# Patient Record
Sex: FEMALE | Race: WHITE | NOT HISPANIC OR LATINO | Employment: FULL TIME | URBAN - METROPOLITAN AREA
[De-identification: names, ages, dates, MRNs, and addresses within clinical notes are randomized per-mention and may not be internally consistent; named-entity substitution may affect disease eponyms.]

---

## 2017-01-11 ENCOUNTER — APPOINTMENT (OUTPATIENT)
Dept: LAB | Facility: HOSPITAL | Age: 62
End: 2017-01-11

## 2017-01-11 DIAGNOSIS — E78.5 HYPERLIPIDEMIA, UNSPECIFIED HYPERLIPIDEMIA TYPE: Primary | ICD-10-CM

## 2017-01-11 DIAGNOSIS — E03.9 HYPOTHYROIDISM, UNSPECIFIED TYPE: ICD-10-CM

## 2017-01-11 LAB
ALBUMIN SERPL-MCNC: 4.6 G/DL (ref 3.5–5.2)
ALBUMIN/GLOB SERPL: 1.6 G/DL
ALP SERPL-CCNC: 82 U/L (ref 39–117)
ALT SERPL W P-5'-P-CCNC: 15 U/L (ref 1–33)
ANION GAP SERPL CALCULATED.3IONS-SCNC: 11.9 MMOL/L
AST SERPL-CCNC: 18 U/L (ref 1–32)
BILIRUB SERPL-MCNC: 0.6 MG/DL (ref 0.1–1.2)
BUN BLD-MCNC: 18 MG/DL (ref 8–23)
BUN/CREAT SERPL: 20.5 (ref 7–25)
CALCIUM SPEC-SCNC: 9.3 MG/DL (ref 8.6–10.5)
CHLORIDE SERPL-SCNC: 104 MMOL/L (ref 98–107)
CHOLEST SERPL-MCNC: 219 MG/DL (ref 0–200)
CO2 SERPL-SCNC: 26.1 MMOL/L (ref 22–29)
CREAT BLD-MCNC: 0.88 MG/DL (ref 0.57–1)
GFR SERPL CREATININE-BSD FRML MDRD: 65 ML/MIN/1.73
GLOBULIN UR ELPH-MCNC: 2.9 GM/DL
GLUCOSE BLD-MCNC: 94 MG/DL (ref 65–99)
HDLC SERPL-MCNC: 87 MG/DL (ref 40–60)
LDLC SERPL CALC-MCNC: 114 MG/DL (ref 0–100)
LDLC/HDLC SERPL: 1.31 {RATIO}
POTASSIUM BLD-SCNC: 4 MMOL/L (ref 3.5–5.2)
PROT SERPL-MCNC: 7.5 G/DL (ref 6–8.5)
SODIUM BLD-SCNC: 142 MMOL/L (ref 136–145)
TRIGL SERPL-MCNC: 88 MG/DL (ref 0–150)
TSH SERPL DL<=0.05 MIU/L-ACNC: 1.9 MIU/ML (ref 0.27–4.2)
VLDLC SERPL-MCNC: 17.6 MG/DL (ref 5–40)

## 2017-01-11 PROCEDURE — 80061 LIPID PANEL: CPT | Performed by: INTERNAL MEDICINE

## 2017-01-11 PROCEDURE — 84443 ASSAY THYROID STIM HORMONE: CPT | Performed by: INTERNAL MEDICINE

## 2017-01-11 PROCEDURE — 80053 COMPREHEN METABOLIC PANEL: CPT | Performed by: INTERNAL MEDICINE

## 2017-01-11 RX ORDER — ESTRADIOL 0.5 MG/1
TABLET ORAL
Qty: 90 TABLET | Refills: 0 | Status: SHIPPED | OUTPATIENT
Start: 2017-01-11 | End: 2017-06-30 | Stop reason: SDUPTHER

## 2017-01-11 RX ORDER — SOLIFENACIN SUCCINATE 5 MG/1
TABLET, FILM COATED ORAL
Qty: 90 TABLET | Refills: 1 | Status: SHIPPED | OUTPATIENT
Start: 2017-01-11 | End: 2017-08-09 | Stop reason: SDUPTHER

## 2017-01-16 ENCOUNTER — OFFICE VISIT (OUTPATIENT)
Dept: INTERNAL MEDICINE | Facility: CLINIC | Age: 62
End: 2017-01-16

## 2017-01-16 VITALS
WEIGHT: 157 LBS | HEART RATE: 90 BPM | SYSTOLIC BLOOD PRESSURE: 134 MMHG | DIASTOLIC BLOOD PRESSURE: 84 MMHG | HEIGHT: 58 IN | OXYGEN SATURATION: 98 % | BODY MASS INDEX: 32.95 KG/M2

## 2017-01-16 DIAGNOSIS — E78.00 HYPERCHOLESTEROLEMIA: ICD-10-CM

## 2017-01-16 DIAGNOSIS — F41.9 ANXIETY: Primary | ICD-10-CM

## 2017-01-16 DIAGNOSIS — E03.9 HYPOTHYROIDISM, UNSPECIFIED TYPE: ICD-10-CM

## 2017-01-16 PROCEDURE — 99214 OFFICE O/P EST MOD 30 MIN: CPT | Performed by: INTERNAL MEDICINE

## 2017-01-16 RX ORDER — ALPRAZOLAM 0.25 MG/1
0.25 TABLET ORAL 3 TIMES DAILY PRN
Qty: 30 TABLET | Refills: 0 | Status: SHIPPED | OUTPATIENT
Start: 2017-01-16 | End: 2019-06-21 | Stop reason: SDUPTHER

## 2017-01-16 NOTE — MR AVS SNAPSHOT
Salazar MICHAEL Foy   1/16/2017 9:45 AM   Office Visit    Dept Phone:  [phone]   Encounter #:  [id number]    Provider:  Michelle Wyman MD   Department:  [facility] INTERNAL MEDICINE                Your Full Care Plan              Today's Medication Changes          These changes are accurate as of: 1/16/17 10:35 AM.  If you have any questions, ask your nurse or doctor.               Medication(s)that have changed:     ALPRAZolam 0.25 MG tablet   Commonly known as:  XANAX   Take 1 tablet by mouth 3 (Three) Times a Day As Needed for anxiety.   What changed:    - how much to take  - when to take this  - reasons to take this         Stop taking medication(s)listed here:     calcium carbonate 600 MG tablet   Commonly known as:  OS-HUMBERTO           vitamin D 31814 UNITS capsule capsule   Commonly known as:  ERGOCALCIFEROL           Vitamin D3 2000 UNITS capsule                Where to Get Your Medications      You can get these medications from any pharmacy     Bring a paper prescription for each of these medications     ALPRAZolam 0.25 MG tablet                  Your Updated Medication List          This list is accurate as of: 1/16/17 10:35 AM.  Always use your most recent med list.                ALPRAZolam 0.25 MG tablet   Commonly known as:  XANAX   Take 1 tablet by mouth 3 (Three) Times a Day As Needed for anxiety.       aspirin 81 MG tablet       docusate sodium 100 MG capsule   Commonly known as:  COLACE       estradiol 0.5 MG tablet   Commonly known as:  ESTRACE   TAKE ONE TABLET BY MOUTH DAILY       fluticasone 50 MCG/ACT nasal spray   Commonly known as:  FLONASE       levothyroxine 137 MCG tablet   Commonly known as:  SYNTHROID, LEVOTHROID   TAKE ONE TABLET BY MOUTH DAILY       Magnesium 250 MG tablet       omeprazole 20 MG capsule   Commonly known as:  priLOSEC       simvastatin 40 MG tablet   Commonly known as:  ZOCOR   TAKE ONE TABLET BY MOUTH EVERY NIGHT AT BEDTIME        traZODone 50 MG tablet   Commonly known as:  DESYREL       valACYclovir 1000 MG tablet   Commonly known as:  VALTREX   TAKE ONE TABLET BY MOUTH DAILY       VESICARE 5 MG tablet   Generic drug:  solifenacin   TAKE ONE TABLET BY MOUTH DAILY               You Were Diagnosed With        Codes Comments    Anxiety    -  Primary ICD-10-CM: F41.9  ICD-9-CM: 300.00     Hypothyroidism, unspecified type     ICD-10-CM: E03.9  ICD-9-CM: 244.9     Hypercholesterolemia     ICD-10-CM: E78.00  ICD-9-CM: 272.0       Instructions     None    Patient Instructions History      Upcoming Appointments     Visit Type Date Time Department    OFFICE VISIT 2017  9:45 AM [facility]    OFFICE VISIT 2017 11:00 AM [facility]     [facility] allows you to send messages to your doctor, view your test results, renew your prescriptions, schedule appointments, and more. To sign up, go to [employer] and click on the Sign Up Now link in the New User? box. Enter your [facility] Activation Code exactly as it appears below along with the last four digits of your Social Security Number and your Date of Birth () to complete the sign-up process. If you do not sign up before the expiration date, you must request a new code.    [facility] Activation Code: [email or web address]  Expires: 2017 10:35 AM    If you have questions, you can email [email or web address] or call [phone] to talk to our [facility] staff. Remember, [facility] is NOT to be used for urgent needs. For medical emergencies, dial 911.               Other Info from Your Visit           Your Appointments     2017 11:00 AM EDT   Office Visit with Michelle Wyman MD   [facility] INTER[facility] (--)    [address] [place] [id number] [phone]           Arrive 15 minutes prior to appointment.              Allergies     Codeine      Morphine And Related      Penicillins       "  Reason for Visit     Hyperlipidemia     Hypothyroidism     grief           Vital Signs     Blood Pressure Pulse Height Weight Oxygen Saturation Body Mass Index    134/84 90 58\" (147.3 cm) 157 lb (71.2 kg) 98% 32.81 kg/m2    Smoking Status                   Never Smoker           Problems and Diagnoses Noted     Anxiety problem    High cholesterol    Underactive thyroid        "

## 2017-01-16 NOTE — PROGRESS NOTES
Subjective     Marie Foy is a 61 y.o. female who presents with   Chief Complaint   Patient presents with   • Hyperlipidemia   • Hypothyroidism   • grief       History of Present Illness     HLD.  She is maintained on simvastatin with good control.  Hypothyroidism.  She is well-controlled on levothyroxine.   Grief.  She just lost her  one month ago.  She is feeling very anxious.  She is not sleeping well.  Feels her heart is beating hard with anxiety.     Review of Systems   Respiratory: Negative for shortness of breath.    Cardiovascular: Negative for chest pain.       The following portions of the patient's history were reviewed and updated as appropriate: allergies, current medications and problem list.    Patient Active Problem List    Diagnosis Date Noted   • Fixation hardware in foot 04/21/2016   • Closed nondisplaced fracture of fifth right metatarsal bone 04/21/2016   • Anxiety 02/26/2016   • Gastroesophageal reflux disease 02/26/2016   • Herpes simplex type 2 infection 02/26/2016   • Hypercholesterolemia 02/26/2016   • Hypothyroidism 02/26/2016   • Osteopenia 02/26/2016     Note Last Updated: 2/26/2016     Description: Actonel start date 2006     • Overactive bladder 02/26/2016   • Fear of flying 02/26/2016     Note Last Updated: 2/26/2016     Description: flying and dental work     • Unilateral partial paralysis of vocal cords or larynx 02/26/2016     Note Last Updated: 2/26/2016     Description: chronic     • Colon polyps        Current Outpatient Prescriptions on File Prior to Visit   Medication Sig Dispense Refill   • aspirin 81 MG tablet Take  by mouth.     • docusate sodium (COLACE) 100 MG capsule Take  by mouth daily.     • estradiol (ESTRACE) 0.5 MG tablet TAKE ONE TABLET BY MOUTH DAILY 90 tablet 0   • fluticasone (FLONASE) 50 MCG/ACT nasal spray into each nostril.     • levothyroxine (SYNTHROID, LEVOTHROID) 137 MCG tablet TAKE ONE TABLET BY MOUTH DAILY 90 tablet 0   • Magnesium 250 MG  "tablet Take  by mouth.     • omeprazole (PriLOSEC) 20 MG capsule Take  by mouth.     • simvastatin (ZOCOR) 40 MG tablet TAKE ONE TABLET BY MOUTH EVERY NIGHT AT BEDTIME 90 tablet 1   • traZODone (DESYREL) 50 MG tablet Take  by mouth.     • valACYclovir (VALTREX) 1000 MG tablet TAKE ONE TABLET BY MOUTH DAILY 30 tablet 4   • VESICARE 5 MG tablet TAKE ONE TABLET BY MOUTH DAILY 90 tablet 1   • [DISCONTINUED] ALPRAZolam (XANAX) 0.25 MG tablet Take  by mouth.     • [DISCONTINUED] calcium carbonate (OS-HUMBERTO) 600 MG tablet Take 600 mg by mouth daily.     • [DISCONTINUED] Cholecalciferol (VITAMIN D3) 2000 UNITS capsule Take  by mouth.     • [DISCONTINUED] vitamin D (ERGOCALCIFEROL) 08063 UNITS capsule capsule Take 1 capsule by mouth 2 (two) times a week. 12 capsule 1     No current facility-administered medications on file prior to visit.        Objective     Visit Vitals   • /84   • Pulse 90   • Ht 58\" (147.3 cm)   • Wt 157 lb (71.2 kg)   • SpO2 98%   • BMI 32.81 kg/m2       Physical Exam   Constitutional: She is oriented to person, place, and time. She appears well-developed and well-nourished.   HENT:   Head: Normocephalic and atraumatic.   Cardiovascular: Normal rate, regular rhythm and normal heart sounds.    Pulmonary/Chest: Effort normal and breath sounds normal.   Neurological: She is alert and oriented to person, place, and time.   Skin: Skin is warm and dry.   Psychiatric: She has a normal mood and affect. Her behavior is normal.       Assessment/Plan   Marie was seen today for hyperlipidemia, hypothyroidism and grief.    Diagnoses and all orders for this visit:    Anxiety    Hypothyroidism, unspecified type    Hypercholesterolemia    Other orders  -     ALPRAZolam (XANAX) 0.25 MG tablet; Take 1 tablet by mouth 3 (Three) Times a Day As Needed for anxiety.        Discussion  HLD.  Contiue simvastatin.   Hypothyroidism.  Continue levothyroxine.   Grief and situation anxiety.  Prn Xanax.  Discussed with the " patient onset on action and the potential side effects including addictive nature and sedation.  The patient will let me know of any side effects from the medication.      Harsha and belen updated today.    25 minutes spent with the patient with greater than 50% of time spent counseling the following topics:, diagnostic results, impressions         No future appointments.

## 2017-02-01 ENCOUNTER — DOCUMENTATION (OUTPATIENT)
Dept: PHYSICAL THERAPY | Facility: CLINIC | Age: 62
End: 2017-02-01

## 2017-02-01 NOTE — PROGRESS NOTES
Discharge Summary  Discharge Summary from Physical Therapy Report      Dates  PT visit: 06/15/16 - 07/25/16  Number of Visits: 9     Discharge Status of Patient: See treatment note dated 07/25/16    Goals: Partially Met    Discharge Plan: Continue with current home exercise program as instructed    Comments Patient did not return for final appointment prior to discharge.    Date of Discharge 02/01/17        Lalitha Murphy, PT, DPT  Physical Therapist

## 2017-03-09 RX ORDER — LEVOTHYROXINE SODIUM 137 UG/1
TABLET ORAL
Qty: 90 TABLET | Refills: 0 | Status: SHIPPED | OUTPATIENT
Start: 2017-03-09 | End: 2017-08-09 | Stop reason: SDUPTHER

## 2017-05-01 RX ORDER — SIMVASTATIN 40 MG
TABLET ORAL
Qty: 90 TABLET | Refills: 0 | Status: SHIPPED | OUTPATIENT
Start: 2017-05-01 | End: 2017-05-10

## 2017-05-03 DIAGNOSIS — E78.00 HYPERCHOLESTEROLEMIA: ICD-10-CM

## 2017-05-03 DIAGNOSIS — M85.80 OSTEOPENIA: Primary | ICD-10-CM

## 2017-05-03 DIAGNOSIS — E03.9 HYPOTHYROIDISM, UNSPECIFIED TYPE: ICD-10-CM

## 2017-05-04 LAB
ALBUMIN SERPL-MCNC: 4.7 G/DL (ref 3.5–5.2)
ALBUMIN/GLOB SERPL: 1.7 G/DL
ALP SERPL-CCNC: 86 U/L (ref 39–117)
ALT SERPL-CCNC: 18 U/L (ref 1–33)
APPEARANCE UR: CLEAR
AST SERPL-CCNC: 17 U/L (ref 1–32)
BACTERIA #/AREA URNS HPF: ABNORMAL /HPF
BASOPHILS # BLD AUTO: 0.02 10*3/MM3 (ref 0–0.2)
BASOPHILS NFR BLD AUTO: 0.5 % (ref 0–1.5)
BILIRUB SERPL-MCNC: 0.5 MG/DL (ref 0.1–1.2)
BILIRUB UR QL STRIP: NEGATIVE
BUN SERPL-MCNC: 18 MG/DL (ref 8–23)
BUN/CREAT SERPL: 20.2 (ref 7–25)
CALCIUM SERPL-MCNC: 9.4 MG/DL (ref 8.6–10.5)
CASTS URNS MICRO: ABNORMAL
CHLORIDE SERPL-SCNC: 102 MMOL/L (ref 98–107)
CHOLEST SERPL-MCNC: 234 MG/DL (ref 0–200)
CO2 SERPL-SCNC: 27.7 MMOL/L (ref 22–29)
COLOR UR: YELLOW
CREAT SERPL-MCNC: 0.89 MG/DL (ref 0.57–1)
EOSINOPHIL # BLD AUTO: 0.08 10*3/MM3 (ref 0–0.7)
EOSINOPHIL NFR BLD AUTO: 2 % (ref 0.3–6.2)
EPI CELLS #/AREA URNS HPF: ABNORMAL /HPF
ERYTHROCYTE [DISTWIDTH] IN BLOOD BY AUTOMATED COUNT: 13 % (ref 11.7–13)
GLOBULIN SER CALC-MCNC: 2.7 GM/DL
GLUCOSE SERPL-MCNC: 94 MG/DL (ref 65–99)
GLUCOSE UR QL: NEGATIVE
HCT VFR BLD AUTO: 40.4 % (ref 35.6–45.5)
HDLC SERPL-MCNC: 85 MG/DL (ref 40–60)
HGB BLD-MCNC: 13.1 G/DL (ref 11.9–15.5)
HGB UR QL STRIP: NEGATIVE
IMM GRANULOCYTES # BLD: 0 10*3/MM3 (ref 0–0.03)
IMM GRANULOCYTES NFR BLD: 0 % (ref 0–0.5)
KETONES UR QL STRIP: NEGATIVE
LDLC SERPL CALC-MCNC: 114 MG/DL (ref 0–100)
LEUKOCYTE ESTERASE UR QL STRIP: NEGATIVE
LYMPHOCYTES # BLD AUTO: 1.54 10*3/MM3 (ref 0.9–4.8)
LYMPHOCYTES NFR BLD AUTO: 38.4 % (ref 19.6–45.3)
MCH RBC QN AUTO: 29.4 PG (ref 26.9–32)
MCHC RBC AUTO-ENTMCNC: 32.4 G/DL (ref 32.4–36.3)
MCV RBC AUTO: 90.8 FL (ref 80.5–98.2)
MONOCYTES # BLD AUTO: 0.29 10*3/MM3 (ref 0.2–1.2)
MONOCYTES NFR BLD AUTO: 7.2 % (ref 5–12)
NEUTROPHILS # BLD AUTO: 2.08 10*3/MM3 (ref 1.9–8.1)
NEUTROPHILS NFR BLD AUTO: 51.9 % (ref 42.7–76)
NITRITE UR QL STRIP: NEGATIVE
PH UR STRIP: 7.5 [PH] (ref 5–8)
PLATELET # BLD AUTO: 230 10*3/MM3 (ref 140–500)
POTASSIUM SERPL-SCNC: 4.4 MMOL/L (ref 3.5–5.2)
PROT SERPL-MCNC: 7.4 G/DL (ref 6–8.5)
PROT UR QL STRIP: NEGATIVE
RBC # BLD AUTO: 4.45 10*6/MM3 (ref 3.9–5.2)
RBC #/AREA URNS HPF: ABNORMAL /HPF
SODIUM SERPL-SCNC: 142 MMOL/L (ref 136–145)
SP GR UR: 1.01 (ref 1–1.03)
TRIGL SERPL-MCNC: 176 MG/DL (ref 0–150)
TSH SERPL DL<=0.005 MIU/L-ACNC: 0.43 MIU/ML (ref 0.27–4.2)
UROBILINOGEN UR STRIP-MCNC: (no result) MG/DL
VLDLC SERPL CALC-MCNC: 35.2 MG/DL (ref 5–40)
WBC # BLD AUTO: 4.01 10*3/MM3 (ref 4.5–10.7)
WBC #/AREA URNS HPF: ABNORMAL /HPF

## 2017-05-10 ENCOUNTER — OFFICE VISIT (OUTPATIENT)
Dept: INTERNAL MEDICINE | Facility: CLINIC | Age: 62
End: 2017-05-10

## 2017-05-10 VITALS
BODY MASS INDEX: 33.17 KG/M2 | DIASTOLIC BLOOD PRESSURE: 80 MMHG | WEIGHT: 158 LBS | OXYGEN SATURATION: 96 % | HEART RATE: 67 BPM | HEIGHT: 58 IN | SYSTOLIC BLOOD PRESSURE: 170 MMHG

## 2017-05-10 DIAGNOSIS — M25.511 ACUTE PAIN OF RIGHT SHOULDER: ICD-10-CM

## 2017-05-10 DIAGNOSIS — Z00.00 WELL ADULT EXAM: Primary | ICD-10-CM

## 2017-05-10 DIAGNOSIS — Z11.59 NEED FOR HEPATITIS C SCREENING TEST: ICD-10-CM

## 2017-05-10 DIAGNOSIS — Z12.31 ENCOUNTER FOR SCREENING MAMMOGRAM FOR BREAST CANCER: ICD-10-CM

## 2017-05-10 DIAGNOSIS — E78.5 HYPERLIPIDEMIA, UNSPECIFIED HYPERLIPIDEMIA TYPE: ICD-10-CM

## 2017-05-10 PROCEDURE — 93000 ELECTROCARDIOGRAM COMPLETE: CPT | Performed by: INTERNAL MEDICINE

## 2017-05-10 PROCEDURE — 73030 X-RAY EXAM OF SHOULDER: CPT | Performed by: INTERNAL MEDICINE

## 2017-05-10 PROCEDURE — 99396 PREV VISIT EST AGE 40-64: CPT | Performed by: INTERNAL MEDICINE

## 2017-05-10 RX ORDER — ATORVASTATIN CALCIUM 40 MG/1
40 TABLET, FILM COATED ORAL DAILY
Qty: 90 TABLET | Refills: 3 | Status: SHIPPED | OUTPATIENT
Start: 2017-05-10 | End: 2018-11-30 | Stop reason: SDUPTHER

## 2017-05-10 RX ORDER — ACETAMINOPHEN 500 MG
TABLET ORAL
COMMUNITY
End: 2019-03-11

## 2017-06-16 ENCOUNTER — APPOINTMENT (OUTPATIENT)
Dept: WOMENS IMAGING | Facility: HOSPITAL | Age: 62
End: 2017-06-16

## 2017-06-16 PROCEDURE — 77067 SCR MAMMO BI INCL CAD: CPT | Performed by: RADIOLOGY

## 2017-06-16 PROCEDURE — G0202 SCR MAMMO BI INCL CAD: HCPCS | Performed by: RADIOLOGY

## 2017-07-03 RX ORDER — ESTRADIOL 0.5 MG/1
TABLET ORAL
Qty: 90 TABLET | Refills: 0 | Status: SHIPPED | OUTPATIENT
Start: 2017-07-03 | End: 2017-11-08 | Stop reason: SDUPTHER

## 2017-08-01 ENCOUNTER — RESULTS ENCOUNTER (OUTPATIENT)
Dept: INTERNAL MEDICINE | Facility: CLINIC | Age: 62
End: 2017-08-01

## 2017-08-01 DIAGNOSIS — Z11.59 NEED FOR HEPATITIS C SCREENING TEST: ICD-10-CM

## 2017-08-01 DIAGNOSIS — E78.5 HYPERLIPIDEMIA, UNSPECIFIED HYPERLIPIDEMIA TYPE: ICD-10-CM

## 2017-08-09 RX ORDER — SOLIFENACIN SUCCINATE 5 MG/1
TABLET, FILM COATED ORAL
Qty: 90 TABLET | Refills: 0 | Status: SHIPPED | OUTPATIENT
Start: 2017-08-09 | End: 2018-02-09 | Stop reason: SDUPTHER

## 2017-08-09 RX ORDER — LEVOTHYROXINE SODIUM 137 UG/1
TABLET ORAL
Qty: 90 TABLET | Refills: 0 | Status: SHIPPED | OUTPATIENT
Start: 2017-08-09 | End: 2017-11-08 | Stop reason: SDUPTHER

## 2017-08-14 DIAGNOSIS — E78.5 HYPERLIPIDEMIA, UNSPECIFIED HYPERLIPIDEMIA TYPE: Primary | ICD-10-CM

## 2017-08-14 DIAGNOSIS — E03.9 HYPOTHYROIDISM, UNSPECIFIED TYPE: ICD-10-CM

## 2017-08-14 LAB
ALBUMIN SERPL-MCNC: 4.5 G/DL (ref 3.5–5.2)
ALBUMIN/GLOB SERPL: 1.7 G/DL
ALP SERPL-CCNC: 93 U/L (ref 39–117)
ALT SERPL-CCNC: 18 U/L (ref 1–33)
AST SERPL-CCNC: 18 U/L (ref 1–32)
BILIRUB SERPL-MCNC: 0.7 MG/DL (ref 0.1–1.2)
BUN SERPL-MCNC: 14 MG/DL (ref 8–23)
BUN/CREAT SERPL: 15.1 (ref 7–25)
CALCIUM SERPL-MCNC: 9.7 MG/DL (ref 8.6–10.5)
CHLORIDE SERPL-SCNC: 102 MMOL/L (ref 98–107)
CHOLEST SERPL-MCNC: 223 MG/DL (ref 0–200)
CO2 SERPL-SCNC: 26.2 MMOL/L (ref 22–29)
CREAT SERPL-MCNC: 0.93 MG/DL (ref 0.57–1)
GLOBULIN SER CALC-MCNC: 2.7 GM/DL
GLUCOSE SERPL-MCNC: 98 MG/DL (ref 65–99)
HDLC SERPL-MCNC: 74 MG/DL (ref 40–60)
LDLC SERPL CALC-MCNC: 118 MG/DL (ref 0–100)
POTASSIUM SERPL-SCNC: 4.1 MMOL/L (ref 3.5–5.2)
PROT SERPL-MCNC: 7.2 G/DL (ref 6–8.5)
SODIUM SERPL-SCNC: 143 MMOL/L (ref 136–145)
TRIGL SERPL-MCNC: 156 MG/DL (ref 0–150)
TSH SERPL DL<=0.005 MIU/L-ACNC: 3.27 MIU/ML (ref 0.27–4.2)
VLDLC SERPL CALC-MCNC: 31.2 MG/DL (ref 5–40)

## 2017-08-18 ENCOUNTER — OFFICE VISIT (OUTPATIENT)
Dept: INTERNAL MEDICINE | Facility: CLINIC | Age: 62
End: 2017-08-18

## 2017-08-18 VITALS
OXYGEN SATURATION: 96 % | HEIGHT: 58 IN | HEART RATE: 62 BPM | DIASTOLIC BLOOD PRESSURE: 90 MMHG | SYSTOLIC BLOOD PRESSURE: 152 MMHG | BODY MASS INDEX: 33.17 KG/M2 | WEIGHT: 158 LBS

## 2017-08-18 DIAGNOSIS — R94.31 ABNORMAL EKG: ICD-10-CM

## 2017-08-18 DIAGNOSIS — E03.9 HYPOTHYROIDISM, UNSPECIFIED TYPE: ICD-10-CM

## 2017-08-18 DIAGNOSIS — R07.89 ATYPICAL CHEST PAIN: ICD-10-CM

## 2017-08-18 DIAGNOSIS — E78.00 HYPERCHOLESTEROLEMIA: Primary | ICD-10-CM

## 2017-08-18 PROCEDURE — 93000 ELECTROCARDIOGRAM COMPLETE: CPT | Performed by: INTERNAL MEDICINE

## 2017-08-18 PROCEDURE — 71020 XR CHEST PA AND LATERAL: CPT | Performed by: INTERNAL MEDICINE

## 2017-08-18 PROCEDURE — 99214 OFFICE O/P EST MOD 30 MIN: CPT | Performed by: INTERNAL MEDICINE

## 2017-08-18 RX ORDER — OMEPRAZOLE 40 MG/1
40 CAPSULE, DELAYED RELEASE ORAL DAILY
Qty: 90 CAPSULE | Refills: 3 | Status: SHIPPED | OUTPATIENT
Start: 2017-08-18 | End: 2018-12-31 | Stop reason: SDUPTHER

## 2017-08-18 RX ORDER — RISEDRONATE SODIUM 35 MG/1
35 TABLET, FILM COATED ORAL
COMMUNITY
End: 2017-08-18

## 2017-08-18 NOTE — PATIENT INSTRUCTIONS
Gastroesophageal Reflux Disease, Adult  Normally, food travels down the esophagus and stays in the stomach to be digested. However, when a person has gastroesophageal reflux disease (GERD), food and stomach acid move back up into the esophagus. When this happens, the esophagus becomes sore and inflamed. Over time, GERD can create small holes (ulcers) in the lining of the esophagus.   CAUSES  This condition is caused by a problem with the muscle between the esophagus and the stomach (lower esophageal sphincter, or LES). Normally, the LES muscle closes after food passes through the esophagus to the stomach. When the LES is weakened or abnormal, it does not close properly, and that allows food and stomach acid to go back up into the esophagus. The LES can be weakened by certain dietary substances, medicines, and medical conditions, including:  · Tobacco use.  · Pregnancy.  · Having a hiatal hernia.  · Heavy alcohol use.  · Certain foods and beverages, such as coffee, chocolate, onions, and peppermint.  RISK FACTORS  This condition is more likely to develop in:  · People who have an increased body weight.  · People who have connective tissue disorders.  · People who use NSAID medicines.  SYMPTOMS  Symptoms of this condition include:  · Heartburn.  · Difficult or painful swallowing.  · The feeling of having a lump in the throat.  · A bitter taste in the mouth.  · Bad breath.  · Having a large amount of saliva.  · Having an upset or bloated stomach.  · Belching.  · Chest pain.  · Shortness of breath or wheezing.  · Ongoing (chronic) cough or a night-time cough.  · Wearing away of tooth enamel.  · Weight loss.  Different conditions can cause chest pain. Make sure to see your health care provider if you experience chest pain.  DIAGNOSIS  Your health care provider will take a medical history and perform a physical exam. To determine if you have mild or severe GERD, your health care provider may also monitor how you respond  to treatment. You may also have other tests, including:  · An endoscopy to examine your stomach and esophagus with a small camera.  · A test that measures the acidity level in your esophagus.  · A test that measures how much pressure is on your esophagus.  · A barium swallow or modified barium swallow to show the shape, size, and functioning of your esophagus.  TREATMENT  The goal of treatment is to help relieve your symptoms and to prevent complications. Treatment for this condition may vary depending on how severe your symptoms are. Your health care provider may recommend:  · Changes to your diet.  · Medicine.  · Surgery.  HOME CARE INSTRUCTIONS  Diet  · Follow a diet as recommended by your health care provider. This may involve avoiding foods and drinks such as:    Coffee and tea (with or without caffeine).    Drinks that contain alcohol.    Energy drinks and sports drinks.    Carbonated drinks or sodas.    Chocolate and cocoa.    Peppermint and mint flavorings.    Garlic and onions.    Horseradish.    Spicy and acidic foods, including peppers, chili powder, harding powder, vinegar, hot sauces, and barbecue sauce.    Citrus fruit juices and citrus fruits, such as oranges, nir, and limes.    Tomato-based foods, such as red sauce, chili, salsa, and pizza with red sauce.    Fried and fatty foods, such as donuts, french fries, potato chips, and high-fat dressings.    High-fat meats, such as hot dogs and fatty cuts of red and white meats, such as rib eye steak, sausage, ham, and pacheco.    High-fat dairy items, such as whole milk, butter, and cream cheese.  · Eat small, frequent meals instead of large meals.  · Avoid drinking large amounts of liquid with your meals.  · Avoid eating meals during the 2-3 hours before bedtime.  · Avoid lying down right after you eat.  · Do not exercise right after you eat.   General Instructions   · Pay attention to any changes in your symptoms.  · Take over-the-counter and prescription  medicines only as told by your health care provider. Do not take aspirin, ibuprofen, or other NSAIDs unless your health care provider told you to do so.  · Do not use any tobacco products, including cigarettes, chewing tobacco, and e-cigarettes. If you need help quitting, ask your health care provider.  · Wear loose-fitting clothing. Do not wear anything tight around your waist that causes pressure on your abdomen.  · Raise (elevate) the head of your bed 6 inches (15cm).  · Try to reduce your stress, such as with yoga or meditation. If you need help reducing stress, ask your health care provider.  · If you are overweight, reduce your weight to an amount that is healthy for you. Ask your health care provider for guidance about a safe weight loss goal.  · Keep all follow-up visits as told by your health care provider. This is important.  SEEK MEDICAL CARE IF:  · You have new symptoms.  · You have unexplained weight loss.  · You have difficulty swallowing, or it hurts to swallow.  · You have wheezing or a persistent cough.  · Your symptoms do not improve with treatment.  · You have a hoarse voice.  SEEK IMMEDIATE MEDICAL CARE IF:  · You have pain in your arms, neck, jaw, teeth, or back.  · You feel sweaty, dizzy, or light-headed.  · You have chest pain or shortness of breath.  · You vomit and your vomit looks like blood or coffee grounds.  · You faint.  · Your stool is bloody or black.  · You cannot swallow, drink, or eat.     This information is not intended to replace advice given to you by your health care provider. Make sure you discuss any questions you have with your health care provider.     Document Released: 09/27/2006 Document Revised: 09/07/2016 Document Reviewed: 04/13/2016  [employer] Patient Education ©2017 [employer]

## 2017-08-18 NOTE — PROGRESS NOTES
Subjective     Marie Foy is a 62 y.o. female who presents with   Chief Complaint   Patient presents with   • Hyperlipidemia   • Hypothyroidism       History of Present Illness     HLD.  Little change with atorvastatin 40.  I recommended to take at night.   Hypothyroidism.  Good control with levothyroxine.     New c/o CP.  Off and on issue.  Increase after eating.  Not with exercise.  No SOA.  Stabbing pain.  TUMS is helpful.  Taking an extra prilosec is helpful.     Review of Systems   Respiratory: Negative for cough and shortness of breath.    Cardiovascular: Positive for chest pain.   Gastrointestinal: Negative for abdominal pain.       The following portions of the patient's history were reviewed and updated as appropriate: allergies, current medications and problem list.    Patient Active Problem List    Diagnosis Date Noted   • Anxiety 02/26/2016   • Gastroesophageal reflux disease 02/26/2016   • Herpes simplex type 2 infection 02/26/2016   • Hypercholesterolemia 02/26/2016   • Hypothyroidism 02/26/2016   • Osteopenia 02/26/2016     Note Last Updated: 2/26/2016     Description: Actonel start date 2006     • Overactive bladder 02/26/2016   • Fear of flying 02/26/2016     Note Last Updated: 2/26/2016     Description: flying and dental work     • Unilateral partial paralysis of vocal cords or larynx 02/26/2016     Note Last Updated: 2/26/2016     Description: chronic     • Colon polyps        Current Outpatient Prescriptions on File Prior to Visit   Medication Sig Dispense Refill   • ALPRAZolam (XANAX) 0.25 MG tablet Take 1 tablet by mouth 3 (Three) Times a Day As Needed for anxiety. 30 tablet 0   • aspirin 81 MG tablet Take  by mouth.     • atorvastatin (LIPITOR) 40 MG tablet Take 1 tablet by mouth Daily. 90 tablet 3   • Calcium Carbonate-Vit D-Min (CALCIUM 1200) 5683-5717 MG-UNIT chewable tablet Chew.     • docusate sodium (COLACE) 100 MG capsule Take  by mouth daily.     • estradiol (ESTRACE) 0.5 MG tablet  "TAKE ONE TABLET BY MOUTH DAILY 90 tablet 0   • fluticasone (FLONASE) 50 MCG/ACT nasal spray into each nostril.     • levothyroxine (SYNTHROID, LEVOTHROID) 137 MCG tablet TAKE ONE TABLET BY MOUTH DAILY 90 tablet 0   • Magnesium 250 MG tablet Take  by mouth.     • traZODone (DESYREL) 50 MG tablet Take  by mouth.     • valACYclovir (VALTREX) 1000 MG tablet TAKE ONE TABLET BY MOUTH DAILY 30 tablet 4   • [DISCONTINUED] omeprazole (PriLOSEC) 20 MG capsule Take  by mouth.     • VESICARE 5 MG tablet TAKE ONE TABLET BY MOUTH DAILY 90 tablet 0     No current facility-administered medications on file prior to visit.        Objective     /90  Pulse 62  Ht 58\" (147.3 cm)  Wt 158 lb (71.7 kg)  SpO2 96%  BMI 33.02 kg/m2      Physical Exam   Constitutional: She is oriented to person, place, and time. She appears well-developed and well-nourished.   HENT:   Head: Normocephalic and atraumatic.   Cardiovascular: Normal rate, regular rhythm and normal heart sounds.    Pulmonary/Chest: Effort normal and breath sounds normal.   Neurological: She is alert and oriented to person, place, and time.   Skin: Skin is warm and dry.   Psychiatric: She has a normal mood and affect. Her behavior is normal.     X-rays of the chest  performed today for following indication:   pain.  X-ray reveal nad.  There is no available x-ray for comparison.  X-ray sent to radiology for official interpretation and findings.        ECG 12 Lead  Date/Time: 8/18/2017 2:13 PM  Performed by: VERONIQUE WORLEY  Authorized by: VERONIQUE WORLEY   Comparison: compared with previous ECG from 5/10/2017  Similar to previous ECG  Rhythm: sinus rhythm  Rate: normal  ST Segments: ST segments normal  T depression: V1 and V2  QRS axis: normal  Clinical impression: non-specific ECG            Assessment/Plan   Marie was seen today for hyperlipidemia and hypothyroidism.    Diagnoses and all orders for this visit:    Hypercholesterolemia    Hypothyroidism, unspecified " type    Atypical chest pain  -     XR Chest PA & Lateral  -     Adult Stress Echo With Color & Doppler; Future    Abnormal EKG  -     Adult Stress Echo With Color & Doppler; Future    Other orders  -     omeprazole (PRILOSEC) 40 MG capsule; Take 1 capsule by mouth Daily.  -     Biopsy  -     ECG 12 Lead        Discussion  HLD.  Continue atorvastatin but take qhs.   Hypothyroidism.  Continue levothyroxine.    New atypical CP likely GERD related.  EKG is unchanged.  CXR is NAD.  Increase omeprazole to 40mg.  Schedule stress echo to rule out cardiac.         Future Appointments  Date Time Provider Department Center   11/13/2017 1:10 PM [facility] None   11/20/2017 1:15 PM Michelle Wyman MD [facility] None

## 2017-08-24 ENCOUNTER — HOSPITAL ENCOUNTER (OUTPATIENT)
Dept: CARDIOLOGY | Facility: HOSPITAL | Age: 62
Discharge: HOME | End: 2017-08-24
Admitting: INTERNAL MEDICINE

## 2017-08-24 VITALS
SYSTOLIC BLOOD PRESSURE: 170 MMHG | DIASTOLIC BLOOD PRESSURE: 90 MMHG | HEART RATE: 64 BPM | WEIGHT: 158 LBS | BODY MASS INDEX: 33.17 KG/M2 | HEIGHT: 58 IN

## 2017-08-24 DIAGNOSIS — R93.89 ABNORMAL CXR: Primary | ICD-10-CM

## 2017-08-24 DIAGNOSIS — R07.89 ATYPICAL CHEST PAIN: ICD-10-CM

## 2017-08-24 DIAGNOSIS — R94.31 ABNORMAL EKG: ICD-10-CM

## 2017-08-24 PROCEDURE — 93306 TTE W/DOPPLER COMPLETE: CPT | Performed by: INTERNAL MEDICINE

## 2017-08-24 PROCEDURE — 93306 TTE W/DOPPLER COMPLETE: CPT

## 2017-08-25 ENCOUNTER — TELEPHONE (OUTPATIENT)
Dept: INTERNAL MEDICINE | Facility: CLINIC | Age: 62
End: 2017-08-25

## 2017-08-25 DIAGNOSIS — R07.9 CHEST PAIN, UNSPECIFIED TYPE: Primary | ICD-10-CM

## 2017-08-25 NOTE — TELEPHONE ENCOUNTER
Patient states she was unable to complete stress test. She could not keep up with the treadmill. Is there another type of stress test she could try?  CC    I d/w patient.  I ordered a Lexiscan stress test.  CRIS

## 2017-08-29 LAB
ASCENDING AORTA: 3.2 CM
BH CV ECHO MEAS - ACS: 1.7 CM
BH CV ECHO MEAS - AO MAX PG (FULL): 3.4 MMHG
BH CV ECHO MEAS - AO MAX PG: 7.4 MMHG
BH CV ECHO MEAS - AO MEAN PG (FULL): 1.1 MMHG
BH CV ECHO MEAS - AO MEAN PG: 3.5 MMHG
BH CV ECHO MEAS - AO ROOT AREA (BSA CORRECTED): 1.6
BH CV ECHO MEAS - AO ROOT AREA: 5.2 CM^2
BH CV ECHO MEAS - AO ROOT DIAM: 2.6 CM
BH CV ECHO MEAS - AO V2 MAX: 135.7 CM/SEC
BH CV ECHO MEAS - AO V2 MEAN: 86.8 CM/SEC
BH CV ECHO MEAS - AO V2 VTI: 31.2 CM
BH CV ECHO MEAS - AVA(I,A): 2 CM^2
BH CV ECHO MEAS - AVA(I,D): 2 CM^2
BH CV ECHO MEAS - AVA(V,A): 1.8 CM^2
BH CV ECHO MEAS - AVA(V,D): 1.8 CM^2
BH CV ECHO MEAS - BSA(HAYCOCK): 1.7 M^2
BH CV ECHO MEAS - BSA: 1.6 M^2
BH CV ECHO MEAS - BZI_BMI: 33 KILOGRAMS/M^2
BH CV ECHO MEAS - BZI_METRIC_HEIGHT: 147.3 CM
BH CV ECHO MEAS - BZI_METRIC_WEIGHT: 71.7 KG
BH CV ECHO MEAS - CONTRAST EF 4CH: 64.4 ML/M^2
BH CV ECHO MEAS - EDV(MOD-SP4): 73 ML
BH CV ECHO MEAS - EDV(TEICH): 93.3 ML
BH CV ECHO MEAS - EF(CUBED): 72.1 %
BH CV ECHO MEAS - EF(MOD-SP4): 64.4 %
BH CV ECHO MEAS - EF(TEICH): 64 %
BH CV ECHO MEAS - ESV(MOD-SP4): 26 ML
BH CV ECHO MEAS - ESV(TEICH): 33.6 ML
BH CV ECHO MEAS - FS: 34.7 %
BH CV ECHO MEAS - IVS/LVPW: 0.92
BH CV ECHO MEAS - IVSD: 1.1 CM
BH CV ECHO MEAS - LAT PEAK E' VEL: 7 CM/SEC
BH CV ECHO MEAS - LV DIASTOLIC VOL/BSA (35-75): 44.3 ML/M^2
BH CV ECHO MEAS - LV MASS(C)D: 177.5 GRAMS
BH CV ECHO MEAS - LV MASS(C)DI: 107.7 GRAMS/M^2
BH CV ECHO MEAS - LV MAX PG: 4 MMHG
BH CV ECHO MEAS - LV MEAN PG: 2.4 MMHG
BH CV ECHO MEAS - LV SYSTOLIC VOL/BSA (12-30): 15.8 ML/M^2
BH CV ECHO MEAS - LV V1 MAX: 99.9 CM/SEC
BH CV ECHO MEAS - LV V1 MEAN: 74.6 CM/SEC
BH CV ECHO MEAS - LV V1 VTI: 26.5 CM
BH CV ECHO MEAS - LVIDD: 4.5 CM
BH CV ECHO MEAS - LVIDS: 2.9 CM
BH CV ECHO MEAS - LVLD AP4: 6.8 CM
BH CV ECHO MEAS - LVLS AP4: 5.9 CM
BH CV ECHO MEAS - LVOT AREA (M): 2.3 CM^2
BH CV ECHO MEAS - LVOT AREA: 2.4 CM^2
BH CV ECHO MEAS - LVOT DIAM: 1.7 CM
BH CV ECHO MEAS - LVPWD: 1.2 CM
BH CV ECHO MEAS - MED PEAK E' VEL: 6 CM/SEC
BH CV ECHO MEAS - MR MAX PG: 78.9 MMHG
BH CV ECHO MEAS - MR MAX VEL: 444.2 CM/SEC
BH CV ECHO MEAS - MV A DUR: 0.11 SEC
BH CV ECHO MEAS - MV A MAX VEL: 82.5 CM/SEC
BH CV ECHO MEAS - MV DEC SLOPE: 514.8 CM/SEC^2
BH CV ECHO MEAS - MV DEC TIME: 0.21 SEC
BH CV ECHO MEAS - MV E MAX VEL: 101.9 CM/SEC
BH CV ECHO MEAS - MV E/A: 1.2
BH CV ECHO MEAS - MV MAX PG: 5.9 MMHG
BH CV ECHO MEAS - MV MEAN PG: 2.3 MMHG
BH CV ECHO MEAS - MV P1/2T MAX VEL: 102.3 CM/SEC
BH CV ECHO MEAS - MV P1/2T: 58.2 MSEC
BH CV ECHO MEAS - MV V2 MAX: 121.6 CM/SEC
BH CV ECHO MEAS - MV V2 MEAN: 69.2 CM/SEC
BH CV ECHO MEAS - MV V2 VTI: 38 CM
BH CV ECHO MEAS - MVA P1/2T LCG: 2.1 CM^2
BH CV ECHO MEAS - MVA(P1/2T): 3.8 CM^2
BH CV ECHO MEAS - MVA(VTI): 1.7 CM^2
BH CV ECHO MEAS - PA ACC TIME: 0.15 SEC
BH CV ECHO MEAS - PA MAX PG (FULL): 2.6 MMHG
BH CV ECHO MEAS - PA MAX PG: 4.7 MMHG
BH CV ECHO MEAS - PA PR(ACCEL): 12.5 MMHG
BH CV ECHO MEAS - PA V2 MAX: 108.6 CM/SEC
BH CV ECHO MEAS - PULM A REVS DUR: 0.11 SEC
BH CV ECHO MEAS - PULM A REVS VEL: 26.2 CM/SEC
BH CV ECHO MEAS - PULM DIAS VEL: 38.3 CM/SEC
BH CV ECHO MEAS - PULM S/D: 0.87
BH CV ECHO MEAS - PULM SYS VEL: 33.5 CM/SEC
BH CV ECHO MEAS - PVA(V,A): 2 CM^2
BH CV ECHO MEAS - PVA(V,D): 2 CM^2
BH CV ECHO MEAS - QP/QS: 0.85
BH CV ECHO MEAS - RAP SYSTOLE: 3 MMHG
BH CV ECHO MEAS - RV MAX PG: 2.1 MMHG
BH CV ECHO MEAS - RV MEAN PG: 1.3 MMHG
BH CV ECHO MEAS - RV V1 MAX: 72.3 CM/SEC
BH CV ECHO MEAS - RV V1 MEAN: 54.8 CM/SEC
BH CV ECHO MEAS - RV V1 VTI: 17.9 CM
BH CV ECHO MEAS - RVOT AREA: 3 CM^2
BH CV ECHO MEAS - RVOT DIAM: 2 CM
BH CV ECHO MEAS - RVSP: 13.3 MMHG
BH CV ECHO MEAS - SI(AO): 97.9 ML/M^2
BH CV ECHO MEAS - SI(CUBED): 40.4 ML/M^2
BH CV ECHO MEAS - SI(LVOT): 38.7 ML/M^2
BH CV ECHO MEAS - SI(MOD-SP4): 28.5 ML/M^2
BH CV ECHO MEAS - SI(TEICH): 36.2 ML/M^2
BH CV ECHO MEAS - SUP REN AO DIAM: 1.7 CM
BH CV ECHO MEAS - SV(AO): 161.4 ML
BH CV ECHO MEAS - SV(CUBED): 66.5 ML
BH CV ECHO MEAS - SV(LVOT): 63.7 ML
BH CV ECHO MEAS - SV(MOD-SP4): 47 ML
BH CV ECHO MEAS - SV(RVOT): 54.3 ML
BH CV ECHO MEAS - SV(TEICH): 59.7 ML
BH CV ECHO MEAS - TAPSE (>1.6): 2.2 CM2
BH CV ECHO MEAS - TR MAX VEL: 160.2 CM/SEC
BH CV XLRA - RV BASE: 2.1 CM
BH CV XLRA - TDI S': 13 CM/SEC
E/E' RATIO: 15.5
LEFT ATRIUM VOLUME INDEX: 27 ML/M2
LV EF 2D ECHO EST: 64 %
MAXIMAL PREDICTED HEART RATE: 156 BPM
SINUS: 2.8 CM
STJ: 2.6 CM
STRESS TARGET HR: 133 BPM

## 2017-09-07 ENCOUNTER — HOSPITAL ENCOUNTER (OUTPATIENT)
Dept: CT IMAGING | Facility: HOSPITAL | Age: 62
Discharge: HOME | End: 2017-09-07
Admitting: INTERNAL MEDICINE

## 2017-09-07 DIAGNOSIS — R93.89 ABNORMAL CXR: ICD-10-CM

## 2017-09-07 LAB — CREAT BLDA-MCNC: 0.8 MG/DL (ref 0.6–1.3)

## 2017-09-07 PROCEDURE — 82565 ASSAY OF CREATININE: CPT

## 2017-09-07 PROCEDURE — 0 IOPAMIDOL 61 % SOLUTION: Performed by: INTERNAL MEDICINE

## 2017-09-07 PROCEDURE — 71260 CT THORAX DX C+: CPT

## 2017-09-07 RX ADMIN — IOPAMIDOL 75 ML: 612 INJECTION, SOLUTION INTRAVENOUS at 10:19

## 2017-09-11 PROBLEM — R91.1 INCIDENTAL LUNG NODULE: Status: ACTIVE | Noted: 2017-09-11

## 2017-09-14 ENCOUNTER — HOSPITAL ENCOUNTER (OUTPATIENT)
Dept: CARDIOLOGY | Facility: HOSPITAL | Age: 62
Discharge: HOME | End: 2017-09-14
Admitting: INTERNAL MEDICINE

## 2017-09-14 DIAGNOSIS — R07.9 CHEST PAIN, UNSPECIFIED TYPE: ICD-10-CM

## 2017-09-14 LAB
BH CV NUCLEAR PRIOR STUDY: 2
BH CV STRESS BP STAGE 1: NORMAL
BH CV STRESS COMMENTS STAGE 1: NORMAL
BH CV STRESS DOSE REGADENOSON STAGE 1: 0.4
BH CV STRESS DURATION MIN STAGE 1: 0
BH CV STRESS DURATION SEC STAGE 1: 15
BH CV STRESS HR STAGE 1: 112
BH CV STRESS PROTOCOL 1: NORMAL
BH CV STRESS RECOVERY BP: NORMAL MMHG
BH CV STRESS RECOVERY HR: 75 BPM
BH CV STRESS STAGE 1: 1
LV EF NUC BP: 73 %
MAXIMAL PREDICTED HEART RATE: 156 BPM
PERCENT MAX PREDICTED HR: 71.79 %
STRESS BASELINE BP: NORMAL MMHG
STRESS BASELINE HR: 54 BPM
STRESS PERCENT HR: 84 %
STRESS POST EXERCISE DUR SEC: 15 SEC
STRESS POST PEAK BP: NORMAL MMHG
STRESS POST PEAK HR: 112 BPM
STRESS TARGET HR: 133 BPM

## 2017-09-14 PROCEDURE — 25010000002 REGADENOSON 0.4 MG/5ML SOLUTION: Performed by: INTERNAL MEDICINE

## 2017-09-14 PROCEDURE — 93018 CV STRESS TEST I&R ONLY: CPT | Performed by: INTERNAL MEDICINE

## 2017-09-14 PROCEDURE — 0 TECHNETIUM TETROFOSMIN KIT: Performed by: INTERNAL MEDICINE

## 2017-09-14 PROCEDURE — A9502 TC99M TETROFOSMIN: HCPCS | Performed by: INTERNAL MEDICINE

## 2017-09-14 PROCEDURE — 93016 CV STRESS TEST SUPVJ ONLY: CPT | Performed by: INTERNAL MEDICINE

## 2017-09-14 PROCEDURE — 78452 HT MUSCLE IMAGE SPECT MULT: CPT | Performed by: INTERNAL MEDICINE

## 2017-09-14 PROCEDURE — 78452 HT MUSCLE IMAGE SPECT MULT: CPT

## 2017-09-14 PROCEDURE — 93017 CV STRESS TEST TRACING ONLY: CPT

## 2017-09-14 RX ADMIN — TETROFOSMIN 1 DOSE: 1.38 INJECTION, POWDER, LYOPHILIZED, FOR SOLUTION INTRAVENOUS at 10:35

## 2017-09-14 RX ADMIN — REGADENOSON 0.4 MG: 0.08 INJECTION, SOLUTION INTRAVENOUS at 10:35

## 2017-09-14 RX ADMIN — TETROFOSMIN 1 DOSE: 1.38 INJECTION, POWDER, LYOPHILIZED, FOR SOLUTION INTRAVENOUS at 09:36

## 2017-11-08 RX ORDER — ESTRADIOL 0.5 MG/1
TABLET ORAL
Qty: 90 TABLET | Refills: 0 | Status: SHIPPED | OUTPATIENT
Start: 2017-11-08 | End: 2018-02-09 | Stop reason: SDUPTHER

## 2017-11-08 RX ORDER — LEVOTHYROXINE SODIUM 137 UG/1
TABLET ORAL
Qty: 90 TABLET | Refills: 0 | Status: SHIPPED | OUTPATIENT
Start: 2017-11-08 | End: 2018-02-09 | Stop reason: SDUPTHER

## 2017-11-13 DIAGNOSIS — E78.00 HYPERCHOLESTEROLEMIA: Primary | ICD-10-CM

## 2017-11-13 DIAGNOSIS — E03.9 HYPOTHYROIDISM, UNSPECIFIED TYPE: ICD-10-CM

## 2017-12-12 RX ORDER — VALACYCLOVIR HYDROCHLORIDE 1 G/1
TABLET, FILM COATED ORAL
Qty: 30 TABLET | Refills: 3 | Status: SHIPPED | OUTPATIENT
Start: 2017-12-12 | End: 2019-02-13 | Stop reason: SDUPTHER

## 2017-12-21 DIAGNOSIS — E03.9 HYPOTHYROIDISM, UNSPECIFIED TYPE: ICD-10-CM

## 2017-12-21 DIAGNOSIS — E78.5 HYPERLIPIDEMIA, UNSPECIFIED HYPERLIPIDEMIA TYPE: Primary | ICD-10-CM

## 2017-12-27 LAB
ALBUMIN SERPL-MCNC: 4.9 G/DL (ref 3.5–5.2)
ALBUMIN/GLOB SERPL: 1.7 G/DL
ALP SERPL-CCNC: 110 U/L (ref 39–117)
ALT SERPL-CCNC: 15 U/L (ref 1–33)
AST SERPL-CCNC: 12 U/L (ref 1–32)
BILIRUB SERPL-MCNC: 0.6 MG/DL (ref 0.1–1.2)
BUN SERPL-MCNC: 22 MG/DL (ref 8–23)
BUN/CREAT SERPL: 19 (ref 7–25)
CALCIUM SERPL-MCNC: 9.6 MG/DL (ref 8.6–10.5)
CHLORIDE SERPL-SCNC: 101 MMOL/L (ref 98–107)
CHOLEST SERPL-MCNC: 267 MG/DL (ref 0–200)
CO2 SERPL-SCNC: 26.8 MMOL/L (ref 22–29)
CREAT SERPL-MCNC: 1.16 MG/DL (ref 0.57–1)
GLOBULIN SER CALC-MCNC: 2.9 GM/DL
GLUCOSE SERPL-MCNC: 94 MG/DL (ref 65–99)
HDLC SERPL-MCNC: 71 MG/DL (ref 40–60)
LDLC SERPL CALC-MCNC: 151 MG/DL (ref 0–100)
POTASSIUM SERPL-SCNC: 4.4 MMOL/L (ref 3.5–5.2)
PROT SERPL-MCNC: 7.8 G/DL (ref 6–8.5)
SODIUM SERPL-SCNC: 143 MMOL/L (ref 136–145)
TRIGL SERPL-MCNC: 227 MG/DL (ref 0–150)
TSH SERPL DL<=0.005 MIU/L-ACNC: 1 MIU/ML (ref 0.27–4.2)
VLDLC SERPL CALC-MCNC: 45.4 MG/DL (ref 5–40)

## 2018-01-02 ENCOUNTER — OFFICE VISIT (OUTPATIENT)
Dept: INTERNAL MEDICINE | Facility: CLINIC | Age: 63
End: 2018-01-02

## 2018-01-02 VITALS
HEART RATE: 76 BPM | HEIGHT: 58 IN | SYSTOLIC BLOOD PRESSURE: 154 MMHG | DIASTOLIC BLOOD PRESSURE: 82 MMHG | WEIGHT: 155 LBS | BODY MASS INDEX: 32.54 KG/M2 | OXYGEN SATURATION: 98 %

## 2018-01-02 DIAGNOSIS — E03.9 HYPOTHYROIDISM, UNSPECIFIED TYPE: ICD-10-CM

## 2018-01-02 DIAGNOSIS — J20.8 ACUTE BRONCHITIS DUE TO OTHER SPECIFIED ORGANISMS: ICD-10-CM

## 2018-01-02 DIAGNOSIS — E78.00 HYPERCHOLESTEROLEMIA: Primary | ICD-10-CM

## 2018-01-02 PROCEDURE — 99214 OFFICE O/P EST MOD 30 MIN: CPT | Performed by: INTERNAL MEDICINE

## 2018-01-02 RX ORDER — DOXYCYCLINE 100 MG/1
100 CAPSULE ORAL EVERY 12 HOURS SCHEDULED
Qty: 20 CAPSULE | Refills: 0 | Status: SHIPPED | OUTPATIENT
Start: 2018-01-02 | End: 2018-05-16

## 2018-01-02 NOTE — PROGRESS NOTES
Subjective     Marie Foy is a 62 y.o. female who presents with   Chief Complaint   Patient presents with   • Hyperlipidemia   • Hypothyroidism   • Cough       History of Present Illness     Cough for a week.  Production of sputum.  No fever.  Mucinex DM helps.  Head and sinus pain is associated.  Moderate severity of persistent symptoms.     HLD.  She admittedly is not good with taking medication.    Hypothyroidism.  Controlled with levothyroxine.      Review of Systems   Constitutional: Positive for fatigue.   HENT: Positive for congestion. Negative for sore throat.    Respiratory: Positive for cough. Negative for shortness of breath.    Cardiovascular: Negative for chest pain.       The following portions of the patient's history were reviewed and updated as appropriate: allergies, current medications and problem list.    Patient Active Problem List    Diagnosis Date Noted   • Incidental lung nodule 09/11/2017     Note Last Updated: 9/11/2017     By Ct scan 9/2017.  Recheck six months.      • Anxiety 02/26/2016   • Gastroesophageal reflux disease 02/26/2016   • Herpes simplex type 2 infection 02/26/2016   • Hypercholesterolemia 02/26/2016   • Hypothyroidism 02/26/2016   • Osteopenia 02/26/2016     Note Last Updated: 2/26/2016     Description: Actonel start date 2006     • Overactive bladder 02/26/2016   • Fear of flying 02/26/2016     Note Last Updated: 2/26/2016     Description: flying and dental work     • Unilateral partial paralysis of vocal cords or larynx 02/26/2016     Note Last Updated: 2/26/2016     Description: chronic     • Colon polyps        Current Outpatient Prescriptions on File Prior to Visit   Medication Sig Dispense Refill   • ALPRAZolam (XANAX) 0.25 MG tablet Take 1 tablet by mouth 3 (Three) Times a Day As Needed for anxiety. 30 tablet 0   • aspirin 81 MG tablet Take  by mouth.     • atorvastatin (LIPITOR) 40 MG tablet Take 1 tablet by mouth Daily. 90 tablet 3   • Calcium Carbonate-Vit  "D-Min (CALCIUM 1200) 8639-8893 MG-UNIT chewable tablet Chew.     • docusate sodium (COLACE) 100 MG capsule Take  by mouth daily.     • estradiol (ESTRACE) 0.5 MG tablet TAKE ONE TABLET BY MOUTH DAILY 90 tablet 0   • fluticasone (FLONASE) 50 MCG/ACT nasal spray into each nostril.     • levothyroxine (SYNTHROID, LEVOTHROID) 137 MCG tablet TAKE ONE TABLET BY MOUTH DAILY 90 tablet 0   • Magnesium 250 MG tablet Take  by mouth.     • omeprazole (PRILOSEC) 40 MG capsule Take 1 capsule by mouth Daily. 90 capsule 3   • traZODone (DESYREL) 50 MG tablet Take  by mouth.     • valACYclovir (VALTREX) 1000 MG tablet TAKE ONE TABLET BY MOUTH DAILY 30 tablet 3   • VESICARE 5 MG tablet TAKE ONE TABLET BY MOUTH DAILY 90 tablet 0     No current facility-administered medications on file prior to visit.        Objective     /82  Pulse 76  Ht 147.3 cm (57.99\")  Wt 70.3 kg (155 lb)  SpO2 98%  BMI 32.4 kg/m2    Physical Exam   Constitutional: She is oriented to person, place, and time. She appears well-developed and well-nourished.   HENT:   Head: Normocephalic and atraumatic.   Right Ear: Hearing and tympanic membrane normal.   Left Ear: Hearing and tympanic membrane normal.   Mouth/Throat: No oropharyngeal exudate or posterior oropharyngeal erythema.   Cardiovascular: Normal rate, regular rhythm and normal heart sounds.    Pulmonary/Chest: Effort normal and breath sounds normal.   Neurological: She is alert and oriented to person, place, and time.   Skin: Skin is warm and dry.   Psychiatric: She has a normal mood and affect. Her behavior is normal.       Assessment/Plan   Marie was seen today for hyperlipidemia, hypothyroidism and cough.    Diagnoses and all orders for this visit:    Hypercholesterolemia    Hypothyroidism, unspecified type    Acute bronchitis due to other specified organisms    Other orders  -     doxycycline (MONODOX) 100 MG capsule; Take 1 capsule by mouth Every 12 (Twelve) Hours.        Discussion  HLD.  " Increase compliance with atorvastatin.    Hypothyroidism.  Continue current regimen.  Patient presents with episodes of acute bronchitis.  A prescription for antibiotics is provided today.  The patient is instructed to take along with Mucinex DM.  Let me know they are not feeling better over the next 3 days or if there is any change in symptoms.             No future appointments.

## 2018-01-22 DIAGNOSIS — E78.00 HYPERCHOLESTEROLEMIA: Primary | ICD-10-CM

## 2018-01-22 DIAGNOSIS — E03.9 HYPOTHYROIDISM, UNSPECIFIED TYPE: ICD-10-CM

## 2018-01-22 DIAGNOSIS — R79.9 ABNORMAL BLOOD CHEMISTRY: ICD-10-CM

## 2018-01-24 LAB
BUN SERPL-MCNC: 16 MG/DL (ref 8–23)
BUN/CREAT SERPL: 20 (ref 7–25)
CALCIUM SERPL-MCNC: 9.4 MG/DL (ref 8.6–10.5)
CHLORIDE SERPL-SCNC: 103 MMOL/L (ref 98–107)
CO2 SERPL-SCNC: 30.4 MMOL/L (ref 22–29)
CREAT SERPL-MCNC: 0.8 MG/DL (ref 0.57–1)
GLUCOSE SERPL-MCNC: 71 MG/DL (ref 65–99)
POTASSIUM SERPL-SCNC: 4 MMOL/L (ref 3.5–5.2)
SODIUM SERPL-SCNC: 144 MMOL/L (ref 136–145)

## 2018-02-09 RX ORDER — SOLIFENACIN SUCCINATE 5 MG/1
TABLET, FILM COATED ORAL
Qty: 90 TABLET | Refills: 0 | Status: SHIPPED | OUTPATIENT
Start: 2018-02-09 | End: 2018-04-13 | Stop reason: SDUPTHER

## 2018-02-09 RX ORDER — ESTRADIOL 0.5 MG/1
TABLET ORAL
Qty: 90 TABLET | Refills: 0 | Status: SHIPPED | OUTPATIENT
Start: 2018-02-09 | End: 2018-08-13 | Stop reason: SDUPTHER

## 2018-02-09 RX ORDER — LEVOTHYROXINE SODIUM 137 UG/1
TABLET ORAL
Qty: 90 TABLET | Refills: 0 | Status: SHIPPED | OUTPATIENT
Start: 2018-02-09 | End: 2018-05-16

## 2018-03-12 DIAGNOSIS — R91.1 INCIDENTAL LUNG NODULE: Primary | ICD-10-CM

## 2018-04-16 ENCOUNTER — HOSPITAL ENCOUNTER (OUTPATIENT)
Dept: CT IMAGING | Facility: HOSPITAL | Age: 63
Discharge: HOME | End: 2018-04-16
Admitting: INTERNAL MEDICINE

## 2018-04-16 DIAGNOSIS — R91.1 INCIDENTAL LUNG NODULE: ICD-10-CM

## 2018-04-16 PROCEDURE — 71250 CT THORAX DX C-: CPT

## 2018-04-16 RX ORDER — SOLIFENACIN SUCCINATE 5 MG/1
5 TABLET, FILM COATED ORAL DAILY
Qty: 90 TABLET | Refills: 2 | Status: SHIPPED | OUTPATIENT
Start: 2018-04-16 | End: 2018-04-16 | Stop reason: SDUPTHER

## 2018-04-16 RX ORDER — SOLIFENACIN SUCCINATE 5 MG/1
5 TABLET, FILM COATED ORAL DAILY
Qty: 90 TABLET | Refills: 2 | Status: SHIPPED | OUTPATIENT
Start: 2018-04-16 | End: 2018-07-18

## 2018-05-08 DIAGNOSIS — E03.9 HYPOTHYROIDISM, UNSPECIFIED TYPE: ICD-10-CM

## 2018-05-08 DIAGNOSIS — E78.5 HYPERLIPIDEMIA, UNSPECIFIED HYPERLIPIDEMIA TYPE: Primary | ICD-10-CM

## 2018-05-10 LAB
ALBUMIN SERPL-MCNC: 4.7 G/DL (ref 3.5–5.2)
ALBUMIN/GLOB SERPL: 2.2 G/DL
ALP SERPL-CCNC: 89 U/L (ref 39–117)
ALT SERPL-CCNC: 17 U/L (ref 1–33)
APPEARANCE UR: CLEAR
AST SERPL-CCNC: 17 U/L (ref 1–32)
BACTERIA #/AREA URNS HPF: NORMAL /HPF
BASOPHILS # BLD AUTO: 0.01 10*3/MM3 (ref 0–0.2)
BASOPHILS NFR BLD AUTO: 0.2 % (ref 0–1.5)
BILIRUB SERPL-MCNC: 0.6 MG/DL (ref 0.1–1.2)
BILIRUB UR QL STRIP: NEGATIVE
BUN SERPL-MCNC: 18 MG/DL (ref 8–23)
BUN/CREAT SERPL: 23.1 (ref 7–25)
CALCIUM SERPL-MCNC: 9.7 MG/DL (ref 8.6–10.5)
CHLORIDE SERPL-SCNC: 103 MMOL/L (ref 98–107)
CHOLEST SERPL-MCNC: 201 MG/DL (ref 0–200)
CO2 SERPL-SCNC: 25 MMOL/L (ref 22–29)
COLOR UR: YELLOW
CREAT SERPL-MCNC: 0.78 MG/DL (ref 0.57–1)
EOSINOPHIL # BLD AUTO: 0.11 10*3/MM3 (ref 0–0.7)
EOSINOPHIL NFR BLD AUTO: 1.9 % (ref 0.3–6.2)
EPI CELLS #/AREA URNS HPF: NORMAL /HPF
ERYTHROCYTE [DISTWIDTH] IN BLOOD BY AUTOMATED COUNT: 13.1 % (ref 11.7–13)
GFR SERPLBLD CREATININE-BSD FMLA CKD-EPI: 74 ML/MIN/1.73
GFR SERPLBLD CREATININE-BSD FMLA CKD-EPI: 90 ML/MIN/1.73
GLOBULIN SER CALC-MCNC: 2.1 GM/DL
GLUCOSE SERPL-MCNC: 98 MG/DL (ref 65–99)
GLUCOSE UR QL: NEGATIVE
HCT VFR BLD AUTO: 40.8 % (ref 35.6–45.5)
HDLC SERPL-MCNC: 76 MG/DL (ref 40–60)
HGB BLD-MCNC: 12.8 G/DL (ref 11.9–15.5)
HGB UR QL STRIP: NEGATIVE
IMM GRANULOCYTES # BLD: 0 10*3/MM3 (ref 0–0.03)
IMM GRANULOCYTES NFR BLD: 0 % (ref 0–0.5)
KETONES UR QL STRIP: NEGATIVE
LDLC SERPL CALC-MCNC: 105 MG/DL (ref 0–100)
LEUKOCYTE ESTERASE UR QL STRIP: NEGATIVE
LYMPHOCYTES # BLD AUTO: 1.57 10*3/MM3 (ref 0.9–4.8)
LYMPHOCYTES NFR BLD AUTO: 27.5 % (ref 19.6–45.3)
MCH RBC QN AUTO: 28.9 PG (ref 26.9–32)
MCHC RBC AUTO-ENTMCNC: 31.4 G/DL (ref 32.4–36.3)
MCV RBC AUTO: 92.1 FL (ref 80.5–98.2)
MICRO URNS: NORMAL
MICRO URNS: NORMAL
MONOCYTES # BLD AUTO: 0.42 10*3/MM3 (ref 0.2–1.2)
MONOCYTES NFR BLD AUTO: 7.4 % (ref 5–12)
NEUTROPHILS # BLD AUTO: 3.6 10*3/MM3 (ref 1.9–8.1)
NEUTROPHILS NFR BLD AUTO: 63 % (ref 42.7–76)
NITRITE UR QL STRIP: NEGATIVE
PH UR STRIP: 5.5 [PH] (ref 5–7.5)
PLATELET # BLD AUTO: 214 10*3/MM3 (ref 140–500)
POTASSIUM SERPL-SCNC: 4.2 MMOL/L (ref 3.5–5.2)
PROT SERPL-MCNC: 6.8 G/DL (ref 6–8.5)
PROT UR QL STRIP: NEGATIVE
RBC # BLD AUTO: 4.43 10*6/MM3 (ref 3.9–5.2)
RBC #/AREA URNS HPF: NORMAL /HPF
SODIUM SERPL-SCNC: 142 MMOL/L (ref 136–145)
SP GR UR: 1.01 (ref 1–1.03)
T4 FREE SERPL-MCNC: 2.19 NG/DL (ref 0.93–1.7)
TRIGL SERPL-MCNC: 100 MG/DL (ref 0–150)
TSH SERPL DL<=0.005 MIU/L-ACNC: 0.1 MIU/ML (ref 0.27–4.2)
URINALYSIS REFLEX: NORMAL
UROBILINOGEN UR STRIP-MCNC: 0.2 MG/DL (ref 0.2–1)
VLDLC SERPL CALC-MCNC: 20 MG/DL (ref 5–40)
WBC # BLD AUTO: 5.71 10*3/MM3 (ref 4.5–10.7)
WBC #/AREA URNS HPF: NORMAL /HPF

## 2018-05-16 ENCOUNTER — OFFICE VISIT (OUTPATIENT)
Dept: INTERNAL MEDICINE | Facility: CLINIC | Age: 63
End: 2018-05-16

## 2018-05-16 VITALS
HEART RATE: 57 BPM | OXYGEN SATURATION: 98 % | DIASTOLIC BLOOD PRESSURE: 84 MMHG | SYSTOLIC BLOOD PRESSURE: 158 MMHG | WEIGHT: 158 LBS | BODY MASS INDEX: 33.17 KG/M2 | HEIGHT: 58 IN

## 2018-05-16 DIAGNOSIS — E03.9 HYPOTHYROIDISM, UNSPECIFIED TYPE: ICD-10-CM

## 2018-05-16 DIAGNOSIS — M25.551 RIGHT HIP PAIN: ICD-10-CM

## 2018-05-16 DIAGNOSIS — Z11.59 NEED FOR HEPATITIS C SCREENING TEST: ICD-10-CM

## 2018-05-16 DIAGNOSIS — E78.00 HYPERCHOLESTEROLEMIA: ICD-10-CM

## 2018-05-16 DIAGNOSIS — Z00.00 WELL ADULT EXAM: Primary | ICD-10-CM

## 2018-05-16 PROCEDURE — 90471 IMMUNIZATION ADMIN: CPT | Performed by: INTERNAL MEDICINE

## 2018-05-16 PROCEDURE — 73502 X-RAY EXAM HIP UNI 2-3 VIEWS: CPT | Performed by: INTERNAL MEDICINE

## 2018-05-16 PROCEDURE — 99396 PREV VISIT EST AGE 40-64: CPT | Performed by: INTERNAL MEDICINE

## 2018-05-16 PROCEDURE — 90632 HEPA VACCINE ADULT IM: CPT | Performed by: INTERNAL MEDICINE

## 2018-05-16 RX ORDER — LEVOTHYROXINE SODIUM 0.12 MG/1
125 TABLET ORAL DAILY
Qty: 30 TABLET | Refills: 11 | Status: SHIPPED | OUTPATIENT
Start: 2018-05-16 | End: 2019-06-01 | Stop reason: SDUPTHER

## 2018-05-16 NOTE — PROGRESS NOTES
Subjective     Marie Foy is a 62 y.o. female who presents for a complete physical exam.      History of Present Illness     Increased BP.  She states typically good at home.  We discussed doing checks and calling in from home.  HLD.  Fairly good control with atorvastatin 40.   Hypothyroidism.  She is overcontrolled.   We discussed decreasing dose.    C/o right hip pain with walking steps and getting up from walking.  Normal walking does not hurt.  No injury.     Review of Systems   Constitutional: Negative.    HENT: Negative.    Eyes: Negative.    Respiratory: Negative.    Cardiovascular: Negative.    Gastrointestinal: Negative.    Endocrine: Negative.    Genitourinary: Negative.    Skin: Negative.    Allergic/Immunologic: Negative.    Neurological: Negative.    Hematological: Negative.    Psychiatric/Behavioral: Negative.        The following portions of the patient's history were reviewed and updated as appropriate: allergies, current medications, past family history, past medical history, past social history, past surgical history and problem list.  Health maintenance tab was reviewed and updated with the patient.       Patient Active Problem List    Diagnosis Date Noted   • Incidental lung nodule 09/11/2017     Note Last Updated: 9/11/2017     By Ct scan 9/2017.  Recheck six months.      • Anxiety 02/26/2016   • Gastroesophageal reflux disease 02/26/2016   • Herpes simplex type 2 infection 02/26/2016   • Hypercholesterolemia 02/26/2016   • Hypothyroidism 02/26/2016   • Osteopenia 02/26/2016     Note Last Updated: 2/26/2016     Description: Actonel start date 2006     • Overactive bladder 02/26/2016   • Fear of flying 02/26/2016     Note Last Updated: 2/26/2016     Description: flying and dental work     • Unilateral partial paralysis of vocal cords or larynx 02/26/2016     Note Last Updated: 2/26/2016     Description: chronic     • Colon polyps        Past Medical History:   Diagnosis Date   • Colon polyps     • Constipation    • Difficulty sleeping    • Generalized stiffness    • Graves disease    • Hearing loss in right ear    • Heart murmur    • History of bipolar disorder    • History of substance abuse    • Joint pain     swelling   • Muscle pain        Past Surgical History:   Procedure Laterality Date   • ANKLE SURGERY     • BLADDER SURGERY     • COLONOSCOPY N/A 8/23/2016    Procedure: COLONOSCOPY TO CECUM & T.I.  WITH SALINE INJECTION, HOT SNARE POLYPECTOMY, COLD POLYPECTOMY AND CLIP PLACEMENT X 1;  Surgeon: Darryl Garcia MD;  Location: [facility] ENDOSCOPY;  Service:    • FOOT SURGERY     • HYSTERECTOMY     • JOINT REPLACEMENT Right     HAND   • REPLACEMENT TOTAL KNEE     • THROAT SURGERY      childhood       Family History   Problem Relation Age of Onset   • Hypertension Mother    • Heart failure Mother    • Diabetes Mother    • Colon polyps Mother    • Pancreatic cancer Father        Social History     Social History   • Marital status:      Spouse name: N/A   • Number of children: 5   • Years of education: N/A     Occupational History   • Not on file.     Social History Main Topics   • Smoking status: Never Smoker   • Smokeless tobacco: Never Used   • Alcohol use Yes      Comment: OCASSIONALLY   • Drug use: Unknown   • Sexual activity: Defer     Other Topics Concern   • Not on file     Social History Narrative   • No narrative on file       Current Outpatient Prescriptions on File Prior to Visit   Medication Sig Dispense Refill   • ALPRAZolam (XANAX) 0.25 MG tablet Take 1 tablet by mouth 3 (Three) Times a Day As Needed for anxiety. 30 tablet 0   • aspirin 81 MG tablet Take  by mouth.     • atorvastatin (LIPITOR) 40 MG tablet Take 1 tablet by mouth Daily. 90 tablet 3   • Calcium Carbonate-Vit D-Min (CALCIUM 1200) 2454-0551 MG-UNIT chewable tablet Chew.     • docusate sodium (COLACE) 100 MG capsule Take  by mouth daily.     • estradiol (ESTRACE) 0.5 MG tablet TAKE ONE TABLET BY MOUTH DAILY 90 tablet 0   •  "fluticasone (FLONASE) 50 MCG/ACT nasal spray into each nostril.     • Magnesium 250 MG tablet Take  by mouth.     • omeprazole (PRILOSEC) 40 MG capsule Take 1 capsule by mouth Daily. 90 capsule 3   • solifenacin (VESICARE) 5 MG tablet Take 1 tablet by mouth Daily. 90 tablet 2   • valACYclovir (VALTREX) 1000 MG tablet TAKE ONE TABLET BY MOUTH DAILY 30 tablet 3   • [DISCONTINUED] levothyroxine (SYNTHROID, LEVOTHROID) 137 MCG tablet TAKE ONE TABLET BY MOUTH DAILY 90 tablet 0   • [DISCONTINUED] doxycycline (MONODOX) 100 MG capsule Take 1 capsule by mouth Every 12 (Twelve) Hours. 20 capsule 0     No current facility-administered medications on file prior to visit.        Allergies   Allergen Reactions   • Codeine    • Morphine And Related    • Penicillins        Immunization History   Administered Date(s) Administered   • Influenza, Quadrivalent 10/20/2014   • Tdap 10/20/2014   • Zoster 10/14/2013       Objective     /84   Pulse 57   Ht 147.3 cm (57.99\")   Wt 71.7 kg (158 lb)   SpO2 98%   BMI 33.03 kg/m²     Physical Exam   Constitutional: She is oriented to person, place, and time. She appears well-developed and well-nourished.   HENT:   Head: Normocephalic and atraumatic.   Right Ear: Hearing, tympanic membrane and external ear normal.   Left Ear: Hearing, tympanic membrane and external ear normal.   Nose: Nose normal.   Mouth/Throat: Oropharynx is clear and moist.   Neck: Neck supple. No thyromegaly present.   Cardiovascular: Normal rate, regular rhythm and normal heart sounds.    No murmur heard.  Pulmonary/Chest: Effort normal and breath sounds normal. Right breast exhibits no mass. Left breast exhibits no mass.   Abdominal: Soft. She exhibits no distension. There is no hepatosplenomegaly. There is no tenderness.   Genitourinary: No breast tenderness.   Musculoskeletal:        Right shoulder: She exhibits decreased range of motion and tenderness. She exhibits no swelling, no effusion and no crepitus. "   Lymphadenopathy:     She has no cervical adenopathy.   Neurological: She is alert and oriented to person, place, and time.   Skin: Skin is warm and dry.   Psychiatric: She has a normal mood and affect. Her speech is normal and behavior is normal. Judgment and thought content normal. Cognition and memory are normal.     X-rays of the right hip  performed today for following indication:   pain.  X-ray reveal OA changes.  There is no available x-ray for comparison.  X-ray sent to radiology for official interpretation and findings.        Assessment/Plan   Marie was seen today for annual exam.    Diagnoses and all orders for this visit:    Well adult exam    Right hip pain  -     XR Hip With or Without Pelvis 2 - 3 View Right    Need for hepatitis C screening test  -     Hepatitis C Antibody    Hypercholesterolemia    Hypothyroidism, unspecified type    Other orders  -     levothyroxine (SYNTHROID) 125 MCG tablet; Take 1 tablet by mouth Daily.  -     Hepatitis A Vaccine Adult IM        Discussion    Patient presents today for a CPE.      Patient follows a healthy diet.   Patient follows an adequate exercise regimen. Patient will schedule a mammogram. Colon cancer screening is up to date.   Pap smear no longer performed secondary to hysterectomy.    I have recommended that the patient get the new HZV shot called Shingrix.  DEXA is up to date.    HLD.  Continue current.  Hypothyroidism.  Decrease to 125mcg and recheck 8 weeks.   Right hip pain.  Abnormal xray.  Sent to radiologist for overread to start.     Health Maintenance   Topic Date Due   • HEPATITIS C SCREENING  02/26/2016   • ANNUAL PHYSICAL  05/11/2018   • INFLUENZA VACCINE  08/01/2018   • DXA SCAN  08/15/2018   • LIPID PANEL  05/09/2019   • MAMMOGRAM  06/16/2019   • COLONOSCOPY  08/23/2021   • TDAP/TD VACCINES (2 - Td) 10/20/2024   • ZOSTER VACCINE  Completed   • PAP SMEAR  Excluded            No future appointments.

## 2018-05-17 LAB — HCV AB S/CO SERPL IA: <0.1 S/CO RATIO (ref 0–0.9)

## 2018-05-18 ENCOUNTER — TELEPHONE (OUTPATIENT)
Dept: INTERNAL MEDICINE | Facility: CLINIC | Age: 63
End: 2018-05-18

## 2018-05-18 DIAGNOSIS — M25.551 RIGHT HIP PAIN: Primary | ICD-10-CM

## 2018-05-18 NOTE — TELEPHONE ENCOUNTER
Pt informed.    ----- Message from Michelle Wyman MD sent at 5/17/2018  3:30 PM EDT -----  Hep C is negative.

## 2018-06-01 ENCOUNTER — HOSPITAL ENCOUNTER (OUTPATIENT)
Dept: PHYSICAL THERAPY | Facility: HOSPITAL | Age: 63
Setting detail: THERAPIES SERIES
Discharge: HOME | End: 2018-06-01

## 2018-06-01 DIAGNOSIS — M25.551 RIGHT HIP PAIN: Primary | ICD-10-CM

## 2018-06-01 PROCEDURE — 97162 PT EVAL MOD COMPLEX 30 MIN: CPT | Performed by: PHYSICAL THERAPIST

## 2018-06-01 PROCEDURE — 97140 MANUAL THERAPY 1/> REGIONS: CPT | Performed by: PHYSICAL THERAPIST

## 2018-06-02 NOTE — THERAPY EVALUATION
Outpatient Physical Therapy Ortho Initial Evaluation  [facility]     Patient Name: Marie Foy  : 1955  MRN: [id number]  Today's Date: 2018      Visit Date: 2018    Patient Active Problem List   Diagnosis   • Anxiety   • Gastroesophageal reflux disease   • Herpes simplex type 2 infection   • Hypercholesterolemia   • Hypothyroidism   • Osteopenia   • Overactive bladder   • Fear of flying   • Unilateral partial paralysis of vocal cords or larynx   • Colon polyps   • Incidental lung nodule        Past Medical History:   Diagnosis Date   • Colon polyps    • Constipation    • Difficulty sleeping    • Generalized stiffness    • Graves disease    • Hearing loss in right ear    • Heart murmur    • History of bipolar disorder    • History of substance abuse    • Joint pain     swelling   • Muscle pain         Past Surgical History:   Procedure Laterality Date   • ANKLE SURGERY     • BLADDER SURGERY     • COLONOSCOPY N/A 2016    Procedure: COLONOSCOPY TO CECUM & T.I.  WITH SALINE INJECTION, HOT SNARE POLYPECTOMY, COLD POLYPECTOMY AND CLIP PLACEMENT X 1;  Surgeon: Darryl Garcia MD;  Location: [facility] ENDOSCOPY;  Service:    • FOOT SURGERY     • HYSTERECTOMY     • JOINT REPLACEMENT Right     HAND   • REPLACEMENT TOTAL KNEE     • THROAT SURGERY      childhood       Visit Dx:     ICD-10-CM ICD-9-CM   1. Right hip pain M25.551 719.45             Patient History     Row Name 18 1100             History    Chief Complaint Pain  -KJ      Type of Pain Hip pain  -KJ      Date Current Problem(s) Began 17  -KJ      Brief Description of Current Complaint Pain on side of R hip for about three months, worsening. Awful going up stairs, sleeping on R side or L.   -KJ      Patient/Caregiver Goals Relieve pain  -KJ         Pain     Pain Location Hip  -KJ      Pain at Present 2  -KJ      Pain at Best 2  -KJ      Pain at Worst 10   stabbing but not consant  -KJ      What Performance Factors Make the  Current Problem(s) WORSE? stairs, sleeping  on it, standing after prolonged sitting. Mild discomfrot with prolonged walking, more like a muscle cramp.   -KJ      What Performance Factors Make the Current Problem(s) BETTER? walks one step at a time, using railing. Heating pad. Takes ibuprofen- not sure if that helps.   -KJ        User Key  (r) = Recorded By, (t) = Taken By, (c) = Cosigned By    Initials Name Provider Type    KATHERINE Kaufman, PT Physical Therapist                PT Ortho     Row Name 06/01/18 1100       Lumbosacral Accessory Motions    Lumbosacral Accessory Motions Tested? --   painful and hypomobil L4-5 and R and L SI- very  -KJ       Lumbar/SI Special Tests    SLR (Neural Tension) Right:;Negative  -KJ    Thigh Thrust/Posterior Shear (SI Dysfunction) Right:;Positive  -KJ       Hip/Thigh Palpation    Greater Trochanter Right:;Tender   just posterior with very deep pressure  -KJ    Gluteus Medius Right:;Tender;Guarded/taut;Trigger point   mildly tender  -KJ    Gluteus Minimus Right:;Tender;Guarded/taut   mildly tender  -KJ    Piriformis Right:;Tender;Guarded/taut   mildly tender  -KJ       Hip Special Tests    STEPHANIA (hip vs SI pathology) Right:;Negative  -KJ    Hip scour test (labral vs hip pathology) Right:;Negative   smooth motion noted  -KJ    Piriformis test (piriformis syndrome) Right:;Negative  -KJ       General ROM    RT Lower Ext Rt Hip ABduction;Rt Hip Extension;Rt Hip Flexion;Rt Hip External Rotation;Rt Hip Internal Rotation;Rt Knee Extension/Flexion  -KJ       Right Lower Ext    Rt Hip ABduction AROM 0-49 deg with pain  -KJ    Rt Hip Extension AROM 0-9deg with pain  -KJ    Rt Hip Flexion AROM 0-90 deg   -KJ    Rt Hip External Rotation AROM 0-30 deg  -KJ    Rt Hip Internal Rotation AROM 0-45 deg  -KJ       MMT (Manual Muscle Testing)    Additional Documentation --   R hip flexion, Er/IR; R knee ext/flexion 4+/5  -KJ       Transfers    Comment (Transfers) Slow and antalgic transfer from  sit to stand  -KJ       Gait/Stairs Assessment/Training    Comment (Gait/Stairs) Slow, antlagic pace noted, trendelenburg on R.   -KJ      User Key  (r) = Recorded By, (t) = Taken By, (c) = Cosigned By    Initials Name Provider Type    KATHERINE Kaufman, PT Physical Therapist                      Therapy Education  Education Details: Evaluation findings, likely cause of pain, use of ice, plan of care.   Given: HEP, Symptoms/condition management, Pain management  Program: New  How Provided: Verbal, Demonstration, Written  Provided to: Patient  Level of Understanding: Verbalized, Demonstrated, Teach back education performed           PT OP Goals     Row Name 06/01/18 2100          PT Short Term Goals    STG Date to Achieve 06/15/18  -KJ     STG 1 Pt. will be independent and compliant with initial home exercise program.  -KJ     STG 1 Progress New  -KJ     STG 2 Pt. will report R hip pain </=7-8/10 on VAS indicating decreased perceived functional disability.  -KJ     STG 2 Progress New  -KJ     STG 3 Pt. will report sleeping >3-4 hours without interruption from hip pain.  -KJ     STG 3 Progress New  -KJ        Long Term Goals    LTG Date to Achieve 07/01/18  -KJ     LTG 1 Pt. will be independent and compliant with advanced home exercise program.  -KJ     LTG 1 Progress New  -KJ     LTG 2 Pt. will report R hip pain </=2-3/10 to increase ease of going up/down stairs.   -KJ     LTG 2 Progress New  -KJ     LTG 3 Pt. will score >/=60/80 on LEFS, indicating decreased perceived functional disability.  -KJ     LTG 3 Progress New  -KJ        Time Calculation    PT Goal Re-Cert Due Date 07/01/18  -KJ       User Key  (r) = Recorded By, (t) = Taken By, (c) = Cosigned By    Initials Name Provider Type    KATHERINE Kaufman, PT Physical Therapist                PT Assessment/Plan     Row Name 06/01/18 1650          PT Assessment    Functional Limitations Decreased safety during functional activities;Impaired gait;Impaired  locomotion;Limitations in functional capacity and performance;Performance in sport activities;Limitations in community activities;Limitation in home management;Performance in leisure activities  -KJ     Assessment Comments Pt. is a 62 year old female with three month history of R lateral hip pain with walking, stair climbing, and sleep with insidious onset. Pt. presents with painful R hip AROM, tenderness R greater trochanter, painful lumbar PIVM, trigger points R gluteals, and LEFS score of 43/80 where 80/80 represents no perceived functional disability. Pt's signs and symptoms are consistent with R trochanteric bursitis. Pt. will benefit from skilled physical therapy to address these issues.   -KJ     Please refer to paper survey for additional self-reported information Yes  -KJ     Rehab Potential Good  -KJ     Patient/caregiver participated in establishment of treatment plan and goals Yes  -KJ     Patient would benefit from skilled therapy intervention Yes  -KJ        PT Plan    PT Frequency 2x/week  -KJ     Predicted Duration of Therapy Intervention (OT Eval) 6 weeks  -KJ     PT Plan Comments Assess response to needling, continue weekly. Trial of phono to R hip, Oscar Ruffin Instruct in core and hip stab program.   -KJ       User Key  (r) = Recorded By, (t) = Taken By, (c) = Cosigned By    Initials Name Provider Type    KATHERINE Kaufman, PT Physical Therapist                Modalities     Row Name 06/01/18 2100             Ice    Location Ice pack to R hip in L side lying followign needling   -KJ      Rx Minutes 10 mins  -KJ        User Key  (r) = Recorded By, (t) = Taken By, (c) = Cosigned By    Initials Name Provider Type    KATHERINE Kaufman, PT Physical Therapist             Manual Rx (last 36 hours)      Manual Treatments     Row Name 06/01/18 1500             Manual Rx 1    Manual Rx 1 Type Intramuscular manual therapy with DDN.    After reviewing all risks (including pneumothorax, bruising, infection,  nerve injury, and soreness) written informed consent for dry needling was obtained.     Patient position during treatment: L sidelying     Muscles treated: R gluteus medius    Response: several LTRs noted, minimal pain response.     Clean needle technique observed at all times, precautions for lung fields, neurovascular structures observed.     Manual palpation and assessment performed before, during, and after session.    Written after-care instructions issued.   -KATHERINE        User Key  (r) = Recorded By, (t) = Taken By, (c) = Cosigned By    Initials Name Provider Type    KATHERINE Kaufman, PT Physical Therapist                      Outcome Measure Options: Lower Extremity Functional Scale (LEFS)  Lower Extremity Functional Index  Any of your usual work, housework or school activities: A little bit of difficulty  Your usual hobbies, recreational or sporting activities: Extreme difficulty or unable to perform activity  Getting into or out of the bath: A little bit of difficulty  Walking between rooms: No difficulty  Putting on your shoes or socks: A little bit of difficulty  Squatting: Quite a bit of difficulty  Lifting an object, like a bag of groceries from the floor: No difficulty  Performing light activities around your home: A little bit of difficulty  Performing heavy activities around your home: Moderate difficulty  Getting into or out of a car: A little bit of difficulty  Walking 2 blocks: No difficulty  Walking a mile: No difficulty  Going up or down 10 stairs (about 1 flight of stairs): Quite a bit of difficulty  Standing for 1 hour: Moderate difficulty  Sitting for 1 hour: Moderate difficulty  Running on even ground: Extreme difficulty or unable to perform activity  Running on uneven ground: Extreme difficulty or unable to perform activity  Making sharp turns while running fast: Extreme difficulty or unable to perform activity  Hopping: Extreme difficulty or unable to perform activity  Rolling over in  bed: No difficulty  Total: 43      Time Calculation:   Start Time: 1115  Stop Time: 1210  Time Calculation (min): 55 min  Total Timed Code Minutes- PT: 25 minute(s)     Therapy Charges for Today     Code Description Service Date Service Provider Modifiers Qty    23671270362 HC PT MANUAL THERAPY EA 15 MIN 6/1/2018 Marissa Kaufman, PT GP 1    90831917167 HC PT HOT OR COLD PACK TREAT MCARE 6/1/2018 Marissa Kaufman, PT GP 1    01794311192 HC PT EVAL MOD COMPLEXITY 2 6/1/2018 Marissa Kaufman, PT GP 1          PT G-Codes  Outcome Measure Options: Lower Extremity Functional Scale (LEFS)         Marissa Kaufman, PT  6/1/2018

## 2018-06-05 ENCOUNTER — APPOINTMENT (OUTPATIENT)
Dept: PHYSICAL THERAPY | Facility: HOSPITAL | Age: 63
End: 2018-06-05

## 2018-06-07 ENCOUNTER — APPOINTMENT (OUTPATIENT)
Dept: PHYSICAL THERAPY | Facility: HOSPITAL | Age: 63
End: 2018-06-07

## 2018-06-12 ENCOUNTER — HOSPITAL ENCOUNTER (OUTPATIENT)
Dept: PHYSICAL THERAPY | Facility: HOSPITAL | Age: 63
Setting detail: THERAPIES SERIES
Discharge: HOME | End: 2018-06-12

## 2018-06-12 ENCOUNTER — APPOINTMENT (OUTPATIENT)
Dept: PHYSICAL THERAPY | Facility: HOSPITAL | Age: 63
End: 2018-06-12

## 2018-06-12 DIAGNOSIS — M25.551 RIGHT HIP PAIN: Primary | ICD-10-CM

## 2018-06-12 PROCEDURE — 97140 MANUAL THERAPY 1/> REGIONS: CPT

## 2018-06-12 PROCEDURE — 97110 THERAPEUTIC EXERCISES: CPT

## 2018-06-12 NOTE — THERAPY TREATMENT NOTE
Outpatient Physical Therapy Ortho Treatment Note  [facility]     Patient Name: Marie Foy  : 1955  MRN: [id number]  Today's Date: 2018      Visit Date: 2018    Visit Dx:    ICD-10-CM ICD-9-CM   1. Right hip pain M25.551 719.45       Patient Active Problem List   Diagnosis   • Anxiety   • Gastroesophageal reflux disease   • Herpes simplex type 2 infection   • Hypercholesterolemia   • Hypothyroidism   • Osteopenia   • Overactive bladder   • Fear of flying   • Unilateral partial paralysis of vocal cords or larynx   • Colon polyps   • Incidental lung nodule        Past Medical History:   Diagnosis Date   • Colon polyps    • Constipation    • Difficulty sleeping    • Generalized stiffness    • Graves disease    • Hearing loss in right ear    • Heart murmur    • History of bipolar disorder    • History of substance abuse    • Joint pain     swelling   • Muscle pain         Past Surgical History:   Procedure Laterality Date   • ANKLE SURGERY     • BLADDER SURGERY     • COLONOSCOPY N/A 2016    Procedure: COLONOSCOPY TO CECUM & T.I.  WITH SALINE INJECTION, HOT SNARE POLYPECTOMY, COLD POLYPECTOMY AND CLIP PLACEMENT X 1;  Surgeon: Darryl Garcia MD;  Location: [facility] ENDOSCOPY;  Service:    • FOOT SURGERY     • HYSTERECTOMY     • JOINT REPLACEMENT Right     HAND   • REPLACEMENT TOTAL KNEE     • THROAT SURGERY      childhood                             PT Assessment/Plan     Row Name 18 1300          PT Assessment    Assessment Comments Pt returns for fisrt f/u visit.  Reports good response to DDN at initial visit.  Noted multiple TP's throughout gluteals and along entire ITB.  Added Gua sha and STM for glutes, piriformis and ITB and will assess response next visit. Instructed and issued HEP for core and hip strengthening.  Pt unable to isolate t.a. without incrrased LBP so did not add to HEP yet but advised to practice in car at stoplights.  Discussed heat and ice and opted for heat  today to relax faciltiated muscles throughout glute/hip and ITB  -JA        PT Plan    PT Plan Comments assess response to ther ex, manual and heat.  -DOMINIQUE       User Key  (r) = Recorded By, (t) = Taken By, (c) = Cosigned By    Initials Name Provider Type    DOMINIQUE Stoddard, PT Physical Therapist                Modalities     Row Name 06/12/18 1300             Moist Heat    MH Applied Yes  -DOMINIQUE      Location R hip/glutes/ITB in L SL w/pillow between knees  -JA      Rx Minutes 12 mins  -JA      MH S/P Rx Yes  -DOMINIQUE        User Key  (r) = Recorded By, (t) = Taken By, (c) = Cosigned By    Initials Name Provider Type    DOMINIQUE Stoddard, PT Physical Therapist                Exercises     Row Name 06/12/18 1700 06/12/18 1300          Subjective Comments    Subjective Comments  -- I was a little sore like she said I could be but Friday night i had the best sleep I've had in weeks.  -DOMINIQUE        Subjective Pain    Able to rate subjective pain?  -- yes  -JA     Pre-Treatment Pain Level  -- 4  -JA        Total Minutes    56724 - PT Therapeutic Exercise Minutes  -- 23  -JA     09091 - PT Manual Therapy Minutes 20  -JA  --        Exercise 1    Exercise Name 1  -- Nustep  -JA     Cueing 1  -- Verbal  -JA     Time 1  -- 7 min  -JA     Additional Comments  -- L4  -JA        Exercise 2    Exercise Name 2  -- SKC  -JA     Cueing 2  -- Verbal;Tactile  -JA     Reps 2  -- 3  -JA     Time 2  -- 20s  -JA     Additional Comments  -- diallo  -JA        Exercise 3    Exercise Name 3  -- LTR in small range  -JA     Cueing 3  -- Verbal;Tactile  -JA     Reps 3  -- 5  -JA     Time 3  -- 5s  -JA     Additional Comments  -- sub-pain, slow and controlled  -JA        Exercise 4    Exercise Name 4  -- Piriformis stretch H/L crossed leg  -JA     Cueing 4  -- Verbal;Tactile  -JA     Reps 4  -- 3  -JA     Time 4  -- 15-20s  -JA     Additional Comments  -- diallo   R painful but improved each rep  -JA        Exercise 5    Exercise Name 5  -- glute sets   "-JA     Cueing 5  -- Verbal  -JA     Reps 5  -- 10  -JA     Time 5  -- 3s  -JA        Exercise 6    Exercise Name 6  -- SL clams  -JA     Cueing 6  -- Verbal;Tactile  -JA     Reps 6  -- 10  -JA     Time 6  -- 3s  -JA        Exercise 7    Exercise Name 7  -- \"froggy\" stretch/hip add stretch  -JA     Reps 7  -- 3  -JA     Time 7  -- 10-15 sec   -JA     Additional Comments  -- do on couch to let leg rest against the back for gentle controlled stretch  -DOMINIQUE       User Key  (r) = Recorded By, (t) = Taken By, (c) = Cosigned By    Initials Name Provider Type    DOMINIQUE Stoddard, PT Physical Therapist                06/12/18 1700   Total Minutes   40802 - PT Manual Therapy Minutes 20   Manual Rx 1   Manual Rx 1 Location R glute/hip/ITB in L SL w/pillow between knees   Manual Rx 1 Type gua sha and STM, TP release in glutes  (exquisite point tenderness in R glutes, gr troch, & ITB)   Manual Rx 1 Duration 20 min                    Therapy Education  Education Details: Discussed goal to increase R hip flexibility and strengthen/stabilize hip girdle and core to improve functional mobility.  Discussed heat and ice for pain modulation.   Given: HEP, Symptoms/condition management, Pain management  Program: New  How Provided: Verbal, Demonstration, Written  Provided to: Patient  Level of Understanding: Teach back education performed              Time Calculation:      Therapy Suggested Charges     Code   Minutes Charges    01406 (CPT®) Hc Pt Neuromusc Re Education Ea 15 Min      86178 (CPT®) Hc Pt Ther Proc Ea 15 Min      60894 (CPT®) Hc Gait Training Ea 15 Min      48776 (CPT®) Hc Pt Therapeutic Act Ea 15 Min      01186 (CPT®) Hc Pt Manual Therapy Ea 15 Min 20 1    22086 (CPT®) Hc Pt Ther Massage- Per 15 Min      17376 (CPT®) Hc Pt Iontophoresis Ea 15 Min      95517 (CPT®) Hc Pt Elec Stim Ea-Per 15 Min      45893 (CPT®) Hc Pt Ultrasound Ea 15 Min      72244 (CPT®) Hc Pt Self Care/Mgmt/Train Ea 15 Min      Total  20 1    "     Therapy Charges for Today     Code Description Service Date Service Provider Modifiers Qty    14548708964 HC PT MANUAL THERAPY EA 15 MIN 6/12/2018 Monica Stoddard, PT GP 1    97837774398 HC PT THER PROC EA 15 MIN 6/12/2018 Monica Stoddard, PT GP 2    84800619160 HC PT HOT OR COLD PACK TREAT MCARE 6/12/2018 Monica Stoddard, PT GP 1                    Monica Stoddard, PT  6/12/2018

## 2018-06-14 ENCOUNTER — HOSPITAL ENCOUNTER (OUTPATIENT)
Dept: PHYSICAL THERAPY | Facility: HOSPITAL | Age: 63
Setting detail: THERAPIES SERIES
Discharge: HOME | End: 2018-06-14

## 2018-06-14 DIAGNOSIS — M25.551 RIGHT HIP PAIN: Primary | ICD-10-CM

## 2018-06-14 PROCEDURE — 97110 THERAPEUTIC EXERCISES: CPT | Performed by: PHYSICAL THERAPIST

## 2018-06-14 PROCEDURE — 97140 MANUAL THERAPY 1/> REGIONS: CPT | Performed by: PHYSICAL THERAPIST

## 2018-06-14 NOTE — THERAPY TREATMENT NOTE
Outpatient Physical Therapy Ortho Treatment Note  [facility]     Patient Name: Marie Foy  : 1955  MRN: [id number]  Today's Date: 2018      Visit Date: 2018    Visit Dx:    ICD-10-CM ICD-9-CM   1. Right hip pain M25.551 719.45       Patient Active Problem List   Diagnosis   • Anxiety   • Gastroesophageal reflux disease   • Herpes simplex type 2 infection   • Hypercholesterolemia   • Hypothyroidism   • Osteopenia   • Overactive bladder   • Fear of flying   • Unilateral partial paralysis of vocal cords or larynx   • Colon polyps   • Incidental lung nodule        Past Medical History:   Diagnosis Date   • Colon polyps    • Constipation    • Difficulty sleeping    • Generalized stiffness    • Graves disease    • Hearing loss in right ear    • Heart murmur    • History of bipolar disorder    • History of substance abuse    • Joint pain     swelling   • Muscle pain         Past Surgical History:   Procedure Laterality Date   • ANKLE SURGERY     • BLADDER SURGERY     • COLONOSCOPY N/A 2016    Procedure: COLONOSCOPY TO CECUM & T.I.  WITH SALINE INJECTION, HOT SNARE POLYPECTOMY, COLD POLYPECTOMY AND CLIP PLACEMENT X 1;  Surgeon: Darryl Garcia MD;  Location: [facility] ENDOSCOPY;  Service:    • FOOT SURGERY     • HYSTERECTOMY     • JOINT REPLACEMENT Right     HAND   • REPLACEMENT TOTAL KNEE     • THROAT SURGERY      childhood                             PT Assessment/Plan     Row Name 18 1300          PT Assessment    Assessment Comments Ms. Foy presents, ambulating with mild limp.  We progressed hip girdle/core strengthening today and repeated DDN of R glut medius, R piriformis with several LTR's.  Suspect L spine involvement/stenosis on top of greater trochanteric tendonopathy.   -GJ        PT Plan    PT Plan Comments assess response to DDN of R glut tissues. continue to strengthen hip girdle/core tissues, DDN/manual PRN.  Activity modification education   -       User Key  (r) =  Recorded By, (t) = Taken By, (c) = Cosigned By    Initials Name Provider Type    MARVIN Matos, PT Physical Therapist                Modalities     Row Name 06/14/18 1100             Ice    Ice Applied Yes  -GJ      Location Ice pack to R hip in L side lying followign needling   -GJ      Rx Minutes 10 mins  -GJ      Ice S/P Rx Yes  -GJ        User Key  (r) = Recorded By, (t) = Taken By, (c) = Cosigned By    Initials Name Provider Type    GJ Adriel Matos, PT Physical Therapist                Exercises     Row Name 06/14/18 1128 06/14/18 1100          Subjective Comments    Subjective Comments  -- I'm sore from the deep massage last time. No N/T in my legs.  The worst thing is trying to go up stairs I get a stab in my hip or when getting up from sitting   -GJ        Subjective Pain    Pre-Treatment Pain Level  -- 0  -GJ        Total Minutes    38418 - PT Therapeutic Exercise Minutes 30  -GJ  --     72857 - PT Manual Therapy Minutes 15  -GJ  --        Exercise 1    Exercise Name 1  -- Nustep  -GJ     Time 1  -- 5 min  -GJ        Exercise 3    Exercise Name 3  -- LTR in small range  -GJ     Cueing 3  -- Verbal  -GJ     Reps 3  -- 10  -GJ     Time 3  -- 5 s  -GJ        Exercise 4    Exercise Name 4  -- Piriformis stretch H/L crossed leg  -GJ     Cueing 4  -- Verbal;Tactile  -GJ     Reps 4  -- 3  -GJ     Time 4  -- 15-20s  -GJ     Additional Comments  -- bilateral  -GJ        Exercise 6    Exercise Name 6  -- SL clams, bilatereally   -GJ     Cueing 6  -- Verbal;Tactile  -GJ     Sets 6  -- 2  -GJ     Reps 6  -- 10  -GJ     Time 6  -- 3s  -GJ        Exercise 8    Exercise Name 8  -- stanidng HS curl, bilateral  -GJ     Cueing 8  -- Verbal;Demo  -GJ     Sets 8  -- 2  -GJ     Reps 8  -- 10  -GJ     Additional Comments  -- 2#  -GJ        Exercise 9    Exercise Name 9  -- bridge  -GJ     Cueing 9  -- Demo;Verbal  -GJ     Reps 9  -- 15  -GJ     Time 9  -- 3 s  -GJ        Exercise 10    Exercise Name 10  -- mini squat  -GJ      Cueing 10  -- Verbal;Demo  -GJ     Reps 10  -- 15  -       User Key  (r) = Recorded By, (t) = Taken By, (c) = Cosigned By    Initials Name Provider Type    MARVIN Matos PT Physical Therapist                        Manual Rx (last 36 hours)      Manual Treatments     Row Name 06/14/18 1100             Manual Rx 2    Manual Rx 2 Location intramuscular manual therpay  -GJ Assessed pt. for Dry Needling and intramuscular manual therapy. Discussed risks/benefits of Dry Needling with pt, including but not limited to muscle soreness, bruising, vasovagal response, pneumothorax, nerve injury. Patient signed written consent to proceed with treatment.    Patient position during treatment: L SL with pillow between knees.   Muscles treated: R glut medius, R piriformis.   Response to treatment: several moderate LTR's, no immediate adverse response. Moderate pain response.     palpation used before, during, and after to facilitate assessment Clean needle technique observed at all times, precautions for lung fields, neurovascular structures observed.    DDN performed by Adriel Matos II, PT, DPT     Manual Rx 2 Duration 15 min  -        User Key  (r) = Recorded By, (t) = Taken By, (c) = Cosigned By    Initials Name Provider Type    MARVIN Matos PT Physical Therapist                PT OP Goals     Row Name 06/14/18 1100          PT Short Term Goals    STG Date to Achieve 06/15/18  -     STG 1 Pt. will be independent and compliant with initial home exercise program.  -GJ     STG 1 Progress Ongoing  -GJ     STG 1 Progress Comments intermittent cues  -GJ     STG 2 Pt. will report R hip pain </=7-8/10 on VAS indicating decreased perceived functional disability.  -GJ     STG 2 Progress Progressing  -GJ     STG 2 Progress Comments pt reportig 0/10 pain today in the clinic, will continue to assess  -GJ     STG 3 Pt. will report sleeping >3-4 hours without interruption from hip pain.  -GJ     STG 3 Progress Ongoing   -GJ     STG 3 Progress Comments pt reports waking intermittently to adjust position, able to return to sleep  -GJ        Long Term Goals    LTG Date to Achieve 07/01/18  -GJ     LTG 1 Pt. will be independent and compliant with advanced home exercise program.  -GJ     LTG 1 Progress Ongoing  -GJ     LTG 2 Pt. will report R hip pain </=2-3/10 to increase ease of going up/down stairs.   -GJ     LTG 2 Progress Ongoing  -GJ     LTG 2 Progress Comments pt reportig 0/10 pain today in the clinic, will continue to assess  -GJ     LTG 3 Pt. will score >/=60/80 on LEFS, indicating decreased perceived functional disability.  -GJ     LTG 3 Progress Ongoing  -GJ       User Key  (r) = Recorded By, (t) = Taken By, (c) = Cosigned By    Initials Name Provider Type    MARVIN Matos, PT Physical Therapist          Therapy Education  Education Details: discussed ergonmics with work activities, discussed importance of strengthening hip girdle tissues.    Given: HEP, Symptoms/condition management, Pain management, Posture/body mechanics, Mobility training  Program: New  How Provided: Verbal, Demonstration, Written  Provided to: Patient  Level of Understanding: Teach back education performed, Verbalized, Demonstrated              Time Calculation:   Start Time: 1130  Stop Time: 1228  Time Calculation (min): 58 min  Therapy Suggested Charges     Code   Minutes Charges    33285 (CPT®) Hc Pt Neuromusc Re Education Ea 15 Min      00769 (CPT®) Hc Pt Ther Proc Ea 15 Min 30 2    81373 (CPT®) Hc Gait Training Ea 15 Min      68309 (CPT®) Hc Pt Therapeutic Act Ea 15 Min      23936 (CPT®) Hc Pt Manual Therapy Ea 15 Min 15 1    63659 (CPT®) Hc Pt Ther Massage- Per 15 Min      46358 (CPT®) Hc Pt Iontophoresis Ea 15 Min      91559 (CPT®) Hc Pt Elec Stim Ea-Per 15 Min      15860 (CPT®) Hc Pt Ultrasound Ea 15 Min      43303 (CPT®) Hc Pt Self Care/Mgmt/Train Ea 15 Min      Total  45 3        Therapy Charges for Today     Code Description Service Date  Service Provider Modifiers Qty    [id number]  PT THER PROC EA 15 MIN 6/14/2018 Adriel Matos, PT GP 2    76793243362 HC PT MANUAL THERAPY EA 15 MIN 6/14/2018 Adriel Matos, PT GP 1    [id number]  PT HOT OR COLD PACK TREAT MCARE 6/14/2018 Adriel Matso, PT GP 1                    Adriel Matos, PT  6/14/2018

## 2018-06-18 ENCOUNTER — APPOINTMENT (OUTPATIENT)
Dept: WOMENS IMAGING | Facility: HOSPITAL | Age: 63
End: 2018-06-18

## 2018-06-19 ENCOUNTER — HOSPITAL ENCOUNTER (OUTPATIENT)
Dept: PHYSICAL THERAPY | Facility: HOSPITAL | Age: 63
Setting detail: THERAPIES SERIES
Discharge: HOME | End: 2018-06-19

## 2018-06-19 DIAGNOSIS — M25.551 RIGHT HIP PAIN: Primary | ICD-10-CM

## 2018-06-19 PROCEDURE — 97110 THERAPEUTIC EXERCISES: CPT | Performed by: PHYSICAL THERAPIST

## 2018-06-19 PROCEDURE — 97140 MANUAL THERAPY 1/> REGIONS: CPT | Performed by: PHYSICAL THERAPIST

## 2018-06-19 NOTE — THERAPY TREATMENT NOTE
Outpatient Physical Therapy Ortho Treatment Note  [facility]     Patient Name: Marie Foy  : 1955  MRN: [id number]  Today's Date: 2018      Visit Date: 2018    Visit Dx:    ICD-10-CM ICD-9-CM   1. Right hip pain M25.551 719.45       Patient Active Problem List   Diagnosis   • Anxiety   • Gastroesophageal reflux disease   • Herpes simplex type 2 infection   • Hypercholesterolemia   • Hypothyroidism   • Osteopenia   • Overactive bladder   • Fear of flying   • Unilateral partial paralysis of vocal cords or larynx   • Colon polyps   • Incidental lung nodule        Past Medical History:   Diagnosis Date   • Colon polyps    • Constipation    • Difficulty sleeping    • Generalized stiffness    • Graves disease    • Hearing loss in right ear    • Heart murmur    • History of bipolar disorder    • History of substance abuse    • Joint pain     swelling   • Muscle pain         Past Surgical History:   Procedure Laterality Date   • ANKLE SURGERY     • BLADDER SURGERY     • COLONOSCOPY N/A 2016    Procedure: COLONOSCOPY TO CECUM & T.I.  WITH SALINE INJECTION, HOT SNARE POLYPECTOMY, COLD POLYPECTOMY AND CLIP PLACEMENT X 1;  Surgeon: Darryl Garcia MD;  Location: [facility] ENDOSCOPY;  Service:    • FOOT SURGERY     • HYSTERECTOMY     • JOINT REPLACEMENT Right     HAND   • REPLACEMENT TOTAL KNEE     • THROAT SURGERY      childhood                             PT Assessment/Plan     Row Name 18 1245          PT Assessment    Assessment Comments Patient demonstrates more notable antalgia entering clinic today, reports increased pain in R hip.  She has palpable tenderness with taut tissues and knots throughout R lateral/post hip girdle (glutes, piriformis, ITB, tissues and greater trochanteric region) - intermittent referral down leg and a couple of times up into shoulder blade reported during manual this visit.  Patient indicates she is primarily on her feet at work which is likely not helping  her condition.    -RA        PT Plan    PT Plan Comments Continue skilled therapy for postural/core/hip girdle strength/stabilization as patient tolerates.  Manual/modalities as appropriate/indicated.   -RA       User Key  (r) = Recorded By, (t) = Taken By, (c) = Cosigned By    Initials Name Provider Type    SARAI Rawlsina MICHAEL Meza, PT Physical Therapist                Modalities     Row Name 06/19/18 1200             Moist Heat    MH Applied Yes  -RA      Location R hip/glutes/ITB in L SL w/pillow between knees  -RA      Rx Minutes 12 mins  -RA      MH S/P Rx Yes  -RA        User Key  (r) = Recorded By, (t) = Taken By, (c) = Cosigned By    Initials Name Provider Type    SARAI Meza, PT Physical Therapist                Exercises     Row Name 06/19/18 1200 06/19/18 1100          Subjective Comments    Subjective Comments Don't know if I overdid it with ex or if I slept wrong last night - couldn't get comfortable all night.    -RA  --        Subjective Pain    Pre-Treatment Pain Level 7  -RA  --        Total Minutes    64743 - PT Therapeutic Exercise Minutes 28  -RA  --     43734 - PT Manual Therapy Minutes  -- 18  -RA        Exercise 1    Exercise Name 1 Nustep  -RA  --     Time 1 5 min  -RA  --     Additional Comments L4  -RA  --        Exercise 2    Exercise Name 2 SKC  -RA  --     Cueing 2 Verbal  -RA  --     Reps 2 3  -RA  --     Time 2 20s  -RA  --     Additional Comments B  -RA  --        Exercise 3    Exercise Name 3 LTR in small range  -RA  --     Cueing 3 Verbal  -RA  --     Reps 3 10  -RA  --     Time 3 5 s  -RA  --        Exercise 4    Exercise Name 4 Piriformis stretch H/L crossed leg  -RA  --     Cueing 4 Verbal;Tactile  -RA  --     Reps 4 3  -RA  --     Time 4 15-20s  -RA  --     Additional Comments B   -RA  --        Exercise 6    Exercise Name 6 SL clams, bilatereally   -RA  --     Cueing 6 Verbal;Tactile  -RA  --     Sets 6 --  -RA  --     Reps 6 10  -RA  --     Time 6 3s  -RA  --        Exercise  8    Exercise Name 8 blairidcris HS curl, bilateral  -RA  --     Cueing 8 Verbal;Demo  -RA  --     Sets 8 2  -RA  --     Reps 8 10  -RA  --     Additional Comments 2#   -RA  --        Exercise 9    Exercise Name 9 bridge  -RA  --     Cueing 9 Demo;Verbal  -RA  --     Reps 9 15  -RA  --     Time 9 3 s  -RA  --        Exercise 10    Exercise Name 10 mini squat  -RA  --     Cueing 10 Verbal;Demo  -RA  --     Reps 10 15  -RA  --       User Key  (r) = Recorded By, (t) = Taken By, (c) = Cosigned By    Initials Name Provider Type    RA Mary Ann Meza, PT Physical Therapist                        Manual Rx (last 36 hours)      Manual Treatments     Row Name 06/19/18 1100             Total Minutes    26856 - PT Manual Therapy Minutes 18  -RA         Manual Rx 1    Manual Rx 1 Location R glute/hip/ITB in L SL w/pillow between knees  -RA      Manual Rx 1 Type STM, TP release in glutes   tenderness in R glutes, gr troch, & ITB  -RA        User Key  (r) = Recorded By, (t) = Taken By, (c) = Cosigned By    Initials Name Provider Type    SARAI Meza, PT Physical Therapist                             Time Calculation:   Start Time: 1218  Stop Time: 1318  Time Calculation (min): 60 min  Therapy Suggested Charges     Code   Minutes Charges    79282 (CPT®) Hc Pt Neuromusc Re Education Ea 15 Min      18564 (CPT®) Hc Pt Ther Proc Ea 15 Min 28 2    03710 (CPT®) Hc Gait Training Ea 15 Min      35377 (CPT®) Hc Pt Therapeutic Act Ea 15 Min      61750 (CPT®) Hc Pt Manual Therapy Ea 15 Min 18 1    02785 (CPT®) Hc Pt Ther Massage- Per 15 Min      41177 (CPT®) Hc Pt Iontophoresis Ea 15 Min      30908 (CPT®) Hc Pt Elec Stim Ea-Per 15 Min      84007 (CPT®) Hc Pt Ultrasound Ea 15 Min      70679 (CPT®) Hc Pt Self Care/Mgmt/Train Ea 15 Min      Total  46 3        Therapy Charges for Today     Code Description Service Date Service Provider Modifiers Qty    73742995159 HC PT THER PROC EA 15 MIN 6/19/2018 Mary Ann Meza, PT GP 2    [id number]   PT MANUAL THERAPY EA 15 MIN 6/19/2018 Mary Ann Meza, PT GP 1    [id number]  PT HOT OR COLD PACK TREAT MCARE 6/19/2018 Mary Ann Meza, PT GP 1                    Mary Ann Meza, PT  6/19/2018

## 2018-06-21 ENCOUNTER — HOSPITAL ENCOUNTER (OUTPATIENT)
Dept: PHYSICAL THERAPY | Facility: HOSPITAL | Age: 63
Setting detail: THERAPIES SERIES
Discharge: HOME | End: 2018-06-21

## 2018-06-21 DIAGNOSIS — M25.551 RIGHT HIP PAIN: Primary | ICD-10-CM

## 2018-06-21 PROCEDURE — 97110 THERAPEUTIC EXERCISES: CPT | Performed by: PHYSICAL THERAPIST

## 2018-06-21 PROCEDURE — 97140 MANUAL THERAPY 1/> REGIONS: CPT | Performed by: PHYSICAL THERAPIST

## 2018-06-21 NOTE — THERAPY TREATMENT NOTE
Outpatient Physical Therapy Ortho Treatment Note  [facility]     Patient Name: Marie Foy  : 1955  MRN: [id number]  Today's Date: 2018      Visit Date: 2018    Visit Dx:    ICD-10-CM ICD-9-CM   1. Right hip pain M25.551 719.45       Patient Active Problem List   Diagnosis   • Anxiety   • Gastroesophageal reflux disease   • Herpes simplex type 2 infection   • Hypercholesterolemia   • Hypothyroidism   • Osteopenia   • Overactive bladder   • Fear of flying   • Unilateral partial paralysis of vocal cords or larynx   • Colon polyps   • Incidental lung nodule        Past Medical History:   Diagnosis Date   • Colon polyps    • Constipation    • Difficulty sleeping    • Generalized stiffness    • Graves disease    • Hearing loss in right ear    • Heart murmur    • History of bipolar disorder    • History of substance abuse    • Joint pain     swelling   • Muscle pain         Past Surgical History:   Procedure Laterality Date   • ANKLE SURGERY     • BLADDER SURGERY     • COLONOSCOPY N/A 2016    Procedure: COLONOSCOPY TO CECUM & T.I.  WITH SALINE INJECTION, HOT SNARE POLYPECTOMY, COLD POLYPECTOMY AND CLIP PLACEMENT X 1;  Surgeon: Darryl Garcia MD;  Location: [facility] ENDOSCOPY;  Service:    • FOOT SURGERY     • HYSTERECTOMY     • JOINT REPLACEMENT Right     HAND   • REPLACEMENT TOTAL KNEE     • THROAT SURGERY      childhood                             PT Assessment/Plan     Row Name 18 1416          PT Assessment    Assessment Comments Patient walking better today, continues to exhibit increased lateral trunk sway 2/2 hip decreased hip strength/stabilizaiton.  -RA        PT Plan    PT Plan Comments Continue working on postural/core/hip girdle strength/stabilization.  May add more standing hip work, continue manual, possible DDN as indicated.   -RA       User Key  (r) = Recorded By, (t) = Taken By, (c) = Cosigned By    Initials Name Provider Type    RA Mary Ann Meza, PT Physical  Therapist                Modalities     Row Name 06/21/18 1300             Moist Heat    MH Applied Yes  -RA      Location R hip/glutes/ITB in L SL w/pillow between knees  -RA      Rx Minutes 12 mins  -RA      MH S/P Rx Yes  -RA        User Key  (r) = Recorded By, (t) = Taken By, (c) = Cosigned By    Initials Name Provider Type    RA Mary Ann MICHAEL Meza, PT Physical Therapist                Exercises     Row Name 06/21/18 1300             Subjective Comments    Subjective Comments Better, not so much pain, just sore/stiff - haven't been awake for very long  -RA         Subjective Pain    Pre-Treatment Pain Level 3  -RA         Total Minutes    43956 - PT Therapeutic Exercise Minutes 29  -RA      68442 - PT Manual Therapy Minutes 16  -RA         Exercise 1    Exercise Name 1 Nustep  -RA      Time 1 5 min  -RA      Additional Comments L4  -RA         Exercise 2    Exercise Name 2 SKC  -RA      Cueing 2 Verbal  -RA      Reps 2 3  -RA      Time 2 20s  -RA      Additional Comments B  -RA         Exercise 3    Exercise Name 3 LTR in small range  -RA      Cueing 3 Verbal  -RA      Reps 3 10  -RA      Time 3 5 s  -RA         Exercise 4    Exercise Name 4 Piriformis stretch H/L crossed leg  -RA      Cueing 4 Verbal;Tactile  -RA      Reps 4 3  -RA      Time 4 15-20s  -RA      Additional Comments B  -RA         Exercise 6    Exercise Name 6 SL clams, bilatereally   -RA      Cueing 6 Verbal;Tactile  -RA      Sets 6 2  -RA      Reps 6 10  -RA      Time 6 3s  -RA         Exercise 8    Exercise Name 8 stanidng HS curl, bilateral  -RA      Cueing 8 Verbal;Demo  -RA      Sets 8 2  -RA      Reps 8 10  -RA      Additional Comments 2#  -RA         Exercise 9    Exercise Name 9 bridge  -RA      Cueing 9 Demo;Verbal  -RA      Reps 9 15  -RA      Time 9 3 s  -RA         Exercise 10    Exercise Name 10 mini squat  -RA      Cueing 10 Verbal;Demo  -RA      Reps 10 15  -RA        User Key  (r) = Recorded By, (t) = Taken By, (c) = Cosigned By     Initials Name Provider Type    RA Mary Ann Meza PT Physical Therapist                        Manual Rx (last 36 hours)      Manual Treatments     Row Name 06/21/18 1300             Total Minutes    85626 - PT Manual Therapy Minutes 16  -RA         Manual Rx 1    Manual Rx 1 Location R glute/hip/ITB in L SL w/pillow between knees  -RA      Manual Rx 1 Type STM, TP release in glutes   tenderness in R glutes, gr troch, & ITB  -RA        User Key  (r) = Recorded By, (t) = Taken By, (c) = Cosigned By    Initials Name Provider Type    RA Mary Ann Meza PT Physical Therapist              Therapy Education  Given: HEP, Symptoms/condition management, Pain management, Posture/body mechanics, Mobility training  Program: Reinforced  How Provided: Verbal, Demonstration  Provided to: Patient  Level of Understanding: Teach back education performed, Verbalized, Demonstrated              Time Calculation:   Start Time: 1319  Stop Time: 1416  Time Calculation (min): 57 min  Therapy Suggested Charges     Code   Minutes Charges    17125 (CPT®) Hc Pt Neuromusc Re Education Ea 15 Min      95733 (CPT®) Hc Pt Ther Proc Ea 15 Min 29 2    99518 (CPT®) Hc Gait Training Ea 15 Min      59732 (CPT®) Hc Pt Therapeutic Act Ea 15 Min      00839 (CPT®) Hc Pt Manual Therapy Ea 15 Min 16 1    29540 (CPT®) Hc Pt Ther Massage- Per 15 Min      66047 (CPT®) Hc Pt Iontophoresis Ea 15 Min      26765 (CPT®) Hc Pt Elec Stim Ea-Per 15 Min      58751 (CPT®) Hc Pt Ultrasound Ea 15 Min      77796 (CPT®) Hc Pt Self Care/Mgmt/Train Ea 15 Min      Total  45 3        Therapy Charges for Today     Code Description Service Date Service Provider Modifiers Qty    16291944021 HC PT THER PROC EA 15 MIN 6/21/2018 Mary Ann Meza, PT GP 2    14441275225 HC PT MANUAL THERAPY EA 15 MIN 6/21/2018 Mary Ann Meza, PT GP 1    16789684301 HC PT HOT OR COLD PACK TREAT MCARE 6/21/2018 Mary Ann Meza, PT GP 1                    Mary Ann Meza, PT  6/21/2018

## 2018-06-26 ENCOUNTER — APPOINTMENT (OUTPATIENT)
Dept: PHYSICAL THERAPY | Facility: HOSPITAL | Age: 63
End: 2018-06-26

## 2018-06-28 ENCOUNTER — APPOINTMENT (OUTPATIENT)
Dept: WOMENS IMAGING | Facility: HOSPITAL | Age: 63
End: 2018-06-28

## 2018-06-28 ENCOUNTER — HOSPITAL ENCOUNTER (OUTPATIENT)
Dept: PHYSICAL THERAPY | Facility: HOSPITAL | Age: 63
Setting detail: THERAPIES SERIES
Discharge: HOME | End: 2018-06-28

## 2018-06-28 DIAGNOSIS — M25.551 RIGHT HIP PAIN: Primary | ICD-10-CM

## 2018-06-28 PROCEDURE — 77067 SCR MAMMO BI INCL CAD: CPT | Performed by: RADIOLOGY

## 2018-06-28 PROCEDURE — 97110 THERAPEUTIC EXERCISES: CPT

## 2018-06-28 PROCEDURE — 97140 MANUAL THERAPY 1/> REGIONS: CPT

## 2018-06-28 NOTE — THERAPY TREATMENT NOTE
Outpatient Physical Therapy Ortho Treatment Note  [facility]     Patient Name: Marie Foy  : 1955  MRN: [id number]  Today's Date: 2018      Visit Date: 2018    Visit Dx:    ICD-10-CM ICD-9-CM   1. Right hip pain M25.551 719.45       Patient Active Problem List   Diagnosis   • Anxiety   • Gastroesophageal reflux disease   • Herpes simplex type 2 infection   • Hypercholesterolemia   • Hypothyroidism   • Osteopenia   • Overactive bladder   • Fear of flying   • Unilateral partial paralysis of vocal cords or larynx   • Colon polyps   • Incidental lung nodule        Past Medical History:   Diagnosis Date   • Colon polyps    • Constipation    • Difficulty sleeping    • Generalized stiffness    • Graves disease    • Hearing loss in right ear    • Heart murmur    • History of bipolar disorder    • History of substance abuse    • Joint pain     swelling   • Muscle pain         Past Surgical History:   Procedure Laterality Date   • ANKLE SURGERY     • BLADDER SURGERY     • COLONOSCOPY N/A 2016    Procedure: COLONOSCOPY TO CECUM & T.I.  WITH SALINE INJECTION, HOT SNARE POLYPECTOMY, COLD POLYPECTOMY AND CLIP PLACEMENT X 1;  Surgeon: Darryl Garcia MD;  Location: [facility] ENDOSCOPY;  Service:    • FOOT SURGERY     • HYSTERECTOMY     • JOINT REPLACEMENT Right     HAND   • REPLACEMENT TOTAL KNEE     • THROAT SURGERY      childhood                             PT Assessment/Plan     Row Name 18 1100          PT Assessment    Assessment Comments Pt reports no pain in hip today but notes stiffness in knees.  She tolerated increased strengthening with tband around knees for sidestepping and other variations with no immediate adverse reaction.  Also increased weight with standing ham curls.  She had significantly less point tenderness pain in glutes and ITB.  Demonstrates progress toward goals.  -JA        PT Plan    PT Plan Comments Continue working on postureal/core/hip girdle  strength/stabilization.  Cont gradually adding more standing hip work, continue manual, possible DDN as indicated.   -JA       User Key  (r) = Recorded By, (t) = Taken By, (c) = Cosigned By    Initials Name Provider Type    DOMINIQUE Stoddard, PT Physical Therapist                Modalities     Row Name 06/28/18 1100             Moist Heat    MH Applied Yes  -JA      Location R hip/glutes/ITB in L SL w/pillow between knees  -JA      Rx Minutes 15 mins  -JA      MH S/P Rx Yes  -JA        User Key  (r) = Recorded By, (t) = Taken By, (c) = Cosigned By    Initials Name Provider Type    DOMINIQUE Stoddard, PT Physical Therapist                Exercises     Row Name 06/28/18 1100             Subjective Comments    Subjective Comments Not having any pain today.  Just a stiffness in my knees.  -JA         Subjective Pain    Able to rate subjective pain? yes  -JA      Pre-Treatment Pain Level 0  -JA         Total Minutes    01054 - PT Therapeutic Exercise Minutes 32  -JA      08751 - PT Manual Therapy Minutes 14  -JA         Exercise 1    Exercise Name 1 Nustep  -JA      Time 1 5 min  -JA      Additional Comments increased to L5  -JA         Exercise 2    Exercise Name 2 SKC  -JA      Cueing 2 Verbal  -JA      Reps 2 3  -JA      Time 2 20s  -JA      Additional Comments diallo  -JA         Exercise 3    Exercise Name 3 LTR in small range  -JA      Cueing 3 Verbal  -JA      Reps 3 10  -JA      Time 3 5 s  -JA         Exercise 4    Exercise Name 4 Piriformis stretch H/L crossed leg  -JA      Cueing 4 Verbal;Tactile  -JA      Reps 4 3  -JA      Time 4 15-20s  -JA         Exercise 6    Exercise Name 6 SL clams, bilatereally   -JA      Cueing 6 Verbal;Tactile  -JA      Sets 6 2  -JA      Reps 6 10  -JA      Time 6 3s  -JA         Exercise 7    Exercise Name 7 added gait work w/tband around knees  -JA      Cueing 7 Verbal;Demo  -JA      Additional Comments sidestep, zigzag, monster, box-step  -JA         Exercise 8    Exercise  Name 8 standing HS curl, bilateral  -DOMINIQUE      Cueing 8 Verbal;Demo  -JA      Sets 8 2  -JA      Reps 8 10  -JA      Additional Comments 3#  -JA         Exercise 9    Exercise Name 9 bridge  -JA      Cueing 9 Demo;Verbal  -JA      Reps 9 15  -JA      Time 9 3 s  -JA         Exercise 10    Exercise Name 10 mini squat  -JA      Cueing 10 Verbal;Demo  -JA      Reps 10 10  -JA        User Key  (r) = Recorded By, (t) = Taken By, (c) = Cosigned By    Initials Name Provider Type    DOMINIQUE Stoddard, PT Physical Therapist                        Manual Rx (last 36 hours)      Manual Treatments     Row Name 06/28/18 1100             Total Minutes    87504 - PT Manual Therapy Minutes 14  -JA         Manual Rx 1    Manual Rx 1 Location R glute/hip/ITB in L SL w/pillow between knees  -DOMINIQUE      Manual Rx 1 Type gua sha and STM, TP release in glutes   much less point tenderness in R glutes, gr troch, & ITB  -DOMINIQUE      Manual Rx 1 Duration --  -DOMINIQUE        User Key  (r) = Recorded By, (t) = Taken By, (c) = Cosigned By    Initials Name Provider Type    DOMINIQUE Stoddard, PT Physical Therapist                PT OP Goals     Row Name 06/28/18 1100          PT Short Term Goals    STG Date to Achieve 06/15/18  -DOMINIQUE     STG 1 Pt. will be independent and compliant with initial home exercise program.  -DOMINIQUE     STG 1 Progress Met  -DOMINIQUE     STG 2 Pt. will report R hip pain </=7-8/10 on VAS indicating decreased perceived functional disability.  -DOMINIQUE     STG 2 Progress Met  -DOMINIQUE     STG 2 Progress Comments 0/10 hip pain, only c/o stiffness in knees  -DOMINIQUE     STG 3 Pt. will report sleeping >3-4 hours without interruption from hip pain.  -DOMINIQUE     STG 3 Progress Met  -DOMINIQUE        Long Term Goals    LTG Date to Achieve 07/01/18  -DOMINIQUE     LTG 1 Pt. will be independent and compliant with advanced home exercise program.  -DOMINIQUE     LTG 1 Progress Progressing  -DOMINIQUE     LTG 2 Pt. will report R hip pain </=2-3/10 to increase ease of going up/down stairs.   -DOMINIQUE      LTG 2 Progress Ongoing  -DOMINIQUE     LTG 2 Progress Comments still has pain with steps and avoids them at work  -DOMINIQUE     LTG 3 Pt. will score >/=60/80 on LEFS, indicating decreased perceived functional disability.  -DOMINIQUE     LTG 3 Progress Ongoing  -DOMINIQUE     LTG 3 Progress Comments not yet reassessed  -DOMINIQUE       User Key  (r) = Recorded By, (t) = Taken By, (c) = Cosigned By    Initials Name Provider Type    DOMINIQUE Stoddard, PT Physical Therapist          Therapy Education  Given: Symptoms/condition management, Pain management, Posture/body mechanics, HEP              Time Calculation:   Start Time: 1130  Stop Time: 1225  Time Calculation (min): 55 min  Therapy Suggested Charges     Code   Minutes Charges    37989 (CPT®) Hc Pt Neuromusc Re Education Ea 15 Min      17552 (CPT®) Hc Pt Ther Proc Ea 15 Min 32 2    88988 (CPT®) Hc Gait Training Ea 15 Min      52640 (CPT®) Hc Pt Therapeutic Act Ea 15 Min      99013 (CPT®) Hc Pt Manual Therapy Ea 15 Min 14 1    81012 (CPT®) Hc Pt Ther Massage- Per 15 Min      73895 (CPT®) Hc Pt Iontophoresis Ea 15 Min      31998 (CPT®) Hc Pt Elec Stim Ea-Per 15 Min      09034 (CPT®) Hc Pt Ultrasound Ea 15 Min      94444 (CPT®) Hc Pt Self Care/Mgmt/Train Ea 15 Min      Total  46 3        Therapy Charges for Today     Code Description Service Date Service Provider Modifiers Qty    54628663911 HC PT THER PROC EA 15 MIN 6/28/2018 Monica Stoddard, PT GP 2    43542302813 HC PT MANUAL THERAPY EA 15 MIN 6/28/2018 Monica Stoddard, PT GP 1    76949475460 HC PT HOT OR COLD PACK TREAT MCARE 6/28/2018 Monica Stoddard, PT GP 1                    Monica Stoddard PT  6/28/2018

## 2018-07-12 DIAGNOSIS — E03.9 HYPOTHYROIDISM, UNSPECIFIED TYPE: ICD-10-CM

## 2018-07-12 DIAGNOSIS — E89.0 POSTABLATIVE HYPOTHYROIDISM: Primary | ICD-10-CM

## 2018-07-13 LAB — TSH SERPL DL<=0.005 MIU/L-ACNC: 0.54 MIU/ML (ref 0.27–4.2)

## 2018-07-18 ENCOUNTER — TELEPHONE (OUTPATIENT)
Dept: INTERNAL MEDICINE | Facility: CLINIC | Age: 63
End: 2018-07-18

## 2018-07-18 RX ORDER — TOLTERODINE 4 MG/1
4 CAPSULE, EXTENDED RELEASE ORAL DAILY
Qty: 30 CAPSULE | Refills: 5 | Status: SHIPPED | OUTPATIENT
Start: 2018-07-18 | End: 2018-12-04

## 2018-07-18 NOTE — TELEPHONE ENCOUNTER
Vesicare is to expensive. Is there a cheaper medication?  CC    I called in detrol LA to try. ZEENATW

## 2018-08-06 ENCOUNTER — OFFICE VISIT (OUTPATIENT)
Dept: INTERNAL MEDICINE | Facility: CLINIC | Age: 63
End: 2018-08-06

## 2018-08-06 VITALS
WEIGHT: 157 LBS | TEMPERATURE: 98 F | OXYGEN SATURATION: 95 % | HEART RATE: 66 BPM | BODY MASS INDEX: 32.82 KG/M2 | DIASTOLIC BLOOD PRESSURE: 84 MMHG | SYSTOLIC BLOOD PRESSURE: 138 MMHG

## 2018-08-06 DIAGNOSIS — N30.00 ACUTE CYSTITIS WITHOUT HEMATURIA: Primary | ICD-10-CM

## 2018-08-06 LAB
BILIRUB BLD-MCNC: NEGATIVE MG/DL
CLARITY, POC: ABNORMAL
COLOR UR: ABNORMAL
GLUCOSE UR STRIP-MCNC: NEGATIVE MG/DL
KETONES UR QL: NEGATIVE
LEUKOCYTE EST, POC: ABNORMAL
NITRITE UR-MCNC: NEGATIVE MG/ML
PH UR: 6.5 [PH] (ref 5–8)
PROT UR STRIP-MCNC: ABNORMAL MG/DL
RBC # UR STRIP: ABNORMAL /UL
SP GR UR: 1.01 (ref 1–1.03)
UROBILINOGEN UR QL: NORMAL

## 2018-08-06 PROCEDURE — 99213 OFFICE O/P EST LOW 20 MIN: CPT | Performed by: INTERNAL MEDICINE

## 2018-08-06 PROCEDURE — 81003 URINALYSIS AUTO W/O SCOPE: CPT | Performed by: INTERNAL MEDICINE

## 2018-08-06 RX ORDER — NITROFURANTOIN 25; 75 MG/1; MG/1
100 CAPSULE ORAL 2 TIMES DAILY
Qty: 14 CAPSULE | Refills: 0 | Status: SHIPPED | OUTPATIENT
Start: 2018-08-06 | End: 2018-08-13

## 2018-08-06 NOTE — PROGRESS NOTES
Subjective     Marie Foy is a 63 y.o. female who presents with   Chief Complaint   Patient presents with   • Difficulty Urinating       History of Present Illness     She started on Friday with symptoms.  Burning and urgency.  It gets worse as day goes on.  No fever.    Review of Systems   Gastrointestinal: Negative for abdominal pain.   Musculoskeletal: Positive for back pain.       The following portions of the patient's history were reviewed and updated as appropriate: allergies, current medications and problem list.    Patient Active Problem List    Diagnosis Date Noted   • Incidental lung nodule 09/11/2017     Note Last Updated: 9/11/2017     By Ct scan 9/2017.  Recheck six months.      • Anxiety 02/26/2016   • Gastroesophageal reflux disease 02/26/2016   • Herpes simplex type 2 infection 02/26/2016   • Hypercholesterolemia 02/26/2016   • Hypothyroidism 02/26/2016   • Osteopenia 02/26/2016     Note Last Updated: 2/26/2016     Description: Actonel start date 2006     • Overactive bladder 02/26/2016   • Fear of flying 02/26/2016     Note Last Updated: 2/26/2016     Description: flying and dental work     • Unilateral partial paralysis of vocal cords or larynx 02/26/2016     Note Last Updated: 2/26/2016     Description: chronic     • Colon polyps        Current Outpatient Prescriptions on File Prior to Visit   Medication Sig Dispense Refill   • ALPRAZolam (XANAX) 0.25 MG tablet Take 1 tablet by mouth 3 (Three) Times a Day As Needed for anxiety. 30 tablet 0   • aspirin 81 MG tablet Take  by mouth.     • atorvastatin (LIPITOR) 40 MG tablet Take 1 tablet by mouth Daily. 90 tablet 3   • Calcium Carbonate-Vit D-Min (CALCIUM 1200) 3311-2515 MG-UNIT chewable tablet Chew.     • docusate sodium (COLACE) 100 MG capsule Take  by mouth daily.     • estradiol (ESTRACE) 0.5 MG tablet TAKE ONE TABLET BY MOUTH DAILY 90 tablet 0   • fluticasone (FLONASE) 50 MCG/ACT nasal spray into each nostril.     • levothyroxine  (SYNTHROID) 125 MCG tablet Take 1 tablet by mouth Daily. 30 tablet 11   • Magnesium 250 MG tablet Take  by mouth.     • omeprazole (PRILOSEC) 40 MG capsule Take 1 capsule by mouth Daily. 90 capsule 3   • tolterodine LA (DETROL LA) 4 MG 24 hr capsule Take 1 capsule by mouth Daily. 30 capsule 5   • valACYclovir (VALTREX) 1000 MG tablet TAKE ONE TABLET BY MOUTH DAILY 30 tablet 3     No current facility-administered medications on file prior to visit.        Objective     /84   Pulse 66   Temp 98 °F (36.7 °C)   Wt 71.2 kg (157 lb)   SpO2 95%   BMI 32.82 kg/m²     Physical Exam   Constitutional: She appears well-developed and well-nourished.   HENT:   Head: Normocephalic and atraumatic.   Pulmonary/Chest: Effort normal.   Abdominal: Soft. She exhibits no distension and no mass. There is no tenderness. There is no rebound and no guarding.   Neurological: She is alert.       Assessment/Plan   Marie was seen today for difficulty urinating.    Diagnoses and all orders for this visit:    Acute cystitis without hematuria  -     POC Urinalysis Dipstick, Automated  -     Urine Culture - Urine, Urine, Clean Catch    Other orders  -     nitrofurantoin, macrocrystal-monohydrate, (MACROBID) 100 MG capsule; Take 1 capsule by mouth 2 (Two) Times a Day for 7 days.        Discussion  Patient presents with acute cystitis.  Urine dip is positive for markers of infection.  A prescription for antibiotics is given.  Urine is send for culture and we will follow up on that.  Let us know if she is not feeling better of the next 48 hours.          Future Appointments  Date Time Provider Department Center   11/26/2018 1:30 PM [facility] None   11/30/2018 1:00 PM Michelle Wyman MD [facility] None

## 2018-08-10 LAB
BACTERIA UR CULT: ABNORMAL
BACTERIA UR CULT: ABNORMAL
OTHER ANTIBIOTIC SUSC ISLT: ABNORMAL

## 2018-08-13 RX ORDER — ESTRADIOL 0.5 MG/1
TABLET ORAL
Qty: 90 TABLET | Refills: 0 | Status: SHIPPED | OUTPATIENT
Start: 2018-08-13 | End: 2018-08-14 | Stop reason: SDUPTHER

## 2018-08-14 RX ORDER — ESTRADIOL 0.5 MG/1
0.5 TABLET ORAL DAILY
Qty: 90 TABLET | Refills: 0 | Status: SHIPPED | OUTPATIENT
Start: 2018-08-14 | End: 2019-02-13 | Stop reason: SDUPTHER

## 2018-08-18 ENCOUNTER — DOCUMENTATION (OUTPATIENT)
Dept: PHYSICAL THERAPY | Facility: HOSPITAL | Age: 63
End: 2018-08-18

## 2018-08-18 DIAGNOSIS — M25.551 RIGHT HIP PAIN: Primary | ICD-10-CM

## 2018-08-18 NOTE — THERAPY DISCHARGE NOTE
Outpatient Physical Therapy Discharge Summary         Patient Name: Marie Foy  : 1955  MRN: [id number]    Today's Date: 2018    Visit Dx:    ICD-10-CM ICD-9-CM   1. Right hip pain M25.551 719.45             PT OP Goals     Row Name 18 1200          PT Short Term Goals    STG Date to Achieve 06/15/18  -DOMINIQUE     STG 1 Pt. will be independent and compliant with initial home exercise program.  -DOMINIQUE     STG 1 Progress Met  -DOMINIQUE     STG 2 Pt. will report R hip pain </=7-8/10 on VAS indicating decreased perceived functional disability.  -DOMINIQUE     STG 2 Progress Met  -DOMINIQUE     STG 3 Pt. will report sleeping >3-4 hours without interruption from hip pain.  -DOMINIQUE     STG 3 Progress Met  -DOMINIQUE        Long Term Goals    LTG Date to Achieve 18  -DOMINIQUE     LTG 1 Pt. will be independent and compliant with advanced home exercise program.  -DOMINIQUE     LTG 1 Progress Partially Met  -DOMINIQUE     LTG 2 Pt. will report R hip pain </=2-3/10 to increase ease of going up/down stairs.   -DOMINIQUE     LTG 2 Progress Not Met  -DOMINIQUE     LTG 3 Pt. will score >/=60/80 on LEFS, indicating decreased perceived functional disability.  -DOMINIQUE     LTG 3 Progress Not Met  -DOMINIQUE     LTG 3 Progress Comments not reassessed  -DOMINIQUE       User Key  (r) = Recorded By, (t) = Taken By, (c) = Cosigned By    Initials Name Provider Type    Monica Reyes, PT Physical Therapist          OP PT Discharge Summary  Discharge Instructions/Additional Comments:   Pt was seen for 6 visits for R hip pain from  to 18 for R hip pain.  Therapy consisted of ther ex, HEP, manual techniques, education for posture and body mechanics to reduce strain on hip, and heat.  She progressed well and at the last visit attended she reported no pain in hip, just stiffness in knees.  She tolerated increased strengthening with tband around knees for sidestepping and other variations with no immediate adverse reaction, as well as tolerated increased weight with standing ham curls.   She had significantly less point tenderness pain in glutes and ITB and demonstrated progress toward goals.      Time Calculation:        Therapy Suggested Charges     Code   Minutes Charges    None                       Monica Stoddard, PT  8/18/2018

## 2018-11-26 DIAGNOSIS — E78.5 HYPERLIPIDEMIA, UNSPECIFIED HYPERLIPIDEMIA TYPE: ICD-10-CM

## 2018-11-26 DIAGNOSIS — E03.9 HYPOTHYROIDISM, UNSPECIFIED TYPE: Primary | ICD-10-CM

## 2018-11-27 LAB
ALBUMIN SERPL-MCNC: 4.8 G/DL (ref 3.5–5.2)
ALBUMIN/GLOB SERPL: 1.5 G/DL
ALP SERPL-CCNC: 96 U/L (ref 39–117)
ALT SERPL-CCNC: 18 U/L (ref 1–33)
AST SERPL-CCNC: 18 U/L (ref 1–32)
BILIRUB SERPL-MCNC: 0.7 MG/DL (ref 0.1–1.2)
BUN SERPL-MCNC: 20 MG/DL (ref 8–23)
BUN/CREAT SERPL: 21.5 (ref 7–25)
CALCIUM SERPL-MCNC: 10.6 MG/DL (ref 8.6–10.5)
CHLORIDE SERPL-SCNC: 101 MMOL/L (ref 98–107)
CHOLEST SERPL-MCNC: 311 MG/DL (ref 0–200)
CO2 SERPL-SCNC: 27.4 MMOL/L (ref 22–29)
CREAT SERPL-MCNC: 0.93 MG/DL (ref 0.57–1)
GLOBULIN SER CALC-MCNC: 3.1 GM/DL
GLUCOSE SERPL-MCNC: 106 MG/DL (ref 65–99)
HDLC SERPL-MCNC: 87 MG/DL (ref 40–60)
LDLC SERPL CALC-MCNC: 198 MG/DL (ref 0–100)
POTASSIUM SERPL-SCNC: 4.3 MMOL/L (ref 3.5–5.2)
PROT SERPL-MCNC: 7.9 G/DL (ref 6–8.5)
SODIUM SERPL-SCNC: 140 MMOL/L (ref 136–145)
T4 FREE SERPL-MCNC: 1.27 NG/DL (ref 0.93–1.7)
TRIGL SERPL-MCNC: 129 MG/DL (ref 0–150)
TSH SERPL DL<=0.005 MIU/L-ACNC: 8 MIU/ML (ref 0.27–4.2)
VLDLC SERPL CALC-MCNC: 25.8 MG/DL (ref 5–40)

## 2018-11-30 ENCOUNTER — OFFICE VISIT (OUTPATIENT)
Dept: INTERNAL MEDICINE | Facility: CLINIC | Age: 63
End: 2018-11-30

## 2018-11-30 VITALS
DIASTOLIC BLOOD PRESSURE: 78 MMHG | SYSTOLIC BLOOD PRESSURE: 130 MMHG | HEART RATE: 61 BPM | OXYGEN SATURATION: 97 % | BODY MASS INDEX: 32.82 KG/M2 | WEIGHT: 157 LBS

## 2018-11-30 DIAGNOSIS — E03.9 HYPOTHYROIDISM, UNSPECIFIED TYPE: ICD-10-CM

## 2018-11-30 DIAGNOSIS — E78.00 HYPERCHOLESTEROLEMIA: Primary | ICD-10-CM

## 2018-11-30 PROBLEM — R91.1 INCIDENTAL LUNG NODULE: Status: RESOLVED | Noted: 2017-09-11 | Resolved: 2018-11-30

## 2018-11-30 PROCEDURE — 99213 OFFICE O/P EST LOW 20 MIN: CPT | Performed by: INTERNAL MEDICINE

## 2018-11-30 RX ORDER — ATORVASTATIN CALCIUM 40 MG/1
40 TABLET, FILM COATED ORAL DAILY
Qty: 90 TABLET | Refills: 3 | Status: SHIPPED | OUTPATIENT
Start: 2018-11-30 | End: 2020-03-18

## 2018-11-30 NOTE — PROGRESS NOTES
Subjective     Marie Foy is a 63 y.o. female who presents with   Chief Complaint   Patient presents with   • Hyperlipidemia   • Hypothyroidism       History of Present Illness     HLD.  Cholesterol is very elevated.  She has skipped some doses of cholesterol medication.    Hypothyroidism.  She is slightly under controlled.  She at times skips doses.      Review of Systems   Respiratory: Negative.    Cardiovascular: Negative.        The following portions of the patient's history were reviewed and updated as appropriate: allergies, current medications and problem list.    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 30 tablet 0   • aspirin 81 MG tablet Take  by mouth.     • Calcium Carbonate-Vit D-Min (CALCIUM 1200) 5497-6473 MG-UNIT chewable tablet Chew.     • docusate sodium (COLACE) 100 MG capsule Take  by mouth daily.     • estradiol (ESTRACE) 0.5 MG tablet Take 1 tablet by mouth Daily. 90 tablet 0   • fluticasone (FLONASE) 50 MCG/ACT nasal spray into each nostril.     • levothyroxine (SYNTHROID) 125 MCG tablet Take 1 tablet by mouth Daily. 30 tablet 11   • Magnesium 250 MG tablet Take  by mouth.     • omeprazole (PRILOSEC) 40 MG capsule Take 1  capsule by mouth Daily. 90 capsule 3   • tolterodine LA (DETROL LA) 4 MG 24 hr capsule Take 1 capsule by mouth Daily. 30 capsule 5   • valACYclovir (VALTREX) 1000 MG tablet TAKE ONE TABLET BY MOUTH DAILY 30 tablet 3   • [DISCONTINUED] atorvastatin (LIPITOR) 40 MG tablet Take 1 tablet by mouth Daily. 90 tablet 3     No current facility-administered medications on file prior to visit.        Objective     /78   Pulse 61   Wt 71.2 kg (157 lb)   SpO2 97%   BMI 32.82 kg/m²     Physical Exam   Constitutional: She is oriented to person, place, and time. She appears well-developed and well-nourished.   HENT:   Head: Normocephalic and atraumatic.   Cardiovascular: Normal rate, regular rhythm and normal heart sounds.   Pulmonary/Chest: Effort normal and breath sounds normal.   Neurological: She is alert and oriented to person, place, and time.   Skin: Skin is warm and dry.   Psychiatric: She has a normal mood and affect. Her behavior is normal.       Assessment/Plan   Marie was seen today for hyperlipidemia and hypothyroidism.    Diagnoses and all orders for this visit:    Hypercholesterolemia    Hypothyroidism, unspecified type    Other orders  -     atorvastatin (LIPITOR) 40 MG tablet; Take 1 tablet by mouth Daily.        Discussion  HLD.  I encouraged compliance with atorvastatin.   Hypothyroidism.  Continue levothyroxine.      F/u OV three months.        No future appointments.

## 2018-12-04 RX ORDER — SOLIFENACIN SUCCINATE 5 MG/1
5 TABLET, FILM COATED ORAL DAILY
Qty: 90 TABLET | Refills: 3 | Status: SHIPPED | OUTPATIENT
Start: 2018-12-04 | End: 2020-05-11 | Stop reason: SDUPTHER

## 2018-12-31 RX ORDER — OMEPRAZOLE 40 MG/1
CAPSULE, DELAYED RELEASE ORAL
Qty: 90 CAPSULE | Refills: 0 | Status: SHIPPED | OUTPATIENT
Start: 2018-12-31 | End: 2019-04-11 | Stop reason: SDUPTHER

## 2019-01-08 ENCOUNTER — OFFICE VISIT (OUTPATIENT)
Dept: INTERNAL MEDICINE | Facility: CLINIC | Age: 64
End: 2019-01-08

## 2019-01-08 VITALS
HEART RATE: 83 BPM | WEIGHT: 155 LBS | TEMPERATURE: 98.2 F | BODY MASS INDEX: 32.4 KG/M2 | SYSTOLIC BLOOD PRESSURE: 180 MMHG | OXYGEN SATURATION: 98 % | DIASTOLIC BLOOD PRESSURE: 82 MMHG

## 2019-01-08 DIAGNOSIS — J10.1 INFLUENZA A: Primary | ICD-10-CM

## 2019-01-08 LAB
EXPIRATION DATE: ABNORMAL
FLUAV AG NPH QL: POSITIVE
FLUBV AG NPH QL: NEGATIVE
INTERNAL CONTROL: ABNORMAL
Lab: ABNORMAL

## 2019-01-08 PROCEDURE — 99213 OFFICE O/P EST LOW 20 MIN: CPT | Performed by: INTERNAL MEDICINE

## 2019-01-08 PROCEDURE — 87804 INFLUENZA ASSAY W/OPTIC: CPT | Performed by: INTERNAL MEDICINE

## 2019-01-08 RX ORDER — OSELTAMIVIR PHOSPHATE 75 MG/1
75 CAPSULE ORAL 2 TIMES DAILY
Qty: 10 CAPSULE | Refills: 0 | Status: SHIPPED | OUTPATIENT
Start: 2019-01-08 | End: 2019-03-11

## 2019-01-08 NOTE — PROGRESS NOTES
Subjective     Marie Foy is a 63 y.o. female who presents with   Chief Complaint   Patient presents with   • Sore Throat   • Earache   • Cough       History of Present Illness     Four days of symptoms.  Started with a sore throat.  Achiness associated.  Mild ear pain.  Cough which is severe and dry.  No SOA or wheezing.  Motrin helpful.      Review of Systems   Constitutional: Positive for fatigue.       The following portions of the patient's history were reviewed and updated as appropriate: allergies, current medications and problem list.    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 30 tablet 0   • aspirin 81 MG tablet Take  by mouth.     • atorvastatin (LIPITOR) 40 MG tablet Take 1 tablet by mouth Daily. 90 tablet 3   • Calcium Carbonate-Vit D-Min (CALCIUM 1200) 6198-8632 MG-UNIT chewable tablet Chew.     • docusate sodium (COLACE) 100 MG capsule Take  by mouth daily.     • estradiol (ESTRACE) 0.5 MG tablet Take 1 tablet by mouth Daily. 90 tablet 0   • fluticasone (FLONASE) 50 MCG/ACT nasal spray into each nostril.     • levothyroxine (SYNTHROID) 125 MCG tablet Take 1 tablet by mouth Daily. 30 tablet 11   • Magnesium 250 MG tablet Take  by  mouth.     • omeprazole (priLOSEC) 40 MG capsule TAKE ONE CAPSULE BY MOUTH DAILY 90 capsule 0   • solifenacin (VESICARE) 5 MG tablet Take 1 tablet by mouth Daily. 90 tablet 3   • valACYclovir (VALTREX) 1000 MG tablet TAKE ONE TABLET BY MOUTH DAILY 30 tablet 3     No current facility-administered medications on file prior to visit.        Objective     /82   Pulse 83   Temp 98.2 °F (36.8 °C)   Wt 70.3 kg (155 lb)   SpO2 98%   BMI 32.40 kg/m²     Physical Exam   Constitutional: She is oriented to person, place, and time. She appears well-developed and well-nourished.   HENT:   Head: Normocephalic and atraumatic.   Right Ear: Hearing and tympanic membrane normal.   Left Ear: Hearing and tympanic membrane normal.   Mouth/Throat: No oropharyngeal exudate or posterior oropharyngeal erythema.   Cardiovascular: Normal rate, regular rhythm and normal heart sounds.   Pulmonary/Chest: Effort normal and breath sounds normal.   Neurological: She is alert and oriented to person, place, and time.   Skin: Skin is warm and dry.   Psychiatric: She has a normal mood and affect. Her behavior is normal.       Assessment/Plan   Marie was seen today for sore throat, earache and cough.    Diagnoses and all orders for this visit:    Influenza A  -     POC Influenza A / B    Other orders  -     oseltamivir (TAMIFLU) 75 MG capsule; Take 1 capsule by mouth 2 (Two) Times a Day.        Discussion  Patient presents with an acute respiratory illness c/w the flu.  Marie Foy tests positive for influenza A.  A prescription for Tamiflu is provided.  Increase rest and fluids.  Warned of pneumonia complication.  The patient will let me know if not feeling better over the next several days.           Future Appointments   Date Time Provider Department Center   2/28/2019  1:30 PM [facility] None   3/5/2019  1:15 PM Michelle Wyman MD [facility] None

## 2019-02-13 RX ORDER — VALACYCLOVIR HYDROCHLORIDE 1 G/1
TABLET, FILM COATED ORAL
Qty: 30 TABLET | Refills: 0 | Status: SHIPPED | OUTPATIENT
Start: 2019-02-13 | End: 2019-03-10 | Stop reason: SDUPTHER

## 2019-02-13 RX ORDER — ESTRADIOL 0.5 MG/1
TABLET ORAL
Qty: 90 TABLET | Refills: 0 | Status: SHIPPED | OUTPATIENT
Start: 2019-02-13 | End: 2019-08-07 | Stop reason: SDUPTHER

## 2019-02-28 DIAGNOSIS — E03.9 HYPOTHYROIDISM, UNSPECIFIED TYPE: ICD-10-CM

## 2019-02-28 DIAGNOSIS — E78.5 HYPERLIPIDEMIA, UNSPECIFIED HYPERLIPIDEMIA TYPE: Primary | ICD-10-CM

## 2019-03-01 DIAGNOSIS — E03.9 HYPOTHYROIDISM, UNSPECIFIED TYPE: ICD-10-CM

## 2019-03-01 DIAGNOSIS — E78.5 HYPERLIPIDEMIA, UNSPECIFIED HYPERLIPIDEMIA TYPE: Primary | ICD-10-CM

## 2019-03-05 LAB
ALBUMIN SERPL-MCNC: 4.8 G/DL (ref 3.5–5.2)
ALBUMIN/GLOB SERPL: 1.8 G/DL
ALP SERPL-CCNC: 93 U/L (ref 39–117)
ALT SERPL-CCNC: 13 U/L (ref 1–33)
AST SERPL-CCNC: 13 U/L (ref 1–32)
BILIRUB SERPL-MCNC: 0.5 MG/DL (ref 0.1–1.2)
BUN SERPL-MCNC: 19 MG/DL (ref 8–23)
BUN/CREAT SERPL: 22.1 (ref 7–25)
CALCIUM SERPL-MCNC: 9.7 MG/DL (ref 8.6–10.5)
CHLORIDE SERPL-SCNC: 104 MMOL/L (ref 98–107)
CHOLEST SERPL-MCNC: 201 MG/DL (ref 0–200)
CO2 SERPL-SCNC: 26.4 MMOL/L (ref 22–29)
CREAT SERPL-MCNC: 0.86 MG/DL (ref 0.57–1)
GLOBULIN SER CALC-MCNC: 2.6 GM/DL
GLUCOSE SERPL-MCNC: 99 MG/DL (ref 65–99)
HDLC SERPL-MCNC: 81 MG/DL (ref 40–60)
LDLC SERPL CALC-MCNC: 99 MG/DL (ref 0–100)
POTASSIUM SERPL-SCNC: 4.2 MMOL/L (ref 3.5–5.2)
PROT SERPL-MCNC: 7.4 G/DL (ref 6–8.5)
SODIUM SERPL-SCNC: 143 MMOL/L (ref 136–145)
TRIGL SERPL-MCNC: 105 MG/DL (ref 0–150)
TSH SERPL DL<=0.005 MIU/L-ACNC: 2.11 MIU/ML (ref 0.27–4.2)
VLDLC SERPL CALC-MCNC: 21 MG/DL (ref 5–40)

## 2019-03-11 ENCOUNTER — OFFICE VISIT (OUTPATIENT)
Dept: INTERNAL MEDICINE | Facility: CLINIC | Age: 64
End: 2019-03-11

## 2019-03-11 VITALS
BODY MASS INDEX: 33.45 KG/M2 | DIASTOLIC BLOOD PRESSURE: 90 MMHG | OXYGEN SATURATION: 98 % | WEIGHT: 160 LBS | SYSTOLIC BLOOD PRESSURE: 140 MMHG | HEART RATE: 60 BPM

## 2019-03-11 DIAGNOSIS — E78.00 HYPERCHOLESTEROLEMIA: Primary | ICD-10-CM

## 2019-03-11 DIAGNOSIS — R03.0 ELEVATED BLOOD PRESSURE READING IN OFFICE WITHOUT DIAGNOSIS OF HYPERTENSION: ICD-10-CM

## 2019-03-11 DIAGNOSIS — E03.9 HYPOTHYROIDISM, UNSPECIFIED TYPE: ICD-10-CM

## 2019-03-11 PROCEDURE — 99213 OFFICE O/P EST LOW 20 MIN: CPT | Performed by: INTERNAL MEDICINE

## 2019-03-11 RX ORDER — VALACYCLOVIR HYDROCHLORIDE 1 G/1
TABLET, FILM COATED ORAL
Qty: 30 TABLET | Refills: 0 | Status: SHIPPED | OUTPATIENT
Start: 2019-03-11 | End: 2019-08-07 | Stop reason: SDUPTHER

## 2019-03-11 NOTE — PROGRESS NOTES
Subjective     Marie Foy is a 63 y.o. female who presents with   Chief Complaint   Patient presents with   • Hyperlipidemia   • Hypothyroidism       History of Present Illness     HLD.  Control is good on Atorvastatin.    Hypothyroidism.  Control is good on levothyroxine.    BP is increased today.  Typically she is good at home.      Review of Systems   Respiratory: Negative.    Cardiovascular: Negative.        The following portions of the patient's history were reviewed and updated as appropriate: allergies, current medications and problem list.    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 30 tablet 0   • aspirin 81 MG tablet Take  by mouth.     • atorvastatin (LIPITOR) 40 MG tablet Take 1 tablet by mouth Daily. 90 tablet 3   • docusate sodium (COLACE) 100 MG capsule Take  by mouth daily.     • estradiol (ESTRACE) 0.5 MG tablet TAKE ONE TABLET BY MOUTH DAILY 90 tablet 0   • fluticasone (FLONASE) 50 MCG/ACT nasal spray into each nostril.     • levothyroxine (SYNTHROID) 125 MCG tablet Take 1 tablet by mouth Daily. 30 tablet 11   • Magnesium 250 MG tablet Take  by mouth.     • omeprazole (priLOSEC) 40 MG capsule TAKE ONE CAPSULE BY MOUTH DAILY 90  capsule 0   • solifenacin (VESICARE) 5 MG tablet Take 1 tablet by mouth Daily. 90 tablet 3   • valACYclovir (VALTREX) 1000 MG tablet TAKE ONE TABLET BY MOUTH DAILY 30 tablet 0   • [DISCONTINUED] Calcium Carbonate-Vit D-Min (CALCIUM 1200) 4940-9320 MG-UNIT chewable tablet Chew.     • [DISCONTINUED] oseltamivir (TAMIFLU) 75 MG capsule Take 1 capsule by mouth 2 (Two) Times a Day. 10 capsule 0     No current facility-administered medications on file prior to visit.        Objective     /90   Pulse 60   Wt 72.6 kg (160 lb)   SpO2 98%   BMI 33.45 kg/m²     Physical Exam   Constitutional: She is oriented to person, place, and time. She appears well-developed and well-nourished.   HENT:   Head: Normocephalic and atraumatic.   Cardiovascular: Normal rate, regular rhythm and normal heart sounds.   Pulmonary/Chest: Effort normal and breath sounds normal.   Neurological: She is alert and oriented to person, place, and time.   Skin: Skin is warm and dry.   Psychiatric: She has a normal mood and affect. Her behavior is normal.       Assessment/Plan   Marie was seen today for hyperlipidemia and hypothyroidism.    Diagnoses and all orders for this visit:    Hypercholesterolemia    Hypothyroidism, unspecified type    Elevated blood pressure reading in office without diagnosis of hypertension        Discussion    HLD.  Continue Crestor.    Hypothyroidism. Continue levothyroxine.    Increased BP.  We discussed medications that increased blood pressure such as NSAIDs.  Watch salt intake.  Call in checks from home.         Future Appointments   Date Time Provider Department Center   6/17/2019  1:00 PM [facility] None   6/21/2019 11:15 AM Michelle Wyman MD [facility] None

## 2019-04-11 RX ORDER — OMEPRAZOLE 40 MG/1
CAPSULE, DELAYED RELEASE ORAL
Qty: 90 CAPSULE | Refills: 0 | Status: SHIPPED | OUTPATIENT
Start: 2019-04-11 | End: 2019-08-03 | Stop reason: SDUPTHER

## 2019-06-03 RX ORDER — LEVOTHYROXINE SODIUM 0.12 MG/1
TABLET ORAL
Qty: 30 TABLET | Refills: 7 | Status: SHIPPED | OUTPATIENT
Start: 2019-06-03 | End: 2020-03-18

## 2019-06-17 DIAGNOSIS — E03.9 HYPOTHYROIDISM, UNSPECIFIED TYPE: ICD-10-CM

## 2019-06-17 DIAGNOSIS — E78.5 HYPERLIPIDEMIA, UNSPECIFIED HYPERLIPIDEMIA TYPE: Primary | ICD-10-CM

## 2019-06-18 LAB
ALBUMIN SERPL-MCNC: 4.4 G/DL (ref 3.5–5.2)
ALBUMIN/GLOB SERPL: 1.6 G/DL
ALP SERPL-CCNC: 98 U/L (ref 39–117)
ALT SERPL-CCNC: 12 U/L (ref 1–33)
AST SERPL-CCNC: 13 U/L (ref 1–32)
BILIRUB SERPL-MCNC: 0.7 MG/DL (ref 0.2–1.2)
BUN SERPL-MCNC: 15 MG/DL (ref 8–23)
BUN/CREAT SERPL: 19 (ref 7–25)
CALCIUM SERPL-MCNC: 9.7 MG/DL (ref 8.6–10.5)
CHLORIDE SERPL-SCNC: 102 MMOL/L (ref 98–107)
CHOLEST SERPL-MCNC: 209 MG/DL (ref 0–200)
CO2 SERPL-SCNC: 27.7 MMOL/L (ref 22–29)
CREAT SERPL-MCNC: 0.79 MG/DL (ref 0.57–1)
GLOBULIN SER CALC-MCNC: 2.8 GM/DL
GLUCOSE SERPL-MCNC: 86 MG/DL (ref 65–99)
HDLC SERPL-MCNC: 76 MG/DL (ref 40–60)
LDLC SERPL CALC-MCNC: 110 MG/DL (ref 0–100)
POTASSIUM SERPL-SCNC: 4.4 MMOL/L (ref 3.5–5.2)
PROT SERPL-MCNC: 7.2 G/DL (ref 6–8.5)
SODIUM SERPL-SCNC: 141 MMOL/L (ref 136–145)
TRIGL SERPL-MCNC: 114 MG/DL (ref 0–150)
TSH SERPL DL<=0.005 MIU/L-ACNC: 0.46 MIU/ML (ref 0.27–4.2)
VLDLC SERPL CALC-MCNC: 22.8 MG/DL

## 2019-06-21 ENCOUNTER — OFFICE VISIT (OUTPATIENT)
Dept: INTERNAL MEDICINE | Facility: CLINIC | Age: 64
End: 2019-06-21

## 2019-06-21 VITALS
SYSTOLIC BLOOD PRESSURE: 140 MMHG | HEART RATE: 64 BPM | WEIGHT: 155 LBS | DIASTOLIC BLOOD PRESSURE: 88 MMHG | OXYGEN SATURATION: 98 % | BODY MASS INDEX: 32.54 KG/M2 | HEIGHT: 58 IN

## 2019-06-21 DIAGNOSIS — E78.00 HYPERCHOLESTEROLEMIA: ICD-10-CM

## 2019-06-21 DIAGNOSIS — E03.9 HYPOTHYROIDISM, UNSPECIFIED TYPE: ICD-10-CM

## 2019-06-21 DIAGNOSIS — Z00.00 WELL ADULT EXAM: Primary | ICD-10-CM

## 2019-06-21 DIAGNOSIS — F41.9 ANXIETY: ICD-10-CM

## 2019-06-21 PROBLEM — Z79.890 HORMONE REPLACEMENT THERAPY (HRT): Status: ACTIVE | Noted: 2019-06-21

## 2019-06-21 PROCEDURE — 99397 PER PM REEVAL EST PAT 65+ YR: CPT | Performed by: INTERNAL MEDICINE

## 2019-06-21 RX ORDER — ALPRAZOLAM 0.25 MG/1
0.25 TABLET ORAL 3 TIMES DAILY PRN
Qty: 20 TABLET | Refills: 0 | Status: SHIPPED | OUTPATIENT
Start: 2019-06-21 | End: 2022-01-04 | Stop reason: SDUPTHER

## 2019-06-21 RX ORDER — ALPRAZOLAM 0.25 MG/1
0.25 TABLET ORAL 3 TIMES DAILY PRN
Qty: 20 TABLET | Refills: 0 | Status: SHIPPED | OUTPATIENT
Start: 2019-06-21 | End: 2019-06-21 | Stop reason: SDUPTHER

## 2019-06-21 NOTE — PROGRESS NOTES
Subjective     Marie Foy is a 63 y.o. female who presents for a complete physical exam.      History of Present Illness     Increased BP.  She states typically good at home.  We discussed doing checks and calling in from home.  HLD.  Fairly good control with atorvastatin 40.   Hypothyroidism.  She is overcontrolled.   We discussed decreasing dose.  Anxiety.  Rare use of xanax.      Patient is renting a home from her ex-.  She pays rent.  Son is living there rent free.  Ex- says she is free to ask him to leave.  She states he uses drug and is unstable.  She fears of retaliation if she asked him to leave.  She does not want him there any longer.  She is fearful of him.  He has never physically harmed her.      Review of Systems   Constitutional: Negative.    HENT: Negative.    Eyes: Negative.    Respiratory: Negative.    Cardiovascular: Negative.    Gastrointestinal: Negative.    Endocrine: Negative.    Genitourinary: Negative.    Musculoskeletal: Negative.    Skin: Negative.    Allergic/Immunologic: Negative.    Neurological: Negative.    Hematological: Negative.        The following portions of the patient's history were reviewed and updated as appropriate: allergies, current medications, past family history, past medical history, past social history, past surgical history and problem list.  Health maintenance tab was reviewed and updated with the patient.       Patient Active Problem List    Diagnosis Date Noted   • Hormone replacement therapy (HRT) 06/21/2019   • Anxiety 02/26/2016   • Gastroesophageal reflux disease 02/26/2016   • Herpes simplex type 2 infection 02/26/2016   • Hypercholesterolemia 02/26/2016   • Hypothyroidism 02/26/2016   • Osteopenia 02/26/2016     Note Last Updated: 2/26/2016     Description: Actonel start date 2006     • Overactive bladder 02/26/2016   • Fear of flying 02/26/2016     Note Last Updated: 2/26/2016     Description: flying and dental work     • Unilateral  partial paralysis of vocal cords or larynx 02/26/2016     Note Last Updated: 2/26/2016     Description: chronic     • Colon polyps        Past Medical History:   Diagnosis Date   • Colon polyps    • Constipation    • Difficulty sleeping    • Generalized stiffness    • Graves disease    • Hearing loss in right ear    • Heart murmur    • History of bipolar disorder    • History of substance abuse    • Joint pain     swelling   • Muscle pain        Past Surgical History:   Procedure Laterality Date   • ANKLE SURGERY     • BLADDER SURGERY     • COLONOSCOPY N/A 8/23/2016    Procedure: COLONOSCOPY TO CECUM & T.I.  WITH SALINE INJECTION, HOT SNARE POLYPECTOMY, COLD POLYPECTOMY AND CLIP PLACEMENT X 1;  Surgeon: Darryl Garcia MD;  Location: [facility] ENDOSCOPY;  Service:    • FOOT SURGERY     • HYSTERECTOMY     • JOINT REPLACEMENT Right     HAND   • REPLACEMENT TOTAL KNEE     • THROAT SURGERY      childhood       Family History   Problem Relation Age of Onset   • Hypertension Mother    • Heart failure Mother    • Diabetes Mother    • Colon polyps Mother    • Pancreatic cancer Father        Social History     Socioeconomic History   • Marital status:      Spouse name: Not on file   • Number of children: 5   • Years of education: Not on file   • Highest education level: Not on file   Tobacco Use   • Smoking status: Never Smoker   • Smokeless tobacco: Never Used   Substance and Sexual Activity   • Alcohol use: Yes     Comment: OCASSIONALLY   • Drug use: Defer   • Sexual activity: Defer       Current Outpatient Medications on File Prior to Visit   Medication Sig Dispense Refill   • aspirin 81 MG tablet Take  by mouth.     • atorvastatin (LIPITOR) 40 MG tablet Take 1 tablet by mouth Daily. 90 tablet 3   • docusate sodium (COLACE) 100 MG capsule Take  by mouth daily.     • estradiol (ESTRACE) 0.5 MG tablet TAKE ONE TABLET BY MOUTH DAILY 90 tablet 0   • fluticasone (FLONASE) 50 MCG/ACT nasal spray into each nostril.     •  "levothyroxine (SYNTHROID, LEVOTHROID) 125 MCG tablet TAKE ONE TABLET BY MOUTH DAILY 30 tablet 7   • Magnesium 250 MG tablet Take  by mouth.     • omeprazole (priLOSEC) 40 MG capsule TAKE ONE CAPSULE BY MOUTH DAILY 90 capsule 0   • solifenacin (VESICARE) 5 MG tablet Take 1 tablet by mouth Daily. 90 tablet 3   • valACYclovir (VALTREX) 1000 MG tablet TAKE ONE TABLET BY MOUTH DAILY 30 tablet 0   • [DISCONTINUED] ALPRAZolam (XANAX) 0.25 MG tablet Take 1 tablet by mouth 3 (Three) Times a Day As Needed for anxiety. 30 tablet 0     No current facility-administered medications on file prior to visit.        Allergies   Allergen Reactions   • Codeine    • Morphine And Related    • Penicillins        Immunization History   Administered Date(s) Administered   • Flu Mist 10/20/2014   • Hepatitis A 05/16/2018   • Pneumococcal Conjugate 13-Valent (PCV13) 06/21/2019   • Tdap 10/20/2014   • Zostavax 10/14/2013       Objective     /88   Pulse 64   Ht 147.3 cm (57.99\")   Wt 70.3 kg (155 lb)   SpO2 98%   BMI 32.40 kg/m²     Physical Exam   Constitutional: She is oriented to person, place, and time. She appears well-developed and well-nourished.   HENT:   Head: Normocephalic and atraumatic.   Right Ear: Hearing, tympanic membrane and external ear normal.   Left Ear: Hearing, tympanic membrane and external ear normal.   Nose: Nose normal.   Mouth/Throat: Oropharynx is clear and moist.   Neck: Neck supple. No thyromegaly present.   Cardiovascular: Normal rate, regular rhythm and normal heart sounds.   No murmur heard.  Pulmonary/Chest: Effort normal and breath sounds normal. Right breast exhibits no mass. Left breast exhibits no mass.   Abdominal: Soft. She exhibits no distension. There is no hepatosplenomegaly. There is no tenderness.   Genitourinary: No breast tenderness.   Lymphadenopathy:     She has no cervical adenopathy.   Neurological: She is alert and oriented to person, place, and time.   Skin: Skin is warm and dry. "   Psychiatric: She has a normal mood and affect. Her speech is normal and behavior is normal. Judgment and thought content normal. Cognition and memory are normal.       Assessment/Plan   Marie was seen today for annual exam.    Diagnoses and all orders for this visit:    Well adult exam  -     pneumococcal conj. 13-valent (PREVNAR-13) vaccine 0.5 mL    Hypercholesterolemia    Hypothyroidism, unspecified type    Anxiety    Other orders  -     Discontinue: ALPRAZolam (XANAX) 0.25 MG tablet; Take 1 tablet by mouth 3 (Three) Times a Day As Needed for Anxiety.  -     ALPRAZolam (XANAX) 0.25 MG tablet; Take 1 tablet by mouth 3 (Three) Times a Day As Needed for Anxiety.  -     Cancel: Hepatitis A Vaccine Adult IM        Discussion    Patient presents today for a CPE.      Patient follows a healthy diet.   Patient follows an adequate exercise regimen. Mammogram is up to date.   Colon cancer screening is up to date.   Pap smear no longer performed secondary to hysterectomy.   Immunizations are as per orders.   Patient will schedule a DEXA.    Increased BP. Monitor at home and call in checks.  HLD.  Continue current.  Hypothyroidism.  Continue current.  Anxiety.  Update contract.    She would like her adult drug using son to leave where she is renting but is unsure of how to go about it because she fears him.  I gave the patient number for [facility] to get advice.  He has actually never physically harmed her.    I have recommended that the patient get the following immunizations:  Shingrix.      Health Maintenance   Topic Date Due   • ZOSTER VACCINE (2 of 3) 12/09/2013   • PNEUMOCOCCAL VACCINES (65+ LOW/MEDIUM RISK) (1 of 2 - PCV13) 06/28/2018   • DXA SCAN  08/15/2018   • ANNUAL PHYSICAL  05/17/2019   • INFLUENZA VACCINE  08/01/2019   • LIPID PANEL  06/17/2020   • MAMMOGRAM  06/28/2020   • COLONOSCOPY  08/23/2021   • TDAP/TD VACCINES (2 - Td) 10/20/2024   • HEPATITIS C SCREENING  Completed   • PAP SMEAR   Discontinued            Future Appointments   Date Time Provider Department Center   12/26/2019  1:00 PM [facility] None   6/18/2020  1:00 PM [facility] None   6/18/2020  1:00 PM [facility] None   6/23/2020  1:30 PM Michelle Wyman MD [facility] None

## 2019-08-05 RX ORDER — OMEPRAZOLE 40 MG/1
CAPSULE, DELAYED RELEASE ORAL
Qty: 90 CAPSULE | Refills: 0 | Status: SHIPPED | OUTPATIENT
Start: 2019-08-05 | End: 2019-11-16 | Stop reason: SDUPTHER

## 2019-08-07 RX ORDER — ESTRADIOL 0.5 MG/1
TABLET ORAL
Qty: 90 TABLET | Refills: 0 | Status: SHIPPED | OUTPATIENT
Start: 2019-08-07 | End: 2020-05-06

## 2019-08-07 RX ORDER — VALACYCLOVIR HYDROCHLORIDE 1 G/1
TABLET, FILM COATED ORAL
Qty: 30 TABLET | Refills: 0 | Status: SHIPPED | OUTPATIENT
Start: 2019-08-07 | End: 2019-11-08 | Stop reason: SDUPTHER

## 2019-11-08 RX ORDER — VALACYCLOVIR HYDROCHLORIDE 1 G/1
TABLET, FILM COATED ORAL
Qty: 30 TABLET | Refills: 0 | Status: SHIPPED | OUTPATIENT
Start: 2019-11-08 | End: 2020-02-17

## 2019-11-18 RX ORDER — OMEPRAZOLE 40 MG/1
CAPSULE, DELAYED RELEASE ORAL
Qty: 90 CAPSULE | Refills: 0 | Status: SHIPPED | OUTPATIENT
Start: 2019-11-18 | End: 2020-03-11

## 2019-12-24 DIAGNOSIS — E03.9 HYPOTHYROIDISM, UNSPECIFIED TYPE: ICD-10-CM

## 2019-12-24 DIAGNOSIS — E78.5 HYPERLIPIDEMIA, UNSPECIFIED HYPERLIPIDEMIA TYPE: Primary | ICD-10-CM

## 2020-02-17 DIAGNOSIS — E78.5 HYPERLIPIDEMIA, UNSPECIFIED HYPERLIPIDEMIA TYPE: Primary | ICD-10-CM

## 2020-02-17 DIAGNOSIS — E03.9 HYPOTHYROIDISM, UNSPECIFIED TYPE: ICD-10-CM

## 2020-02-17 RX ORDER — VALACYCLOVIR HYDROCHLORIDE 1 G/1
TABLET, FILM COATED ORAL
Qty: 30 TABLET | Refills: 0 | Status: SHIPPED | OUTPATIENT
Start: 2020-02-17 | End: 2020-05-04

## 2020-02-19 LAB
ALBUMIN SERPL-MCNC: 4.5 G/DL (ref 3.5–5.2)
ALBUMIN/GLOB SERPL: 1.9 G/DL
ALP SERPL-CCNC: 94 U/L (ref 39–117)
ALT SERPL-CCNC: 23 U/L (ref 1–33)
AST SERPL-CCNC: 16 U/L (ref 1–32)
BILIRUB SERPL-MCNC: 0.6 MG/DL (ref 0.2–1.2)
BUN SERPL-MCNC: 19 MG/DL (ref 8–23)
BUN/CREAT SERPL: 22.1 (ref 7–25)
CALCIUM SERPL-MCNC: 9.6 MG/DL (ref 8.6–10.5)
CHLORIDE SERPL-SCNC: 103 MMOL/L (ref 98–107)
CHOLEST SERPL-MCNC: 196 MG/DL (ref 0–200)
CO2 SERPL-SCNC: 26.4 MMOL/L (ref 22–29)
CREAT SERPL-MCNC: 0.86 MG/DL (ref 0.57–1)
GLOBULIN SER CALC-MCNC: 2.4 GM/DL
GLUCOSE SERPL-MCNC: 80 MG/DL (ref 65–99)
HDLC SERPL-MCNC: 70 MG/DL (ref 40–60)
LDLC SERPL CALC-MCNC: 102 MG/DL (ref 0–100)
POTASSIUM SERPL-SCNC: 4.6 MMOL/L (ref 3.5–5.2)
PROT SERPL-MCNC: 6.9 G/DL (ref 6–8.5)
SODIUM SERPL-SCNC: 141 MMOL/L (ref 136–145)
T4 FREE SERPL-MCNC: 2.41 NG/DL (ref 0.93–1.7)
TRIGL SERPL-MCNC: 122 MG/DL (ref 0–150)
TSH SERPL DL<=0.005 MIU/L-ACNC: 0.09 UIU/ML (ref 0.27–4.2)
VLDLC SERPL CALC-MCNC: 24.4 MG/DL

## 2020-03-11 RX ORDER — OMEPRAZOLE 40 MG/1
CAPSULE, DELAYED RELEASE ORAL
Qty: 90 CAPSULE | Refills: 3 | Status: SHIPPED | OUTPATIENT
Start: 2020-03-11 | End: 2021-03-29

## 2020-03-18 RX ORDER — ATORVASTATIN CALCIUM 40 MG/1
TABLET, FILM COATED ORAL
Qty: 90 TABLET | Refills: 2 | Status: SHIPPED | OUTPATIENT
Start: 2020-03-18 | End: 2021-06-28

## 2020-03-18 RX ORDER — LEVOTHYROXINE SODIUM 0.12 MG/1
TABLET ORAL
Qty: 30 TABLET | Refills: 6 | Status: SHIPPED | OUTPATIENT
Start: 2020-03-18 | End: 2020-06-23 | Stop reason: SDUPTHER

## 2020-05-04 ENCOUNTER — TELEPHONE (OUTPATIENT)
Dept: ORTHOPEDIC SURGERY | Facility: CLINIC | Age: 65
End: 2020-05-04

## 2020-05-04 RX ORDER — VALACYCLOVIR HYDROCHLORIDE 1 G/1
TABLET, FILM COATED ORAL
Qty: 90 TABLET | Refills: 3 | Status: SHIPPED | OUTPATIENT
Start: 2020-05-04 | End: 2021-10-19

## 2020-05-05 NOTE — PROGRESS NOTES
New Patient Complaint      Patient: Marie Fyo  YOB: 1955 64 y.o. female  Medical Record Number: [id number]    Chief Complaints: I hurt my knee    History of Present Illness: Patient fell from a slow step ladder and struck her left knee on a toilet on 4/4/2020.  She had onset of pain in her left knee which has improved to some degree but now moderate intermittent aching burning pain with swelling and bruising over the medial aspect of her left knee worse with standing and walking improved with ice and rest.    She was seen at [facility] and provided with a knee immobilizer.  She thought she had a walker but has not been able to find it and has been bearing weight on this.    Prior to this she had some intermittent aching pain in her left knee after sitting for prolonged periods of time with known history of arthritis in her left knee for which she has seen Dr. To in the past.  She had previous right knee replacement by Dr. Kaplan      Patient underwent revision fixation of right fifth metatarsal recurrent fracture in February 2016 and was last seen in July 2016 for that.    HPI    Allergies:   Allergies   Allergen Reactions   • Codeine    • Morphine And Related    • Penicillins        Medications:   Current Outpatient Medications on File Prior to Visit   Medication Sig   • ALPRAZolam (XANAX) 0.25 MG tablet Take 1 tablet by mouth 3 (Three) Times a Day As Needed for Anxiety.   • atorvastatin (LIPITOR) 40 MG tablet TAKE ONE TABLET BY MOUTH DAILY   • docusate sodium (COLACE) 100 MG capsule Take  by mouth daily.   • fluticasone (FLONASE) 50 MCG/ACT nasal spray into each nostril.   • levothyroxine (SYNTHROID, LEVOTHROID) 125 MCG tablet TAKE ONE TABLET BY MOUTH DAILY   • Magnesium 250 MG tablet Take  by mouth.   • Magnesium 500 MG capsule Take  by mouth.   • omeprazole (priLOSEC) 40 MG capsule TAKE ONE CAPSULE BY MOUTH DAILY   • solifenacin (VESICARE) 5 MG tablet Take 1 tablet  "by mouth Daily.   • valACYclovir (VALTREX) 1000 MG tablet TAKE ONE TABLET BY MOUTH DAILY   • [DISCONTINUED] aspirin 81 MG tablet Take  by mouth.   • [DISCONTINUED] estradiol (ESTRACE) 0.5 MG tablet TAKE ONE TABLET BY MOUTH DAILY     No current facility-administered medications on file prior to visit.        Past Medical History:   Diagnosis Date   • Colon polyps    • Constipation    • Difficulty sleeping    • Generalized stiffness    • Graves disease    • Hearing loss in right ear    • Heart murmur    • History of bipolar disorder    • History of substance abuse (CMS/HCC)    • Joint pain     swelling   • Muscle pain      Past Surgical History:   Procedure Laterality Date   • ANKLE SURGERY     • BLADDER SURGERY     • COLONOSCOPY N/A 8/23/2016    Procedure: COLONOSCOPY TO CECUM & T.I.  WITH SALINE INJECTION, HOT SNARE POLYPECTOMY, COLD POLYPECTOMY AND CLIP PLACEMENT X 1;  Surgeon: Darryl Garcia MD;  Location: [facility] ENDOSCOPY;  Service:    • FOOT SURGERY     • HYSTERECTOMY     • JOINT REPLACEMENT Right     HAND   • REPLACEMENT TOTAL KNEE     • THROAT SURGERY      childhood     Social History     Occupational History   • Not on file   Tobacco Use   • Smoking status: Never Smoker   • Smokeless tobacco: Never Used   Substance and Sexual Activity   • Alcohol use: Yes     Comment: OCASSIONALLY   • Drug use: Defer   • Sexual activity: Defer      Social History     Social History Narrative   • Not on file     Family History   Problem Relation Age of Onset   • Hypertension Mother    • Heart failure Mother    • Diabetes Mother    • Colon polyps Mother    • Pancreatic cancer Father        Review of Systems: 14 point review of systems performed, positive pertinent findings identified in HPI. All remaining systems negative     Review of Systems      Physical Exam:   Vitals:    05/06/20 1119   Temp: 98.1 °F (36.7 °C)   Weight: 70.3 kg (155 lb)   Height: 147.3 cm (58\")   PainSc:   6     Physical Exam   Constitutional: pleasant, " well developed   Eyes: sclera non icteric  Hearing : adequate for exam  Cardiovascular: palpable pulses in left foot, left calf/ thigh NT without sign of DVT  Respiratoy: breathing unlabored   Neurological: grossly sensate to LT throughout left LE  Psychiatric: oriented with normal mood and affect.   Lymphatic: No palpable popliteal lymphadenopathy left LE  Skin: intact throughout left leg/foot  Musculoskeletal: Left knee shows moderate ecchymosis and tenderness over the medial aspect of the knee along the proximal tibia but there is no break in the skin or impending skin compromise.  She was nontender over the lateral tibial plateau nor along the medial or lateral femoral condyles to any appreciable degree.  She was nontender over the extensor mechanism and was able to actively flex the knee to about 105 degrees and actively extend this as well as perform straight leg raise.  Left leg compartments were soft.  Physical Exam  Ortho Exam    Radiology: 4 views of the left knee including the distal femur and proximal tib-fib were brought in the patient had done on 5/4/2020 and compared with x-rays  of the left knee from 6/9/2016, there is persistent significant arthritic change of the left knee that has worsened to some degree with significant cystic change over the medial aspect of the tibial plateau but without a clear fracture.  There remains tricompartmental arthritic changes but no gross malalignment of the patella.    Assessment/Plan: Exacerbation of chronic left knee arthritis    Reviewed with her there could be an occult fracture here but did not see any obvious disruption of her extensor mechanism.    We discussed getting an MRI to evaluate for fracture but I think it would change our treatment protocol at this time as she has known significant arthritis and does not have any obvious major fracture.    Her pain has been improving but will have her discontinue the knee immobilizer and she will continue with use  of a cloth elastic type knee compression garment to help with swelling continue with use of ice with skin precautions and was provided with a walker today for partial weightbearing only.    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    We will see her back in 2 weeks with x-rays of her left knee.  If anything worsens in the interim she will let me know.

## 2020-05-06 ENCOUNTER — OFFICE VISIT (OUTPATIENT)
Dept: ORTHOPEDIC SURGERY | Facility: CLINIC | Age: 65
End: 2020-05-06

## 2020-05-06 VITALS — TEMPERATURE: 98.1 F | WEIGHT: 155 LBS | BODY MASS INDEX: 32.54 KG/M2 | HEIGHT: 58 IN

## 2020-05-06 DIAGNOSIS — M17.12 PRIMARY LOCALIZED OSTEOARTHROSIS OF LEFT LOWER LEG: Primary | ICD-10-CM

## 2020-05-06 PROCEDURE — 99214 OFFICE O/P EST MOD 30 MIN: CPT | Performed by: ORTHOPAEDIC SURGERY

## 2020-05-06 RX ORDER — FOLIC ACID 0.8 MG
500 TABLET ORAL DAILY
COMMUNITY
End: 2021-03-15 | Stop reason: SDDI

## 2020-05-11 ENCOUNTER — TELEMEDICINE (OUTPATIENT)
Dept: INTERNAL MEDICINE | Facility: CLINIC | Age: 65
End: 2020-05-11

## 2020-05-11 ENCOUNTER — TELEPHONE (OUTPATIENT)
Dept: INTERNAL MEDICINE | Facility: CLINIC | Age: 65
End: 2020-05-11

## 2020-05-11 DIAGNOSIS — I10 ESSENTIAL (PRIMARY) HYPERTENSION: Primary | ICD-10-CM

## 2020-05-11 PROCEDURE — 99214 OFFICE O/P EST MOD 30 MIN: CPT | Performed by: INTERNAL MEDICINE

## 2020-05-11 RX ORDER — SOLIFENACIN SUCCINATE 5 MG/1
5 TABLET, FILM COATED ORAL DAILY
Qty: 90 TABLET | Refills: 3 | Status: SHIPPED | OUTPATIENT
Start: 2020-05-11 | End: 2020-05-26 | Stop reason: SDUPTHER

## 2020-05-11 RX ORDER — AMLODIPINE BESYLATE 5 MG/1
5 TABLET ORAL DAILY
Qty: 90 TABLET | Refills: 3 | Status: SHIPPED | OUTPATIENT
Start: 2020-05-11 | End: 2021-07-27

## 2020-05-11 NOTE — TELEPHONE ENCOUNTER
PLEASE SEND A SCRIPT FOR solifenacin (VESICARE) 5 MG tablet    [facility] [phone] FAX NUMBER      ALSO, HER PRESSURE IS GETTING HIGHER THAT SHE IS COMFORTABLE WITH.  PLEASE CALL AND ADVISE [phone]

## 2020-05-11 NOTE — PROGRESS NOTES
Subjective     Marie Foy is a 64 y.o. female who presents with   Chief Complaint   Patient presents with   • Hypertension       History of Present Illness     You have chosen to receive care through a telehealth visit.  Do you consent to use a video/audio connection for your medical care today? Yes.  Length of visit is 26 minutes.      C/o increasing BP.  No symptoms associated.  She just started to check on it.  She has been taking more ibuprofen recently.  Numbers at home 181/81, 180/101, 173/71.  Moderately severe BP elevation has been constant over the last several days.      Review of Systems   Constitutional: Negative for fever.   Respiratory: Negative for shortness of breath.    Cardiovascular: Negative for chest pain, palpitations and leg swelling.   Neurological: Negative for headaches.       The following portions of the patient's history were reviewed and updated as appropriate: allergies, current medications and problem list.    Patient Active Problem List    Diagnosis Date Noted   • Essential (primary) hypertension 05/11/2020   • Hormone replacement therapy (HRT) 06/21/2019   • Anxiety 02/26/2016   • Gastroesophageal reflux disease 02/26/2016   • Herpes simplex type 2 infection 02/26/2016   • Hypercholesterolemia 02/26/2016   • Hypothyroidism 02/26/2016   • Osteopenia 02/26/2016     Note Last Updated: 2/26/2016     Description: Actonel start date 2006     • Overactive bladder 02/26/2016   • Fear of flying 02/26/2016     Note Last Updated: 2/26/2016     Description: flying and dental work     • Unilateral partial paralysis of vocal cords or larynx 02/26/2016     Note Last Updated: 2/26/2016     Description: chronic     • Colon polyps        Current Outpatient Medications on File Prior to Visit   Medication Sig Dispense Refill   • ALPRAZolam (XANAX) 0.25 MG tablet Take 1 tablet by mouth 3 (Three) Times a Day As Needed for Anxiety. 20 tablet 0   • atorvastatin (LIPITOR) 40 MG tablet TAKE ONE TABLET BY  MOUTH DAILY 90 tablet 2   • docusate sodium (COLACE) 100 MG capsule Take  by mouth daily.     • fluticasone (FLONASE) 50 MCG/ACT nasal spray into each nostril.     • levothyroxine (SYNTHROID, LEVOTHROID) 125 MCG tablet TAKE ONE TABLET BY MOUTH DAILY 30 tablet 6   • Magnesium 250 MG tablet Take  by mouth.     • Magnesium 500 MG capsule Take  by mouth.     • omeprazole (priLOSEC) 40 MG capsule TAKE ONE CAPSULE BY MOUTH DAILY 90 capsule 3   • solifenacin (VESIcare) 5 MG tablet Take 1 tablet by mouth Daily. 90 tablet 3   • valACYclovir (VALTREX) 1000 MG tablet TAKE ONE TABLET BY MOUTH DAILY 90 tablet 3   • [DISCONTINUED] solifenacin (VESICARE) 5 MG tablet Take 1 tablet by mouth Daily. 90 tablet 3     No current facility-administered medications on file prior to visit.        Objective     There were no vitals taken for this visit.    Physical Exam   Constitutional: She is oriented to person, place, and time. She appears well-developed and well-nourished.   HENT:   Head: Normocephalic and atraumatic.   Pulmonary/Chest: Effort normal.   Neurological: She is alert and oriented to person, place, and time.   Psychiatric: She has a normal mood and affect. Her behavior is normal.       Assessment/Plan   Marie was seen today for hypertension.    Diagnoses and all orders for this visit:    Essential (primary) hypertension        Discussion    Patient presents with new onset essential hypertension.  She is instructed to avoid NSAIDs.  She is instructed to watch salt in her diet.  Discussed with the patient onset on action and the potential side effects including edema.  The patient will let me know of any side effects from the medication.    F/u as planned in June.           Future Appointments   Date Time Provider Department Center   5/20/2020 11:40 AM Abelino Little MD [facility] None   6/18/2020  1:00 PM [facility] None   6/18/2020  1:00 PM [facility] None   6/23/2020   1:30 PM Michelle Wyman MD [facility] None

## 2020-05-11 NOTE — TELEPHONE ENCOUNTER
Make video visit for today for BP f/u.    I printed out prescription.  I could no find the pharmacy.  CRIS

## 2020-05-20 ENCOUNTER — OFFICE VISIT (OUTPATIENT)
Dept: ORTHOPEDIC SURGERY | Facility: CLINIC | Age: 65
End: 2020-05-20

## 2020-05-20 VITALS — TEMPERATURE: 97.3 F | WEIGHT: 155 LBS | BODY MASS INDEX: 32.54 KG/M2 | HEIGHT: 58 IN

## 2020-05-20 DIAGNOSIS — M17.12 PRIMARY LOCALIZED OSTEOARTHROSIS OF LEFT LOWER LEG: Primary | ICD-10-CM

## 2020-05-20 PROCEDURE — 73562 X-RAY EXAM OF KNEE 3: CPT | Performed by: ORTHOPAEDIC SURGERY

## 2020-05-20 PROCEDURE — 99213 OFFICE O/P EST LOW 20 MIN: CPT | Performed by: ORTHOPAEDIC SURGERY

## 2020-05-20 NOTE — PROGRESS NOTES
"Knee Exam      Patient: Marie Foy    YOB: 1955 64 y.o. female    Chief Complaints: knee hurts    History of Present Illness: Patient was seen on 5/6/2020 after injuring her left knee on 4/4/2020 when she fell from a low step ladder and struck her knee on a toilet.    She has since been using a walker and knee pain has improved some but has not resolved with moderate aching stabbing pain over the anteromedial aspect of the left knee.    Prior to this she had some intermittent aching pain in her left knee after sitting for prolonged periods of time with known history of arthritis in her left knee for which she has seen Dr. To in the past.  She had previous right knee replacement by Dr. Kaplan        Patient underwent revision fixation of right fifth metatarsal recurrent fracture in February 2016 and was last seen in July 2016 for that.  HPI    ROS: knee pain  Past Medical History:   Diagnosis Date   • Colon polyps    • Constipation    • Difficulty sleeping    • Generalized stiffness    • Graves disease    • Hearing loss in right ear    • Heart murmur    • History of bipolar disorder    • History of substance abuse (CMS/[facility])    • Joint pain     swelling   • Muscle pain        Physical Exam:   Vitals:    05/20/20 1203   Temp: 97.3 °F (36.3 °C)   TempSrc: Temporal   Weight: 70.3 kg (155 lb)   Height: 147.3 cm (58\")   PainSc: 0-No pain   PainLoc: Knee     Well developed with normal mood.  Left knee shows mild swelling with resolving ecchymosis over the anteromedial joint line with discomfort over the anteromedial aspect of the knee at the joint line.  Grossly stable ligamentous exam      Radiology: 3 views of the left knee ordered evaluate pain reviewed and compared with prior x-rays.  There is significant arthritic change with loss of joint space medially and tricompartmental arthritic change.  I do not see any clear evidence of fracture.  There is some questionable abnormality on the lateral " but I think it is over lapping osteophytes.  May be some questionable area over the medial tibial plateau with a may be some mild impaction or could be cystic change.      Assessment/Plan: Left knee pain with probable exacerbation of chronic arthritis with possible fracture.    We discussed treatment options and I think we need to go ahead and get an MRI of her knee to make sure there is not any type of occult fracture or other pathology that would preclude advancing with weightbearing and physical therapy and possible injection.    She will continue with her walker for now and use her elastic knee brace and will get the MRI in order to evaluate for any other pathology and then determine treatment course from there.    She understands to call to schedule follow-up appointment after MRIs been scheduled in order review results in the office

## 2020-05-22 ENCOUNTER — HOSPITAL ENCOUNTER (OUTPATIENT)
Dept: MRI IMAGING | Facility: HOSPITAL | Age: 65
Discharge: HOME | End: 2020-05-22
Admitting: ORTHOPAEDIC SURGERY

## 2020-05-22 DIAGNOSIS — M17.12 PRIMARY LOCALIZED OSTEOARTHROSIS OF LEFT LOWER LEG: ICD-10-CM

## 2020-05-22 PROCEDURE — 73721 MRI JNT OF LWR EXTRE W/O DYE: CPT

## 2020-05-26 ENCOUNTER — APPOINTMENT (OUTPATIENT)
Dept: MRI IMAGING | Facility: HOSPITAL | Age: 65
End: 2020-05-26

## 2020-05-26 ENCOUNTER — TELEPHONE (OUTPATIENT)
Dept: INTERNAL MEDICINE | Facility: CLINIC | Age: 65
End: 2020-05-26

## 2020-05-26 RX ORDER — SOLIFENACIN SUCCINATE 5 MG/1
5 TABLET, FILM COATED ORAL DAILY
Qty: 90 TABLET | Refills: 3 | Status: SHIPPED | OUTPATIENT
Start: 2020-05-26 | End: 2020-11-16 | Stop reason: SDUPTHER

## 2020-05-26 NOTE — TELEPHONE ENCOUNTER
Pt is requesting the medication solifenacin (VESIcare) 5 MG tablet to be faxed over     Fax number [phone]    Please advise

## 2020-05-27 ENCOUNTER — TELEPHONE (OUTPATIENT)
Dept: ORTHOPEDIC SURGERY | Facility: CLINIC | Age: 65
End: 2020-05-27

## 2020-05-27 NOTE — TELEPHONE ENCOUNTER
"I called and spoke with patient about her MRI results.  She said her knee is feeling much better \"not having any pain, the swelling is gone, and the discoloration is almost gone\".  She has been using her brace and has not been using a walker or cane around the house but using a walker out of the house.    Reviewed with her that her MRI shows degenerative tears of the meniscus as well as a hematoma and MCL sprain and arthritis but no fracture.    As her symptoms are essentially resolved I do not think we need to do an injection nor does she desire it.  She also declined any formal therapy at this point.    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    If symptoms recur she will let me know and we will get her back in to evaluate for injection and therapy.      "

## 2020-06-01 ENCOUNTER — TELEPHONE (OUTPATIENT)
Dept: ORTHOPEDIC SURGERY | Facility: CLINIC | Age: 65
End: 2020-06-01

## 2020-06-01 NOTE — TELEPHONE ENCOUNTER
Patient says [employer] told her she could return to work using a cane. Says her work won't let her return with any restrictions.

## 2020-06-01 NOTE — TELEPHONE ENCOUNTER
I spoke with patient she has had her knee feels fine now not having any pain or swelling and we had initially discussed her returning to work using her cane as needed but she is not allowed to return to work with use of her cane and stated to me that she felt that she could do as such as safely.    We will lowered her then return to work without restrictions but if she has any recurrence of pain she will back off and let me know.

## 2020-06-17 DIAGNOSIS — I10 ESSENTIAL (PRIMARY) HYPERTENSION: Primary | ICD-10-CM

## 2020-06-17 DIAGNOSIS — E03.9 HYPOTHYROIDISM, UNSPECIFIED TYPE: ICD-10-CM

## 2020-06-17 DIAGNOSIS — Z00.00 MEDICARE ANNUAL WELLNESS VISIT, SUBSEQUENT: ICD-10-CM

## 2020-06-17 DIAGNOSIS — E78.5 HYPERLIPIDEMIA, UNSPECIFIED HYPERLIPIDEMIA TYPE: ICD-10-CM

## 2020-06-19 LAB
ALBUMIN SERPL-MCNC: 4.5 G/DL (ref 3.8–4.8)
ALBUMIN/GLOB SERPL: 2 {RATIO} (ref 1.2–2.2)
ALP SERPL-CCNC: 88 IU/L (ref 39–117)
ALT SERPL-CCNC: 12 IU/L (ref 0–32)
APPEARANCE UR: CLEAR
AST SERPL-CCNC: 15 IU/L (ref 0–40)
BACTERIA #/AREA URNS HPF: NORMAL /[HPF]
BASOPHILS # BLD AUTO: 0 X10E3/UL (ref 0–0.2)
BASOPHILS NFR BLD AUTO: 0 %
BILIRUB SERPL-MCNC: 0.5 MG/DL (ref 0–1.2)
BILIRUB UR QL STRIP: NEGATIVE
BUN SERPL-MCNC: 20 MG/DL (ref 8–27)
BUN/CREAT SERPL: 24 (ref 12–28)
CALCIUM SERPL-MCNC: 9.5 MG/DL (ref 8.7–10.3)
CHLORIDE SERPL-SCNC: 105 MMOL/L (ref 96–106)
CHOLEST SERPL-MCNC: 173 MG/DL (ref 100–199)
CO2 SERPL-SCNC: 24 MMOL/L (ref 20–29)
COLOR UR: YELLOW
CREAT SERPL-MCNC: 0.83 MG/DL (ref 0.57–1)
EOSINOPHIL # BLD AUTO: 0.1 X10E3/UL (ref 0–0.4)
EOSINOPHIL NFR BLD AUTO: 3 %
EPI CELLS #/AREA URNS HPF: NORMAL /HPF (ref 0–10)
ERYTHROCYTE [DISTWIDTH] IN BLOOD BY AUTOMATED COUNT: 12.8 % (ref 11.7–15.4)
GLOBULIN SER CALC-MCNC: 2.2 G/DL (ref 1.5–4.5)
GLUCOSE SERPL-MCNC: 99 MG/DL (ref 65–99)
GLUCOSE UR QL: NEGATIVE
HCT VFR BLD AUTO: 37.4 % (ref 34–46.6)
HDLC SERPL-MCNC: 68 MG/DL
HGB BLD-MCNC: 12.2 G/DL (ref 11.1–15.9)
HGB UR QL STRIP: NEGATIVE
IMM GRANULOCYTES # BLD AUTO: 0 X10E3/UL (ref 0–0.1)
IMM GRANULOCYTES NFR BLD AUTO: 0 %
KETONES UR QL STRIP: NEGATIVE
LDLC SERPL CALC-MCNC: 86 MG/DL (ref 0–99)
LDLC/HDLC SERPL: 1.3 RATIO (ref 0–3.2)
LEUKOCYTE ESTERASE UR QL STRIP: ABNORMAL
LYMPHOCYTES # BLD AUTO: 1.3 X10E3/UL (ref 0.7–3.1)
LYMPHOCYTES NFR BLD AUTO: 27 %
MCH RBC QN AUTO: 28.7 PG (ref 26.6–33)
MCHC RBC AUTO-ENTMCNC: 32.6 G/DL (ref 31.5–35.7)
MCV RBC AUTO: 88 FL (ref 79–97)
MICRO URNS: ABNORMAL
MONOCYTES # BLD AUTO: 0.4 X10E3/UL (ref 0.1–0.9)
MONOCYTES NFR BLD AUTO: 8 %
MUCOUS THREADS URNS QL MICRO: PRESENT
NEUTROPHILS # BLD AUTO: 3 X10E3/UL (ref 1.4–7)
NEUTROPHILS NFR BLD AUTO: 62 %
NITRITE UR QL STRIP: POSITIVE
PH UR STRIP: 5.5 [PH] (ref 5–7.5)
PLATELET # BLD AUTO: 275 X10E3/UL (ref 150–450)
POTASSIUM SERPL-SCNC: 4.2 MMOL/L (ref 3.5–5.2)
PROT SERPL-MCNC: 6.7 G/DL (ref 6–8.5)
PROT UR QL STRIP: NEGATIVE
RBC # BLD AUTO: 4.25 X10E6/UL (ref 3.77–5.28)
RBC #/AREA URNS HPF: NORMAL /HPF (ref 0–2)
SODIUM SERPL-SCNC: 143 MMOL/L (ref 134–144)
SP GR UR: 1.02 (ref 1–1.03)
T4 FREE SERPL-MCNC: 2.64 NG/DL (ref 0.82–1.77)
TRIGL SERPL-MCNC: 93 MG/DL (ref 0–149)
TSH SERPL DL<=0.005 MIU/L-ACNC: 0.03 UIU/ML (ref 0.45–4.5)
UROBILINOGEN UR STRIP-MCNC: 0.2 MG/DL (ref 0.2–1)
VLDLC SERPL CALC-MCNC: 19 MG/DL (ref 5–40)
WBC # BLD AUTO: 4.8 X10E3/UL (ref 3.4–10.8)
WBC #/AREA URNS HPF: NORMAL /HPF (ref 0–5)

## 2020-06-23 ENCOUNTER — OFFICE VISIT (OUTPATIENT)
Dept: INTERNAL MEDICINE | Facility: CLINIC | Age: 65
End: 2020-06-23

## 2020-06-23 VITALS
OXYGEN SATURATION: 97 % | HEART RATE: 91 BPM | DIASTOLIC BLOOD PRESSURE: 55 MMHG | WEIGHT: 154 LBS | HEIGHT: 58 IN | RESPIRATION RATE: 16 BRPM | SYSTOLIC BLOOD PRESSURE: 151 MMHG | TEMPERATURE: 98 F | BODY MASS INDEX: 32.32 KG/M2

## 2020-06-23 DIAGNOSIS — Z00.00 WELCOME TO MEDICARE PREVENTIVE VISIT: Primary | ICD-10-CM

## 2020-06-23 PROBLEM — Z79.890 HORMONE REPLACEMENT THERAPY (HRT): Status: RESOLVED | Noted: 2019-06-21 | Resolved: 2020-06-23

## 2020-06-23 PROCEDURE — G0402 INITIAL PREVENTIVE EXAM: HCPCS | Performed by: INTERNAL MEDICINE

## 2020-06-23 PROCEDURE — 90732 PPSV23 VACC 2 YRS+ SUBQ/IM: CPT | Performed by: INTERNAL MEDICINE

## 2020-06-23 PROCEDURE — G0009 ADMIN PNEUMOCOCCAL VACCINE: HCPCS | Performed by: INTERNAL MEDICINE

## 2020-06-23 RX ORDER — LEVOTHYROXINE SODIUM 112 UG/1
112 TABLET ORAL DAILY
Qty: 90 TABLET | Refills: 3 | Status: SHIPPED | OUTPATIENT
Start: 2020-06-23 | End: 2020-09-15 | Stop reason: SDUPTHER

## 2020-06-23 NOTE — PROGRESS NOTES
Subjective     Marie Foy is a 64 y.o. female who presents for a Welcome to Medicare CPE.      History of Present Illness     HTN.  BP is increased today.  We discussed her checking at work.    HLD.  Fairly good control with atorvastatin 40.   Hypothyroidism.  She is overcontrolled.   We discussed decreasing dose.  Anxiety.  Rare use of xanax for anxiety.      Review of Systems   Respiratory: Negative.    Cardiovascular: Negative.        The following portions of the patient's history were reviewed and updated as appropriate: allergies, current medications, past family history, past medical history, past social history, past surgical history and problem list.     Patient Active Problem List   Diagnosis   • Anxiety   • Gastroesophageal reflux disease   • Herpes simplex type 2 infection   • Hypercholesterolemia   • Hypothyroidism   • Osteopenia   • Overactive bladder   • Fear of flying   • Unilateral partial paralysis of vocal cords or larynx   • Colon polyps   • Hormone replacement therapy (HRT)   • Essential (primary) hypertension   • Primary localized osteoarthrosis of left lower leg       Past Medical History:   Diagnosis Date   • Colon polyps    • Constipation    • Difficulty sleeping    • Generalized stiffness    • Graves disease    • Hearing loss in right ear    • Heart murmur    • History of bipolar disorder    • History of substance abuse (CMS/[facility])    • Joint pain     swelling   • Muscle pain        Past Surgical History:   Procedure Laterality Date   • ANKLE SURGERY     • BLADDER SURGERY     • COLONOSCOPY N/A 8/23/2016    Procedure: COLONOSCOPY TO CECUM & T.I.  WITH SALINE INJECTION, HOT SNARE POLYPECTOMY, COLD POLYPECTOMY AND CLIP PLACEMENT X 1;  Surgeon: Darryl Garcia MD;  Location: [facility] ENDOSCOPY;  Service:    • FOOT SURGERY     • HYSTERECTOMY     • JOINT REPLACEMENT Right     HAND   • REPLACEMENT TOTAL KNEE     • THROAT SURGERY      childhood       Family History   Problem Relation Age of Onset    • Hypertension Mother    • Heart failure Mother    • Diabetes Mother    • Colon polyps Mother    • Pancreatic cancer Father        Social History     Socioeconomic History   • Marital status:      Spouse name: Not on file   • Number of children: 5   • Years of education: Not on file   • Highest education level: Not on file   Tobacco Use   • Smoking status: Never Smoker   • Smokeless tobacco: Never Used   Substance and Sexual Activity   • Alcohol use: Yes     Comment: OCASSIONALLY   • Drug use: Defer   • Sexual activity: Defer       Current Outpatient Medications on File Prior to Visit   Medication Sig Dispense Refill   • ALPRAZolam (XANAX) 0.25 MG tablet Take 1 tablet by mouth 3 (Three) Times a Day As Needed for Anxiety. 20 tablet 0   • amLODIPine (Norvasc) 5 MG tablet Take 1 tablet by mouth Daily. 90 tablet 3   • atorvastatin (LIPITOR) 40 MG tablet TAKE ONE TABLET BY MOUTH DAILY 90 tablet 2   • docusate sodium (COLACE) 100 MG capsule Take  by mouth daily.     • levothyroxine (SYNTHROID, LEVOTHROID) 125 MCG tablet TAKE ONE TABLET BY MOUTH DAILY 30 tablet 6   • Magnesium 500 MG capsule Take  by mouth.     • omeprazole (priLOSEC) 40 MG capsule TAKE ONE CAPSULE BY MOUTH DAILY 90 capsule 3   • solifenacin (VESIcare) 5 MG tablet Take 1 tablet by mouth Daily. 90 tablet 3   • valACYclovir (VALTREX) 1000 MG tablet TAKE ONE TABLET BY MOUTH DAILY 90 tablet 3   • fluticasone (FLONASE) 50 MCG/ACT nasal spray into each nostril.     • Magnesium 250 MG tablet Take  by mouth.       No current facility-administered medications on file prior to visit.        Allergies   Allergen Reactions   • Codeine    • Morphine And Related    • Penicillins        Immunization History   Administered Date(s) Administered   • Flu Mist 10/20/2014   • Hepatitis A 05/16/2018   • Pneumococcal Conjugate 13-Valent (PCV13) 06/21/2019   • Tdap 10/20/2014   • Zostavax 10/14/2013       Objective     /55 (BP Location: Right arm, Patient Position:  "Sitting, Cuff Size: Adult)   Pulse 91   Temp 98 °F (36.7 °C)   Resp 16   Ht 147.3 cm (58\")   Wt 69.9 kg (154 lb)   SpO2 97%   BMI 32.19 kg/m²     Physical Exam   Constitutional: She is oriented to person, place, and time. She appears well-developed and well-nourished.   HENT:   Head: Normocephalic and atraumatic.   Right Ear: Hearing, tympanic membrane and external ear normal.   Left Ear: Hearing, tympanic membrane and external ear normal.   Nose: Nose normal.   Mouth/Throat: Oropharynx is clear and moist.   Neck: Neck supple. No thyromegaly present.   Cardiovascular: Normal rate, regular rhythm and normal heart sounds.   No murmur heard.  Pulmonary/Chest: Effort normal and breath sounds normal. Right breast exhibits no mass. Left breast exhibits no mass. No breast tenderness.   Abdominal: Soft. She exhibits no distension. There is no hepatosplenomegaly. There is no tenderness.   Genitourinary: No breast tenderness.   Lymphadenopathy:     She has no cervical adenopathy.   Neurological: She is alert and oriented to person, place, and time.   Skin: Skin is warm and dry.   Psychiatric: She has a normal mood and affect. Her speech is normal and behavior is normal. Judgment and thought content normal. Cognition and memory are normal.         ECG 12 Lead  Date/Time: 6/23/2020 1:53 PM  Performed by: Michelle Wyman MD  Authorized by: Michelle Wyman MD   Comparison: compared with previous ECG from 8/18/2017  Similar to previous ECG  Rhythm: sinus rhythm  Rate: normal  Conduction: conduction normal  ST Segments: ST segments normal  T Waves: T waves normal  QRS axis: normal  Other findings: poor R wave progression    Clinical impression: non-specific ECG              Eye exam results:  See rooming activity    Assessment/Plan   Marie was seen today for medicare wellness-initial visit.    Diagnoses and all orders for this visit:    Welcome to Medicare preventive visit    Other orders  -     levothyroxine (SYNTHROID, " LEVOTHROID) 112 MCG tablet; Take 1 tablet by mouth Daily.  -     Pneumococcal Polysaccharide Vaccine 23-Valent Greater Than or Equal To 1yo Subcutaneous / IM        Discussion    Welcome to Medicare Physical.  See below for waylon history, PHQ-9, functional ability questionnaire and cognitive impairment screening. Direct observation of cognitive abilities:  The patient does not exhibit any impairment in cognitive abilities upon direct observation at today's visit.    EKG and eye exam were performed today as well.  These were all reviewed with the patient and the patient was provided with a personal prevention plan of service in patient instructions.   ACP discussion was held with the patient during this visit. Patient does not have an advance directive, information provided..  Patient was given health advice or handouts on the following:  nutrition, exercise.  I have recommended that the patient get the following immunizations:  Shingrix.  HTN. The patient will continue current regimen.    The patient is instructed to monitor at home and call in checks.    HLD.  The patient will continue current regimen.      Hypothyroidism.  Decrease dose.  Recheck in 6-8 weeks.        Depression Screen:    PHQ-2/PHQ-9 Depression Screening 6/23/2020   Little interest or pleasure in doing things 1   Feeling down, depressed, or hopeless 1   Trouble falling or staying asleep, or sleeping too much 1   Feeling tired or having little energy 3   Poor appetite or overeating 0   Feeling bad about yourself - or that you are a failure or have let yourself or your family down 0   Trouble concentrating on things, such as reading the newspaper or watching television 0   Moving or speaking so slowly that other people could have noticed. Or the opposite - being so fidgety or restless that you have been moving around a lot more than usual 0   Thoughts that you would be better off dead, or of hurting yourself in some way 0   Total Score 6   If you  checked off any problems, how difficult have these problems made it for you to do your work, take care of things at home, or get along with other people? Not difficult at all       Fall Risk Screen:  STEW Fall Risk Assessment has not been completed.    Health Habits and Functional/Cognitive screen:  Functional & Cognitive Status 6/23/2020   Do you have difficulty preparing food and eating? No   Do you have difficulty bathing yourself, getting dressed or grooming yourself? No   Do you have difficulty using the toilet? No   Do you have difficulty moving around from place to place? No   Do you have trouble with steps or getting out of a bed or a chair? No   Current Diet Low Fat Diet   Dental Exam Not up to date   Eye Exam Not up to date   Exercise (times per week) 5 times per week   Current Exercise Activities Include Walking   Do you need help using the phone?  No   Are you deaf or do you have serious difficulty hearing?  Yes   Do you need help with transportation? No   Do you need help shopping? No   Do you need help preparing meals?  No   Do you need help with housework?  No   Do you need help with laundry? No   Do you need help taking your medications? No   Do you need help managing money? No   Do you ever drive or ride in a car without wearing a seat belt? No       Health Maintenance   Topic Date Due   • ZOSTER VACCINE (2 of 3) 12/09/2013   • MEDICARE ANNUAL WELLNESS  02/26/2016   • Pneumococcal Vaccine Once at 65 Years Old  06/28/2018   • MAMMOGRAM  06/28/2020   • INFLUENZA VACCINE  08/01/2020   • LIPID PANEL  06/18/2021   • COLONOSCOPY  08/23/2021   • DXA SCAN  06/19/2022   • TDAP/TD VACCINES (2 - Td) 10/20/2024   • HEPATITIS C SCREENING  Completed            No future appointments.

## 2020-06-30 DIAGNOSIS — M81.0 OSTEOPOROSIS, UNSPECIFIED OSTEOPOROSIS TYPE, UNSPECIFIED PATHOLOGICAL FRACTURE PRESENCE: Primary | ICD-10-CM

## 2020-07-17 ENCOUNTER — INFUSION (OUTPATIENT)
Dept: ONCOLOGY | Facility: HOSPITAL | Age: 65
End: 2020-07-17

## 2020-07-17 VITALS
OXYGEN SATURATION: 98 % | TEMPERATURE: 98.4 F | DIASTOLIC BLOOD PRESSURE: 79 MMHG | BODY MASS INDEX: 31.89 KG/M2 | HEART RATE: 74 BPM | SYSTOLIC BLOOD PRESSURE: 147 MMHG | WEIGHT: 152.6 LBS

## 2020-07-17 DIAGNOSIS — M81.0 OSTEOPOROSIS, UNSPECIFIED OSTEOPOROSIS TYPE, UNSPECIFIED PATHOLOGICAL FRACTURE PRESENCE: Primary | ICD-10-CM

## 2020-07-17 PROCEDURE — 25010000002 DENOSUMAB 60 MG/ML SOLUTION PREFILLED SYRINGE: Performed by: INTERNAL MEDICINE

## 2020-07-17 PROCEDURE — 96372 THER/PROPH/DIAG INJ SC/IM: CPT

## 2020-07-17 RX ADMIN — DENOSUMAB 60 MG: 60 INJECTION SUBCUTANEOUS at 13:48

## 2020-09-14 DIAGNOSIS — E03.9 HYPOTHYROIDISM, UNSPECIFIED TYPE: Primary | ICD-10-CM

## 2020-09-14 LAB
T4 FREE SERPL-MCNC: 1.99 NG/DL (ref 0.93–1.7)
TSH SERPL DL<=0.005 MIU/L-ACNC: 0.22 UIU/ML (ref 0.27–4.2)

## 2020-09-15 RX ORDER — LEVOTHYROXINE SODIUM 0.1 MG/1
100 TABLET ORAL DAILY
Qty: 30 TABLET | Refills: 5 | Status: SHIPPED | OUTPATIENT
Start: 2020-09-15 | End: 2021-04-30

## 2020-09-16 ENCOUNTER — APPOINTMENT (OUTPATIENT)
Dept: WOMENS IMAGING | Facility: HOSPITAL | Age: 65
End: 2020-09-16

## 2020-09-16 PROCEDURE — 77063 BREAST TOMOSYNTHESIS BI: CPT | Performed by: RADIOLOGY

## 2020-09-16 PROCEDURE — 77067 SCR MAMMO BI INCL CAD: CPT | Performed by: RADIOLOGY

## 2020-09-25 DIAGNOSIS — R92.8 ABNORMAL MAMMOGRAM OF RIGHT BREAST: Primary | ICD-10-CM

## 2020-10-29 ENCOUNTER — APPOINTMENT (OUTPATIENT)
Dept: WOMENS IMAGING | Facility: HOSPITAL | Age: 65
End: 2020-10-29

## 2020-10-29 PROCEDURE — 77065 DX MAMMO INCL CAD UNI: CPT | Performed by: RADIOLOGY

## 2020-11-02 DIAGNOSIS — R92.8 ABNORMAL MAMMOGRAM OF RIGHT BREAST: Primary | ICD-10-CM

## 2020-11-02 DIAGNOSIS — R92.8 ABNORMAL MAMMOGRAM OF RIGHT BREAST: ICD-10-CM

## 2020-11-16 RX ORDER — SOLIFENACIN SUCCINATE 5 MG/1
5 TABLET, FILM COATED ORAL DAILY
Qty: 90 TABLET | Refills: 3 | Status: SHIPPED | OUTPATIENT
Start: 2020-11-16 | End: 2021-10-14 | Stop reason: SDUPTHER

## 2020-11-16 RX ORDER — SOLIFENACIN SUCCINATE 5 MG/1
5 TABLET, FILM COATED ORAL DAILY
Qty: 90 TABLET | Refills: 3 | Status: CANCELLED | OUTPATIENT
Start: 2020-11-16

## 2020-11-16 NOTE — TELEPHONE ENCOUNTER
Caller: Marie Foy    Relationship: Self    Best call back number: [phone]  Medication needed:   Requested Prescriptions     Pending Prescriptions Disp Refills   • solifenacin (VESIcare) 5 MG tablet 90 tablet 3     Sig: Take 1 tablet by mouth Daily.           Does the patient have less than a 3 day supply:  [] Yes  [x] No    What is the patient's preferred pharmacy:    Patient would like this to faxed over to [facility], THE FAX NUMBER IS [phone] HER CUSTOMER ID NUMBER IS [id number]. THIS IS DUE TO THE COST OF MEDICATION.

## 2020-11-17 ENCOUNTER — TELEPHONE (OUTPATIENT)
Dept: INTERNAL MEDICINE | Facility: CLINIC | Age: 65
End: 2020-11-17

## 2020-11-17 NOTE — TELEPHONE ENCOUNTER
Caller: Marie Foy    Relationship: Self    Best call back number: [phone]    Medication needed:    solifenacin (VESIcare) 5 MG tablet  5 mg, Daily 3 ordered       What details did the patient provide when requesting the medication: PATIENT HAS REQUESTED THIS MEDICATION BE SENT TO [facility] DUE TO HIGH CO-PAY AT OTHER PHARMACY.    THE PATIENT STATED SHE HAD A NOTICE THROUGH [employer] THE MEDICATION REFILLED HAD BEEN DENIED.     Does the patient have less than a 3 day supply:  [] Yes  [x] No    What is the patient's preferred pharmacy:      [facility]  [address] [place] [address]    PHONE: [phone]   FAX: [phone]     PATIENT WOULD LIKE A CALLBACK WITH STATUS UPDATE ON WHETHER THIS HAS BEEN FILLED.

## 2020-11-25 ENCOUNTER — TELEPHONE (OUTPATIENT)
Dept: INTERNAL MEDICINE | Facility: CLINIC | Age: 65
End: 2020-11-25

## 2020-11-25 NOTE — TELEPHONE ENCOUNTER
----- Message from Susanne Coyle MA sent at 11/25/2020 12:54 PM EST -----  Regarding: FW: Prescription Question  Contact: [phone]    ----- Message -----  From: Marie Foy  Sent: 11/25/2020  11:59 AM EST  To: Ame Starks [facility]  Subject: Prescription Question                            I asked that a script be faxed to a Mail-order RX, one that I have used before and was denied by your office. Why?  My vesicare/solifenacin 5mg thru my present RX insurance is OVER $900.00, for a 90 day supply, and once the deductible is meet, it's still OVER $700, for 90 day supply. Through the Mail Order RX, the vesicare/solifencin 5mg is only $75. for a 90 day supply, and I have used this mail-order RX before, without problems.  Please re-consider faxing the script  for the Vesicare/Solifenacin 5mg to the mail-order RX.  Fax #[phone] Customer ID # [id number]  Thank you for your time concerning this matter. If this has already been taken care of, Thank you again.  Marie Foy  1955

## 2020-11-25 NOTE — TELEPHONE ENCOUNTER
----- Message from Marie Foy sent at 11/25/2020 11:59 AM EST -----  Regarding: Prescription Question  Contact: [phone]  I asked that a script be faxed to a Mail-order RX, one that I have used before and was denied by your office. Why?  My vesicare/solifenacin 5mg thru my present RX insurance is OVER $900.00, for a 90 day supply, and once the deductible is meet, it's still OVER $700, for 90 day supply. Through the Mail Order RX, the vesicare/solifencin 5mg is only $75. for a 90 day supply, and I have used this mail-order RX before, without problems.  Please re-consider faxing the script  for the Vesicare/Solifenacin 5mg to the mail-order RX.  Fax #[phone] Customer ID # [id number]  Thank you for your time concerning this matter. If this has already been taken care of, Thank you again.  Marie Foy  1955

## 2020-12-04 ENCOUNTER — APPOINTMENT (OUTPATIENT)
Dept: WOMENS IMAGING | Facility: HOSPITAL | Age: 65
End: 2020-12-04

## 2020-12-04 PROCEDURE — 19081 BX BREAST 1ST LESION STRTCTC: CPT | Performed by: RADIOLOGY

## 2020-12-10 DIAGNOSIS — E03.9 HYPOTHYROIDISM, UNSPECIFIED TYPE: Primary | ICD-10-CM

## 2020-12-11 LAB — TSH SERPL DL<=0.005 MIU/L-ACNC: 0.74 UIU/ML (ref 0.45–4.5)

## 2021-01-18 ENCOUNTER — LAB (OUTPATIENT)
Dept: OTHER | Facility: HOSPITAL | Age: 66
End: 2021-01-18

## 2021-01-18 ENCOUNTER — INFUSION (OUTPATIENT)
Dept: ONCOLOGY | Facility: HOSPITAL | Age: 66
End: 2021-01-18

## 2021-01-18 VITALS — HEIGHT: 58 IN | RESPIRATION RATE: 18 BRPM | TEMPERATURE: 97.5 F | BODY MASS INDEX: 31.89 KG/M2

## 2021-01-18 DIAGNOSIS — M85.80 OSTEOPENIA, UNSPECIFIED LOCATION: ICD-10-CM

## 2021-01-18 DIAGNOSIS — M85.80 OSTEOPENIA, UNSPECIFIED LOCATION: Primary | ICD-10-CM

## 2021-01-18 DIAGNOSIS — M81.0 OSTEOPOROSIS, UNSPECIFIED OSTEOPOROSIS TYPE, UNSPECIFIED PATHOLOGICAL FRACTURE PRESENCE: ICD-10-CM

## 2021-01-18 LAB
ALBUMIN SERPL-MCNC: 4.6 G/DL (ref 3.5–5.2)
ALBUMIN/GLOB SERPL: 1.7 G/DL
ALP SERPL-CCNC: 77 U/L (ref 39–117)
ALT SERPL W P-5'-P-CCNC: 12 U/L (ref 1–33)
ANION GAP SERPL CALCULATED.3IONS-SCNC: 6.6 MMOL/L (ref 5–15)
AST SERPL-CCNC: 14 U/L (ref 1–32)
BILIRUB SERPL-MCNC: 0.5 MG/DL (ref 0–1.2)
BUN SERPL-MCNC: 14 MG/DL (ref 8–23)
BUN/CREAT SERPL: 17.3 (ref 7–25)
CALCIUM SPEC-SCNC: 9.4 MG/DL (ref 8.6–10.5)
CHLORIDE SERPL-SCNC: 107 MMOL/L (ref 98–107)
CO2 SERPL-SCNC: 29.4 MMOL/L (ref 22–29)
CREAT SERPL-MCNC: 0.81 MG/DL (ref 0.57–1)
GFR SERPL CREATININE-BSD FRML MDRD: 71 ML/MIN/1.73
GLOBULIN UR ELPH-MCNC: 2.7 GM/DL
GLUCOSE SERPL-MCNC: 107 MG/DL (ref 65–99)
POTASSIUM SERPL-SCNC: 4.1 MMOL/L (ref 3.5–5.2)
PROT SERPL-MCNC: 7.3 G/DL (ref 6–8.5)
SODIUM SERPL-SCNC: 143 MMOL/L (ref 136–145)

## 2021-01-18 PROCEDURE — 96372 THER/PROPH/DIAG INJ SC/IM: CPT

## 2021-01-18 PROCEDURE — 25010000002 DENOSUMAB 60 MG/ML SOLUTION PREFILLED SYRINGE: Performed by: INTERNAL MEDICINE

## 2021-01-18 PROCEDURE — 80053 COMPREHEN METABOLIC PANEL: CPT | Performed by: INTERNAL MEDICINE

## 2021-01-18 RX ADMIN — DENOSUMAB 60 MG: 60 INJECTION SUBCUTANEOUS at 13:34

## 2021-01-18 NOTE — NURSING NOTE
Arrived  for prolia injection. Indication and side effects reviewed. Denies recent dental work. Labs and medications verified. Prolia administered in R arm without incidence. Instructed to call prescribing MD for any concerns or questions and instructed on how to schedule future appts.  Pt vu and discharged ambulatory.

## 2021-01-21 ENCOUNTER — TRANSCRIBE ORDERS (OUTPATIENT)
Dept: SLEEP MEDICINE | Facility: HOSPITAL | Age: 66
End: 2021-01-21

## 2021-01-21 DIAGNOSIS — Z01.818 OTHER SPECIFIED PRE-OPERATIVE EXAMINATION: Primary | ICD-10-CM

## 2021-01-23 ENCOUNTER — LAB (OUTPATIENT)
Dept: LAB | Facility: HOSPITAL | Age: 66
End: 2021-01-23

## 2021-01-23 DIAGNOSIS — Z01.818 OTHER SPECIFIED PRE-OPERATIVE EXAMINATION: ICD-10-CM

## 2021-01-23 PROCEDURE — U0004 COV-19 TEST NON-CDC HGH THRU: HCPCS

## 2021-01-23 PROCEDURE — C9803 HOPD COVID-19 SPEC COLLECT: HCPCS

## 2021-01-25 LAB — SARS-COV-2 RNA RESP QL NAA+PROBE: NOT DETECTED

## 2021-01-26 ENCOUNTER — ON CAMPUS - OUTPATIENT (OUTPATIENT)
Dept: URBAN - METROPOLITAN AREA HOSPITAL 114 | Facility: HOSPITAL | Age: 66
End: 2021-01-26
Payer: COMMERCIAL

## 2021-01-26 ENCOUNTER — ANESTHESIA (OUTPATIENT)
Dept: GASTROENTEROLOGY | Facility: HOSPITAL | Age: 66
End: 2021-01-26

## 2021-01-26 ENCOUNTER — HOSPITAL ENCOUNTER (OUTPATIENT)
Facility: HOSPITAL | Age: 66
Setting detail: HOSPITAL OUTPATIENT SURGERY
Discharge: HOME | End: 2021-01-26
Attending: INTERNAL MEDICINE | Admitting: INTERNAL MEDICINE

## 2021-01-26 ENCOUNTER — ANESTHESIA EVENT (OUTPATIENT)
Dept: GASTROENTEROLOGY | Facility: HOSPITAL | Age: 66
End: 2021-01-26

## 2021-01-26 VITALS
OXYGEN SATURATION: 100 % | HEIGHT: 58 IN | RESPIRATION RATE: 16 BRPM | DIASTOLIC BLOOD PRESSURE: 78 MMHG | HEART RATE: 79 BPM | SYSTOLIC BLOOD PRESSURE: 146 MMHG | WEIGHT: 144 LBS | BODY MASS INDEX: 30.23 KG/M2

## 2021-01-26 DIAGNOSIS — Z86.010 HX OF COLONIC POLYPS: ICD-10-CM

## 2021-01-26 DIAGNOSIS — D12.0 BENIGN NEOPLASM OF CECUM: ICD-10-CM

## 2021-01-26 DIAGNOSIS — K63.3 ULCER OF INTESTINE: ICD-10-CM

## 2021-01-26 DIAGNOSIS — Z86.010 PERSONAL HISTORY OF COLONIC POLYPS: ICD-10-CM

## 2021-01-26 DIAGNOSIS — D09.8 CARCINOMA IN SITU OF OTHER SPECIFIED SITES: ICD-10-CM

## 2021-01-26 PROCEDURE — 88305 TISSUE EXAM BY PATHOLOGIST: CPT | Performed by: INTERNAL MEDICINE

## 2021-01-26 PROCEDURE — 45381 COLONOSCOPY SUBMUCOUS NJX: CPT | Mod: 59 | Performed by: INTERNAL MEDICINE

## 2021-01-26 PROCEDURE — 45380 COLONOSCOPY AND BIOPSY: CPT | Performed by: INTERNAL MEDICINE

## 2021-01-26 PROCEDURE — 25010000002 PROPOFOL 10 MG/ML EMULSION: Performed by: ANESTHESIOLOGY

## 2021-01-26 RX ORDER — PROPOFOL 10 MG/ML
VIAL (ML) INTRAVENOUS AS NEEDED
Status: DISCONTINUED | OUTPATIENT
Start: 2021-01-26 | End: 2021-01-26 | Stop reason: SURG

## 2021-01-26 RX ORDER — SODIUM CHLORIDE, SODIUM LACTATE, POTASSIUM CHLORIDE, CALCIUM CHLORIDE 600; 310; 30; 20 MG/100ML; MG/100ML; MG/100ML; MG/100ML
30 INJECTION, SOLUTION INTRAVENOUS CONTINUOUS PRN
Status: DISCONTINUED | OUTPATIENT
Start: 2021-01-26 | End: 2021-01-26 | Stop reason: HOSPADM

## 2021-01-26 RX ORDER — PROPOFOL 10 MG/ML
VIAL (ML) INTRAVENOUS CONTINUOUS PRN
Status: DISCONTINUED | OUTPATIENT
Start: 2021-01-26 | End: 2021-01-26 | Stop reason: SURG

## 2021-01-26 RX ORDER — LIDOCAINE HYDROCHLORIDE 20 MG/ML
INJECTION, SOLUTION INFILTRATION; PERINEURAL AS NEEDED
Status: DISCONTINUED | OUTPATIENT
Start: 2021-01-26 | End: 2021-01-26 | Stop reason: SURG

## 2021-01-26 RX ADMIN — PROPOFOL 100 MG: 10 INJECTION, EMULSION INTRAVENOUS at 10:30

## 2021-01-26 RX ADMIN — PROPOFOL 100 MCG/KG/MIN: 10 INJECTION, EMULSION INTRAVENOUS at 10:32

## 2021-01-26 RX ADMIN — SODIUM CHLORIDE, POTASSIUM CHLORIDE, SODIUM LACTATE AND CALCIUM CHLORIDE 30 ML/HR: 600; 310; 30; 20 INJECTION, SOLUTION INTRAVENOUS at 10:25

## 2021-01-26 RX ADMIN — LIDOCAINE HYDROCHLORIDE 60 MG: 20 INJECTION, SOLUTION INFILTRATION; PERINEURAL at 10:31

## 2021-01-26 NOTE — DISCHARGE INSTRUCTIONS
For the next 24 hours patient needs to be with a responsible adult.    For 24 hours DO NOT drive, operate machinery, appliances, drink alcohol, make important decisions or sign legal documents.    Start with a light or bland diet and advance to regular diet as tolerated.    Follow recommendations on procedure report provided by your doctor.    Call Dr Garcia for problems [phone]    Problems may include but not limited to: large amounts of bleeding, trouble breathing, repeated vomiting, severe unrelieved pain, fever or chills.

## 2021-01-26 NOTE — ANESTHESIA POSTPROCEDURE EVALUATION
"Patient: Marie Foy    Procedure Summary     Date: 01/26/21 Room / Location: [facility] ENDOSCOPY 9 /  [facility]    Anesthesia Start: 1028 Anesthesia Stop: 1103    Procedure: COLONOSCOPY to cecum with cold biopsyies and cold biopsy polypectomy and tattoo needle injection of colonic ulcer (N/A ) Diagnosis:     Surgeon: Darryl Garcia MD Provider: Rocco Fan MD    Anesthesia Type: MAC ASA Status: 3          Anesthesia Type: MAC    Vitals  Vitals Value Taken Time   /78 01/26/21 1119   Temp     Pulse 79 01/26/21 1119   Resp 16 01/26/21 1119   SpO2 100 % 01/26/21 1109           Post Anesthesia Care and Evaluation    Patient location during evaluation: PACU  Patient participation: complete - patient participated  Level of consciousness: awake  Pain score: 0  Pain management: adequate  Airway patency: patent  Anesthetic complications: No anesthetic complications  PONV Status: none  Cardiovascular status: acceptable  Respiratory status: acceptable  Hydration status: acceptable    Comments: /78 (BP Location: Left arm, Patient Position: Lying)   Pulse 79   Resp 16   Ht 147.3 cm (58\")   Wt 65.3 kg (144 lb)   SpO2 100%   BMI 30.10 kg/m²       "

## 2021-01-26 NOTE — ANESTHESIA PREPROCEDURE EVALUATION
Anesthesia Evaluation     Patient summary reviewed and Nursing notes reviewed   NPO Solid Status: > 8 hours  NPO Liquid Status: > 2 hours           Airway   Mallampati: I  TM distance: >3 FB  Neck ROM: full  No difficulty expected  Dental - normal exam     Pulmonary - negative pulmonary ROS and normal exam   Cardiovascular - normal exam    (+) hypertension, valvular problems/murmurs, hyperlipidemia,       Neuro/Psych  (+) psychiatric history Anxiety,     GI/Hepatic/Renal/Endo    (+)  GERD,      Musculoskeletal     Abdominal  - normal exam    Bowel sounds: normal.   Substance History - negative use     OB/GYN negative ob/gyn ROS         Other   arthritis,                    Anesthesia Plan    ASA 3     MAC       Anesthetic plan, all risks, benefits, and alternatives have been provided, discussed and informed consent has been obtained with: patient.

## 2021-01-26 NOTE — H&P
Patient Care Team:  Michelle Wyman MD as PCP - General  Michelle Wyman MD as PCP - Family Medicine  Michelle Wyman MD as Referring Physician (Internal Medicine)    Chief complaint history of colon polyps    Subjective     History of Present Illness    Review of Systems   All other systems reviewed and are negative.       Past History:  Medical History: has a past medical history of Colon polyps, Constipation, Difficulty sleeping, Generalized stiffness, Graves disease, Hearing loss in right ear, Heart murmur, History of bipolar disorder, History of substance abuse (CMS/HCC), Hyperlipidemia, Hypertension, Joint pain, and Muscle pain.   Surgical History: has a past surgical history that includes Ankle surgery; Bladder surgery; Foot surgery; Hysterectomy; Replacement total knee (Right); Throat surgery; Joint replacement (Right); and Colonoscopy (N/A, 8/23/2016).   Family History: family history includes Colon polyps in her mother; Diabetes in her mother; Heart failure in her mother; Hypertension in her mother; Pancreatic cancer in her father.   Social History: reports that she has never smoked. She has never used smokeless tobacco. She reports current alcohol use. She reports previous drug use.    Medications Prior to Admission   Medication Sig Dispense Refill Last Dose   • ALPRAZolam (XANAX) 0.25 MG tablet Take 1 tablet by mouth 3 (Three) Times a Day As Needed for Anxiety. 20 tablet 0 Past Month at Unknown time   • amLODIPine (Norvasc) 5 MG tablet Take 1 tablet by mouth Daily. 90 tablet 3 1/26/2021 at Unknown time   • atorvastatin (LIPITOR) 40 MG tablet TAKE ONE TABLET BY MOUTH DAILY 90 tablet 2 1/25/2021 at Unknown time   • calcium citrate-vitamin d (CITRACAL) 200-250 MG-UNIT tablet tablet Take 1 tablet by mouth 2 (Two) Times a Day.   1/25/2021 at Unknown time   • docusate sodium (COLACE) 100 MG capsule Take  by mouth daily.   1/25/2021 at Unknown time   • levothyroxine (SYNTHROID, LEVOTHROID) 100 MCG  tablet Take 1 tablet by mouth Daily. 30 tablet 5 1/26/2021 at Unknown time   • Magnesium 500 MG capsule Take  by mouth.   1/25/2021 at Unknown time   • omeprazole (priLOSEC) 40 MG capsule TAKE ONE CAPSULE BY MOUTH DAILY 90 capsule 3 1/26/2021 at Unknown time   • solifenacin (VESIcare) 5 MG tablet Take 1 tablet by mouth Daily. 90 tablet 3 1/25/2021 at Unknown time   • valACYclovir (VALTREX) 1000 MG tablet TAKE ONE TABLET BY MOUTH DAILY 90 tablet 3 1/25/2021 at Unknown time      Allergies: Codeine, Morphine and related, and Penicillins    Objective      Vital Signs  Heart Rate:  [61] 61  Resp:  [16] 16  BP: (144)/(64) 144/64    Physical Exam  HENT:      Right Ear: External ear normal.      Left Ear: External ear normal.      Mouth/Throat:      Pharynx: Oropharynx is clear.   Eyes:      Conjunctiva/sclera: Conjunctivae normal.   Cardiovascular:      Rate and Rhythm: Normal rate.   Pulmonary:      Effort: Pulmonary effort is normal.   Abdominal:      General: Abdomen is flat.   Neurological:      General: No focal deficit present.      Mental Status: She is alert.   Psychiatric:         Mood and Affect: Mood normal.         Results Review:   I reviewed the patient's new clinical results.      Assessment/Plan       * No active hospital problems. *      Assessment:  (Personal history of colon polyps ).     Plan:   (Colonoscopy risks, alternatives and benefits discussed with patient and the patient is agreeable to having procedure done.).       I discussed the patients findings and my recommendations with patient and nursing staff    Darryl Garcia MD  01/26/21  10:33 EST

## 2021-01-27 LAB
CYTO UR: NORMAL
LAB AP CASE REPORT: NORMAL
LAB AP DIAGNOSIS COMMENT: NORMAL
PATH REPORT.FINAL DX SPEC: NORMAL
PATH REPORT.GROSS SPEC: NORMAL

## 2021-01-29 ENCOUNTER — TELEPHONE (OUTPATIENT)
Dept: INTERNAL MEDICINE | Facility: CLINIC | Age: 66
End: 2021-01-29

## 2021-01-29 ENCOUNTER — OFFICE (OUTPATIENT)
Dept: URBAN - METROPOLITAN AREA CLINIC 65 | Facility: CLINIC | Age: 66
End: 2021-01-29
Payer: MEDICARE

## 2021-01-29 VITALS
HEART RATE: 63 BPM | HEIGHT: 58 IN | SYSTOLIC BLOOD PRESSURE: 140 MMHG | WEIGHT: 144 LBS | DIASTOLIC BLOOD PRESSURE: 88 MMHG | TEMPERATURE: 98 F

## 2021-01-29 DIAGNOSIS — C18.9 COLON ADENOCARCINOMA (HCC): Primary | ICD-10-CM

## 2021-01-29 DIAGNOSIS — Z86.010 PERSONAL HISTORY OF COLONIC POLYPS: ICD-10-CM

## 2021-01-29 DIAGNOSIS — C18.9 MALIGNANT NEOPLASM OF COLON, UNSPECIFIED: ICD-10-CM

## 2021-01-29 PROCEDURE — 99213 OFFICE O/P EST LOW 20 MIN: CPT | Performed by: INTERNAL MEDICINE

## 2021-01-29 NOTE — TELEPHONE ENCOUNTER
PATIENT REQUESTING A LIST OF  SURGEONS THAT YOU WOULD RECOMMEND FOR HER RESECTION OF HER COLON DUE TO CANCER.  PLEASE ADVISE    PATIENT CALL BACK #: [phone]

## 2021-02-04 ENCOUNTER — OFFICE VISIT (OUTPATIENT)
Dept: SURGERY | Facility: CLINIC | Age: 66
End: 2021-02-04

## 2021-02-04 VITALS — WEIGHT: 150.8 LBS | BODY MASS INDEX: 31.65 KG/M2 | HEIGHT: 58 IN

## 2021-02-04 DIAGNOSIS — C18.6 MALIGNANT NEOPLASM OF DESCENDING COLON (HCC): Primary | ICD-10-CM

## 2021-02-04 PROCEDURE — 99204 OFFICE O/P NEW MOD 45 MIN: CPT | Performed by: SURGERY

## 2021-02-04 RX ORDER — SODIUM CHLORIDE 0.9 % (FLUSH) 0.9 %
10 SYRINGE (ML) INJECTION AS NEEDED
Status: CANCELLED | OUTPATIENT
Start: 2021-03-02

## 2021-02-04 RX ORDER — ALVIMOPAN 12 MG/1
12 CAPSULE ORAL ONCE
Status: CANCELLED | OUTPATIENT
Start: 2021-03-02 | End: 2021-02-11

## 2021-02-04 RX ORDER — SODIUM CHLORIDE 0.9 % (FLUSH) 0.9 %
3 SYRINGE (ML) INJECTION EVERY 12 HOURS SCHEDULED
Status: CANCELLED | OUTPATIENT
Start: 2021-03-02

## 2021-02-04 NOTE — PROGRESS NOTES
Cc: Colon cancer    History of presenting illness:   This is a nice, 65-year-old female with a history of colon polyps who last had a colonoscopy around 5 years ago who more recently underwent repeat surveillance colonoscopy done by Dr. Garcia with finding of invasive adenocarcinoma felt to be in the descending colon.  The patient has been asymptomatic.  She denies any weight loss, change in appetite or change in bowel habits.  No rectal bleeding.  No abdominal pain.  Patient relates that her mother had colon cancer diagnosed in her 80s, although it seems that that was not the cause of her death.    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  The precise nature of this is unclear, but she says that she has been told as a result there is some narrowing of the trachea.  This has not resulted in any breathing troubles.  She has undergone a hysterectomy as well as what sounds like bladder suspension.  She has had right knee surgery and a recent right breast biopsy (benign).  She is also had orthopedic procedures on her foot, ankle and hand.  She denies any other abdominal surgery.    Medications: Xanax, Norvasc, Lipitor, Citracal, docusate, levothyroxine, Prilosec, Vesicare, Valtrex    Allergies: Codeine causes a headache, morphine caused a rash long ago (she has taken oxycodone without trouble) penicillin caused a rash (patient has taken Keflex and other cephalosporins without trouble)    Social History: Patient is a non-smoker, continues to work, recently     Family History: Colon cancer in her mother (diagnosed in her 80s), heart failure in her mother, pancreatic cancer in her father    Review of Systems:  Constitutional: Negative for fever, chills, change in weight or appetite  HENT: no hearing change, no runny  nose  Eyes: no visual changes or icterus  Neck: no swollen glands or dysphagia or odynophagia  Respiratory: negative for SOB, cough, hemoptysis or wheezing  Cardiovascular: negative for chest pain, palpitations or peripheral edema  Gastrointestinal: Negative for nausea, vomiting, diarrhea, positive for occasional constipation  Musculoskeletal: negative for myalgias or joint pain  Skin: patient denies icterus or rash  Hematologic: negative for easy bleeding or bruising      Physical Exam:   Heart rate 64 weight 150 pounds height 58 inches body mass index 31.5  General: alert and oriented, appropriate, no acute distress  HENT:  Head is normocephalic, atraumatic, mucous membranes are moist  Eyes: Extraocular movements are intact, no scleral icterus  Neck: Supple without lymphadenopathy or thyromegaly, trachea is in the midline  Respiratory: Lungs are clear bilaterally without wheezing, no use of accessory muscles is noted  Cardiovascular: Regular rate and rhythm without murmur, no peripheral edema  Gastrointestinal: Soft and benign, no hernia felt, no significant scars, no tenderness, no mass, liver not appreciated  Musculoskeletal: Muscle bulk and strength is normal and equal bilaterally.  No gross deformity.  Skin: No rash or icterus noted    Laboratory data: Pathology from recent colonoscopy reviewed.  There was a benign tubular adenoma in the cecum, completely removed.  At around 50 cm and what was estimated to be the descending colon there was an invasive moderately differentiated adenocarcinoma identified.    Colonoscopy images are reviewed by me.  The lesion in question is sessile and measured at about 22 mm.  The report states that it was inked with 3 cc of Puja ink tattoo.    Imaging data: None relevant      Assessment and plan:   -Invasive adenocarcinoma of the descending colon  -History of airway injury as a child with possible resultant tracheal stenosis, asymptomatic, but possibly affecting administration  of general anesthesia, will discuss with anesthesia  -Options discussed with patient.  I discussed the nature of staging and have suggested that we obtain CT of the abdomen and pelvis and have also ordered a CEA level.  Assuming that there is no evidence for widely metastatic disease, I suggested that we proceed with a minimally invasive, da Farhat robot assisted left colectomy.  Complications associated with the procedure were discussed with the patient and noted to include, but not be limited to, bleeding, infection, injury to intra-abdominal structures, anastomotic leak, injury to the bladder, ureter, associated bowel structures, hernia, possible need for diverting ostomy or further revisional surgery.  Patient is agreeable to proceed as outlined.  I did explain to the patient that if there was evidence of metastatic disease, in that scenario typically surgery would be delayed indefinitely.      Martin Calvo MD, FACS  General, Minimally Invasive and Endoscopic Surgery  [facility]    [address], [address]  [place], [place], [address]  P: [phone]  F: [phone]

## 2021-02-17 ENCOUNTER — TRANSCRIBE ORDERS (OUTPATIENT)
Dept: PREADMISSION TESTING | Facility: HOSPITAL | Age: 66
End: 2021-02-17

## 2021-02-17 DIAGNOSIS — Z01.818 OTHER SPECIFIED PRE-OPERATIVE EXAMINATION: Primary | ICD-10-CM

## 2021-02-22 ENCOUNTER — PRE-ADMISSION TESTING (OUTPATIENT)
Dept: PREADMISSION TESTING | Facility: HOSPITAL | Age: 66
End: 2021-02-22

## 2021-02-22 ENCOUNTER — HOSPITAL ENCOUNTER (OUTPATIENT)
Dept: CT IMAGING | Facility: HOSPITAL | Age: 66
Discharge: HOME | End: 2021-02-22

## 2021-02-22 VITALS
BODY MASS INDEX: 31.07 KG/M2 | OXYGEN SATURATION: 99 % | HEIGHT: 58 IN | WEIGHT: 148 LBS | SYSTOLIC BLOOD PRESSURE: 161 MMHG | TEMPERATURE: 98.4 F | RESPIRATION RATE: 16 BRPM | DIASTOLIC BLOOD PRESSURE: 75 MMHG | HEART RATE: 56 BPM

## 2021-02-22 DIAGNOSIS — C18.6 MALIGNANT NEOPLASM OF DESCENDING COLON (HCC): ICD-10-CM

## 2021-02-22 LAB
ANION GAP SERPL CALCULATED.3IONS-SCNC: 9.1 MMOL/L (ref 5–15)
BASOPHILS # BLD AUTO: 0.02 10*3/MM3 (ref 0–0.2)
BASOPHILS NFR BLD AUTO: 0.5 % (ref 0–1.5)
BUN SERPL-MCNC: 19 MG/DL (ref 8–23)
BUN/CREAT SERPL: 25 (ref 7–25)
CALCIUM SPEC-SCNC: 9.1 MG/DL (ref 8.6–10.5)
CEA SERPL-MCNC: 5.86 NG/ML
CHLORIDE SERPL-SCNC: 104 MMOL/L (ref 98–107)
CO2 SERPL-SCNC: 25.9 MMOL/L (ref 22–29)
CREAT SERPL-MCNC: 0.76 MG/DL (ref 0.57–1)
DEPRECATED RDW RBC AUTO: 39.9 FL (ref 37–54)
EOSINOPHIL # BLD AUTO: 0.08 10*3/MM3 (ref 0–0.4)
EOSINOPHIL NFR BLD AUTO: 1.8 % (ref 0.3–6.2)
ERYTHROCYTE [DISTWIDTH] IN BLOOD BY AUTOMATED COUNT: 12.8 % (ref 12.3–15.4)
GFR SERPL CREATININE-BSD FRML MDRD: 76 ML/MIN/1.73
GLUCOSE SERPL-MCNC: 90 MG/DL (ref 65–99)
HCT VFR BLD AUTO: 37.5 % (ref 34–46.6)
HGB BLD-MCNC: 12.5 G/DL (ref 12–15.9)
IMM GRANULOCYTES # BLD AUTO: 0 10*3/MM3 (ref 0–0.05)
IMM GRANULOCYTES NFR BLD AUTO: 0 % (ref 0–0.5)
LYMPHOCYTES # BLD AUTO: 1.33 10*3/MM3 (ref 0.7–3.1)
LYMPHOCYTES NFR BLD AUTO: 30.6 % (ref 19.6–45.3)
MCH RBC QN AUTO: 28.8 PG (ref 26.6–33)
MCHC RBC AUTO-ENTMCNC: 33.3 G/DL (ref 31.5–35.7)
MCV RBC AUTO: 86.4 FL (ref 79–97)
MONOCYTES # BLD AUTO: 0.35 10*3/MM3 (ref 0.1–0.9)
MONOCYTES NFR BLD AUTO: 8.1 % (ref 5–12)
NEUTROPHILS NFR BLD AUTO: 2.56 10*3/MM3 (ref 1.7–7)
NEUTROPHILS NFR BLD AUTO: 59 % (ref 42.7–76)
NRBC BLD AUTO-RTO: 0 /100 WBC (ref 0–0.2)
PLATELET # BLD AUTO: 237 10*3/MM3 (ref 140–450)
PMV BLD AUTO: 8.8 FL (ref 6–12)
POTASSIUM SERPL-SCNC: 4.2 MMOL/L (ref 3.5–5.2)
QT INTERVAL: 436 MS
RBC # BLD AUTO: 4.34 10*6/MM3 (ref 3.77–5.28)
SODIUM SERPL-SCNC: 139 MMOL/L (ref 136–145)
WBC # BLD AUTO: 4.34 10*3/MM3 (ref 3.4–10.8)

## 2021-02-22 PROCEDURE — 36415 COLL VENOUS BLD VENIPUNCTURE: CPT

## 2021-02-22 PROCEDURE — 85025 COMPLETE CBC W/AUTO DIFF WBC: CPT

## 2021-02-22 PROCEDURE — 82378 CARCINOEMBRYONIC ANTIGEN: CPT

## 2021-02-22 PROCEDURE — 74177 CT ABD & PELVIS W/CONTRAST: CPT

## 2021-02-22 PROCEDURE — 25010000002 IOPAMIDOL 61 % SOLUTION: Performed by: SURGERY

## 2021-02-22 PROCEDURE — 82565 ASSAY OF CREATININE: CPT

## 2021-02-22 PROCEDURE — 93010 ELECTROCARDIOGRAM REPORT: CPT | Performed by: INTERNAL MEDICINE

## 2021-02-22 PROCEDURE — 80048 BASIC METABOLIC PNL TOTAL CA: CPT

## 2021-02-22 RX ADMIN — IOPAMIDOL 85 ML: 612 INJECTION, SOLUTION INTRAVENOUS at 11:52

## 2021-02-22 NOTE — DISCHARGE INSTRUCTIONS
Take the following medications the morning of surgery: LEVOTHYROXINE, AMLODIPINE, OMEPRAZOLE, XANAX AS NEEDED.      If you are on prescription narcotic pain medication to control your pain you may also take that medication the morning of surgery.    General Instructions:  • Do not eat solid food after midnight the night before surgery.  • You may drink clear liquids day of surgery but must stop at least one hour before your hospital arrival time.  • It is beneficial for you to have a clear drink that contains carbohydrates the day of surgery.  We suggest a 12 to 20 ounce bottle of Gatorade or Powerade for non-diabetic patients or a 12 to 20 ounce bottle of G2 or Powerade Zero for diabetic patients.     Clear liquids are liquids you can see through.  Nothing red in color.     Plain water                               Sports drinks  Sodas                                   Gelatin (Jell-O)  Fruit juices without pulp such as white grape juice and apple juice  Popsicles that contain no fruit or yogurt  Tea or coffee (no cream or milk added)  Gatorade / Powerade  G2 / Powerade Zero    • Bring any papers given to you in the doctor’s office.  • Wear clean comfortable clothes.  • Do not wear contact lenses, false eyelashes or make-up.  Bring a case for your glasses.   • Remove all piercings.  Leave jewelry and any other valuables at home.  • The Pre-Admission Testing nurse will instruct you to bring medications if unable to obtain an accurate list in Pre-Admission Testing.            Preventing a Surgical Site Infection:  • For 2 to 3 days before surgery, avoid shaving with a razor because the razor can irritate skin and make it easier to develop an infection.    • Any areas of open skin can increase the risk of a post-operative wound infection by allowing bacteria to enter and travel throughout the body.  Notify your surgeon if you have any skin wounds / rashes even if it is not near the expected surgical site.  The area  will need assessed to determine if surgery should be delayed until it is healed.  • The night prior to surgery shower using a fresh bar of anti-bacterial soap (such as Dial) and clean washcloth.  Sleep in a clean bed with clean clothing.  Do not allow pets to sleep with you.  • Shower on the morning of surgery using a fresh bar of anti-bacterial soap (such as Dial) and clean washcloth.  Dry with a clean towel and dress in clean clothing.  • Ask your surgeon if you will be receiving antibiotics prior to surgery.  • Make sure you, your family, and all healthcare providers clean their hands with soap and water or an alcohol based hand  before caring for you or your wound.    Day of surgery: 3/2/2021. MAIN OR. ARRIVAL TIME 6AM  Your arrival time is approximately two hours before your scheduled surgery time.  Upon arrival, a Pre-op nurse and Anesthesiologist will review your health history, obtain vital signs, and answer questions you may have.  The only belongings needed at this time will be a list of your home medications and if applicable your C-PAP/BI-PAP machine.  A Pre-op nurse will start an IV and you may receive medication in preparation for surgery, including something to help you relax.     Please be aware that surgery does come with discomfort.  We want to make every effort to control your discomfort so please discuss any uncontrolled symptoms with your nurse.   Your doctor will most likely have prescribed pain medications.          If you are staying overnight following surgery, you will be transported to your hospital room following the recovery period.  [facility] has all private rooms.    If you have any questions please call Pre-Admission Testing at [phone].  Deductibles and co-payments are collected on the day of service. Please be prepared to pay the required co-pay, deductible or deposit on the day of service as defined by your plan.    CHLORHEXIDINE CLOTH  INSTRUCTIONS  The morning of surgery follow these instructions using the Chlorhexidine cloths you've been given.  These steps reduce bacteria on the body.  Do not use the cloths near your eyes, ears mouth, genitalia or on open wounds.  Throw the cloths away after use but do not try to flush them down a toilet.      • Open and remove one cloth at a time from the package.    • Leave the cloth unfolded and begin the bathing.  • Massage the skin with the cloths using gentle pressure to remove bacteria.  Do not scrub harshly.   • Follow the steps below with one 2% CHG cloth per area (6 total cloths).  • One cloth for neck, shoulders and chest.  • One cloth for both arms, hands, fingers and underarms (do underarms last).  • One cloth for the abdomen followed by groin.  • One cloth for right leg and foot including between the toes.  • One cloth for left leg and foot including between the toes.  • The last cloth is to be used for the back of the neck, back and buttocks.    Allow the CHG to air dry 3 minutes on the skin which will give it time to work and decrease the chance of irritation.  The skin may feel sticky until it is dry.  Do not rinse with water or any other liquid or you will lose the beneficial effects of the CHG.  If mild skin irritation occurs, do rinse the skin to remove the CHG.  Report this to the nurse at time of admission.  Do not apply lotions, creams, ointments, deodorants or perfumes after using the clothes. Dress in clean clothes before coming to the hospital.    Patient Education for Self-Quarantine Process    Following your COVID testing, we strongly recommend that you do not leave your home after you have been tested for COVID except to get medical care. This includes not going to work, school or to public areas.  If this is not possible for you to do please limit your activities to only required outings.  Be sure to wear a mask when you are with other people, practice social distancing and wash  your hands frequently.      The following items provide additional details to keep you safe.  • Wash your hands with soap and water frequently for at least 20 seconds.   • Avoid touching your eyes, nose and mouth with unwashed hands.  • Do not share anything - utensils, towels, food from the same bowl.   • Have your own utensils, drinking glass, dishes, towels and bedding.   • Do not have visitors.   • Do use FaceTime to stay in touch with family and friends.  • You should stay in a specific room away from others if possible.   • Stay at least 6 feet away from others in the home if you cannot have a dedicated room to yourself.   • Do not snuggle with your pet. While the CDC says there is no evidence that pets can spread COVID-19 or be infected from humans, it is probably best to avoid “petting, snuggling, being kissed or licked and sharing food (during self-quarantine)”, according to the CDC.   • Sanitize household surfaces daily. Include all high touch areas (door handles, light switches, phones, countertops, etc.)  • Do not share a bathroom with others, if possible.   • Wear a mask around others in your home if you are unable to stay in a separate room or 6 feet apart. If  you are unable to wear a mask, have your family member wear a mask if they must be within 6 feet of you.   Call your surgeon immediately if you experience any of the following symptoms:  • Sore Throat  • Shortness of Breath or difficulty breathing  • Cough  • Chills  • Body soreness or muscle pain  • Headache  • Fever  • New loss of taste or smell  • Do not arrive for your surgery ill.  Your procedure will need to be rescheduled to another time.  You will need to call your physician before the day of surgery to avoid any unnecessary exposure to hospital staff as well as other patients.

## 2021-02-23 LAB — CREAT BLDA-MCNC: 0.8 MG/DL (ref 0.6–1.3)

## 2021-02-27 ENCOUNTER — LAB (OUTPATIENT)
Dept: LAB | Facility: HOSPITAL | Age: 66
End: 2021-02-27

## 2021-02-27 DIAGNOSIS — Z01.818 OTHER SPECIFIED PRE-OPERATIVE EXAMINATION: ICD-10-CM

## 2021-02-27 PROCEDURE — U0005 INFEC AGEN DETEC AMPLI PROBE: HCPCS

## 2021-02-27 PROCEDURE — U0004 COV-19 TEST NON-CDC HGH THRU: HCPCS

## 2021-02-27 PROCEDURE — C9803 HOPD COVID-19 SPEC COLLECT: HCPCS

## 2021-03-01 LAB — SARS-COV-2 RNA RESP QL NAA+PROBE: NOT DETECTED

## 2021-03-02 ENCOUNTER — HOSPITAL ENCOUNTER (INPATIENT)
Facility: HOSPITAL | Age: 66
LOS: 2 days | Discharge: HOME | End: 2021-03-04
Attending: SURGERY | Admitting: SURGERY

## 2021-03-02 ENCOUNTER — ANESTHESIA EVENT (OUTPATIENT)
Dept: PERIOP | Facility: HOSPITAL | Age: 66
End: 2021-03-02

## 2021-03-02 ENCOUNTER — ANESTHESIA (OUTPATIENT)
Dept: PERIOP | Facility: HOSPITAL | Age: 66
End: 2021-03-02

## 2021-03-02 DIAGNOSIS — C18.6 MALIGNANT NEOPLASM OF DESCENDING COLON (HCC): ICD-10-CM

## 2021-03-02 PROCEDURE — 25010000002 NEOSTIGMINE PER 0.5 MG: Performed by: STUDENT IN AN ORGANIZED HEALTH CARE EDUCATION/TRAINING PROGRAM

## 2021-03-02 PROCEDURE — 25010000002 FENTANYL CITRATE (PF) 100 MCG/2ML SOLUTION: Performed by: NURSE ANESTHETIST, CERTIFIED REGISTERED

## 2021-03-02 PROCEDURE — 25010000002 KETOROLAC TROMETHAMINE PER 15 MG: Performed by: NURSE ANESTHETIST, CERTIFIED REGISTERED

## 2021-03-02 PROCEDURE — 25010000002 PROPOFOL 10 MG/ML EMULSION: Performed by: NURSE ANESTHETIST, CERTIFIED REGISTERED

## 2021-03-02 PROCEDURE — 25010000002 ONDANSETRON PER 1 MG: Performed by: NURSE ANESTHETIST, CERTIFIED REGISTERED

## 2021-03-02 PROCEDURE — 25010000002 LIDOCAINE PER 10 MG: Performed by: NURSE ANESTHETIST, CERTIFIED REGISTERED

## 2021-03-02 PROCEDURE — 25010000002 CEFOXITIN PER 1 G: Performed by: SURGERY

## 2021-03-02 PROCEDURE — 25010000002 SUCCINYLCHOLINE PER 20 MG: Performed by: NURSE ANESTHETIST, CERTIFIED REGISTERED

## 2021-03-02 PROCEDURE — 88309 TISSUE EXAM BY PATHOLOGIST: CPT | Performed by: SURGERY

## 2021-03-02 PROCEDURE — 25010000002 DEXAMETHASONE PER 1 MG: Performed by: NURSE ANESTHETIST, CERTIFIED REGISTERED

## 2021-03-02 PROCEDURE — 25010000002 MIDAZOLAM PER 1 MG: Performed by: ANESTHESIOLOGY

## 2021-03-02 PROCEDURE — 0DBL4ZZ EXCISION OF TRANSVERSE COLON, PERCUTANEOUS ENDOSCOPIC APPROACH: ICD-10-PCS | Performed by: SURGERY

## 2021-03-02 PROCEDURE — 44204 LAPARO PARTIAL COLECTOMY: CPT | Performed by: SPECIALIST/TECHNOLOGIST, OTHER

## 2021-03-02 PROCEDURE — 8E0W4CZ ROBOTIC ASSISTED PROCEDURE OF TRUNK REGION, PERCUTANEOUS ENDOSCOPIC APPROACH: ICD-10-PCS | Performed by: SURGERY

## 2021-03-02 PROCEDURE — 25010000002 HYDROMORPHONE PER 4 MG: Performed by: NURSE ANESTHETIST, CERTIFIED REGISTERED

## 2021-03-02 PROCEDURE — C1889 IMPLANT/INSERT DEVICE, NOC: HCPCS | Performed by: SURGERY

## 2021-03-02 PROCEDURE — 25010000002 PHENYLEPHRINE PER 1 ML: Performed by: NURSE ANESTHETIST, CERTIFIED REGISTERED

## 2021-03-02 PROCEDURE — 25010000002 MAGNESIUM SULFATE PER 500 MG OF MAGNESIUM: Performed by: NURSE ANESTHETIST, CERTIFIED REGISTERED

## 2021-03-02 PROCEDURE — 25010000002 DIPHENHYDRAMINE PER 50 MG: Performed by: NURSE ANESTHETIST, CERTIFIED REGISTERED

## 2021-03-02 PROCEDURE — 44204 LAPARO PARTIAL COLECTOMY: CPT | Performed by: SURGERY

## 2021-03-02 DEVICE — KNOTLESS TISSUE CONTROL DEVICE, VIOLET UNIDIRECTIONAL (ANTIBACTERIAL) SYNTHETIC ABSORBABLE DEVICE
Type: IMPLANTABLE DEVICE | Site: ABDOMEN | Status: FUNCTIONAL
Brand: STRATAFIX

## 2021-03-02 DEVICE — STAPLER 60 RELOAD BLUE
Type: IMPLANTABLE DEVICE | Site: ABDOMEN | Status: FUNCTIONAL
Brand: SUREFORM

## 2021-03-02 RX ORDER — LIDOCAINE HYDROCHLORIDE 10 MG/ML
0.5 INJECTION, SOLUTION EPIDURAL; INFILTRATION; INTRACAUDAL; PERINEURAL ONCE AS NEEDED
Status: DISCONTINUED | OUTPATIENT
Start: 2021-03-02 | End: 2021-03-02 | Stop reason: HOSPADM

## 2021-03-02 RX ORDER — ALVIMOPAN 12 MG/1
12 CAPSULE ORAL 2 TIMES DAILY
Status: DISCONTINUED | OUTPATIENT
Start: 2021-03-03 | End: 2021-03-03

## 2021-03-02 RX ORDER — LIDOCAINE HYDROCHLORIDE 20 MG/ML
INJECTION, SOLUTION INFILTRATION; PERINEURAL AS NEEDED
Status: DISCONTINUED | OUTPATIENT
Start: 2021-03-02 | End: 2021-03-02 | Stop reason: SURG

## 2021-03-02 RX ORDER — SODIUM CHLORIDE 0.9 % (FLUSH) 0.9 %
3 SYRINGE (ML) INJECTION EVERY 12 HOURS SCHEDULED
Status: DISCONTINUED | OUTPATIENT
Start: 2021-03-02 | End: 2021-03-02 | Stop reason: HOSPADM

## 2021-03-02 RX ORDER — OXYBUTYNIN CHLORIDE 5 MG/1
5 TABLET, EXTENDED RELEASE ORAL DAILY
Status: DISCONTINUED | OUTPATIENT
Start: 2021-03-02 | End: 2021-03-04 | Stop reason: HOSPADM

## 2021-03-02 RX ORDER — ONDANSETRON 2 MG/ML
4 INJECTION INTRAMUSCULAR; INTRAVENOUS ONCE AS NEEDED
Status: DISCONTINUED | OUTPATIENT
Start: 2021-03-02 | End: 2021-03-02 | Stop reason: HOSPADM

## 2021-03-02 RX ORDER — GABAPENTIN 100 MG/1
200 CAPSULE ORAL EVERY 8 HOURS SCHEDULED
Status: DISCONTINUED | OUTPATIENT
Start: 2021-03-02 | End: 2021-03-04 | Stop reason: HOSPADM

## 2021-03-02 RX ORDER — NALOXONE HCL 0.4 MG/ML
0.2 VIAL (ML) INJECTION AS NEEDED
Status: DISCONTINUED | OUTPATIENT
Start: 2021-03-02 | End: 2021-03-02 | Stop reason: HOSPADM

## 2021-03-02 RX ORDER — AMLODIPINE BESYLATE 5 MG/1
5 TABLET ORAL DAILY
Status: DISCONTINUED | OUTPATIENT
Start: 2021-03-03 | End: 2021-03-04 | Stop reason: HOSPADM

## 2021-03-02 RX ORDER — ONDANSETRON 2 MG/ML
INJECTION INTRAMUSCULAR; INTRAVENOUS AS NEEDED
Status: DISCONTINUED | OUTPATIENT
Start: 2021-03-02 | End: 2021-03-02 | Stop reason: SURG

## 2021-03-02 RX ORDER — PROPOFOL 10 MG/ML
VIAL (ML) INTRAVENOUS AS NEEDED
Status: DISCONTINUED | OUTPATIENT
Start: 2021-03-02 | End: 2021-03-02 | Stop reason: SURG

## 2021-03-02 RX ORDER — ALPRAZOLAM 0.25 MG/1
0.25 TABLET ORAL 3 TIMES DAILY PRN
Status: DISCONTINUED | OUTPATIENT
Start: 2021-03-02 | End: 2021-03-04 | Stop reason: HOSPADM

## 2021-03-02 RX ORDER — LIDOCAINE HYDROCHLORIDE ANHYDROUS AND DEXTROSE MONOHYDRATE 5; 400 G/100ML; MG/100ML
INJECTION, SOLUTION INTRAVENOUS CONTINUOUS PRN
Status: DISCONTINUED | OUTPATIENT
Start: 2021-03-02 | End: 2021-03-02 | Stop reason: SURG

## 2021-03-02 RX ORDER — ALVIMOPAN 12 MG/1
12 CAPSULE ORAL ONCE
Status: COMPLETED | OUTPATIENT
Start: 2021-03-02 | End: 2021-03-02

## 2021-03-02 RX ORDER — LEVOTHYROXINE SODIUM 0.1 MG/1
100 TABLET ORAL DAILY
Status: DISCONTINUED | OUTPATIENT
Start: 2021-03-03 | End: 2021-03-04 | Stop reason: HOSPADM

## 2021-03-02 RX ORDER — GLYCOPYRROLATE 0.2 MG/ML
INJECTION INTRAMUSCULAR; INTRAVENOUS AS NEEDED
Status: DISCONTINUED | OUTPATIENT
Start: 2021-03-02 | End: 2021-03-02 | Stop reason: SURG

## 2021-03-02 RX ORDER — DIPHENHYDRAMINE HYDROCHLORIDE 50 MG/ML
12.5 INJECTION INTRAMUSCULAR; INTRAVENOUS
Status: DISCONTINUED | OUTPATIENT
Start: 2021-03-02 | End: 2021-03-02 | Stop reason: HOSPADM

## 2021-03-02 RX ORDER — CELECOXIB 200 MG/1
200 CAPSULE ORAL EVERY 12 HOURS SCHEDULED
Status: DISCONTINUED | OUTPATIENT
Start: 2021-03-02 | End: 2021-03-04 | Stop reason: HOSPADM

## 2021-03-02 RX ORDER — LABETALOL HYDROCHLORIDE 5 MG/ML
5 INJECTION, SOLUTION INTRAVENOUS
Status: DISCONTINUED | OUTPATIENT
Start: 2021-03-02 | End: 2021-03-02 | Stop reason: HOSPADM

## 2021-03-02 RX ORDER — PROMETHAZINE HYDROCHLORIDE 25 MG/1
25 TABLET ORAL ONCE AS NEEDED
Status: DISCONTINUED | OUTPATIENT
Start: 2021-03-02 | End: 2021-03-02 | Stop reason: HOSPADM

## 2021-03-02 RX ORDER — FENTANYL CITRATE 50 UG/ML
25 INJECTION, SOLUTION INTRAMUSCULAR; INTRAVENOUS
Status: DISCONTINUED | OUTPATIENT
Start: 2021-03-02 | End: 2021-03-02 | Stop reason: HOSPADM

## 2021-03-02 RX ORDER — DEXAMETHASONE SODIUM PHOSPHATE 10 MG/ML
INJECTION INTRAMUSCULAR; INTRAVENOUS AS NEEDED
Status: DISCONTINUED | OUTPATIENT
Start: 2021-03-02 | End: 2021-03-02 | Stop reason: SURG

## 2021-03-02 RX ORDER — ROCURONIUM BROMIDE 10 MG/ML
INJECTION, SOLUTION INTRAVENOUS AS NEEDED
Status: DISCONTINUED | OUTPATIENT
Start: 2021-03-02 | End: 2021-03-02 | Stop reason: SURG

## 2021-03-02 RX ORDER — DIPHENHYDRAMINE HCL 25 MG
25 CAPSULE ORAL
Status: DISCONTINUED | OUTPATIENT
Start: 2021-03-02 | End: 2021-03-02 | Stop reason: HOSPADM

## 2021-03-02 RX ORDER — FLUMAZENIL 0.1 MG/ML
0.2 INJECTION INTRAVENOUS AS NEEDED
Status: DISCONTINUED | OUTPATIENT
Start: 2021-03-02 | End: 2021-03-02 | Stop reason: HOSPADM

## 2021-03-02 RX ORDER — HYDRALAZINE HYDROCHLORIDE 20 MG/ML
5 INJECTION INTRAMUSCULAR; INTRAVENOUS
Status: DISCONTINUED | OUTPATIENT
Start: 2021-03-02 | End: 2021-03-02 | Stop reason: HOSPADM

## 2021-03-02 RX ORDER — ONDANSETRON 2 MG/ML
4 INJECTION INTRAMUSCULAR; INTRAVENOUS EVERY 6 HOURS PRN
Status: DISCONTINUED | OUTPATIENT
Start: 2021-03-02 | End: 2021-03-04 | Stop reason: HOSPADM

## 2021-03-02 RX ORDER — MIDAZOLAM HYDROCHLORIDE 1 MG/ML
0.5 INJECTION INTRAMUSCULAR; INTRAVENOUS
Status: DISCONTINUED | OUTPATIENT
Start: 2021-03-02 | End: 2021-03-02 | Stop reason: HOSPADM

## 2021-03-02 RX ORDER — FENTANYL CITRATE 50 UG/ML
INJECTION, SOLUTION INTRAMUSCULAR; INTRAVENOUS AS NEEDED
Status: DISCONTINUED | OUTPATIENT
Start: 2021-03-02 | End: 2021-03-02 | Stop reason: SURG

## 2021-03-02 RX ORDER — SODIUM CHLORIDE 9 MG/ML
INJECTION, SOLUTION INTRAVENOUS AS NEEDED
Status: DISCONTINUED | OUTPATIENT
Start: 2021-03-02 | End: 2021-03-02 | Stop reason: HOSPADM

## 2021-03-02 RX ORDER — DIPHENHYDRAMINE HYDROCHLORIDE 50 MG/ML
INJECTION INTRAMUSCULAR; INTRAVENOUS AS NEEDED
Status: DISCONTINUED | OUTPATIENT
Start: 2021-03-02 | End: 2021-03-02 | Stop reason: SURG

## 2021-03-02 RX ORDER — SODIUM CHLORIDE, SODIUM LACTATE, POTASSIUM CHLORIDE, CALCIUM CHLORIDE 600; 310; 30; 20 MG/100ML; MG/100ML; MG/100ML; MG/100ML
9 INJECTION, SOLUTION INTRAVENOUS CONTINUOUS
Status: DISCONTINUED | OUTPATIENT
Start: 2021-03-02 | End: 2021-03-02

## 2021-03-02 RX ORDER — HYDROMORPHONE HYDROCHLORIDE 1 MG/ML
0.25 INJECTION, SOLUTION INTRAMUSCULAR; INTRAVENOUS; SUBCUTANEOUS
Status: DISCONTINUED | OUTPATIENT
Start: 2021-03-02 | End: 2021-03-02 | Stop reason: HOSPADM

## 2021-03-02 RX ORDER — PANTOPRAZOLE SODIUM 40 MG/1
40 TABLET, DELAYED RELEASE ORAL EVERY MORNING
Status: DISCONTINUED | OUTPATIENT
Start: 2021-03-03 | End: 2021-03-04 | Stop reason: HOSPADM

## 2021-03-02 RX ORDER — ONDANSETRON 4 MG/1
4 TABLET, FILM COATED ORAL EVERY 6 HOURS PRN
Status: DISCONTINUED | OUTPATIENT
Start: 2021-03-02 | End: 2021-03-04 | Stop reason: HOSPADM

## 2021-03-02 RX ORDER — KETOROLAC TROMETHAMINE 30 MG/ML
INJECTION, SOLUTION INTRAMUSCULAR; INTRAVENOUS AS NEEDED
Status: DISCONTINUED | OUTPATIENT
Start: 2021-03-02 | End: 2021-03-02 | Stop reason: SURG

## 2021-03-02 RX ORDER — SUCCINYLCHOLINE CHLORIDE 20 MG/ML
INJECTION INTRAMUSCULAR; INTRAVENOUS AS NEEDED
Status: DISCONTINUED | OUTPATIENT
Start: 2021-03-02 | End: 2021-03-02 | Stop reason: SURG

## 2021-03-02 RX ORDER — OXYCODONE HYDROCHLORIDE AND ACETAMINOPHEN 5; 325 MG/1; MG/1
1 TABLET ORAL EVERY 4 HOURS PRN
Status: DISCONTINUED | OUTPATIENT
Start: 2021-03-02 | End: 2021-03-04 | Stop reason: HOSPADM

## 2021-03-02 RX ORDER — SODIUM CHLORIDE 0.9 % (FLUSH) 0.9 %
3-10 SYRINGE (ML) INJECTION AS NEEDED
Status: DISCONTINUED | OUTPATIENT
Start: 2021-03-02 | End: 2021-03-02 | Stop reason: HOSPADM

## 2021-03-02 RX ORDER — MAGNESIUM SULFATE HEPTAHYDRATE 500 MG/ML
INJECTION, SOLUTION INTRAMUSCULAR; INTRAVENOUS AS NEEDED
Status: DISCONTINUED | OUTPATIENT
Start: 2021-03-02 | End: 2021-03-02 | Stop reason: SURG

## 2021-03-02 RX ORDER — FAMOTIDINE 10 MG/ML
20 INJECTION, SOLUTION INTRAVENOUS ONCE
Status: COMPLETED | OUTPATIENT
Start: 2021-03-02 | End: 2021-03-02

## 2021-03-02 RX ORDER — ALBUTEROL SULFATE 2.5 MG/3ML
2.5 SOLUTION RESPIRATORY (INHALATION) ONCE AS NEEDED
Status: DISCONTINUED | OUTPATIENT
Start: 2021-03-02 | End: 2021-03-02 | Stop reason: HOSPADM

## 2021-03-02 RX ORDER — KETAMINE HYDROCHLORIDE 10 MG/ML
INJECTION INTRAMUSCULAR; INTRAVENOUS AS NEEDED
Status: DISCONTINUED | OUTPATIENT
Start: 2021-03-02 | End: 2021-03-02 | Stop reason: SURG

## 2021-03-02 RX ORDER — MIDAZOLAM HYDROCHLORIDE 1 MG/ML
1 INJECTION INTRAMUSCULAR; INTRAVENOUS
Status: DISCONTINUED | OUTPATIENT
Start: 2021-03-02 | End: 2021-03-02 | Stop reason: HOSPADM

## 2021-03-02 RX ORDER — SODIUM CHLORIDE 0.9 % (FLUSH) 0.9 %
10 SYRINGE (ML) INJECTION AS NEEDED
Status: DISCONTINUED | OUTPATIENT
Start: 2021-03-02 | End: 2021-03-02 | Stop reason: HOSPADM

## 2021-03-02 RX ORDER — BUPIVACAINE HYDROCHLORIDE AND EPINEPHRINE 2.5; 5 MG/ML; UG/ML
INJECTION, SOLUTION INFILTRATION; PERINEURAL AS NEEDED
Status: DISCONTINUED | OUTPATIENT
Start: 2021-03-02 | End: 2021-03-02 | Stop reason: HOSPADM

## 2021-03-02 RX ORDER — PROMETHAZINE HYDROCHLORIDE 25 MG/1
25 SUPPOSITORY RECTAL ONCE AS NEEDED
Status: DISCONTINUED | OUTPATIENT
Start: 2021-03-02 | End: 2021-03-02 | Stop reason: HOSPADM

## 2021-03-02 RX ORDER — ACETAMINOPHEN 500 MG
500 TABLET ORAL EVERY 6 HOURS
Status: DISCONTINUED | OUTPATIENT
Start: 2021-03-02 | End: 2021-03-04 | Stop reason: HOSPADM

## 2021-03-02 RX ORDER — INDOCYANINE GREEN AND WATER 25 MG
KIT INJECTION AS NEEDED
Status: DISCONTINUED | OUTPATIENT
Start: 2021-03-02 | End: 2021-03-02 | Stop reason: SURG

## 2021-03-02 RX ORDER — MAGNESIUM HYDROXIDE 1200 MG/15ML
LIQUID ORAL AS NEEDED
Status: DISCONTINUED | OUTPATIENT
Start: 2021-03-02 | End: 2021-03-02 | Stop reason: HOSPADM

## 2021-03-02 RX ORDER — EPHEDRINE SULFATE 50 MG/ML
5 INJECTION, SOLUTION INTRAVENOUS ONCE AS NEEDED
Status: DISCONTINUED | OUTPATIENT
Start: 2021-03-02 | End: 2021-03-02 | Stop reason: HOSPADM

## 2021-03-02 RX ADMIN — PHENYLEPHRINE HYDROCHLORIDE 100 MCG: 10 INJECTION INTRAVENOUS at 09:16

## 2021-03-02 RX ADMIN — GLYCOPYRROLATE 0.2 MG: 0.2 INJECTION INTRAMUSCULAR; INTRAVENOUS at 08:13

## 2021-03-02 RX ADMIN — KETAMINE HYDROCHLORIDE 10 MG: 10 INJECTION INTRAMUSCULAR; INTRAVENOUS at 09:30

## 2021-03-02 RX ADMIN — PROPOFOL 110 MG: 10 INJECTION, EMULSION INTRAVENOUS at 08:16

## 2021-03-02 RX ADMIN — INDOCYANINE GREEN AND WATER 7.5 MG: KIT at 10:23

## 2021-03-02 RX ADMIN — LIDOCAINE HYDROCHLORIDE 2 MG/MIN: 4 INJECTION, SOLUTION INTRAVENOUS at 08:46

## 2021-03-02 RX ADMIN — FENTANYL CITRATE 50 MCG: 50 INJECTION INTRAMUSCULAR; INTRAVENOUS at 10:03

## 2021-03-02 RX ADMIN — ALVIMOPAN 12 MG: 12 CAPSULE ORAL at 07:50

## 2021-03-02 RX ADMIN — SUCCINYLCHOLINE CHLORIDE 100 MG: 20 INJECTION, SOLUTION INTRAMUSCULAR; INTRAVENOUS; PARENTERAL at 08:16

## 2021-03-02 RX ADMIN — DIPHENHYDRAMINE HYDROCHLORIDE 25 MG: 50 INJECTION, SOLUTION INTRAMUSCULAR; INTRAVENOUS at 08:30

## 2021-03-02 RX ADMIN — NEOSTIGMINE METHYLSULFATE 2 MG: 1 INJECTION INTRAMUSCULAR; INTRAVENOUS; SUBCUTANEOUS at 11:33

## 2021-03-02 RX ADMIN — PHENYLEPHRINE HYDROCHLORIDE 100 MCG: 10 INJECTION INTRAVENOUS at 11:25

## 2021-03-02 RX ADMIN — GABAPENTIN 200 MG: 100 CAPSULE ORAL at 17:16

## 2021-03-02 RX ADMIN — ROCURONIUM BROMIDE 15 MG: 50 INJECTION INTRAVENOUS at 10:16

## 2021-03-02 RX ADMIN — CEFOXITIN SODIUM 2 G: 2 POWDER, FOR SOLUTION INTRAVENOUS at 17:18

## 2021-03-02 RX ADMIN — GLYCOPYRROLATE 0.4 MG: 0.2 INJECTION INTRAMUSCULAR; INTRAVENOUS at 11:33

## 2021-03-02 RX ADMIN — KETAMINE HYDROCHLORIDE 10 MG: 10 INJECTION INTRAMUSCULAR; INTRAVENOUS at 11:18

## 2021-03-02 RX ADMIN — LIDOCAINE HYDROCHLORIDE 80 MG: 20 INJECTION, SOLUTION INFILTRATION; PERINEURAL at 08:16

## 2021-03-02 RX ADMIN — FENTANYL CITRATE 25 MCG: 50 INJECTION, SOLUTION INTRAMUSCULAR; INTRAVENOUS at 12:43

## 2021-03-02 RX ADMIN — ROCURONIUM BROMIDE 5 MG: 50 INJECTION INTRAVENOUS at 08:16

## 2021-03-02 RX ADMIN — KETAMINE HYDROCHLORIDE 10 MG: 10 INJECTION INTRAMUSCULAR; INTRAVENOUS at 08:34

## 2021-03-02 RX ADMIN — FENTANYL CITRATE 50 MCG: 50 INJECTION INTRAMUSCULAR; INTRAVENOUS at 08:13

## 2021-03-02 RX ADMIN — ACETAMINOPHEN 500 MG: 500 TABLET, FILM COATED ORAL at 20:31

## 2021-03-02 RX ADMIN — CELECOXIB 200 MG: 200 CAPSULE ORAL at 20:31

## 2021-03-02 RX ADMIN — CEFOXITIN SODIUM 2 G: 2 POWDER, FOR SOLUTION INTRAVENOUS at 21:56

## 2021-03-02 RX ADMIN — KETAMINE HYDROCHLORIDE 10 MG: 10 INJECTION INTRAMUSCULAR; INTRAVENOUS at 10:36

## 2021-03-02 RX ADMIN — PHENYLEPHRINE HYDROCHLORIDE 100 MCG: 10 INJECTION INTRAVENOUS at 09:20

## 2021-03-02 RX ADMIN — CEFOXITIN SODIUM 2 G: 1 POWDER, FOR SOLUTION INTRAVENOUS at 07:58

## 2021-03-02 RX ADMIN — KETAMINE HYDROCHLORIDE 10 MG: 10 INJECTION INTRAMUSCULAR; INTRAVENOUS at 08:25

## 2021-03-02 RX ADMIN — MAGNESIUM SULFATE HEPTAHYDRATE 2 G: 500 INJECTION, SOLUTION INTRAMUSCULAR; INTRAVENOUS at 08:43

## 2021-03-02 RX ADMIN — SUGAMMADEX 200 MG: 100 INJECTION, SOLUTION INTRAVENOUS at 12:10

## 2021-03-02 RX ADMIN — FAMOTIDINE 20 MG: 10 INJECTION, SOLUTION INTRAVENOUS at 07:51

## 2021-03-02 RX ADMIN — HYDROMORPHONE HYDROCHLORIDE 0.25 MG: 1 INJECTION, SOLUTION INTRAMUSCULAR; INTRAVENOUS; SUBCUTANEOUS at 12:53

## 2021-03-02 RX ADMIN — GABAPENTIN 200 MG: 100 CAPSULE ORAL at 21:56

## 2021-03-02 RX ADMIN — DEXAMETHASONE SODIUM PHOSPHATE 6 MG: 10 INJECTION INTRAMUSCULAR; INTRAVENOUS at 08:40

## 2021-03-02 RX ADMIN — ONDANSETRON 4 MG: 2 INJECTION INTRAMUSCULAR; INTRAVENOUS at 11:32

## 2021-03-02 RX ADMIN — MIDAZOLAM 0.5 MG: 1 INJECTION INTRAMUSCULAR; INTRAVENOUS at 07:52

## 2021-03-02 RX ADMIN — SODIUM CHLORIDE, POTASSIUM CHLORIDE, SODIUM LACTATE AND CALCIUM CHLORIDE 9 ML/HR: 600; 310; 30; 20 INJECTION, SOLUTION INTRAVENOUS at 07:51

## 2021-03-02 RX ADMIN — KETOROLAC TROMETHAMINE 30 MG: 30 INJECTION, SOLUTION INTRAMUSCULAR at 11:29

## 2021-03-02 RX ADMIN — PHENYLEPHRINE HYDROCHLORIDE 100 MCG: 10 INJECTION INTRAVENOUS at 08:44

## 2021-03-02 RX ADMIN — ROCURONIUM BROMIDE 45 MG: 50 INJECTION INTRAVENOUS at 08:20

## 2021-03-02 RX ADMIN — OXYBUTYNIN CHLORIDE 5 MG: 5 TABLET, EXTENDED RELEASE ORAL at 17:16

## 2021-03-02 RX ADMIN — ACETAMINOPHEN 500 MG: 500 TABLET, FILM COATED ORAL at 17:16

## 2021-03-02 RX ADMIN — CEFOXITIN SODIUM 2 G: 1 POWDER, FOR SOLUTION INTRAVENOUS at 11:14

## 2021-03-02 NOTE — ANESTHESIA POSTPROCEDURE EVALUATION
Patient: Marie Foy    Procedure Summary     Date: 03/02/21 Room / Location: [facility] OR  / [facility] MAIN OR    Anesthesia Start: 0805 Anesthesia Stop: 1159    Procedure: LAPAROSCOPIC TRANSVERSE COLECTOMY WITH DAVINCI ROBOT (N/A Abdomen) Diagnosis:       Malignant neoplasm of descending colon (CMS/HCC)      (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])    Surgeon: Martin Calvo MD Provider: Daron Jiang MD    Anesthesia Type: general ASA Status: 3          Anesthesia Type: general    Vitals  Vitals Value Taken Time   /54 03/02/21 1415   Temp 36.4 °C (97.6 °F) 03/02/21 1415   Pulse 45 03/02/21 1427   Resp 16 03/02/21 1415   SpO2 98 % 03/02/21 1428   Vitals shown include unvalidated device data.        Post Anesthesia Care and Evaluation    Patient location during evaluation: bedside  Patient participation: complete - patient participated  Level of consciousness: awake and alert  Pain management: adequate  Airway patency: patent  Anesthetic complications: No anesthetic complications  PONV Status: controlled  Cardiovascular status: blood pressure returned to baseline and acceptable  Respiratory status: acceptable  Hydration status: acceptable

## 2021-03-02 NOTE — ANESTHESIA PROCEDURE NOTES
Airway  Urgency: elective    Date/Time: 3/2/2021 8:21 AM  Airway not difficult    General Information and Staff    Patient location during procedure: OR  Anesthesiologist: Daron Jiang MD  CRNA: David Fine CRNA    Indications and Patient Condition  Indications for airway management: airway protection    Preoxygenated: yes  MILS maintained throughout  Mask difficulty assessment: 1 - vent by mask    Final Airway Details  Final airway type: endotracheal airway      Successful airway: ETT  Cuffed: yes   Successful intubation technique: direct laryngoscopy  Facilitating devices/methods: anterior pressure/BURP and intubating stylet  Endotracheal tube insertion site: oral  Blade: John  Blade size: 3  ETT size (mm): 6.0  Cormack-Lehane Classification: grade IIa - partial view of glottis  Placement verified by: chest auscultation and capnometry   Inital cuff pressure (cm H2O): 22  Cuff volume (mL): 6  Measured from: lips  ETT/EBT  to lips (cm): 20  Number of attempts at approach: 2  Assessment: atraumatic intubation    Additional Comments  Appears L VC paralyzed

## 2021-03-02 NOTE — OP NOTE
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    Anesthesia:  General (general endotracheal tube)    EBL:   50 mL    Specimens:   Distal transverse colon    Indications:  · Nice 65-year-old female with a recently diagnosed adenocarcinoma at about 50 cm, estimated by gastroenterology to be in the descending colon.  CT prior to colectomy was negative.    Findings:   · Tattoo was in the distal portion of the transverse colon  · No evidence of any distant disease    Recommendations:   · Routine post colectomy care    Description of procedure:    After obtaining informed consent, patient was brought to the operating room and placed under a general anesthetic.  Rincon catheter was placed.  She was oriented in split leg position and her abdomen was then sterilely prepped and draped.  Access to the abdominal cavity was gained with a 5 mm trocar and direct access technique in the right upper quadrant.  The abdomen was insufflated and initial inspection demonstrated no evidence of injury.  Additional 8 mm robotic trocar was then placed to the right of midline below the umbilicus.  I then used this to manipulate the omentum and lift this up over the transverse colon.  We were able to follow the sigmoid colon and descending colon and I could not identify the tattoo.  As we flipped the omentum over the transverse colon you could see that in the distal portion of the transverse colon the tattoo was evident.  It was  clear that this would be more of a transverse colectomy than a left colectomy, and as such I placed the trochars which just a slight angle from the right upper quadrant to the left lower quadrant.  8 mm trochars were placed in the right upper quadrant and right midabdomen.  The initial 8 mm trocar in the right lower quadrant was exchanged out for a 12 mm and then a suprapubic 8 mm trocar was placed.  The robot was then docked.  I moved to the console.  I began taking the omentum off of the descending colon and this was followed proximally to the splenic flexure.  The omentum was taken off the splenic flexure and rotated over the top.  I then continue taking the omentum off work my way proximally to about the level of the falciform ligament.  This was well proximal to the tattoo.  I chose my proximal division site.  The lesser sac was entered and a space was made underneath the transverse colon approximately 6 cm proximal to the tattoo.  The 60 mm blue loaded sureform stapler was used to divide the transverse colon.  I then identified my distal transection site and shows a spot again about 6 cm distal to the tattoo.  Again the mesentery was taken with the vessel sealing device and the colon was divided proximal to the splenic flexure again using the 60 mm blue loaded sureform stapler.  The vessel sealing device was then used to divide the mesentery.  I traced it down as low as I could to the root and then came across it from proximal to distal.  The specimen was completely freed up and passed down into the pelvis.  Arms 3 was undocked and the 12 mm port site was extended slightly at the skin level.  The anterior fascia overlying the rectus muscle was extended laterally and the rectus fibers were split.  The posterior sheath was then split.  The small-sized Abundio wound retractor was introduced and the specimen was extracted and then opened on the back table.  We could see that we had obtained the lesion in question  and had about 5 cm of margin on one side and approximately 7 on the other.  The 12 mm trocar was then reintroduced inside the Abundio wound retractor and the Abundio was then tied around this port.  Arm 3 was then docked again.  I then scrubbed back in and the 2 ends of the colon seem to sit next each other nicely.  We did inject ICG and there was excellent perfusion at both ends of the transected colon.  I used a 2-0 silk to gather the ends of the colon at the planned distal end of the anastomosis.  Another 2-0 silk was used to hold up the planned proximal end of the anastomosis.  The monopolar scissors were then used to make a hole in both the proximal and distal transverse colon and again the 60 mm blue loaded stapler was used to create our common channel.  The common defect was then closed with a inner layer of full-thickness 3-0 PDS suture and then run back on itself as a second Lemberted layer.  The omentum was then pulled back over the transverse colon.  The robot was undocked.  The extraction site in the right lower quadrant was closed at the fascial layer with 0 PDS.  Skin incisions were closed with 4-0 Monocryl.    Martin Calvo MD  General and Endoscopic Surgery  [facility] [place], operated by [facility]    [address], [address]  [place], [place], [address]  P: [phone]  F: [phone]

## 2021-03-02 NOTE — ANESTHESIA PREPROCEDURE EVALUATION
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) 22-Feb-2021     Neuro/Psych  (+) psychiatric history Anxiety,     GI/Hepatic/Renal/Endo    (+)   thyroid problem hypothyroidism    ROS Comment: Colon CA     Musculoskeletal     Abdominal    Substance History      OB/GYN          Other                        Anesthesia Plan    ASA 3     general   (Will intubate with smaller ETT)    Anesthetic plan, all risks, benefits, and alternatives have been provided, discussed and informed consent has been obtained with: patient.

## 2021-03-02 NOTE — PLAN OF CARE
Goal Outcome Evaluation:     Progress: improving  Outcome Summary: Pt arrived to 3 park from PACU, lethargic but AOx4, tolerating clear liquids, not complaining of much pain sched pain meds given PRN if needed. IV ab given IV now SL. Pt on room air. Rincon cath clean intact to bedside drainage. Surgical site clean dry and intact. VSS no acute needs at this time

## 2021-03-03 LAB
ANION GAP SERPL CALCULATED.3IONS-SCNC: 8.9 MMOL/L (ref 5–15)
BASOPHILS # BLD AUTO: 0.02 10*3/MM3 (ref 0–0.2)
BASOPHILS NFR BLD AUTO: 0.2 % (ref 0–1.5)
BUN SERPL-MCNC: 10 MG/DL (ref 8–23)
BUN/CREAT SERPL: 11.4 (ref 7–25)
CALCIUM SPEC-SCNC: 7.5 MG/DL (ref 8.6–10.5)
CHLORIDE SERPL-SCNC: 107 MMOL/L (ref 98–107)
CO2 SERPL-SCNC: 23.1 MMOL/L (ref 22–29)
CREAT SERPL-MCNC: 0.88 MG/DL (ref 0.57–1)
DEPRECATED RDW RBC AUTO: 38.8 FL (ref 37–54)
EOSINOPHIL # BLD AUTO: 0.01 10*3/MM3 (ref 0–0.4)
EOSINOPHIL NFR BLD AUTO: 0.1 % (ref 0.3–6.2)
ERYTHROCYTE [DISTWIDTH] IN BLOOD BY AUTOMATED COUNT: 12.4 % (ref 12.3–15.4)
GFR SERPL CREATININE-BSD FRML MDRD: 64 ML/MIN/1.73
GLUCOSE SERPL-MCNC: 97 MG/DL (ref 65–99)
HCT VFR BLD AUTO: 32.7 % (ref 34–46.6)
HGB BLD-MCNC: 11.1 G/DL (ref 12–15.9)
IMM GRANULOCYTES # BLD AUTO: 0.04 10*3/MM3 (ref 0–0.05)
IMM GRANULOCYTES NFR BLD AUTO: 0.5 % (ref 0–0.5)
LYMPHOCYTES # BLD AUTO: 1.52 10*3/MM3 (ref 0.7–3.1)
LYMPHOCYTES NFR BLD AUTO: 18.3 % (ref 19.6–45.3)
MCH RBC QN AUTO: 29.3 PG (ref 26.6–33)
MCHC RBC AUTO-ENTMCNC: 33.9 G/DL (ref 31.5–35.7)
MCV RBC AUTO: 86.3 FL (ref 79–97)
MONOCYTES # BLD AUTO: 0.6 10*3/MM3 (ref 0.1–0.9)
MONOCYTES NFR BLD AUTO: 7.2 % (ref 5–12)
NEUTROPHILS NFR BLD AUTO: 6.13 10*3/MM3 (ref 1.7–7)
NEUTROPHILS NFR BLD AUTO: 73.7 % (ref 42.7–76)
NRBC BLD AUTO-RTO: 0 /100 WBC (ref 0–0.2)
PLATELET # BLD AUTO: 210 10*3/MM3 (ref 140–450)
PMV BLD AUTO: 9.6 FL (ref 6–12)
POTASSIUM SERPL-SCNC: 3.7 MMOL/L (ref 3.5–5.2)
RBC # BLD AUTO: 3.79 10*6/MM3 (ref 3.77–5.28)
SODIUM SERPL-SCNC: 139 MMOL/L (ref 136–145)
WBC # BLD AUTO: 8.32 10*3/MM3 (ref 3.4–10.8)

## 2021-03-03 PROCEDURE — 80048 BASIC METABOLIC PNL TOTAL CA: CPT | Performed by: SURGERY

## 2021-03-03 PROCEDURE — 25010000002 ENOXAPARIN PER 10 MG: Performed by: SURGERY

## 2021-03-03 PROCEDURE — 99024 POSTOP FOLLOW-UP VISIT: CPT | Performed by: SURGERY

## 2021-03-03 PROCEDURE — 25010000002 CEFOXITIN PER 1 G: Performed by: SURGERY

## 2021-03-03 PROCEDURE — 85025 COMPLETE CBC W/AUTO DIFF WBC: CPT | Performed by: SURGERY

## 2021-03-03 RX ADMIN — AMLODIPINE BESYLATE 5 MG: 5 TABLET ORAL at 09:37

## 2021-03-03 RX ADMIN — ACETAMINOPHEN 500 MG: 500 TABLET, FILM COATED ORAL at 20:04

## 2021-03-03 RX ADMIN — PANTOPRAZOLE SODIUM 40 MG: 40 TABLET, DELAYED RELEASE ORAL at 09:39

## 2021-03-03 RX ADMIN — ACETAMINOPHEN 500 MG: 500 TABLET, FILM COATED ORAL at 03:55

## 2021-03-03 RX ADMIN — OXYBUTYNIN CHLORIDE 5 MG: 5 TABLET, EXTENDED RELEASE ORAL at 09:38

## 2021-03-03 RX ADMIN — GABAPENTIN 200 MG: 100 CAPSULE ORAL at 06:05

## 2021-03-03 RX ADMIN — CELECOXIB 200 MG: 200 CAPSULE ORAL at 20:04

## 2021-03-03 RX ADMIN — LEVOTHYROXINE SODIUM 100 MCG: 0.1 TABLET ORAL at 09:38

## 2021-03-03 RX ADMIN — ALVIMOPAN 12 MG: 12 CAPSULE ORAL at 09:37

## 2021-03-03 RX ADMIN — GABAPENTIN 200 MG: 100 CAPSULE ORAL at 14:14

## 2021-03-03 RX ADMIN — OXYCODONE HYDROCHLORIDE AND ACETAMINOPHEN 1 TABLET: 5; 325 TABLET ORAL at 16:03

## 2021-03-03 RX ADMIN — ENOXAPARIN SODIUM 40 MG: 40 INJECTION SUBCUTANEOUS at 09:38

## 2021-03-03 RX ADMIN — CEFOXITIN SODIUM 2 G: 2 POWDER, FOR SOLUTION INTRAVENOUS at 03:55

## 2021-03-03 RX ADMIN — OXYCODONE HYDROCHLORIDE AND ACETAMINOPHEN 1 TABLET: 5; 325 TABLET ORAL at 10:58

## 2021-03-03 RX ADMIN — OXYCODONE HYDROCHLORIDE AND ACETAMINOPHEN 1 TABLET: 5; 325 TABLET ORAL at 20:04

## 2021-03-03 RX ADMIN — ACETAMINOPHEN 500 MG: 500 TABLET, FILM COATED ORAL at 09:36

## 2021-03-03 RX ADMIN — ACETAMINOPHEN 500 MG: 500 TABLET, FILM COATED ORAL at 16:03

## 2021-03-03 RX ADMIN — GABAPENTIN 200 MG: 100 CAPSULE ORAL at 21:59

## 2021-03-03 RX ADMIN — CELECOXIB 200 MG: 200 CAPSULE ORAL at 09:36

## 2021-03-03 NOTE — PLAN OF CARE
Goal Outcome Evaluation:  Plan of Care Reviewed With: patient  Progress: improving  Outcome Summary: Pt is slightly drowsy, but will awake to answer questions and take medicines; 5 lap sites are intact; 1 lap site bandage is has old drainage present; RA; No PRN medicines given so far; SCD remains in place; F/C to be d/c this morning; will continue to monitor

## 2021-03-03 NOTE — PROGRESS NOTES
Postop day #1 robotic transverse colectomy    Subjective:  Feels well.  Complains of soreness but no significant pain.  Tolerating some clear liquids, denies nausea.  No GI function yet.    Afebrile, heart rate 60s blood pressure 133/60  General: Asleep but easily awakened, no distress  Abdomen: Soft and appropriately tender, dressings with small amount of serosanguineous drainage.    Assessment and plan:  -Postop day 1 robotic transverse colectomy for cancer  -Try full liquids today  -Mobilize  -Lovenox today for DVT prophylaxis  -Discontinue Rincon catheter  -Await return of GI function    Martin Calvo MD  General and Endoscopic Surgery  [facility]    [address], [address]  [place], [place], [address]  P: [phone]  F: [phone]

## 2021-03-03 NOTE — PROGRESS NOTES
Discharge Planning Assessment  [facility]     Patient Name: Marie Foy  MRN: [id number]  Today's Date: 3/3/2021    Admit Date: 3/2/2021    Discharge Needs Assessment     Row Name 03/03/21 1451       Living Environment    Lives With  alone    Current Living Arrangements  home/apartment/condo    Potentially Unsafe Housing Conditions  other (see comments)    Primary Care Provided by  self    Provides Primary Care For  no one    Family Caregiver if Needed  other relative(s)    Quality of Family Relationships  supportive;helpful;involved    Able to Return to Prior Arrangements  yes       Resource/Environmental Concerns    Resource/Environmental Concerns  none    Transportation Concerns  car, none       Transition Planning    Patient/Family Anticipates Transition to  home    Patient/Family Anticipated Services at Transition  none    Transportation Anticipated  family or friend will provide       Discharge Needs Assessment    Readmission Within the Last 30 Days  no previous admission in last 30 days    Equipment Currently Used at Home  none    Concerns to be Addressed  no discharge needs identified;denies needs/concerns at this time    Anticipated Changes Related to Illness  none    Equipment Needed After Discharge  none        Discharge Plan     Row Name 03/03/21 1451       Plan    Plan  Home with sister in law    Patient/Family in Agreement with Plan  yes    Plan Comments  CCP met with patient at bedside. CCP role explained and discharge planning discussed. Face sheet verified. Patient’s PCP is Dr. Wyman. Patient lives alone, with two steps to the entrance of the home. Patient states she will be staying with her sister in law at discharge but was unsure of her address. Patient is independent with her ADLs and does not use DME. Patient has used [facility] and denies any SNF history. Patient does not anticipate needing home health and states her sister in law will be able to assist her as needed. CCP will follow for any  needs to arise. Debbi VAZQUEZ        Continued Care and Services - Admitted Since 3/2/2021    Coordination has not been started for this encounter.         Demographic Summary     Row Name 03/03/21 1450       General Information    Admission Type  inpatient    Arrived From  emergency department    Referral Source  admission list    Reason for Consult  discharge planning    Preferred Language  English     Used During This Interaction  no        Functional Status     Row Name 03/03/21 1451       Functional Status    Usual Activity Tolerance  good    Current Activity Tolerance  good       Functional Status, IADL    Medications  independent    Meal Preparation  independent    Housekeeping  independent    Laundry  independent    Shopping  independent       Mental Status    General Appearance WDL  WDL       Mental Status Summary    Recent Changes in Mental Status/Cognitive Functioning  no changes        Psychosocial    No documentation.       Abuse/Neglect    No documentation.       Legal    No documentation.       Substance Abuse    No documentation.       Patient Forms    No documentation.           TAYLOR Mcduffie

## 2021-03-04 ENCOUNTER — READMISSION MANAGEMENT (OUTPATIENT)
Dept: CALL CENTER | Facility: HOSPITAL | Age: 66
End: 2021-03-04

## 2021-03-04 VITALS
TEMPERATURE: 98.5 F | OXYGEN SATURATION: 93 % | SYSTOLIC BLOOD PRESSURE: 101 MMHG | DIASTOLIC BLOOD PRESSURE: 57 MMHG | WEIGHT: 143.96 LBS | HEIGHT: 58 IN | BODY MASS INDEX: 30.22 KG/M2 | HEART RATE: 66 BPM | RESPIRATION RATE: 16 BRPM

## 2021-03-04 PROCEDURE — 99024 POSTOP FOLLOW-UP VISIT: CPT | Performed by: SURGERY

## 2021-03-04 PROCEDURE — 25010000002 ENOXAPARIN PER 10 MG: Performed by: SURGERY

## 2021-03-04 RX ORDER — OXYCODONE HYDROCHLORIDE AND ACETAMINOPHEN 5; 325 MG/1; MG/1
1 TABLET ORAL EVERY 4 HOURS PRN
Qty: 10 TABLET | Refills: 0 | Status: SHIPPED | OUTPATIENT
Start: 2021-03-04 | End: 2021-03-09

## 2021-03-04 RX ORDER — CELECOXIB 200 MG/1
200 CAPSULE ORAL EVERY 12 HOURS SCHEDULED
Qty: 8 CAPSULE | Refills: 0 | Status: SHIPPED | OUTPATIENT
Start: 2021-03-04 | End: 2021-06-30

## 2021-03-04 RX ORDER — GABAPENTIN 100 MG/1
200 CAPSULE ORAL EVERY 8 HOURS SCHEDULED
Qty: 24 CAPSULE | Refills: 0 | Status: SHIPPED | OUTPATIENT
Start: 2021-03-04 | End: 2021-03-08 | Stop reason: SDUPTHER

## 2021-03-04 RX ADMIN — PANTOPRAZOLE SODIUM 40 MG: 40 TABLET, DELAYED RELEASE ORAL at 05:52

## 2021-03-04 RX ADMIN — LEVOTHYROXINE SODIUM 100 MCG: 0.1 TABLET ORAL at 08:13

## 2021-03-04 RX ADMIN — AMLODIPINE BESYLATE 5 MG: 5 TABLET ORAL at 08:13

## 2021-03-04 RX ADMIN — ENOXAPARIN SODIUM 40 MG: 40 INJECTION SUBCUTANEOUS at 08:13

## 2021-03-04 RX ADMIN — ACETAMINOPHEN 500 MG: 500 TABLET, FILM COATED ORAL at 03:08

## 2021-03-04 RX ADMIN — GABAPENTIN 200 MG: 100 CAPSULE ORAL at 14:50

## 2021-03-04 RX ADMIN — OXYCODONE HYDROCHLORIDE AND ACETAMINOPHEN 1 TABLET: 5; 325 TABLET ORAL at 01:02

## 2021-03-04 RX ADMIN — OXYBUTYNIN CHLORIDE 5 MG: 5 TABLET, EXTENDED RELEASE ORAL at 08:13

## 2021-03-04 RX ADMIN — ACETAMINOPHEN 500 MG: 500 TABLET, FILM COATED ORAL at 14:50

## 2021-03-04 RX ADMIN — OXYCODONE HYDROCHLORIDE AND ACETAMINOPHEN 1 TABLET: 5; 325 TABLET ORAL at 17:28

## 2021-03-04 RX ADMIN — GABAPENTIN 200 MG: 100 CAPSULE ORAL at 05:52

## 2021-03-04 RX ADMIN — ACETAMINOPHEN 500 MG: 500 TABLET, FILM COATED ORAL at 10:30

## 2021-03-04 RX ADMIN — CELECOXIB 200 MG: 200 CAPSULE ORAL at 08:13

## 2021-03-04 NOTE — PLAN OF CARE
Goal Outcome Evaluation:  Plan of Care Reviewed With: patient  Progress: improving  Outcome Summary: Pt alert and oriented; VSS; Pt up with minimal assist to the bathroom; Pt C/O pain with movement; PRN pain medicine given; Possible discharge soon; will continue to monitor

## 2021-03-04 NOTE — PLAN OF CARE
Goal Outcome Evaluation:     Progress: improving  Outcome Summary: Pt AOx4, pain well controlled with oral pain med, tolerating reg food, up walking in room. VSS discharging pt this afternoon.

## 2021-03-04 NOTE — PROGRESS NOTES
Postop day 2 robotic transverse colectomy    Subjective:  Feels much better today, tolerated full liquids and wants more.  Denies nausea.  Several bowel movements.  They are liquid.  Voiding well.  Pain is minimal.    Objective:  Afebrile with stable vitals  General: Awake alert and oriented, less sleepy today, no distress  Abdomen: Soft and benign, incisions are in good order    Assessment and plan:  -Postop day 2 robotic transverse colectomy for cancer  -Pathology discussed and reviewed with patient, T3N0 cancer seen  -Good progress to this point, advance to solid food today, may be ready for discharge home this afternoon if she continues to do well, otherwise plan for tomorrow    Martin Calvo MD  General and Endoscopic Surgery  [facility]    [address], [address]  [place], [place], [address]  P: [phone]  F: [phone]

## 2021-03-04 NOTE — PAYOR COMM NOTE
"CONTINUED STAY REVIEW    REF #[id number]    F: [phone]        Marie Foy (65 y.o. Female)     Date of Birth Social Security Number Address Home Phone MRN    1955  [address] [place] [address] [phone] [id number]    Congregation Marital Status          Mosque        Admission Date Admission Type Admitting Provider Attending Provider Department, Room/Bed    3/2/21 Elective Martin Calvo MD Englund, Graham D, MD [address], P386/1    Discharge Date Discharge Disposition Discharge Destination                       Attending Provider: Martin Calvo MD    Allergies: Codeine, Morphine And Related, Penicillins    Isolation: None   Infection: None   Code Status: CPR    Ht: 147.3 cm (58\")   Wt: 65.3 kg (143 lb 15.4 oz)    Admission Cmt: None   Principal Problem: 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]                 Active Insurance as of 3/2/2021     Primary Coverage     Payor Plan Insurance Group Employer/Plan Group    ANTHEM BLUE CROSS ANTHEM BLUE CROSS BLUE SHIELD PPO [id number]     Payor Plan Address Payor Plan Phone Number Payor Plan Fax Number Effective Dates    [address] [phone]  8/18/2020 - None Entered    [facility] [id number]       Subscriber Name Subscriber Birth Date Member ID       MARIE FOY 1955 [id number]           Secondary Coverage     Payor Plan Insurance Group Employer/Plan Group    [place] [facility] [place]      Payor Plan Address Payor Plan Phone Number Payor Plan Fax Number Effective Dates    [address] [phone]  7/1/2020 - None Entered    [facility] [id number]       Subscriber Name Subscriber Birth Date Member ID       MARIE FOY 1955 [id number]           Tertiary Coverage     Payor Plan Insurance Group Employer/Plan Group    MEDICARE MEDICARE A & B      Payor Plan Address Payor Plan Phone Number Payor Plan Fax Number Effective Dates    [address] " [phone]  6/1/2018 - None Entered    [facility] [id number]       Subscriber Name Subscriber Birth Date Member ID       RENAN RATLIFF 1955 [id number]                 Emergency Contacts      (Rel.) Home Phone Work Phone Mobile Phone    Padmini Boothe (Daughter) [phone] -- --    DOUG LEAHY (Daughter) -- -- [phone]            Vital Signs (last day)     Date/Time   Temp   Temp src   Pulse   Resp   BP   Patient Position   SpO2    03/04/21 0734   97.5 (36.4)   Oral   77   16   125/63   Sitting   92    03/04/21 0340   97.8 (36.6)   Oral   54   18   117/53   Lying   92    03/03/21 1918   97.6 (36.4)   Oral   52   16   118/66   Lying   96    03/03/21 1543   98 (36.7)   Oral   54   18   111/53   Sitting   93    03/03/21 1258   --   --   59   16   96/53   Sitting   95    03/03/21 0724   97.8 (36.6)   Oral   62   18   133/60   Lying   99    03/03/21 0357   98.9 (37.2)   Oral   68   18   94/61   Lying   92              Oxygen Therapy (last day)     Date/Time   SpO2   Device (Oxygen Therapy)   Flow (L/min)   Oxygen Concentration (%)   ETCO2 (mmHg)    03/04/21 0830   --   room air   --   --   --    03/04/21 0734   92   room air   --   --   --    03/04/21 0340   92   room air   --   --   --    03/03/21 2004   --   room air   --   --   --    03/03/21 1918   96   room air   --   --   --    03/03/21 1543   93   room air   --   --   --    03/03/21 1258   95   room air   --   --   --    03/03/21 0801   --   room air   --   --   --    03/03/21 0724   99   room air   --   --   --    03/03/21 0357   92   room air   --   --   --                Orders (active)      Start     Ordered    03/04/21 1027  Discontinue IV  Once      03/04/21 1027    03/04/21 0758  Diet Regular  Diet Effective Now      03/04/21 0757    03/04/21 0000  celecoxib (CeleBREX) 200 MG capsule  Every 12 Hours Scheduled      03/04/21 1027    03/04/21 0000  gabapentin (NEURONTIN) 100 MG capsule  Every 8 Hours Scheduled      03/04/21 1027     03/04/21 0000  oxyCODONE-acetaminophen (PERCOCET) 5-325 MG per tablet  Every 4 Hours PRN      03/04/21 1027    03/04/21 0000  Discharge Instructions     Comments: Okay to shower  Diet as tolerated  Avoid lifting, squatting, straining or bending    03/04/21 1027    03/03/21 0900  amLODIPine (NORVASC) tablet 5 mg  Daily      03/02/21 1438    03/03/21 0900  levothyroxine (SYNTHROID, LEVOTHROID) tablet 100 mcg  Daily      03/02/21 1438    03/03/21 0900  enoxaparin (LOVENOX) syringe 40 mg  Daily      03/02/21 1438    03/03/21 0700  pantoprazole (PROTONIX) EC tablet 40 mg  Every Morning      03/02/21 1438    03/02/21 2100  celecoxib (CeleBREX) capsule 200 mg  Every 12 Hours Scheduled      03/02/21 1438    03/02/21 1600  oxybutynin XL (DITROPAN-XL) 24 hr tablet 5 mg  Daily      03/02/21 1438    03/02/21 1530  acetaminophen (TYLENOL) tablet 500 mg  Every 6 Hours      03/02/21 1438    03/02/21 1530  gabapentin (NEURONTIN) capsule 200 mg  Every 8 Hours Scheduled      03/02/21 1438    03/02/21 1439  Code Status and Medical Interventions:  Continuous      03/02/21 1438    03/02/21 1439  Strict Intake and Output  Every Shift      03/02/21 1438    03/02/21 1439  Ambulate Patient  Every Shift      03/02/21 1438    03/02/21 1439  Notify Provider if Bladder Distention Continues  Until Discontinued      03/02/21 1438    03/02/21 1438  ondansetron (ZOFRAN) tablet 4 mg  Every 6 Hours PRN      03/02/21 1438    03/02/21 1438  ondansetron (ZOFRAN) injection 4 mg  Every 6 Hours PRN      03/02/21 1438    03/02/21 1438  oxyCODONE-acetaminophen (PERCOCET) 5-325 MG per tablet 1 tablet  Every 4 Hours PRN      03/02/21 1438    03/02/21 1438  ALPRAZolam (XANAX) tablet 0.25 mg  3 Times Daily PRN      03/02/21 1438    03/02/21 1222  Vital Signs  Per Hospital Policy      03/02/21 1221    03/02/21 1147  Pulse Oximetry, Continuous  Continuous      03/02/21 1146    Unscheduled  Oxygen Therapy- Nasal Cannula; Titrate for SPO2: Per Policy  Continuous PRN       03/02/21 0729    Unscheduled  Oxygen Therapy- Nasal Cannula; Titrate for SPO2: per policy  Continuous PRN      03/02/21 1146    Unscheduled  Up in Chair  As Needed      03/02/21 1438    Unscheduled  Bladder Scan if Patient Unable to Void 4-6 Hours After Catheter Removal  As Needed      03/02/21 1438    Unscheduled  Straight Cath Every 4-6 Hours As Needed If Patient is Unable to Void After 4-6 Hours, Bladder Scan Volume is Greater Than 500mL & Patient Has Symptoms of Bladder Discomfort / Distention  As Needed      03/02/21 1438    Unscheduled  Consult Pharmacist For Review of Medications That May Cause Urinary Retention - RN To Place Order for Consult it Needed  As Needed      03/02/21 1438    Unscheduled  Schedule / Prompt Voiding For Patients With Urinary Incontinence  As Needed      03/02/21 1438                   Operative/Procedure Notes       Martin Calvo MD at 03/02/21 0848  Version 2 of 2       Operative Note :   Martin Calvo MD    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    Anesthesia:  General (general endotracheal tube)    EBL:   50 mL    Specimens:   Distal transverse colon    Indications:  · Nice 65-year-old female with a recently diagnosed adenocarcinoma at about 50 cm, estimated by gastroenterology to be in the descending colon.  CT prior to colectomy was negative.    Findings:   · Tattoo was in the distal portion of the transverse colon  · No evidence of any distant disease    Recommendations:   · Routine  post colectomy care    Description of procedure:    After obtaining informed consent, patient was brought to the operating room and placed under a general anesthetic.  Rincon catheter was placed.  She was oriented in split leg position and her abdomen was then sterilely prepped and draped.  Access to the abdominal cavity was gained with a 5 mm trocar and direct access technique in the right upper quadrant.  The abdomen was insufflated and initial inspection demonstrated no evidence of injury.  Additional 8 mm robotic trocar was then placed to the right of midline below the umbilicus.  I then used this to manipulate the omentum and lift this up over the transverse colon.  We were able to follow the sigmoid colon and descending colon and I could not identify the tattoo.  As we flipped the omentum over the transverse colon you could see that in the distal portion of the transverse colon the tattoo was evident.  It was clear that this would be more of a transverse colectomy than a left colectomy, and as such I placed the trochars which just a slight angle from the right upper quadrant to the left lower quadrant.  8 mm trochars were placed in the right upper quadrant and right midabdomen.  The initial 8 mm trocar in the right lower quadrant was exchanged out for a 12 mm and then a suprapubic 8 mm trocar was placed.  The robot was then docked.  I moved to the console.  I began taking the omentum off of the descending colon and this was followed proximally to the splenic flexure.  The omentum was taken off the splenic flexure and rotated over the top.  I then continue taking the omentum off work my way proximally to about the level of the falciform ligament.  This was well proximal to the tattoo.  I chose my proximal division site.  The lesser sac was entered and a space was made underneath the transverse colon approximately 6 cm proximal to the tattoo.  The 60 mm blue loaded sureform stapler was used to divide the  transverse colon.  I then identified my distal transection site and shows a spot again about 6 cm distal to the tattoo.  Again the mesentery was taken with the vessel sealing device and the colon was divided proximal to the splenic flexure again using the 60 mm blue loaded sureform stapler.  The vessel sealing device was then used to divide the mesentery.  I traced it down as low as I could to the root and then came across it from proximal to distal.  The specimen was completely freed up and passed down into the pelvis.  Arms 3 was undocked and the 12 mm port site was extended slightly at the skin level.  The anterior fascia overlying the rectus muscle was extended laterally and the rectus fibers were split.  The posterior sheath was then split.  The small-sized Abundio wound retractor was introduced and the specimen was extracted and then opened on the back table.  We could see that we had obtained the lesion in question and had about 5 cm of margin on one side and approximately 7 on the other.  The 12 mm trocar was then reintroduced inside the Abundio wound retractor and the Abundio was then tied around this port.  Arm 3 was then docked again.  I then scrubbed back in and the 2 ends of the colon seem to sit next each other nicely.  We did inject ICG and there was excellent perfusion at both ends of the transected colon.  I used a 2-0 silk to gather the ends of the colon at the planned distal end of the anastomosis.  Another 2-0 silk was used to hold up the planned proximal end of the anastomosis.  The monopolar scissors were then used to make a hole in both the proximal and distal transverse colon and again the 60 mm blue loaded stapler was used to create our common channel.  The common defect was then closed with a inner layer of full-thickness 3-0 PDS suture and then run back on itself as a second Lemberted layer.  The omentum was then pulled back over the transverse colon.  The robot was undocked.  The extraction  site in the right lower quadrant was closed at the fascial layer with 0 PDS.  Skin incisions were closed with 4-0 Monocryl.    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  CT prior to colectomy was negative.    Findings:   · Tattoo was in the distal portion of the transverse colon  · No evidence of any distant disease    Recommendations:   · Routine post colectomy care    Description of procedure:    After obtaining informed consent, patient was brought to the operating room and placed under a general anesthetic.  Rincon catheter was placed.  She was oriented in split leg position and her abdomen was then sterilely prepped and draped.  Access to the abdominal cavity was gained with a 5 mm trocar and direct access technique in the right upper quadrant.  The abdomen was insufflated and initial inspection demonstrated no evidence of injury.  Additional 8 mm robotic trocar was then placed to the right of midline below the umbilicus.  I then used this to manipulate the omentum and lift this  up over the transverse colon.  We were able to follow the sigmoid colon and descending colon and I could not identify the tattoo.  As we flipped the omentum over the transverse colon you could see that in the distal portion of the transverse colon the tattoo was evident.  It was clear that this would be more of a transverse colectomy than a left colectomy, and as such I placed the trochars which just a slight angle from the right upper quadrant to the left lower quadrant.  8 mm trochars were placed in the right upper quadrant and right midabdomen.  The initial 8 mm trocar in the right lower quadrant was exchanged out for a 12 mm and then a suprapubic 8 mm trocar was placed.  The robot was then docked.  I moved to the console.  I began taking the omentum off of the descending colon and this was followed proximally to the splenic flexure.  The omentum was taken off the splenic flexure and rotated over the top.  I then continue taking the omentum off work my way proximally to about the level of the falciform ligament.  This was well proximal to the tattoo.  I chose my proximal division site.  The lesser sac was entered and a space was made underneath the transverse colon approximately 6 cm proximal to the tattoo.  The 60 mm blue loaded sureform stapler was used to divide the transverse colon.  I then identified my distal transection site and shows a spot again about 6 cm distal to the tattoo.  Again the mesentery was taken with the vessel sealing device and the colon was divided proximal to the splenic flexure again using the 60 mm blue loaded sureform stapler.  The vessel sealing device was then used to divide the mesentery.  I traced it down as low as I could to the root and then came across it from proximal to distal.  The specimen was completely freed up and passed down into the pelvis.  Arms 3 was undocked and the 12 mm port site was extended slightly at the skin level.  The anterior fascia overlying the rectus  muscle was extended laterally and the rectus fibers were split.  The posterior sheath was then split.  The small-sized Abundio wound retractor was introduced and the specimen was extracted and then opened on the back table.  We could see that we had obtained the lesion in question and had about 5 cm of margin on one side and approximately 7 on the other.  The 12 mm trocar was then reintroduced inside the Abundio wound retractor and the Abundio was then tied around this port.  Arm 3 was then docked again.  I then scrubbed back in and the 2 ends of the colon seem to sit next each other nicely.  We did inject ICG and there was excellent perfusion at both ends of the transected colon.  I used a 2-0 silk to gather the ends of the colon at the planned distal end of the anastomosis.  Another 2-0 silk was used to hold up the planned proximal end of the anastomosis.  The monopolar scissors were then used to make a hole in both the proximal and distal transverse colon and again the 60 mm blue loaded stapler was used to create our common channel.  The common defect was then closed with a inner layer of full-thickness 3-0 PDS suture and then run back on itself as a second Lemberted layer.  The omentum was then pulled back over the transverse colon.  The robot was undocked.  The extraction site in the right lower quadrant was closed at the fascial layer with 0 PDS.  Skin incisions were closed with 4-0 Monocryl.    Martin Calvo MD  General and Endoscopic Surgery  [facility]    [address], [address]  [place], [address]  P: [phone]  F: [phone]        Electronically signed by Martin Calvo MD at 03/02/21 1145          Physician Progress Notes      Martin Calvo MD at 03/04/21 1019        Postop day 2 robotic transverse colectomy    Subjective:  Feels much better today, tolerated full liquids and wants more.  Denies nausea.  Several bowel movements.  They are liquid.  Voiding well.  Pain  is minimal.    Objective:  Afebrile with stable vitals  General: Awake alert and oriented, less sleepy today, no distress  Abdomen: Soft and benign, incisions are in good order    Assessment and plan:  -Postop day 2 robotic transverse colectomy for cancer  -Pathology discussed and reviewed with patient, T3N0 cancer seen  -Good progress to this point, advance to solid food today, may be ready for discharge home this afternoon if she continues to do well, otherwise plan for tomorrow    Martin Calvo MD  General and Endoscopic Surgery  [facility]    [address], [address]  [place], [place], [address]  P: [phone]  F: [phone]        Electronically signed by Martin Calvo MD at 03/04/21 1028

## 2021-03-04 NOTE — PROGRESS NOTES
Case Management Discharge Note      Final Note: Home--no needs         Selected Continued Care - Admitted Since 3/2/2021     Destination    No services have been selected for the patient.              Durable Medical Equipment    No services have been selected for the patient.              Dialysis/Infusion    No services have been selected for the patient.              Home Medical Care    No services have been selected for the patient.              Therapy    No services have been selected for the patient.              Community Resources    No services have been selected for the patient.                       Final Discharge Disposition Code: 01 - home or self-care

## 2021-03-04 NOTE — DISCHARGE SUMMARY
Discharge Summary    Patient name: Marie Foy    Medical record number: [id number]    Admission date: 3/2/2021  Discharge date: 3/4/2021    Attending physician: Martin Calvo MD    Primary care physician: Michelle Wyman MD    Referring physician: Martin Calvo MD  [address] [address]    Consulting physician(s): None    Condition on discharge: improving    Primary Diagnoses: Colon cancer    Secondary Diagnoses: None    Operative Procedure: Robotic transverse colectomy    Hospital Course: The patient is a  65 y.o. female that was admitted to the hospital after undergoing robotic transverse colectomy as described in operative report.  Postoperatively she did very well.  Rincon catheter was removed on day 1.  GI function was regained later on day 1, diet was advanced to full liquids and then to solid food the next morning.  Pain was well controlled with minimal need for narcotics.  She was ambulating well.  Pathology report discussed with patient with plans for discharge home on postop day 2 and follow-up with me in the office for referral to medical oncology.    Discharge medications:      Discharge Medications      New Medications      Instructions Start Date   celecoxib 200 MG capsule  Commonly known as: CeleBREX   200 mg, Oral, Every 12 Hours Scheduled      gabapentin 100 MG capsule  Commonly known as: NEURONTIN   200 mg, Oral, Every 8 Hours Scheduled      oxyCODONE-acetaminophen 5-325 MG per tablet  Commonly known as: PERCOCET   1 tablet, Oral, Every 4 Hours PRN         Changes to Medications      Instructions Start Date   atorvastatin 40 MG tablet  Commonly known as: LIPITOR  What changed: when to take this   TAKE ONE TABLET BY MOUTH DAILY      valACYclovir 1000 MG tablet  Commonly known as: VALTREX  What changed:   · when to take this  · additional instructions   TAKE ONE TABLET BY MOUTH DAILY         Continue These Medications      Instructions Start Date   ALPRAZolam 0.25  MG tablet  Commonly known as: XANAX   0.25 mg, Oral, 3 Times Daily PRN      amLODIPine 5 MG tablet  Commonly known as: Norvasc   5 mg, Oral, Daily      calcium citrate-vitamin d 200-250 MG-UNIT tablet tablet  Commonly known as: CITRACAL   1 tablet, Oral, Daily      levothyroxine 100 MCG tablet  Commonly known as: SYNTHROID, LEVOTHROID   100 mcg, Oral, Daily      Magnesium 500 MG capsule   500 mg, Oral, Daily      omeprazole 40 MG capsule  Commonly known as: priLOSEC   TAKE ONE CAPSULE BY MOUTH DAILY      solifenacin 5 MG tablet  Commonly known as: VESIcare   5 mg, Oral, Daily         Stop These Medications    Chlorhexidine Gluconate 2 % pads     docusate sodium 100 MG capsule  Commonly known as: COLACE            Discharge instructions: No lifting over 10 pounds, diet as tolerated, may shower.      Follow-up appointment: Follow up with Martin Calvo MD in the office in 1 week. Call for appointment at [phone].

## 2021-03-05 ENCOUNTER — TRANSITIONAL CARE MANAGEMENT TELEPHONE ENCOUNTER (OUTPATIENT)
Dept: CALL CENTER | Facility: HOSPITAL | Age: 66
End: 2021-03-05

## 2021-03-05 NOTE — OUTREACH NOTE
Prep Survey      Responses   Restoration facility patient discharged from?  [facility]   Is LACE score < 7 ?  No   Emergency Room discharge w/ pulse ox?  No   Eligibility  [facility]   Date of Admission  03/02/21   Date of Discharge  03/04/21   Discharge Disposition  Home or Self Care   Discharge diagnosis  Robotic transverse colectomy for Colon CA   Does the patient have one of the following disease processes/diagnoses(primary or secondary)?  General Surgery   Does the patient have Home health ordered?  No   Is there a DME ordered?  No   Prep survey completed?  Yes          Georgette Michaud RN

## 2021-03-05 NOTE — PAYOR COMM NOTE
"DISCHARGED    REF #[id number]        Marie Foy (65 y.o. Female)     Date of Birth Social Security Number Address Home Phone MRN    1955  [facility] [place] [address] [phone] [id number]    Jain Marital Status          Yazdanism        Admission Date Admission Type Admitting Provider Attending Provider Department, Room/Bed    3/2/21 Elective Martin Calvo MD  [address], P386/1    Discharge Date Discharge Disposition Discharge Destination        3/4/2021 Home or Self Care              Attending Provider: (none)   Allergies: Codeine, Morphine And Related, Penicillins    Isolation: None   Infection: None   Code Status: Prior    Ht: 147.3 cm (58\")   Wt: 65.3 kg (143 lb 15.4 oz)    Admission Cmt: None   Principal Problem: 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 [id number]       Subscriber Name Subscriber Birth Date Member ID       MARIE FOY 1955 [id number]           Secondary Coverage     Payor Plan Insurance Group Employer/Plan Group    [employer] [place] [employer] [place]      Payor Plan Address Payor Plan Phone Number Payor Plan Fax Number Effective Dates    [address] [phone]  7/1/2020 - None Entered    [facility] [id number]       Subscriber Name Subscriber Birth Date Member ID       MARIE FOY 1955 [id number]           Tertiary Coverage     Payor Plan Insurance Group Employer/Plan Group    MEDICARE MEDICARE A & B      Payor Plan Address Payor Plan Phone Number Payor Plan Fax Number Effective Dates    [address] [phone]  6/1/2018 - None Entered    [place] " [place] [address]       Subscriber Name Subscriber Birth Date Member ID       RENAN RATLIFF 1955 [id number]                 Emergency Contacts      (Rel.) Home Phone Work Phone Mobile Phone    Padmini Boothe (Daughter) [phone] -- --    DOUG LEAHY (Daughter) -- -- [phone]            Discharge Order (From admission, onward)     Start     Ordered    03/04/21 1539  Discharge patient  Once     Expected Discharge Date: 03/04/21    Discharge Disposition: Home or Self Care    Physician of Record for Attribution - Please select from Treatment Team: ELHAM ALMEIDA [[id number]]    Review needed by CMO to determine Physician of Record: No       Question Answer Comment   Physician of Record for Attribution - Please select from Treatment Team ELHAM ALMEIDA    Review needed by CMO to determine Physician of Record No        03/04/21 1538

## 2021-03-05 NOTE — OUTREACH NOTE
Call Center TCM Note      Responses   [facility] patient discharged from?  [facility]   Does the patient have one of the following disease processes/diagnoses(primary or secondary)?  General Surgery   TCM attempt successful?  Yes   Call start time  1329   Call end time  1333   Discharge diagnosis  Robotic transverse colectomy for Colon CA   Does the patient have a follow up appointment scheduled with their surgeon?  Yes [3/8/21 at 1:20 pm ]   Has the patient kept scheduled appointments due by today?  N/A   Comments  Hospital d/c f/u appt is on 3/9/21 at 1:00 pm    Psychosocial issues?  No   Did the patient receive a copy of their discharge instructions?  Yes   Nursing interventions  Reviewed instructions with patient   What is the patient's perception of their health status since discharge?  Improving   Nursing interventions  Nurse provided patient education   Is the patient /caregiver able to teach back basic post-op care?  Lifting as instructed by MD in discharge instructions, Drive as instructed by MD in discharge instructions   Is the patient/caregiver able to teach back signs and symptoms of incisional infection?  Fever   Is the patient/caregiver able to teach back steps to recovery at home?  Rest and rebuild strength, gradually increase activity, Set small, achievable goals for return to baseline health   If the patient is a current smoker, are they able to teach back resources for cessation?  Not a smoker   Is the patient/caregiver able to teach back the hierarchy of who to call/visit for symptoms/problems? PCP, Specialist, Home health nurse, Urgent Care, ED, 911  Yes   TCM call completed?  Yes          Mary Chamberlain RN    3/5/2021, 13:33 EST

## 2021-03-08 ENCOUNTER — OFFICE VISIT (OUTPATIENT)
Dept: SURGERY | Facility: CLINIC | Age: 66
End: 2021-03-08

## 2021-03-08 VITALS — HEIGHT: 58 IN | WEIGHT: 143 LBS | BODY MASS INDEX: 30.02 KG/M2

## 2021-03-08 DIAGNOSIS — C18.6 MALIGNANT NEOPLASM OF DESCENDING COLON (HCC): ICD-10-CM

## 2021-03-08 DIAGNOSIS — C18.4 CANCER OF TRANSVERSE COLON (HCC): Primary | ICD-10-CM

## 2021-03-08 PROCEDURE — 99024 POSTOP FOLLOW-UP VISIT: CPT | Performed by: SURGERY

## 2021-03-08 RX ORDER — GABAPENTIN 100 MG/1
100 CAPSULE ORAL 2 TIMES DAILY
Qty: 24 CAPSULE | Refills: 0 | Status: SHIPPED | OUTPATIENT
Start: 2021-03-08 | End: 2021-06-30

## 2021-03-08 RX ORDER — DOCUSATE SODIUM 100 MG/1
100 CAPSULE, LIQUID FILLED ORAL DAILY
COMMUNITY

## 2021-03-09 ENCOUNTER — OFFICE VISIT (OUTPATIENT)
Dept: INTERNAL MEDICINE | Facility: CLINIC | Age: 66
End: 2021-03-09

## 2021-03-09 VITALS
BODY MASS INDEX: 30.02 KG/M2 | OXYGEN SATURATION: 95 % | DIASTOLIC BLOOD PRESSURE: 60 MMHG | WEIGHT: 143 LBS | HEART RATE: 87 BPM | HEIGHT: 58 IN | TEMPERATURE: 97.5 F | SYSTOLIC BLOOD PRESSURE: 128 MMHG

## 2021-03-09 DIAGNOSIS — C18.6 MALIGNANT NEOPLASM OF DESCENDING COLON (HCC): Primary | ICD-10-CM

## 2021-03-09 PROCEDURE — 1111F DSCHRG MED/CURRENT MED MERGE: CPT | Performed by: INTERNAL MEDICINE

## 2021-03-09 PROCEDURE — 99495 TRANSJ CARE MGMT MOD F2F 14D: CPT | Performed by: INTERNAL MEDICINE

## 2021-03-09 NOTE — PROGRESS NOTES
Follow-up da Farhat robot assisted transverse colectomy for cancer    Subjective:  Patient is now about 6 days out, recovering well at home and with no complaints.  Pain is minimal and eating well.  Bowel movements soft and appropriate.    Objective:  General: No acute distress, appropriate  Abdomen: Soft, incisions are well-healed, no sign of infection or hernia    Pathology once again reviewed with patient and demonstrates a T3N0 lesion.    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

## 2021-03-12 LAB
CYTO UR: NORMAL
LAB AP CASE REPORT: NORMAL
LAB AP SYNOPTIC CHECKLIST: NORMAL
Lab: NORMAL
Lab: NORMAL
PATH REPORT.ADDENDUM SPEC: NORMAL
PATH REPORT.FINAL DX SPEC: NORMAL
PATH REPORT.GROSS SPEC: NORMAL

## 2021-03-15 ENCOUNTER — CONSULT (OUTPATIENT)
Dept: ONCOLOGY | Facility: CLINIC | Age: 66
End: 2021-03-15

## 2021-03-15 ENCOUNTER — LAB (OUTPATIENT)
Dept: LAB | Facility: HOSPITAL | Age: 66
End: 2021-03-15

## 2021-03-15 VITALS
RESPIRATION RATE: 16 BRPM | TEMPERATURE: 97.7 F | BODY MASS INDEX: 30.96 KG/M2 | WEIGHT: 147.5 LBS | HEART RATE: 67 BPM | OXYGEN SATURATION: 98 % | DIASTOLIC BLOOD PRESSURE: 77 MMHG | HEIGHT: 58 IN | SYSTOLIC BLOOD PRESSURE: 145 MMHG

## 2021-03-15 DIAGNOSIS — C18.4 CANCER OF TRANSVERSE COLON (HCC): Primary | ICD-10-CM

## 2021-03-15 DIAGNOSIS — D50.0 IRON DEFICIENCY ANEMIA DUE TO CHRONIC BLOOD LOSS: ICD-10-CM

## 2021-03-15 DIAGNOSIS — C18.6 MALIGNANT NEOPLASM OF DESCENDING COLON (HCC): Primary | ICD-10-CM

## 2021-03-15 LAB
BASOPHILS # BLD AUTO: 0.04 10*3/MM3 (ref 0–0.2)
BASOPHILS NFR BLD AUTO: 0.6 % (ref 0–1.5)
DEPRECATED RDW RBC AUTO: 41 FL (ref 37–54)
EOSINOPHIL # BLD AUTO: 0.21 10*3/MM3 (ref 0–0.4)
EOSINOPHIL NFR BLD AUTO: 3.2 % (ref 0.3–6.2)
ERYTHROCYTE [DISTWIDTH] IN BLOOD BY AUTOMATED COUNT: 12.9 % (ref 12.3–15.4)
HCT VFR BLD AUTO: 35.3 % (ref 34–46.6)
HGB BLD-MCNC: 11.5 G/DL (ref 12–15.9)
IMM GRANULOCYTES # BLD AUTO: 0.02 10*3/MM3 (ref 0–0.05)
IMM GRANULOCYTES NFR BLD AUTO: 0.3 % (ref 0–0.5)
LYMPHOCYTES # BLD AUTO: 1.71 10*3/MM3 (ref 0.7–3.1)
LYMPHOCYTES NFR BLD AUTO: 26.1 % (ref 19.6–45.3)
MCH RBC QN AUTO: 28.8 PG (ref 26.6–33)
MCHC RBC AUTO-ENTMCNC: 32.6 G/DL (ref 31.5–35.7)
MCV RBC AUTO: 88.5 FL (ref 79–97)
MONOCYTES # BLD AUTO: 0.5 10*3/MM3 (ref 0.1–0.9)
MONOCYTES NFR BLD AUTO: 7.6 % (ref 5–12)
NEUTROPHILS NFR BLD AUTO: 4.06 10*3/MM3 (ref 1.7–7)
NEUTROPHILS NFR BLD AUTO: 62.2 % (ref 42.7–76)
NRBC BLD AUTO-RTO: 0 /100 WBC (ref 0–0.2)
PLATELET # BLD AUTO: 272 10*3/MM3 (ref 140–450)
PMV BLD AUTO: 9.5 FL (ref 6–12)
RBC # BLD AUTO: 3.99 10*6/MM3 (ref 3.77–5.28)
WBC # BLD AUTO: 6.54 10*3/MM3 (ref 3.4–10.8)

## 2021-03-15 PROCEDURE — 85025 COMPLETE CBC W/AUTO DIFF WBC: CPT

## 2021-03-15 PROCEDURE — 99204 OFFICE O/P NEW MOD 45 MIN: CPT | Performed by: INTERNAL MEDICINE

## 2021-03-15 PROCEDURE — 36415 COLL VENOUS BLD VENIPUNCTURE: CPT

## 2021-03-15 NOTE — PROGRESS NOTES
Subjective     REASON FOR CONSULTATION:  Provide an opinion on any further workup or treatment on:    Colon cancer                       REQUESTING PHYSICIAN: Martin Calvo MD    RECORDS OBTAINED: Records of the patients history including those obtained from the referring provider were reviewed and summarized in detail.    HISTORY OF PRESENT ILLNESS:    Marie Foy is a 65 y.o. patient who was referred for evaluation of newly diagnosed colon cancer. She underwent a regular screening colonoscopy on 2021 and was found to have a suspicoius mass in the descending colon. She was referred to Dr. Calvo. She underwent a laparoscopic transverse partial colectomy on 3/2/2021.  She did well and was discharged home on 3/4/2021.  She is recovering from the surgery and she is slowly advancing her diet. Her incisions are slowly healing. She is not having fever or chills.      Patient denies noticing blood in the stool or having any melena. She was not having abdominal pain.     Patient's family history is positive for colon cancer in her mother, diagnosed at the age of 83. She had advanced cancer and  from CHF. No other family members with colon cancer.      REVIEW OF SYSTEMS:  Review of Systems   Constitutional: Positive for fatigue. Negative for fever and unexpected weight change.   HENT: Positive for voice change. Negative for nosebleeds.    Eyes: Negative for visual disturbance.   Respiratory: Negative for cough and shortness of breath.    Cardiovascular: Negative for chest pain and leg swelling.   Gastrointestinal: Positive for abdominal pain. Negative for blood in stool, constipation, diarrhea, nausea and vomiting.   Genitourinary: Negative for frequency and hematuria.   Musculoskeletal: Negative for back pain and joint swelling.   Skin: Negative for rash.   Neurological: Negative for dizziness and headaches.   Hematological: Negative for adenopathy. Does not bruise/bleed easily.   Psychiatric/Behavioral:  Negative for dysphoric mood. The patient is not nervous/anxious.        Past Medical History:   Diagnosis Date   • Anxiety    • Arthritis    • Benign cardiac murmur    • Colon cancer (CMS/HCC)     REASON FOR SURGERY   • Colon polyps    • Constipation    • Depression     LOSS OF SPOUSE 4 YRS AGO.   • Fibrocystic breast    • Generalized stiffness    • Genital herpes    • Graves disease    • Hard to intubate     R/T SMALLER THAN NORMAL ADULT AIRWAY RELATED TO PREVIOUS SURGERY AND SCAR TISSUE.   • Hearing loss     RIGHT WORST THEN LEFT. AS RESULT CHRONIC OTITIS MEDIA. HOLE RIGHT EAR DRUM   • Heart murmur    • History of bipolar disorder     STATES NOT TREATED. NO SAVANNA PHASES   • History of substance abuse (CMS/HCC)    • Hoarseness     DAMAGE TO VOCAL CORD AS CHILD.    • Hyperlipidemia    • Hypertension    • Insomnia    • Joint pain     swelling   • Paralyzed vocal cords     AS CHILD.    • Thyroid disease        Past Surgical History:   Procedure Laterality Date   • ANKLE SURGERY Left     ORIF WITH HARDWARE   • BREAST BIOPSY Right     BENIGN   • CATARACT EXTRACTION W/ INTRAOCULAR LENS IMPLANT      LEFT AND RIGHT   • COLON RESECTION N/A 3/2/2021    Procedure: LAPAROSCOPIC TRANSVERSE COLECTOMY WITH DAVINCI ROBOT;  Surgeon: Martin Calvo MD;  Location: [facility] MAIN OR;  Service: DaVinci;  Laterality: N/A;   • COLONOSCOPY N/A 8/23/2016    Procedure: COLONOSCOPY TO CECUM & T.I.  WITH SALINE INJECTION, HOT SNARE POLYPECTOMY, COLD POLYPECTOMY AND CLIP PLACEMENT X 1;  Surgeon: Darryl Garcia MD;  Location: [facility] ENDOSCOPY;  Service:    • COLONOSCOPY N/A 1/26/2021    Procedure: COLONOSCOPY to cecum with cold biopsyies and cold biopsy polypectomy and tattoo needle injection of colonic ulcer;  Surgeon: Darryl Garcia MD;  Location: [facility] ENDOSCOPY;  Service: Gastroenterology;  Laterality: N/A;  pre: hx of olyps  post:  colonic ulcer, polyp   • COLPORRHAPHY  2008   • EAR TUBES      MEENA. MULTI TIMES   • FOOT SURGERY Right      STRESS FRACTURE. SURGERY X2 WITH HARDWARE IN PLACE   • HAND SURGERY Right     FOR FRACTURE WITH HARDWARE IN PLACE   • HYSTERECTOMY  2005    WITH BLADDER SLING   • OTHER SURGICAL HISTORY  2006    TVT   • REPLACEMENT TOTAL KNEE Right    • SACROCOLPOPEXY  2007    POSTERIOR COLPORRHAPHY, PERINEORRHAPHY     • THROAT SURGERY      childhood-PUNCTURED ESOPHAGUS AT AGE OF 10.  MULTI SURGERY TO AREA   • TOTAL KNEE ARTHROPLASTY  2011       Social History     Socioeconomic History   • Marital status:      Spouse name: Not on file   • Number of children: 5   • Years of education: Not on file   • Highest education level: Not on file   Tobacco Use   • Smoking status: Never Smoker   • Smokeless tobacco: Never Used   Vaping Use   • Vaping Use: Never used   Substance and Sexual Activity   • Alcohol use: Not Currently     Comment: OCASSIONALLY   • Drug use: Not Currently   • Sexual activity: Defer       Family History   Problem Relation Age of Onset   • Hypertension Mother    • Heart failure Mother    • Diabetes Mother    • Colon polyps Mother    • Colon cancer Mother    • Pancreatic cancer Father    • Malig Hyperthermia Neg Hx         MEDICATIONS:    Current Outpatient Medications:   •  ALPRAZolam (XANAX) 0.25 MG tablet, Take 1 tablet by mouth 3 (Three) Times a Day As Needed for Anxiety., Disp: 20 tablet, Rfl: 0  •  amLODIPine (Norvasc) 5 MG tablet, Take 1 tablet by mouth Daily., Disp: 90 tablet, Rfl: 3  •  atorvastatin (LIPITOR) 40 MG tablet, TAKE ONE TABLET BY MOUTH DAILY (Patient taking differently: Take 40 mg by mouth Every Night.), Disp: 90 tablet, Rfl: 2  •  calcium citrate-vitamin d (CITRACAL) 200-250 MG-UNIT tablet tablet, Take 1 tablet by mouth Daily., Disp: , Rfl:   •  docusate sodium (COLACE) 100 MG capsule, Take 100 mg by mouth Daily., Disp: , Rfl:   •  levothyroxine (SYNTHROID, LEVOTHROID) 100 MCG tablet, Take 1 tablet by mouth Daily., Disp: 30 tablet, Rfl: 5  •  omeprazole (priLOSEC) 40 MG capsule, TAKE ONE  "CAPSULE BY MOUTH DAILY (Patient taking differently: Take 40 mg by mouth Daily.), Disp: 90 capsule, Rfl: 3  •  solifenacin (VESIcare) 5 MG tablet, Take 1 tablet by mouth Daily., Disp: 90 tablet, Rfl: 3  •  valACYclovir (VALTREX) 1000 MG tablet, TAKE ONE TABLET BY MOUTH DAILY (Patient taking differently: Take 1,000 mg by mouth 3 (Three) Times a Week. WITH BREAKOUT WILL SWITCH TO DAILY FOR 7 DAYS), Disp: 90 tablet, Rfl: 3  •  celecoxib (CeleBREX) 200 MG capsule, Take 1 capsule by mouth Every 12 (Twelve) Hours., Disp: 8 capsule, Rfl: 0  •  gabapentin (NEURONTIN) 100 MG capsule, Take 1 capsule by mouth 2 (two) times a day., Disp: 24 capsule, Rfl: 0     ALLERGIES:  Allergies   Allergen Reactions   • Codeine Headache     Headache   • Morphine And Related Rash   • Penicillins Rash        Objective   VITAL SIGNS:  Vitals:    03/15/21 1518   BP: 145/77   Pulse: 67   Resp: 16   Temp: 97.7 °F (36.5 °C)   TempSrc: Temporal   SpO2: 98%   Weight: 66.9 kg (147 lb 8 oz)   Height: 148 cm (58.27\")  Comment: new ht   PainSc: 0-No pain       Wt Readings from Last 3 Encounters:   03/15/21 66.9 kg (147 lb 8 oz)   03/09/21 64.9 kg (143 lb)   03/08/21 64.9 kg (143 lb)       PHYSICAL EXAMINATION  GENERAL:  The patient appears in good general condition, not in acute distress.  SKIN: No skin rashes. No ecchymosis.  HEAD:  Normocephalic.  EYES:  No Jaundice. No Pallor. Pupils equal. EOMI.  NECK:  Supple with Good ROM. No Thyroid masses. Lower neck scar. No Masses.  LYMPHATICS:  No cervical or supraclavicular lymphadenopathy.  CHEST: Normal respiratory effort. Lungs clear to auscultation.   CARDIAC:  Normal S1 & S2. No murmur. No edema.  ABDOMEN:  Soft. Mild RLQ tenderness. No Hepatomegaly. No Splenomegaly. No masses. Incisions are healed with no surrounding redness.  EXTREMITIES:  No Calf tenderness.  NEUROLOGICAL:  No Focal neurological deficits.     RESULT REVIEW:   Results from last 7 days   Lab Units 03/15/21  1511   WBC 10*3/mm3 6.54 "   NEUTROS ABS 10*3/mm3 4.06   HEMOGLOBIN g/dL 11.5*   HEMATOCRIT % 35.3   PLATELETS 10*3/mm3 272         Lab Results   Component Value Date    CEA 5.86 02/22/2021     CT abdomen pelvis on 2/22/2021:  1. No evidence for metastatic disease.  2. There are fairy tiny low-density lesions in the liver that are  probably cysts  3. Focal thickening of the distal transverse colon may reflect a  location of the patient's known malignancy. Correlate with colonoscopy      Pathology exam from 3/2/2021:  SPECIMEN   Procedure  Transverse colectomy    TUMOR   Tumor Site  Transverse colon    Histologic Type  Adenocarcinoma    Histologic Grade  G2: Moderately differentiated    Tumor Size  Greatest dimension (Centimeters): 2.1 cm   Additional Dimension (Centimeters)  1.8 cm     0.4 cm   Tumor Extension  Tumor invades through the muscularis propria into pericolorectal tissue    Macroscopic Tumor Perforation  Not identified    Lymphovascular Invasion  Not identified    Perineural Invasion  Not identified    Tumor Budding  Number of tumor buds in one ‘hotspot’ field (total number in area = 0.785 mm2): 1      Low score (0-4)    Type of Polyp in Which Invasive Carcinoma Arose  None identified    Treatment Effect  No known presurgical therapy    MARGINS   Margins  All margins are uninvolved by invasive carcinoma, high grade dysplasia / intramucosal carcinoma, and low grade dysplasia    Margins Examined  Proximal      Distal      Radial (circumferential) or Mesenteric    Distance of Invasive Carcinoma from Closest Margin  2.0 cm   Closest Margin  Cannot be determined: Proximal and distal not designated    Distance of Tumor from Distal Margin  Cannot be determined: At least 2 cm as proximal and distal were not designated    LYMPH NODES   Number of Lymph Nodes Involved  0    Number of Lymph Nodes Examined  14    Tumor Deposits  Not identified    PATHOLOGIC STAGE CLASSIFICATION (pTNM, AJCC 8th Edition)      Primary Tumor (pT)  pT3    Regional  Lymph Nodes (pN)  pN0    .         Assessment/Plan   *Colon cancer arising from the distal transverse colon.  · Patient was found on screening colonoscopy on 1/26/2021 to have a mass in the descending colon.  · She underwent on 3/2/2021 a laparoscopic transverse partial colectomy.  · Pathology exam revealed a  T3 N0 Grade 2 tumor with clear surgical margin. 14 lymph nodes were negative. There was no evidence of lymphovascular or perineural invasion.   · Tumor was MSI stable.  · Testing revealed no KRAS NRAS or BRAF mutations.  · Baseline CEA was elevated at 5.86.  · I had a lengthy discussion with the patient and I explained the pathologic findings. Since the tumor is stage IIA with adequate number of lymph nodes examined and since she does not have any high risk features, I did not recommend adjuvant chemotherapy.  · I recommended monitoring of her CEA since he baseline CEA was elevated.   · I recommended repeating CT scan of the abdomen and pelvis along with a CT of the chest in 6 months.  · I recommended repeating a colonoscopy in 1 year.     *Iron deficiency anemia. This is suspected of being due to blood losses from the colon tumor as well as surgical blood losses.   · Patient's diet is also low in iron rich food.     PLAN:    1.  Increase intake of iron rich food.  2.  Follow up in 3 months with a CBC CMP CEA Ferritin and iron panel.  3.  Plan to obtain CT scan of the chest, abdomen and pelvis in 6 months.       Saul Ziegler MD  03/15/21

## 2021-03-16 ENCOUNTER — BULK ORDERING (OUTPATIENT)
Dept: CASE MANAGEMENT | Facility: OTHER | Age: 66
End: 2021-03-16

## 2021-03-16 DIAGNOSIS — Z23 IMMUNIZATION DUE: ICD-10-CM

## 2021-03-29 RX ORDER — OMEPRAZOLE 40 MG/1
CAPSULE, DELAYED RELEASE ORAL
Qty: 90 CAPSULE | Refills: 2 | Status: SHIPPED | OUTPATIENT
Start: 2021-03-29 | End: 2021-11-10

## 2021-04-05 ENCOUNTER — OFFICE VISIT (OUTPATIENT)
Dept: SURGERY | Facility: CLINIC | Age: 66
End: 2021-04-05

## 2021-04-05 VITALS — HEIGHT: 58 IN | BODY MASS INDEX: 31.07 KG/M2 | WEIGHT: 148 LBS

## 2021-04-05 DIAGNOSIS — C18.4 CANCER OF TRANSVERSE COLON (HCC): Primary | ICD-10-CM

## 2021-04-05 PROCEDURE — 99024 POSTOP FOLLOW-UP VISIT: CPT | Performed by: SURGERY

## 2021-04-05 NOTE — PROGRESS NOTES
Follow-up da Farhat robot assisted transverse colectomy for cancer    Subjective:  No complaints.  Eating well.  No pain.  Wishes to go back to work.  She has been seen by medical oncology and there are no plans for adjuvant chemotherapy.    Objective:  General: Awake and alert, appears comfortable  Abdomen: Soft and benign, incisions are healed nicely    Assessment and plan:  -Transverse colon cancer, T3N0, seen by oncology and no further adjuvant treatment needed.  -Healing well from surgical standpoint, noted plans for CT in about 6 months, she will need colonoscopy in 1 year.  We will place her in our recall system.    Martin aClvo MD  General and Endoscopic Surgery  [facility] [place]    [address], [address]  [place], [place], [address]  P: [phone]  F: [phone]

## 2021-04-30 RX ORDER — LEVOTHYROXINE SODIUM 0.1 MG/1
TABLET ORAL
Qty: 90 TABLET | Refills: 0 | Status: SHIPPED | OUTPATIENT
Start: 2021-04-30 | End: 2021-08-13

## 2021-06-24 DIAGNOSIS — E78.00 HYPERCHOLESTEROLEMIA: Primary | ICD-10-CM

## 2021-06-24 DIAGNOSIS — M81.0 OSTEOPOROSIS, UNSPECIFIED OSTEOPOROSIS TYPE, UNSPECIFIED PATHOLOGICAL FRACTURE PRESENCE: ICD-10-CM

## 2021-06-24 DIAGNOSIS — E03.9 HYPOTHYROIDISM, UNSPECIFIED TYPE: ICD-10-CM

## 2021-06-25 LAB
25(OH)D3+25(OH)D2 SERPL-MCNC: 20 NG/ML (ref 30–100)
ALBUMIN SERPL-MCNC: 4.9 G/DL (ref 3.5–5.2)
ALBUMIN/GLOB SERPL: 2 G/DL
ALP SERPL-CCNC: 79 U/L (ref 39–117)
ALT SERPL-CCNC: 12 U/L (ref 1–33)
APPEARANCE UR: CLEAR
AST SERPL-CCNC: 13 U/L (ref 1–32)
BACTERIA #/AREA URNS HPF: ABNORMAL /HPF
BASOPHILS # BLD AUTO: 0.03 10*3/MM3 (ref 0–0.2)
BASOPHILS NFR BLD AUTO: 0.6 % (ref 0–1.5)
BILIRUB SERPL-MCNC: 0.5 MG/DL (ref 0–1.2)
BILIRUB UR QL STRIP: NEGATIVE
BUN SERPL-MCNC: 21 MG/DL (ref 8–23)
BUN/CREAT SERPL: 25.9 (ref 7–25)
CALCIUM SERPL-MCNC: 9.3 MG/DL (ref 8.6–10.5)
CASTS URNS MICRO: ABNORMAL
CHLORIDE SERPL-SCNC: 103 MMOL/L (ref 98–107)
CHOLEST SERPL-MCNC: 218 MG/DL (ref 0–200)
CO2 SERPL-SCNC: 30.2 MMOL/L (ref 22–29)
COLOR UR: YELLOW
CREAT SERPL-MCNC: 0.81 MG/DL (ref 0.57–1)
EOSINOPHIL # BLD AUTO: 0.13 10*3/MM3 (ref 0–0.4)
EOSINOPHIL NFR BLD AUTO: 2.5 % (ref 0.3–6.2)
EPI CELLS #/AREA URNS HPF: ABNORMAL /HPF
ERYTHROCYTE [DISTWIDTH] IN BLOOD BY AUTOMATED COUNT: 13.1 % (ref 12.3–15.4)
GLOBULIN SER CALC-MCNC: 2.4 GM/DL
GLUCOSE SERPL-MCNC: 97 MG/DL (ref 65–99)
GLUCOSE UR QL: NEGATIVE
HCT VFR BLD AUTO: 39.3 % (ref 34–46.6)
HDLC SERPL-MCNC: 80 MG/DL (ref 40–60)
HGB BLD-MCNC: 12.9 G/DL (ref 12–15.9)
HGB UR QL STRIP: NEGATIVE
IMM GRANULOCYTES # BLD AUTO: 0.02 10*3/MM3 (ref 0–0.05)
IMM GRANULOCYTES NFR BLD AUTO: 0.4 % (ref 0–0.5)
KETONES UR QL STRIP: NEGATIVE
LDLC SERPL CALC-MCNC: 123 MG/DL (ref 0–100)
LEUKOCYTE ESTERASE UR QL STRIP: ABNORMAL
LYMPHOCYTES # BLD AUTO: 1.67 10*3/MM3 (ref 0.7–3.1)
LYMPHOCYTES NFR BLD AUTO: 31.5 % (ref 19.6–45.3)
MCH RBC QN AUTO: 28.3 PG (ref 26.6–33)
MCHC RBC AUTO-ENTMCNC: 32.8 G/DL (ref 31.5–35.7)
MCV RBC AUTO: 86.2 FL (ref 79–97)
MONOCYTES # BLD AUTO: 0.43 10*3/MM3 (ref 0.1–0.9)
MONOCYTES NFR BLD AUTO: 8.1 % (ref 5–12)
NEUTROPHILS # BLD AUTO: 3.02 10*3/MM3 (ref 1.7–7)
NEUTROPHILS NFR BLD AUTO: 56.9 % (ref 42.7–76)
NITRITE UR QL STRIP: POSITIVE
NRBC BLD AUTO-RTO: 0 /100 WBC (ref 0–0.2)
PH UR STRIP: 7 [PH] (ref 5–8)
PLATELET # BLD AUTO: 234 10*3/MM3 (ref 140–450)
POTASSIUM SERPL-SCNC: 4 MMOL/L (ref 3.5–5.2)
PROT SERPL-MCNC: 7.3 G/DL (ref 6–8.5)
PROT UR QL STRIP: NEGATIVE
RBC # BLD AUTO: 4.56 10*6/MM3 (ref 3.77–5.28)
RBC #/AREA URNS HPF: ABNORMAL /HPF
SODIUM SERPL-SCNC: 143 MMOL/L (ref 136–145)
SP GR UR: 1.01 (ref 1–1.03)
TRIGL SERPL-MCNC: 88 MG/DL (ref 0–150)
TSH SERPL DL<=0.005 MIU/L-ACNC: 3.79 UIU/ML (ref 0.27–4.2)
UROBILINOGEN UR STRIP-MCNC: ABNORMAL MG/DL
VLDLC SERPL CALC-MCNC: 15 MG/DL (ref 5–40)
WBC # BLD AUTO: 5.3 10*3/MM3 (ref 3.4–10.8)
WBC #/AREA URNS HPF: ABNORMAL /HPF

## 2021-06-28 ENCOUNTER — TELEPHONE (OUTPATIENT)
Dept: ONCOLOGY | Facility: CLINIC | Age: 66
End: 2021-06-28

## 2021-06-28 RX ORDER — ATORVASTATIN CALCIUM 40 MG/1
TABLET, FILM COATED ORAL
Qty: 90 TABLET | Refills: 1 | Status: SHIPPED | OUTPATIENT
Start: 2021-06-28 | End: 2021-11-09 | Stop reason: SDUPTHER

## 2021-06-28 NOTE — TELEPHONE ENCOUNTER
Caller: Marie Foy    Relationship to patient: Self    Best call back number: [phone]    Chief complaint: SEVERINO.    Type of visit: FOLLOW UP     If rescheduling, when is the original appointment: 6/22/2021    Additional notes: RECENTLY HAD LABS FOR ANOTHER DOCTOR.

## 2021-06-30 ENCOUNTER — OFFICE VISIT (OUTPATIENT)
Dept: INTERNAL MEDICINE | Facility: CLINIC | Age: 66
End: 2021-06-30

## 2021-06-30 VITALS
OXYGEN SATURATION: 97 % | HEART RATE: 68 BPM | BODY MASS INDEX: 30.86 KG/M2 | HEIGHT: 58 IN | WEIGHT: 147 LBS | DIASTOLIC BLOOD PRESSURE: 78 MMHG | SYSTOLIC BLOOD PRESSURE: 148 MMHG

## 2021-06-30 DIAGNOSIS — C18.6 MALIGNANT NEOPLASM OF DESCENDING COLON (HCC): ICD-10-CM

## 2021-06-30 DIAGNOSIS — E03.9 HYPOTHYROIDISM, UNSPECIFIED TYPE: ICD-10-CM

## 2021-06-30 DIAGNOSIS — Z00.00 MEDICARE ANNUAL WELLNESS VISIT, SUBSEQUENT: Primary | ICD-10-CM

## 2021-06-30 DIAGNOSIS — I10 ESSENTIAL (PRIMARY) HYPERTENSION: ICD-10-CM

## 2021-06-30 DIAGNOSIS — E78.00 HYPERCHOLESTEROLEMIA: ICD-10-CM

## 2021-06-30 PROCEDURE — 99214 OFFICE O/P EST MOD 30 MIN: CPT | Performed by: INTERNAL MEDICINE

## 2021-06-30 PROCEDURE — G0439 PPPS, SUBSEQ VISIT: HCPCS | Performed by: INTERNAL MEDICINE

## 2021-06-30 NOTE — PROGRESS NOTES
The ABCs of the Annual Wellness Visit  Subsequent Medicare Wellness Visit    Chief Complaint   Patient presents with   • Medicare Wellness-subsequent       Subjective   History of Present Illness:  Marie Foy is a 66 y.o. female who presents for a Subsequent Medicare Wellness Visit.    The following data was reviewed by: Michelle Wyman MD on 06/30/2021:  Common labs    Common Labsle 3/3/21 3/3/21 3/15/21 6/25/21 6/25/21 6/25/21    0800 0800  1051 1051 1051   Glucose  97       Glucose     97    BUN  10   21    Creatinine  0.88   0.81    eGFR Non  Am  64   71    eGFR African Am     85    Sodium  139   143    Potassium  3.7   4.0    Chloride  107   103    Calcium  7.5 (A)   9.3    Total Protein     7.3    Albumin     4.90    Total Bilirubin     0.5    Alkaline Phosphatase     79    AST (SGOT)     13    ALT (SGPT)     12    WBC 8.32  6.54 5.30     Hemoglobin 11.1 (A)  11.5 (A) 12.9     Hematocrit 32.7 (A)  35.3 39.3     Platelets 210  272 234     Total Cholesterol      218 (A)   Triglycerides      88   HDL Cholesterol      80 (A)   LDL Cholesterol       123 (A)   (A) Abnormal value       Comments are available for some flowsheets but are not being displayed.           HTN.  BP a little high today.  Discussed checks at home.  HLD.  Fair control.  Hypothyroidism.  Control is good.   Colon cancer.  Doing well after resection.     HEALTH RISK ASSESSMENT    Recent Hospitalizations:  Recently treated at the following:  [facility]    Current Medical Providers:  Patient Care Team:  Michelle Wyman MD as PCP - General  Michelle Wyman MD as PCP - Family Medicine  Martin Calvo MD as Referring Physician (General Surgery)  Saul Ziegler MD as Consulting Physician (Hematology and Oncology)    Smoking Status:  Social History     Tobacco Use   Smoking Status Never Smoker   Smokeless Tobacco Never Used       Alcohol Consumption:  Social History     Substance and Sexual Activity   Alcohol Use  Not Currently    Comment: OCASSIONALLY       Depression Screen:   PHQ-2/PHQ-9 Depression Screening 6/30/2021   Little interest or pleasure in doing things 0   Feeling down, depressed, or hopeless 0   Trouble falling or staying asleep, or sleeping too much -   Feeling tired or having little energy -   Poor appetite or overeating -   Feeling bad about yourself - or that you are a failure or have let yourself or your family down -   Trouble concentrating on things, such as reading the newspaper or watching television -   Moving or speaking so slowly that other people could have noticed. Or the opposite - being so fidgety or restless that you have been moving around a lot more than usual -   Thoughts that you would be better off dead, or of hurting yourself in some way -   Total Score 0   If you checked off any problems, how difficult have these problems made it for you to do your work, take care of things at home, or get along with other people? -       Fall Risk Screen:  STEW Fall Risk Assessment was completed, and patient is at LOW risk for falls.Assessment completed on:6/30/2021    Health Habits and Functional and Cognitive Screening:  Functional & Cognitive Status 6/23/2020   Do you have difficulty preparing food and eating? No   Do you have difficulty bathing yourself, getting dressed or grooming yourself? No   Do you have difficulty using the toilet? No   Do you have difficulty moving around from place to place? No   Do you have trouble with steps or getting out of a bed or a chair? No   Current Diet Low Fat Diet   Dental Exam Not up to date   Eye Exam Not up to date   Exercise (times per week) 5 times per week   Current Exercise Activities Include Walking   Do you need help using the phone?  No   Are you deaf or do you have serious difficulty hearing?  Yes   Do you need help with transportation? No   Do you need help shopping? No   Do you need help preparing meals?  No   Do you need help with housework?  No    Do you need help with laundry? No   Do you need help taking your medications? No   Do you need help managing money? No   Do you ever drive or ride in a car without wearing a seat belt? No         Does the patient have evidence of cognitive impairment? No    Asprin use counseling:Does not need ASA (and currently is not on it)    Age-appropriate Screening Schedule:  Refer to the list below for future screening recommendations based on patient's age, sex and/or medical conditions. Orders for these recommended tests are listed in the plan section. The patient has been provided with a written plan.    Health Maintenance   Topic Date Due   • ZOSTER VACCINE (2 of 3) 12/09/2013   • INFLUENZA VACCINE  08/01/2021   • DXA SCAN  06/19/2022   • LIPID PANEL  06/25/2022   • MAMMOGRAM  09/25/2022   • TDAP/TD VACCINES (2 - Td or Tdap) 10/20/2024   • COLONOSCOPY  01/26/2026   • PAP SMEAR  Discontinued          The following portions of the patient's history were reviewed and updated as appropriate: allergies, current medications, past family history, past medical history, past social history, past surgical history and problem list.    Outpatient Medications Prior to Visit   Medication Sig Dispense Refill   • ALPRAZolam (XANAX) 0.25 MG tablet Take 1 tablet by mouth 3 (Three) Times a Day As Needed for Anxiety. 20 tablet 0   • amLODIPine (Norvasc) 5 MG tablet Take 1 tablet by mouth Daily. 90 tablet 3   • atorvastatin (LIPITOR) 40 MG tablet TAKE ONE TABLET BY MOUTH DAILY 90 tablet 1   • calcium citrate-vitamin d (CITRACAL) 200-250 MG-UNIT tablet tablet Take 1 tablet by mouth Daily.     • docusate sodium (COLACE) 100 MG capsule Take 100 mg by mouth Daily.     • Ferrous Sulfate 28 MG tablet Take  by mouth.     • levothyroxine (SYNTHROID, LEVOTHROID) 100 MCG tablet TAKE ONE TABLET BY MOUTH DAILY 90 tablet 0   • omeprazole (priLOSEC) 40 MG capsule TAKE ONE CAPSULE BY MOUTH DAILY 90 capsule 2   • solifenacin (VESIcare) 5 MG tablet Take 1  "tablet by mouth Daily. 90 tablet 3   • valACYclovir (VALTREX) 1000 MG tablet TAKE ONE TABLET BY MOUTH DAILY (Patient taking differently: Take 1,000 mg by mouth 3 (Three) Times a Week. WITH BREAKOUT WILL SWITCH TO DAILY FOR 7 DAYS) 90 tablet 3   • celecoxib (CeleBREX) 200 MG capsule Take 1 capsule by mouth Every 12 (Twelve) Hours. 8 capsule 0   • gabapentin (NEURONTIN) 100 MG capsule Take 1 capsule by mouth 2 (two) times a day. 24 capsule 0     No facility-administered medications prior to visit.       Patient Active Problem List   Diagnosis   • Anxiety   • Gastroesophageal reflux disease   • Herpes simplex type 2 infection   • Hypercholesterolemia   • Hypothyroidism   • Overactive bladder   • Fear of flying   • Unilateral partial paralysis of vocal cords or larynx   • Colon polyps   • Essential (primary) hypertension   • Primary localized osteoarthrosis of left lower leg   • Osteoporosis   • Malignant neoplasm of descending colon (CMS/HCC)       Advanced Care Planning:  ACP discussion was held with the patient during this visit. Patient has an advance directive in EMR which is still valid.     Review of Systems   Respiratory: Negative.    Cardiovascular: Negative.        Compared to one year ago, the patient feels her physical health is the same.  Compared to one year ago, the patient feels her mental health is the same.    Reviewed chart for potential of high risk medication in the elderly: yes  Reviewed chart for potential of harmful drug interactions in the elderly:yes    Objective         Vitals:    06/30/21 1046   BP: 148/78   Pulse: 68   SpO2: 97%   Weight: 66.7 kg (147 lb)   Height: 148 cm (58.27\")   PainSc: 0-No pain       Body mass index is 30.44 kg/m².  Discussed the patient's BMI with her. The BMI is above average; BMI management plan is completed.    Physical Exam  Constitutional:       Appearance: She is well-developed.   HENT:      Head: Normocephalic and atraumatic.      Right Ear: Hearing, tympanic " membrane and external ear normal.      Left Ear: Hearing, tympanic membrane and external ear normal.      Nose: Nose normal.   Neck:      Thyroid: No thyromegaly.   Cardiovascular:      Rate and Rhythm: Normal rate and regular rhythm.      Heart sounds: Normal heart sounds. No murmur heard.     Pulmonary:      Effort: Pulmonary effort is normal.      Breath sounds: Normal breath sounds.   Chest:      Breasts:         Right: No mass.         Left: No mass.   Abdominal:      General: There is no distension.      Palpations: Abdomen is soft.      Tenderness: There is no abdominal tenderness.   Musculoskeletal:      Cervical back: Neck supple.   Lymphadenopathy:      Cervical: No cervical adenopathy.   Skin:     General: Skin is warm and dry.   Neurological:      Mental Status: She is alert and oriented to person, place, and time.   Psychiatric:         Speech: Speech normal.         Behavior: Behavior normal.         Thought Content: Thought content normal.         Judgment: Judgment normal.         Lab Results   Component Value Date    GLU 97 06/25/2021    CHLPL 218 (H) 06/25/2021    TRIG 88 06/25/2021    HDL 80 (H) 06/25/2021     (H) 06/25/2021    VLDL 15 06/25/2021        Assessment/Plan   Medicare Risks and Personalized Health Plan  CMS Preventative Services Quick Reference  Breast Cancer/Mammogram Screening  Immunizations Discussed/Encouraged (specific immunizations; Shingrix )    The above risks/problems have been discussed with the patient.  Pertinent information has been shared with the patient in the After Visit Summary.  Follow up plans and orders are seen below in the Assessment/Plan Section.    Diagnoses and all orders for this visit:    1. Medicare annual wellness visit, subsequent (Primary)    2. Essential (primary) hypertension    3. Hypercholesterolemia    4. Hypothyroidism, unspecified type    5. Malignant neoplasm of descending colon (CMS/HCC)    HTN.  Control is not optimal.  The patient is  advised to continue current dosage of norvasc.  The patient is instructed to monitor at home and call in checks.    HLD.  Control is good.  The patient is advised to continue current dosage of atorvastatin.  Hypothyroidism.  Control is good.  The patient is advised to continue current dosage of levothyroxine.            Follow Up:  Return in about 6 months (around 12/30/2021) for Recheck.     An After Visit Summary and PPPS were given to the patient.

## 2021-06-30 NOTE — PATIENT INSTRUCTIONS
Medicare Wellness  Personal Prevention Plan of Service     Date of Office Visit:  2021  Encounter Provider:  Michelle Wyman MD  Place of Service:  [facility]  Patient Name: Marie Foy  :  1955    As part of the Medicare Wellness portion of your visit today, we are providing you with this personalized preventive plan of services (PPPS). This plan is based upon recommendations of the [place] Preventive Services Task Force (USPSTF) and the Advisory Committee on Immunization Practices (ACIP).    This lists the preventive care services that should be considered, and provides dates of when you are due. Items listed as completed are up-to-date and do not require any further intervention.    Health Maintenance   Topic Date Due   • COVID-19 Vaccine (1) Never done   • ZOSTER VACCINE (2 of 3) 2013   • ANNUAL WELLNESS VISIT  2021   • INFLUENZA VACCINE  2021   • DXA SCAN  2022   • LIPID PANEL  2022   • MAMMOGRAM  2022   • TDAP/TD VACCINES (2 - Td or Tdap) 10/20/2024   • COLONOSCOPY  2026   • HEPATITIS C SCREENING  Completed   • Pneumococcal Vaccine 65+  Completed   • PAP SMEAR  Discontinued       No orders of the defined types were placed in this encounter.      No follow-ups on file.

## 2021-07-20 DIAGNOSIS — M81.0 OSTEOPOROSIS, UNSPECIFIED OSTEOPOROSIS TYPE, UNSPECIFIED PATHOLOGICAL FRACTURE PRESENCE: Primary | ICD-10-CM

## 2021-07-22 ENCOUNTER — LAB (OUTPATIENT)
Dept: OTHER | Facility: HOSPITAL | Age: 66
End: 2021-07-22

## 2021-07-22 ENCOUNTER — INFUSION (OUTPATIENT)
Dept: ONCOLOGY | Facility: HOSPITAL | Age: 66
End: 2021-07-22

## 2021-07-22 VITALS — RESPIRATION RATE: 18 BRPM | HEIGHT: 58 IN | BODY MASS INDEX: 31.26 KG/M2 | TEMPERATURE: 98.4 F

## 2021-07-22 DIAGNOSIS — M81.0 OSTEOPOROSIS, UNSPECIFIED OSTEOPOROSIS TYPE, UNSPECIFIED PATHOLOGICAL FRACTURE PRESENCE: Primary | ICD-10-CM

## 2021-07-22 PROCEDURE — 80053 COMPREHEN METABOLIC PANEL: CPT | Performed by: INTERNAL MEDICINE

## 2021-07-22 PROCEDURE — 84100 ASSAY OF PHOSPHORUS: CPT | Performed by: INTERNAL MEDICINE

## 2021-07-22 PROCEDURE — 25010000002 DENOSUMAB 60 MG/ML SOLUTION PREFILLED SYRINGE: Performed by: INTERNAL MEDICINE

## 2021-07-22 PROCEDURE — 83735 ASSAY OF MAGNESIUM: CPT | Performed by: INTERNAL MEDICINE

## 2021-07-22 PROCEDURE — 96372 THER/PROPH/DIAG INJ SC/IM: CPT

## 2021-07-22 RX ADMIN — DENOSUMAB 60 MG: 60 INJECTION SUBCUTANEOUS at 15:33

## 2021-07-27 RX ORDER — AMLODIPINE BESYLATE 5 MG/1
TABLET ORAL
Qty: 90 TABLET | Refills: 2 | Status: SHIPPED | OUTPATIENT
Start: 2021-07-27 | End: 2022-07-06

## 2021-07-30 ENCOUNTER — TELEPHONE (OUTPATIENT)
Dept: ONCOLOGY | Facility: CLINIC | Age: 66
End: 2021-07-30

## 2021-07-30 NOTE — TELEPHONE ENCOUNTER
Caller: PT    Relationship to patient: SELF    Best call back number: [phone] OK TO LEAVE A  MSG     Chief complaint: RESCHEDULE    Type of visit: LAB AND F/U     Requested date: 8/5, 8/9, 8/19 AND 8/23 ARE HER DAYS OFF.     If rescheduling, when is the original appointment: 8/2    Additional notes: PT DOES NOT WANT EARLY MORNING PREFER AFTER 10AM OR LATER. APPT SHE WORKS SECOND SHIFT.

## 2021-08-02 ENCOUNTER — APPOINTMENT (OUTPATIENT)
Dept: LAB | Facility: HOSPITAL | Age: 66
End: 2021-08-02

## 2021-08-13 RX ORDER — LEVOTHYROXINE SODIUM 0.1 MG/1
TABLET ORAL
Qty: 90 TABLET | Refills: 0 | Status: SHIPPED | OUTPATIENT
Start: 2021-08-13 | End: 2021-11-09 | Stop reason: SDUPTHER

## 2021-08-19 ENCOUNTER — LAB (OUTPATIENT)
Dept: LAB | Facility: HOSPITAL | Age: 66
End: 2021-08-19

## 2021-08-19 ENCOUNTER — OFFICE VISIT (OUTPATIENT)
Dept: ONCOLOGY | Facility: CLINIC | Age: 66
End: 2021-08-19

## 2021-08-19 VITALS
HEART RATE: 65 BPM | RESPIRATION RATE: 16 BRPM | SYSTOLIC BLOOD PRESSURE: 146 MMHG | OXYGEN SATURATION: 94 % | DIASTOLIC BLOOD PRESSURE: 81 MMHG | HEIGHT: 58 IN | BODY MASS INDEX: 32.37 KG/M2 | WEIGHT: 154.2 LBS | TEMPERATURE: 96.8 F

## 2021-08-19 DIAGNOSIS — C18.6 MALIGNANT NEOPLASM OF DESCENDING COLON (HCC): Primary | ICD-10-CM

## 2021-08-19 DIAGNOSIS — D50.0 IRON DEFICIENCY ANEMIA DUE TO CHRONIC BLOOD LOSS: ICD-10-CM

## 2021-08-19 DIAGNOSIS — C18.6 MALIGNANT NEOPLASM OF DESCENDING COLON (HCC): ICD-10-CM

## 2021-08-19 LAB
ALBUMIN SERPL-MCNC: 4.6 G/DL (ref 3.5–5.2)
ALBUMIN/GLOB SERPL: 1.9 G/DL (ref 1.1–2.4)
ALP SERPL-CCNC: 68 U/L (ref 38–116)
ALT SERPL W P-5'-P-CCNC: 19 U/L (ref 0–33)
ANION GAP SERPL CALCULATED.3IONS-SCNC: 8.2 MMOL/L (ref 5–15)
AST SERPL-CCNC: 19 U/L (ref 0–32)
BASOPHILS # BLD AUTO: 0.03 10*3/MM3 (ref 0–0.2)
BASOPHILS NFR BLD AUTO: 0.6 % (ref 0–1.5)
BILIRUB SERPL-MCNC: 0.4 MG/DL (ref 0.2–1.2)
BUN SERPL-MCNC: 20 MG/DL (ref 6–20)
BUN/CREAT SERPL: 24.4 (ref 7.3–30)
CALCIUM SPEC-SCNC: 9.5 MG/DL (ref 8.5–10.2)
CEA SERPL-MCNC: 2.59 NG/ML
CHLORIDE SERPL-SCNC: 105 MMOL/L (ref 98–107)
CO2 SERPL-SCNC: 28.8 MMOL/L (ref 22–29)
CREAT SERPL-MCNC: 0.82 MG/DL (ref 0.6–1.1)
DEPRECATED RDW RBC AUTO: 44.5 FL (ref 37–54)
EOSINOPHIL # BLD AUTO: 0.15 10*3/MM3 (ref 0–0.4)
EOSINOPHIL NFR BLD AUTO: 2.9 % (ref 0.3–6.2)
ERYTHROCYTE [DISTWIDTH] IN BLOOD BY AUTOMATED COUNT: 13.4 % (ref 12.3–15.4)
FERRITIN SERPL-MCNC: 50.4 NG/ML (ref 13–150)
GFR SERPL CREATININE-BSD FRML MDRD: 69 ML/MIN/1.73
GLOBULIN UR ELPH-MCNC: 2.4 GM/DL (ref 1.8–3.5)
GLUCOSE SERPL-MCNC: 98 MG/DL (ref 74–124)
HCT VFR BLD AUTO: 37.5 % (ref 34–46.6)
HGB BLD-MCNC: 12.1 G/DL (ref 12–15.9)
IMM GRANULOCYTES # BLD AUTO: 0.01 10*3/MM3 (ref 0–0.05)
IMM GRANULOCYTES NFR BLD AUTO: 0.2 % (ref 0–0.5)
IRON 24H UR-MRATE: 45 MCG/DL (ref 37–145)
IRON SATN MFR SERPL: 12 % (ref 14–48)
LYMPHOCYTES # BLD AUTO: 1.62 10*3/MM3 (ref 0.7–3.1)
LYMPHOCYTES NFR BLD AUTO: 31.1 % (ref 19.6–45.3)
MCH RBC QN AUTO: 29.2 PG (ref 26.6–33)
MCHC RBC AUTO-ENTMCNC: 32.3 G/DL (ref 31.5–35.7)
MCV RBC AUTO: 90.6 FL (ref 79–97)
MONOCYTES # BLD AUTO: 0.47 10*3/MM3 (ref 0.1–0.9)
MONOCYTES NFR BLD AUTO: 9 % (ref 5–12)
NEUTROPHILS NFR BLD AUTO: 2.93 10*3/MM3 (ref 1.7–7)
NEUTROPHILS NFR BLD AUTO: 56.2 % (ref 42.7–76)
NRBC BLD AUTO-RTO: 0 /100 WBC (ref 0–0.2)
PLATELET # BLD AUTO: 214 10*3/MM3 (ref 140–450)
PMV BLD AUTO: 9 FL (ref 6–12)
POTASSIUM SERPL-SCNC: 5.1 MMOL/L (ref 3.5–4.7)
PROT SERPL-MCNC: 7 G/DL (ref 6.3–8)
RBC # BLD AUTO: 4.14 10*6/MM3 (ref 3.77–5.28)
SODIUM SERPL-SCNC: 142 MMOL/L (ref 134–145)
TIBC SERPL-MCNC: 382 MCG/DL (ref 249–505)
TRANSFERRIN SERPL-MCNC: 273 MG/DL (ref 200–360)
WBC # BLD AUTO: 5.21 10*3/MM3 (ref 3.4–10.8)

## 2021-08-19 PROCEDURE — 82378 CARCINOEMBRYONIC ANTIGEN: CPT | Performed by: INTERNAL MEDICINE

## 2021-08-19 PROCEDURE — 80053 COMPREHEN METABOLIC PANEL: CPT

## 2021-08-19 PROCEDURE — 83540 ASSAY OF IRON: CPT

## 2021-08-19 PROCEDURE — 82728 ASSAY OF FERRITIN: CPT

## 2021-08-19 PROCEDURE — 85025 COMPLETE CBC W/AUTO DIFF WBC: CPT

## 2021-08-19 PROCEDURE — 84466 ASSAY OF TRANSFERRIN: CPT

## 2021-08-19 PROCEDURE — 99214 OFFICE O/P EST MOD 30 MIN: CPT | Performed by: INTERNAL MEDICINE

## 2021-08-19 PROCEDURE — 36415 COLL VENOUS BLD VENIPUNCTURE: CPT

## 2021-08-19 NOTE — PROGRESS NOTES
Subjective     CHIEF COMPLAINT:      Chief Complaint   Patient presents with   • Follow-up     no concerns       HISTORY OF PRESENT ILLNESS:     Marie Foy is a 66 y.o. female patient who returns today for follow up on her colon cancer and iron deficiency anemia.  She is currently on ferrous sulfate 28 mg of elemental iron 4 days a week.  She is tolerating this dose without problem with constipation or upset stomach.  Bowel habit is alternating between normal and constipation.    Patient denies abdominal pain.  No blood in the stool.    Patient has chronic hoarseness of voice secondary to trauma when she was a child that resulted in unilateral partial paralysis of the vocal cord.    ROS:  Pertinent ROS is in the HPI.     Past medical, surgical, social and family history were reviewed.     MEDICATIONS:    Current Outpatient Medications:   •  ALPRAZolam (XANAX) 0.25 MG tablet, Take 1 tablet by mouth 3 (Three) Times a Day As Needed for Anxiety., Disp: 20 tablet, Rfl: 0  •  amLODIPine (NORVASC) 5 MG tablet, TAKE ONE TABLET BY MOUTH DAILY, Disp: 90 tablet, Rfl: 2  •  atorvastatin (LIPITOR) 40 MG tablet, TAKE ONE TABLET BY MOUTH DAILY, Disp: 90 tablet, Rfl: 1  •  calcium citrate-vitamin d (CITRACAL) 200-250 MG-UNIT tablet tablet, Take 1 tablet by mouth Daily., Disp: , Rfl:   •  docusate sodium (COLACE) 100 MG capsule, Take 100 mg by mouth Daily., Disp: , Rfl:   •  Ferrous Sulfate 28 MG tablet, Take  by mouth., Disp: , Rfl:   •  levothyroxine (SYNTHROID, LEVOTHROID) 100 MCG tablet, TAKE ONE TABLET BY MOUTH DAILY, Disp: 90 tablet, Rfl: 0  •  omeprazole (priLOSEC) 40 MG capsule, TAKE ONE CAPSULE BY MOUTH DAILY, Disp: 90 capsule, Rfl: 2  •  solifenacin (VESIcare) 5 MG tablet, Take 1 tablet by mouth Daily., Disp: 90 tablet, Rfl: 3  •  valACYclovir (VALTREX) 1000 MG tablet, TAKE ONE TABLET BY MOUTH DAILY (Patient taking differently: Take 1,000 mg by mouth 3 (Three) Times a Week. WITH BREAKOUT WILL SWITCH TO DAILY FOR 7  "DAYS), Disp: 90 tablet, Rfl: 3    Objective   VITAL SIGNS:     Vitals:    08/19/21 1259   BP: 146/81   Pulse: 65   Resp: 16   Temp: 96.8 °F (36 °C)   TempSrc: Temporal   SpO2: 94%   Weight: 69.9 kg (154 lb 3.2 oz)   Height: 148 cm (58.27\")   PainSc: 0-No pain     Body mass index is 31.93 kg/m².     Wt Readings from Last 5 Encounters:   08/19/21 69.9 kg (154 lb 3.2 oz)   06/30/21 66.7 kg (147 lb)   04/05/21 67.1 kg (148 lb)   03/15/21 66.9 kg (147 lb 8 oz)   03/09/21 64.9 kg (143 lb)       PHYSICAL EXAMINATION:   GENERAL: The patient appears in good general condition, not in acute distress.   SKIN: No ecchymosis.  EYES: No jaundice.  LYMPHATICS: No cervical lymphadenopathy.  CHEST: Normal respiratory effort.  Lungs clear bilaterally.  No added sounds.  CVS: Normal S1-S2.  No murmurs.  ABDOMEN: Incisions are well-healed.  Abdomen is soft. No tenderness. No Hepatomegaly. No Splenomegaly. No masses.  EXTREMITIES: No edema or calf tenderness    DIAGNOSTIC DATA:     Results from last 7 days   Lab Units 08/19/21  1245   WBC 10*3/mm3 5.21   NEUTROS ABS 10*3/mm3 2.93   HEMOGLOBIN g/dL 12.1   HEMATOCRIT % 37.5   PLATELETS 10*3/mm3 214     Results from last 7 days   Lab Units 08/19/21  1245   SODIUM mmol/L 142   POTASSIUM mmol/L 5.1*   CHLORIDE mmol/L 105   CO2 mmol/L 28.8   BUN mg/dL 20   CREATININE mg/dL 0.82   CALCIUM mg/dL 9.5   ALBUMIN g/dL 4.60   BILIRUBIN mg/dL 0.4   ALK PHOS U/L 68   ALT (SGPT) U/L 19   AST (SGOT) U/L 19   GLUCOSE mg/dL 98     Lab Results   Component Value Date    CEA 2.59 08/19/2021    CEA 5.86 02/22/2021      Component      Latest Ref Rng & Units 8/19/2021   Iron      37 - 145 mcg/dL 45   Iron Saturation      14 - 48 % 12 (L)   Transferrin      200 - 360 mg/dL 273   TIBC      249 - 505 mcg/dL 382   Ferritin      13.00 - 150.00 ng/mL 50.40       Assessment/Plan   *Colon cancer arising from the distal transverse colon.  · Patient was found on screening colonoscopy on 1/26/2021 to have a mass in the " descending colon.  · She underwent on 3/2/2021 a laparoscopic transverse partial colectomy.  · Pathology exam revealed a  T3 N0 Grade 2 tumor with clear surgical margin. 14 lymph nodes were negative. There was no evidence of lymphovascular or perineural invasion.   · Tumor was MSI stable.  · Testing revealed no KRAS NRAS or BRAF mutations.  · Baseline CEA was elevated at 5.86.  · Since the tumor is stage IIA with adequate number of lymph nodes examined and with no high risk features, adjuvant chemotherapy was not recommended.  · Monitoring of CEA was recommended since the baseline CEA was elevated.  · CEA is normal today at 2.59.  · I recommended repeating CT scan in 3 months  · I recommended repeating colonoscopy 1 year from her surgery date (around 3/2/2022).    *Iron deficiency anemia.   · It is attributed to blood losses from the colon cancer and from surgical blood losses.    · Hemoglobin was 11.1 on 3/3/2021.  · Patient's diet was low in iron rich food.    · We recommended increasing her intake of iron rich food.  · Patient started on 4/5/2021 ferrous sulfate 28 mg elemental iron 4 days a week.  · Hemoglobin improved to 12.1 today.    · Anemia work-up from today showed low transferrin saturation of 12%.  · Since her anemia is improving, I recommended continuing the iron at the same dose.      PLAN:    1.  Continue ferrous sulfate 4 days weekly.   2.  Follow-up in 3 months.  We will obtain CT scan of the chest abdomen pelvis 1 week before her return visit.   3.  CBC CMP CEA ferritin iron panel will be obtained at her return visit.         Saul Ziegler MD  08/19/21

## 2021-09-02 ENCOUNTER — OFFICE VISIT (OUTPATIENT)
Dept: ORTHOPEDIC SURGERY | Facility: CLINIC | Age: 66
End: 2021-09-02

## 2021-09-02 VITALS — BODY MASS INDEX: 32.32 KG/M2 | TEMPERATURE: 98.2 F | HEIGHT: 58 IN | WEIGHT: 154 LBS

## 2021-09-02 DIAGNOSIS — M17.12 PRIMARY LOCALIZED OSTEOARTHROSIS OF LEFT LOWER LEG: Primary | ICD-10-CM

## 2021-09-02 DIAGNOSIS — R52 PAIN: ICD-10-CM

## 2021-09-02 PROCEDURE — 20610 DRAIN/INJ JOINT/BURSA W/O US: CPT | Performed by: ORTHOPAEDIC SURGERY

## 2021-09-02 PROCEDURE — 99213 OFFICE O/P EST LOW 20 MIN: CPT | Performed by: ORTHOPAEDIC SURGERY

## 2021-09-02 PROCEDURE — 73562 X-RAY EXAM OF KNEE 3: CPT | Performed by: ORTHOPAEDIC SURGERY

## 2021-09-02 RX ORDER — METHYLPREDNISOLONE ACETATE 80 MG/ML
80 INJECTION, SUSPENSION INTRA-ARTICULAR; INTRALESIONAL; INTRAMUSCULAR; SOFT TISSUE
Status: COMPLETED | OUTPATIENT
Start: 2021-09-02 | End: 2021-09-02

## 2021-09-02 RX ADMIN — METHYLPREDNISOLONE ACETATE 80 MG: 80 INJECTION, SUSPENSION INTRA-ARTICULAR; INTRALESIONAL; INTRAMUSCULAR; SOFT TISSUE at 15:45

## 2021-09-02 NOTE — PROGRESS NOTES
"Knee Exam      Patient: Marie Foy    YOB: 1955 66 y.o. female    Chief Complaints: knee hurts    History of Present Illness: Patient was last seen in May 2020 after injuring her left knee in April 2020 when she fell from a low step ladder and struck her knee on a toilet and had been using a walker with some improvement but without resolution.  Prior to that she had some intermittent aching pain in her left knee after sitting for prolonged period of times and has a known history of arthritis and had seen Dr. To for this in the past had previous right knee replacement by Dr. Hamlin.    She had MRI of her knee at that time which showed tricompartmental arthritis with degenerative tear the medial meniscus and a 4.2 cm hematoma over the anteromedial aspect of the tibial metaphysis.  I subsequently spoke with her on the phone on 5/27/2020 and 6/1/2020 at which time she said her knee was feeling \"fine\" and not having any pain or swelling and was allowed to return to work.    Over the last several months she has had recurrence of moderate to severe aching pain in the left knee with swelling and stiffness worse with standing sitting working etc.      HPI    ROS: knee pain  Past Medical History:   Diagnosis Date   • Anxiety    • Arthritis    • Benign cardiac murmur    • Colon cancer (CMS/HCC)     REASON FOR SURGERY   • Colon polyps    • Constipation    • Depression     LOSS OF SPOUSE 4 YRS AGO.   • Fibrocystic breast    • Generalized stiffness    • Genital herpes    • Graves disease    • Hard to intubate     R/T SMALLER THAN NORMAL ADULT AIRWAY RELATED TO PREVIOUS SURGERY AND SCAR TISSUE.   • Hearing loss     RIGHT WORST THEN LEFT. AS RESULT CHRONIC OTITIS MEDIA. HOLE RIGHT EAR DRUM   • Heart murmur    • History of bipolar disorder     STATES NOT TREATED. NO SAVANNA PHASES   • History of substance abuse (CMS/HCC)    • Hoarseness     DAMAGE TO VOCAL CORD AS CHILD.    • Hyperlipidemia    • Hypertension  " "  • Insomnia    • Joint pain     swelling   • Paralyzed vocal cords     AS CHILD.    • Thyroid disease        Physical Exam:   Vitals:    09/02/21 1455   Temp: 98.2 °F (36.8 °C)   Weight: 69.9 kg (154 lb)   Height: 147.3 cm (58\")   PainSc:   9     Well developed with normal mood.  On exam she has somewhat global tenderness about the left knee but mainly over the medial more so than lateral joint line and minimal discomfort of the medial and lateral femoral condyles and tibial plateau.  She is able to actively fully extend the knee and flexes to about 115 degrees.  Grossly stable ligamentous exam      Radiology: 3 views of the left knee ordered to evaluate pain reviewed and compared with prior x-rays from May 2020.  There remains severe arthritic change which has progressed in the left knee with complete loss of joint space medially and some mild varus alignment.      Assessment/Plan: Left knee pain with a likely exacerbation of chronic arthritis with degenerative meniscal tear.    Reviewed treatment options with her today and again could be an occult fracture stress fracture we decided to hold off on further imaging and discussed other treatment options and after verbal consent and sterile preparation with discussion of risks which can include infection left knee was injected with steroid lidocaine mixture.    She will offload this with a walker or cane for the next several weeks and she would like to do some physical therapy which I think would be beneficial to her.    She understands she may likely eventually need to have a knee replacement.    If symptoms do not improve over the next several weeks she will let me know and we will get an MRI of her left knee otherwise I will see her back as needed.    Large Joint Arthrocentesis: L knee  Date/Time: 9/2/2021 3:45 PM  Consent given by: patient  Site marked: site marked  Timeout: Immediately prior to procedure a time out was called to verify the correct patient, " procedure, equipment, support staff and site/side marked as required   Supporting Documentation  Indications: pain   Procedure Details  Location: knee - L knee  Preparation: Patient was prepped and draped in the usual sterile fashion  Needle size: 22 G  Approach: anteromedial  Medications administered: 80 mg methylPREDNISolone acetate 80 MG/ML; 4 mL lidocaine (cardiac)  Patient tolerance: patient tolerated the procedure well with no immediate complications

## 2021-09-16 ENCOUNTER — APPOINTMENT (OUTPATIENT)
Dept: WOMENS IMAGING | Facility: HOSPITAL | Age: 66
End: 2021-09-16

## 2021-09-16 PROCEDURE — 77066 DX MAMMO INCL CAD BI: CPT | Performed by: RADIOLOGY

## 2021-09-16 PROCEDURE — 77062 BREAST TOMOSYNTHESIS BI: CPT | Performed by: RADIOLOGY

## 2021-09-16 PROCEDURE — G0279 TOMOSYNTHESIS, MAMMO: HCPCS | Performed by: RADIOLOGY

## 2021-09-17 ENCOUNTER — TELEPHONE (OUTPATIENT)
Dept: PHYSICAL THERAPY | Facility: CLINIC | Age: 66
End: 2021-09-17

## 2021-09-30 ENCOUNTER — TREATMENT (OUTPATIENT)
Dept: PHYSICAL THERAPY | Facility: CLINIC | Age: 66
End: 2021-09-30

## 2021-09-30 DIAGNOSIS — M17.12 LOCALIZED PRIMARY OSTEOARTHRITIS OF LOWER LEG, LEFT: Primary | ICD-10-CM

## 2021-09-30 PROCEDURE — 97161 PT EVAL LOW COMPLEX 20 MIN: CPT | Performed by: PHYSICAL THERAPIST

## 2021-09-30 PROCEDURE — 97110 THERAPEUTIC EXERCISES: CPT | Performed by: PHYSICAL THERAPIST

## 2021-09-30 NOTE — PROGRESS NOTES
Physical Therapy Initial Evaluation and Plan of Care    Subjective Evaluation    History of Present Illness  Mechanism of injury: Patient reports that the knee has been bothering her for a while.  Reports that she fell last year and the knee has gotten worse since.  States that she was on a step stool and fell to the ground.  Reports that the MD has been wanting to do a TKR for a while, but she keeps putting it off.  Had a cortisone injection  and that has helped a lot.      Patient Occupation: Health Care   Precautions and Work Restrictions: normal job dutiesPain  Current pain ratin  At worst pain ratin  Location: left anterior knee  Quality: dull ache  Alleviating factors: wait it out.  Aggravating factors: ambulation and standing  Progression: improved    Treatments  Previous treatment: injection treatment           Objective          Observations     Additional Knee Observation Details  Patient ambulates with a normal gait pattern.    Palpation     Additional Palpation Details  TTP to the medial and lateral joint line of the left knee.    Active Range of Motion   Left Knee   Flexion: Left knee active flexion: WNL.   Extension: Left knee active extension: WNL.     Strength/Myotome Testing     Left Knee   Flexion: 4  Extension: 4          Assessment & Plan     Assessment  Impairments: activity intolerance, impaired physical strength, lacks appropriate home exercise program and pain with function  Assessment details: Patient presents with c/o pain, TTP and decreased strength in the left knee.  Barriers to therapy: none  Prognosis: good  Prognosis details: STG's   1)  Independent with HEP  2)  Decrease pain by 50% or more  3)  Min to no TTP present    LTG's   1)  Independent with HEP progression  2)  Decrease pain by 75% or more  3)  Increase strength for the left knee to 4+/5  4)  No TTP present      Plan  Therapy options: will be seen for skilled physical therapy services  Planned therapy interventions:  strengthening, stretching, therapeutic activities and home exercise program  Treatment plan discussed with: patient        Manual Therapy:    0     mins  18705;  Therapeutic Exercise:    20     mins  77236;    Neuromuscular Dionicio:    0    mins  76304;    Therapeutic Activity:     0     mins  29885;     Gait Trainin     mins  67055;     Ultrasound:     0     mins  93980;    Work Hardening           0      mins 70967  Iontophoresis               0   mins 42568    Timed Treatment:   20   mins   Total Treatment:     35   mins    PT SIGNATURE: Eliud Ordoñez, PT   DATE TREATMENT INITIATED: 2021    Initial Certification  Certification Period: 2021  I certify that the therapy services are furnished while this patient is under my care.  The services outlined above are required by this patient, and will be reviewed every 90 days.     PHYSICIAN: Abelino Little MD      DATE:     Please sign and return via fax to [phone].. Thank you, [facility].

## 2021-10-04 ENCOUNTER — TREATMENT (OUTPATIENT)
Dept: PHYSICAL THERAPY | Facility: CLINIC | Age: 66
End: 2021-10-04

## 2021-10-04 DIAGNOSIS — M17.12 LOCALIZED PRIMARY OSTEOARTHRITIS OF LOWER LEG, LEFT: Primary | ICD-10-CM

## 2021-10-04 PROCEDURE — 97110 THERAPEUTIC EXERCISES: CPT | Performed by: PHYSICAL THERAPIST

## 2021-10-04 NOTE — PROGRESS NOTES
Physical Therapy Daily Progress Note            Subjective  Patient reports that the knee is bothering her, reports that she did a lot of walking last night at work.    Objective   See Exercise, Manual, and Modality Logs for complete treatment.       Assessment/Plan  The exercise routine was not significantly increased from the previous visit secondary to increased subjective reports.  Patient performed the routine without a significant increase in pain in the left knee.  Plan to incorporate closed chain activities at the next visit.                     Manual Therapy:    0     mins  05265;  Therapeutic Exercise:    26     mins  84947;     Neuromuscular Dionicio:    0    mins  09312;    Therapeutic Activity:     0     mins  90131;     Gait Trainin     mins  47991;     Ultrasound:     0     mins  30896;    Work Hardening           0      mins 33349  Iontophoresis               0   mins 37131    Timed Treatment:   26   mins   Total Treatment:     26   mins    Eliud Ordoñez, PT  Physical Therapist

## 2021-10-14 RX ORDER — SOLIFENACIN SUCCINATE 5 MG/1
5 TABLET, FILM COATED ORAL DAILY
Qty: 90 TABLET | Refills: 3 | Status: SHIPPED | OUTPATIENT
Start: 2021-10-14 | End: 2021-11-10 | Stop reason: SDUPTHER

## 2021-10-19 RX ORDER — VALACYCLOVIR HYDROCHLORIDE 1 G/1
TABLET, FILM COATED ORAL
Qty: 90 TABLET | Refills: 3 | Status: SHIPPED | OUTPATIENT
Start: 2021-10-19 | End: 2021-11-11 | Stop reason: SDUPTHER

## 2021-10-20 ENCOUNTER — TREATMENT (OUTPATIENT)
Dept: PHYSICAL THERAPY | Facility: CLINIC | Age: 66
End: 2021-10-20

## 2021-10-20 DIAGNOSIS — M17.12 LOCALIZED PRIMARY OSTEOARTHRITIS OF LOWER LEG, LEFT: Primary | ICD-10-CM

## 2021-10-20 PROCEDURE — 97110 THERAPEUTIC EXERCISES: CPT | Performed by: PHYSICAL THERAPIST

## 2021-10-20 NOTE — PROGRESS NOTES
Physical Therapy Daily Progress Note         Subjective  Patient reports that the knee is bothering her a lot today secondary to being on her feet a lot at work.    Objective   See Exercise, Manual, and Modality Logs for complete treatment.       Assessment/Plan  The exercise routine was not significantly progressed secondary to increased subjective reports of pain in the knee.  Patient was able to perform the routine without a significant increase in pain.                   Manual Therapy:    0     mins  37624;  Therapeutic Exercise:    24     mins  97742;     Neuromuscular Dionicio:    0    mins  74072;    Therapeutic Activity:     0     mins  11901;     Gait Trainin     mins  85937;     Ultrasound:     0     mins  71996;    Work Hardening           0      mins 29994  Iontophoresis               0   mins 27569    Timed Treatment:   24   mins   Total Treatment:     24   mins    Eliud Ordoñez, PT  Physical Therapist

## 2021-10-28 ENCOUNTER — TELEPHONE (OUTPATIENT)
Dept: PHYSICAL THERAPY | Facility: CLINIC | Age: 66
End: 2021-10-28

## 2021-11-01 ENCOUNTER — HOSPITAL ENCOUNTER (OUTPATIENT)
Dept: CT IMAGING | Facility: HOSPITAL | Age: 66
Discharge: HOME | End: 2021-11-01
Admitting: INTERNAL MEDICINE

## 2021-11-01 DIAGNOSIS — C18.6 MALIGNANT NEOPLASM OF DESCENDING COLON (HCC): ICD-10-CM

## 2021-11-01 LAB — CREAT BLDA-MCNC: 0.8 MG/DL (ref 0.6–1.3)

## 2021-11-01 PROCEDURE — 82565 ASSAY OF CREATININE: CPT

## 2021-11-01 PROCEDURE — 25010000002 IOPAMIDOL 61 % SOLUTION: Performed by: INTERNAL MEDICINE

## 2021-11-01 PROCEDURE — 71260 CT THORAX DX C+: CPT

## 2021-11-01 PROCEDURE — 74177 CT ABD & PELVIS W/CONTRAST: CPT

## 2021-11-01 RX ADMIN — IOPAMIDOL 85 ML: 612 INJECTION, SOLUTION INTRAVENOUS at 14:53

## 2021-11-09 RX ORDER — ATORVASTATIN CALCIUM 40 MG/1
40 TABLET, FILM COATED ORAL DAILY
Qty: 90 TABLET | Refills: 1 | Status: SHIPPED | OUTPATIENT
Start: 2021-11-09 | End: 2022-10-10

## 2021-11-09 RX ORDER — LEVOTHYROXINE SODIUM 0.1 MG/1
100 TABLET ORAL DAILY
Qty: 90 TABLET | Refills: 1 | Status: SHIPPED | OUTPATIENT
Start: 2021-11-09 | End: 2022-06-01

## 2021-11-09 NOTE — TELEPHONE ENCOUNTER
Caller: [facility] - [place] [id number] - [phone] [facility] [phone] FX    Relationship: Pharmacy    Requested Prescriptions:   Requested Prescriptions     Pending Prescriptions Disp Refills   • levothyroxine (SYNTHROID, LEVOTHROID) 100 MCG tablet 90 tablet 0     Sig: Take 1 tablet by mouth Daily.   • atorvastatin (LIPITOR) 40 MG tablet 90 tablet 1     Sig: Take 1 tablet by mouth Daily.      Pharmacy where request should be sent: [facility] - [address] - [phone] [facility] [phone] FX     Additional details provided by patient:     Does the patient have less than a 3 day supply:  [] Yes  [] No    Aleksandra Gonzales Rep   11/09/21 10:17 EST

## 2021-11-10 RX ORDER — OMEPRAZOLE 40 MG/1
CAPSULE, DELAYED RELEASE ORAL
Qty: 90 CAPSULE | Refills: 2 | Status: SHIPPED | OUTPATIENT
Start: 2021-11-10 | End: 2022-12-13

## 2021-11-10 RX ORDER — SOLIFENACIN SUCCINATE 5 MG/1
5 TABLET, FILM COATED ORAL DAILY
Qty: 90 TABLET | Refills: 3 | Status: SHIPPED | OUTPATIENT
Start: 2021-11-10

## 2021-11-10 NOTE — TELEPHONE ENCOUNTER
Caller: [facility] [address] - [phone] [facility] [phone] FX    Relationship: Pharmacy    Best call back number: [phone]    Requested Prescriptions:   Requested Prescriptions     Pending Prescriptions Disp Refills   • solifenacin (VESIcare) 5 MG tablet 90 tablet 3     Sig: Take 1 tablet by mouth Daily.        Pharmacy where request should be sent: [facility] - [place] [address] - [phone] [facility] [phone] FX       Does the patient have less than a 3 day supply:  [x] Yes  [] No    Aleksandra Rivera Rep   11/10/21 12:31 EST

## 2021-11-11 ENCOUNTER — TELEPHONE (OUTPATIENT)
Dept: ONCOLOGY | Facility: CLINIC | Age: 66
End: 2021-11-11

## 2021-11-11 ENCOUNTER — LAB (OUTPATIENT)
Dept: LAB | Facility: HOSPITAL | Age: 66
End: 2021-11-11

## 2021-11-11 ENCOUNTER — OFFICE VISIT (OUTPATIENT)
Dept: ONCOLOGY | Facility: CLINIC | Age: 66
End: 2021-11-11

## 2021-11-11 VITALS
WEIGHT: 157.5 LBS | DIASTOLIC BLOOD PRESSURE: 80 MMHG | BODY MASS INDEX: 33.06 KG/M2 | TEMPERATURE: 97.5 F | HEART RATE: 60 BPM | SYSTOLIC BLOOD PRESSURE: 149 MMHG | HEIGHT: 58 IN | OXYGEN SATURATION: 98 % | RESPIRATION RATE: 18 BRPM

## 2021-11-11 DIAGNOSIS — C18.6 MALIGNANT NEOPLASM OF DESCENDING COLON (HCC): ICD-10-CM

## 2021-11-11 DIAGNOSIS — C18.6 MALIGNANT NEOPLASM OF DESCENDING COLON (HCC): Primary | ICD-10-CM

## 2021-11-11 DIAGNOSIS — D50.0 IRON DEFICIENCY ANEMIA DUE TO CHRONIC BLOOD LOSS: ICD-10-CM

## 2021-11-11 LAB
ALBUMIN SERPL-MCNC: 4.3 G/DL (ref 3.5–5.2)
ALBUMIN/GLOB SERPL: 1.7 G/DL (ref 1.1–2.4)
ALP SERPL-CCNC: 71 U/L (ref 38–116)
ALT SERPL W P-5'-P-CCNC: 16 U/L (ref 0–33)
ANION GAP SERPL CALCULATED.3IONS-SCNC: 9 MMOL/L (ref 5–15)
AST SERPL-CCNC: 13 U/L (ref 0–32)
BASOPHILS # BLD AUTO: 0.03 10*3/MM3 (ref 0–0.2)
BASOPHILS NFR BLD AUTO: 0.6 % (ref 0–1.5)
BILIRUB SERPL-MCNC: 0.4 MG/DL (ref 0.2–1.2)
BUN SERPL-MCNC: 22 MG/DL (ref 6–20)
BUN/CREAT SERPL: 25.3 (ref 7.3–30)
CALCIUM SPEC-SCNC: 9.5 MG/DL (ref 8.5–10.2)
CEA SERPL-MCNC: 2.04 NG/ML
CHLORIDE SERPL-SCNC: 106 MMOL/L (ref 98–107)
CO2 SERPL-SCNC: 25 MMOL/L (ref 22–29)
CREAT SERPL-MCNC: 0.87 MG/DL (ref 0.6–1.1)
DEPRECATED RDW RBC AUTO: 41 FL (ref 37–54)
EOSINOPHIL # BLD AUTO: 0.17 10*3/MM3 (ref 0–0.4)
EOSINOPHIL NFR BLD AUTO: 3.2 % (ref 0.3–6.2)
ERYTHROCYTE [DISTWIDTH] IN BLOOD BY AUTOMATED COUNT: 12.5 % (ref 12.3–15.4)
FERRITIN SERPL-MCNC: 63.2 NG/ML (ref 13–150)
FOLATE SERPL-MCNC: 10.7 NG/ML (ref 4.78–24.2)
GFR SERPL CREATININE-BSD FRML MDRD: 65 ML/MIN/1.73
GLOBULIN UR ELPH-MCNC: 2.5 GM/DL (ref 1.8–3.5)
GLUCOSE SERPL-MCNC: 96 MG/DL (ref 74–124)
HCT VFR BLD AUTO: 39 % (ref 34–46.6)
HGB BLD-MCNC: 12.5 G/DL (ref 12–15.9)
IMM GRANULOCYTES # BLD AUTO: 0.01 10*3/MM3 (ref 0–0.05)
IMM GRANULOCYTES NFR BLD AUTO: 0.2 % (ref 0–0.5)
IRON 24H UR-MRATE: 40 MCG/DL (ref 37–145)
IRON SATN MFR SERPL: 12 % (ref 14–48)
LYMPHOCYTES # BLD AUTO: 1.52 10*3/MM3 (ref 0.7–3.1)
LYMPHOCYTES NFR BLD AUTO: 28.6 % (ref 19.6–45.3)
MCH RBC QN AUTO: 29.1 PG (ref 26.6–33)
MCHC RBC AUTO-ENTMCNC: 32.1 G/DL (ref 31.5–35.7)
MCV RBC AUTO: 90.7 FL (ref 79–97)
MONOCYTES # BLD AUTO: 0.41 10*3/MM3 (ref 0.1–0.9)
MONOCYTES NFR BLD AUTO: 7.7 % (ref 5–12)
NEUTROPHILS NFR BLD AUTO: 3.17 10*3/MM3 (ref 1.7–7)
NEUTROPHILS NFR BLD AUTO: 59.7 % (ref 42.7–76)
NRBC BLD AUTO-RTO: 0 /100 WBC (ref 0–0.2)
PLATELET # BLD AUTO: 234 10*3/MM3 (ref 140–450)
PMV BLD AUTO: 9.3 FL (ref 6–12)
POTASSIUM SERPL-SCNC: 4.4 MMOL/L (ref 3.5–4.7)
PROT SERPL-MCNC: 6.8 G/DL (ref 6.3–8)
RBC # BLD AUTO: 4.3 10*6/MM3 (ref 3.77–5.28)
SODIUM SERPL-SCNC: 140 MMOL/L (ref 134–145)
TIBC SERPL-MCNC: 344 MCG/DL (ref 249–505)
TRANSFERRIN SERPL-MCNC: 246 MG/DL (ref 200–360)
VIT B12 BLD-MCNC: 305 PG/ML (ref 211–946)
WBC # BLD AUTO: 5.31 10*3/MM3 (ref 3.4–10.8)

## 2021-11-11 PROCEDURE — 99214 OFFICE O/P EST MOD 30 MIN: CPT | Performed by: INTERNAL MEDICINE

## 2021-11-11 PROCEDURE — 82378 CARCINOEMBRYONIC ANTIGEN: CPT | Performed by: INTERNAL MEDICINE

## 2021-11-11 PROCEDURE — 36415 COLL VENOUS BLD VENIPUNCTURE: CPT

## 2021-11-11 PROCEDURE — 82746 ASSAY OF FOLIC ACID SERUM: CPT | Performed by: INTERNAL MEDICINE

## 2021-11-11 PROCEDURE — 80053 COMPREHEN METABOLIC PANEL: CPT

## 2021-11-11 PROCEDURE — 83540 ASSAY OF IRON: CPT

## 2021-11-11 PROCEDURE — 82728 ASSAY OF FERRITIN: CPT

## 2021-11-11 PROCEDURE — 84466 ASSAY OF TRANSFERRIN: CPT

## 2021-11-11 PROCEDURE — 85025 COMPLETE CBC W/AUTO DIFF WBC: CPT

## 2021-11-11 PROCEDURE — 82607 VITAMIN B-12: CPT | Performed by: INTERNAL MEDICINE

## 2021-11-11 RX ORDER — VALACYCLOVIR HYDROCHLORIDE 1 G/1
1000 TABLET, FILM COATED ORAL DAILY
Qty: 90 TABLET | Refills: 3 | Status: SHIPPED | OUTPATIENT
Start: 2021-11-11

## 2021-11-11 NOTE — PROGRESS NOTES
Subjective     CHIEF COMPLAINT:      Chief Complaint   Patient presents with   • Follow-up     discuss CT Scan/fatigue       HISTORY OF PRESENT ILLNESS:     Marie Foy is a 66 y.o. female patient who returns today for follow up on her colon cancer and anemia.  She returns today for follow-up reporting fatigue.  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  She is on her feet for most of the shift.    Patient continues to take the oral iron 3 days a week.  She usually has constipation.  She developed diarrhea after she took the oral contrast for the CT scan on 11/1/2021.    ROS:  Pertinent ROS is in the HPI.     Past medical, surgical, social and family history were reviewed.     MEDICATIONS:    Current Outpatient Medications:   •  ALPRAZolam (XANAX) 0.25 MG tablet, Take 1 tablet by mouth 3 (Three) Times a Day As Needed for Anxiety., Disp: 20 tablet, Rfl: 0  •  amLODIPine (NORVASC) 5 MG tablet, TAKE ONE TABLET BY MOUTH DAILY, Disp: 90 tablet, Rfl: 2  •  atorvastatin (LIPITOR) 40 MG tablet, Take 1 tablet by mouth Daily., Disp: 90 tablet, Rfl: 1  •  calcium citrate-vitamin d (CITRACAL) 200-250 MG-UNIT tablet tablet, Take 1 tablet by mouth Daily., Disp: , Rfl:   •  docusate sodium (COLACE) 100 MG capsule, Take 100 mg by mouth Daily., Disp: , Rfl:   •  Ferrous Sulfate 28 MG tablet, Take  by mouth., Disp: , Rfl:   •  levothyroxine (SYNTHROID, LEVOTHROID) 100 MCG tablet, Take 1 tablet by mouth Daily., Disp: 90 tablet, Rfl: 1  •  omeprazole (priLOSEC) 40 MG capsule, TAKE 1 CAPSULE BY MOUTH DAILY, Disp: 90 capsule, Rfl: 2  •  solifenacin (VESIcare) 5 MG tablet, Take 1 tablet by mouth Daily., Disp: 90 tablet, Rfl: 3  •  valACYclovir (VALTREX) 1000 MG tablet, TAKE ONE TABLET BY MOUTH DAILY, Disp: 90 tablet, Rfl: 3    Objective   VITAL SIGNS:     Vitals:    11/11/21 1104   BP: 149/80   Pulse: 60   Resp: 18   Temp: 97.5 °F (36.4 °C)   TempSrc: Temporal   SpO2: 98%  "  Weight: 71.4 kg (157 lb 8 oz)   Height: 148 cm (58.27\")   PainSc: 0-No pain     Body mass index is 32.62 kg/m².     Wt Readings from Last 5 Encounters:   11/11/21 71.4 kg (157 lb 8 oz)   09/02/21 69.9 kg (154 lb)   08/19/21 69.9 kg (154 lb 3.2 oz)   06/30/21 66.7 kg (147 lb)   04/05/21 67.1 kg (148 lb)       PHYSICAL EXAMINATION:   GENERAL: The patient appears in good general condition, not in acute distress.   SKIN: No ecchymosis.  EYES: No jaundice.  LYMPHATICS: No cervical lymphadenopathy.  CHEST: Normal respiratory effort.  Lungs clear bilaterally.  No added sounds.  CVS: Normal S1-S2.  No murmurs.  ABDOMEN: Soft. No tenderness. No Hepatomegaly. No Splenomegaly. No masses.    DIAGNOSTIC DATA:     Results from last 7 days   Lab Units 11/11/21  1039   WBC 10*3/mm3 5.31   NEUTROS ABS 10*3/mm3 3.17   HEMOGLOBIN g/dL 12.5   HEMATOCRIT % 39.0   PLATELETS 10*3/mm3 234     Results from last 7 days   Lab Units 11/11/21  1039   SODIUM mmol/L 140   POTASSIUM mmol/L 4.4   CHLORIDE mmol/L 106   CO2 mmol/L 25.0   BUN mg/dL 22*   CREATININE mg/dL 0.87   CALCIUM mg/dL 9.5   ALBUMIN g/dL 4.30   BILIRUBIN mg/dL 0.4   ALK PHOS U/L 71   ALT (SGPT) U/L 16   AST (SGOT) U/L 13   GLUCOSE mg/dL 96         Lab 11/11/21  1039   IRON 40   IRON SATURATION 12*   TIBC 344   TRANSFERRIN 246   FERRITIN 63.20     Lab Results   Component Value Date    CEA 2.59 08/19/2021    CEA 5.86 02/22/2021      CT chest abdomen pelvis on 11/1/2021:  Status post partial colectomy. The colonic anastomosis  appears unremarkable. No convincing evidence of metastatic disease  within the chest, abdomen or pelvis.      Assessment/Plan   *Colon cancer arising from the distal transverse colon.  · Patient was found on screening colonoscopy on 1/26/2021 to have a mass in the descending colon.  · She underwent on 3/2/2021 a laparoscopic transverse partial colectomy.  · Pathology exam revealed a  T3 N0 Grade 2 tumor with clear surgical margin. 14 lymph nodes were " negative. There was no evidence of lymphovascular or perineural invasion.   · Tumor was MSI stable.  · Testing revealed no KRAS NRAS or BRAF mutations.  · Baseline CEA was elevated at 5.86.  · Since the tumor was stage IIA with adequate number of lymph nodes examined and with no high risk features, adjuvant chemotherapy was not recommended.  · CEA was 2.59 on 8/19/2021.  · CT scan on 11/1/2021 revealed no evidence of recurrence or metastasis.  · Patient is doing well with no evidence of recurrence.  · She will be due for repeat colonoscopy 1 year from her surgery date (March 2022).  · I recommended a follow-up in 4 months and repeating CT scan in 1 year.    *Iron deficiency anemia.   · It is attributed to blood losses from the colon cancer and from surgical blood losses.    · Hemoglobin was 11.1 on 3/3/2021.  · Patient's diet was low in iron rich food.    · We recommended increasing her intake of iron rich food.  · Patient started on 4/5/2021 ferrous sulfate 28 mg elemental iron 4 days a week.  · Hemoglobin improved to 12.1 on 8/19/2021.  · Hemoglobin is 12.5 today.  · Transferrin saturation remains low at 12%.      PLAN:    1.  Continue ferrous sulfate 3 days a week.   2.  Follow-up in 4 months with CBC CMP CEA ferritin iron panel.   3.  She would be due for colonoscopy in March 2022.   4.  Plan to repeat CT scan chest abdomen pelvis in 1 year but sooner if there are any changes.         Saul Ziegler MD  11/11/21

## 2021-11-11 NOTE — TELEPHONE ENCOUNTER
Caller: [facility] - [place] [id number] - [phone] [facility] [phone] FX    Relationship: Pharmacy    Best call back number: [phone]    Requested Prescriptions:   Requested Prescriptions     Pending Prescriptions Disp Refills   • valACYclovir (VALTREX) 1000 MG tablet 90 tablet 3     Sig: Take 1 tablet by mouth Daily.        Pharmacy where request should be sent: [facility] - [place] [id number] - [phone] [facility] [phone] FX     Additional details provided by patient:     Does the patient have less than a 3 day supply:  [x] Yes  [] No    Aleksandra Kumar Rep   11/11/21 15:11 EST

## 2021-11-11 NOTE — TELEPHONE ENCOUNTER
----- Message from Saul Ziegler MD sent at 11/11/2021 12:06 PM EST -----  Please inform the patient that her iron stores remain low.  Please ask her to continue the oral iron 3 days a week.    Thank you

## 2021-11-12 ENCOUNTER — TELEPHONE (OUTPATIENT)
Dept: ONCOLOGY | Facility: CLINIC | Age: 66
End: 2021-11-12

## 2021-11-12 NOTE — TELEPHONE ENCOUNTER
----- Message from Saul Ziegler MD sent at 11/11/2021  5:35 PM EST -----  Please inform the patient that Vitamin B12 level is low normal which is likely contributing to the fatigue.  Please ask her to start over-the-counter vitamin B12 1000 mcg daily.    Thank you

## 2021-11-15 ENCOUNTER — TELEPHONE (OUTPATIENT)
Dept: ONCOLOGY | Facility: CLINIC | Age: 66
End: 2021-11-15

## 2021-11-15 RX ORDER — LANOLIN ALCOHOL/MO/W.PET/CERES
1000 CREAM (GRAM) TOPICAL DAILY
COMMUNITY
Start: 2021-11-15

## 2021-11-15 NOTE — TELEPHONE ENCOUNTER
Reviewed Dr. Ziegler's note and instructions on pts vm. Left direct number if she has any questions.

## 2021-11-16 LAB
Lab: NORMAL
METHYLMALONATE SERPL-SCNC: 338 NMOL/L (ref 0–378)

## 2021-11-22 ENCOUNTER — DOCUMENTATION (OUTPATIENT)
Dept: PHYSICAL THERAPY | Facility: CLINIC | Age: 66
End: 2021-11-22

## 2021-12-28 DIAGNOSIS — I10 ESSENTIAL (PRIMARY) HYPERTENSION: ICD-10-CM

## 2021-12-28 DIAGNOSIS — E03.9 HYPOTHYROIDISM, UNSPECIFIED TYPE: ICD-10-CM

## 2021-12-28 DIAGNOSIS — E78.00 HYPERCHOLESTEROLEMIA: Primary | ICD-10-CM

## 2021-12-29 LAB
ALBUMIN SERPL-MCNC: 4.8 G/DL (ref 3.8–4.8)
ALBUMIN/GLOB SERPL: 2 {RATIO} (ref 1.2–2.2)
ALP SERPL-CCNC: 88 IU/L (ref 44–121)
ALT SERPL-CCNC: 28 IU/L (ref 0–32)
AST SERPL-CCNC: 24 IU/L (ref 0–40)
BASOPHILS # BLD AUTO: 0 X10E3/UL (ref 0–0.2)
BASOPHILS NFR BLD AUTO: 0 %
BILIRUB SERPL-MCNC: 0.6 MG/DL (ref 0–1.2)
BUN SERPL-MCNC: 19 MG/DL (ref 8–27)
BUN/CREAT SERPL: 22 (ref 12–28)
CALCIUM SERPL-MCNC: 9.9 MG/DL (ref 8.7–10.3)
CHLORIDE SERPL-SCNC: 102 MMOL/L (ref 96–106)
CHOLEST SERPL-MCNC: 229 MG/DL (ref 100–199)
CO2 SERPL-SCNC: 24 MMOL/L (ref 20–29)
CREAT SERPL-MCNC: 0.85 MG/DL (ref 0.57–1)
EOSINOPHIL # BLD AUTO: 0.2 X10E3/UL (ref 0–0.4)
EOSINOPHIL NFR BLD AUTO: 3 %
ERYTHROCYTE [DISTWIDTH] IN BLOOD BY AUTOMATED COUNT: 11.9 % (ref 11.7–15.4)
GLOBULIN SER CALC-MCNC: 2.4 G/DL (ref 1.5–4.5)
GLUCOSE SERPL-MCNC: 98 MG/DL (ref 65–99)
HCT VFR BLD AUTO: 40.5 % (ref 34–46.6)
HDLC SERPL-MCNC: 75 MG/DL
HGB BLD-MCNC: 13.2 G/DL (ref 11.1–15.9)
IMM GRANULOCYTES # BLD AUTO: 0 X10E3/UL (ref 0–0.1)
IMM GRANULOCYTES NFR BLD AUTO: 0 %
LDLC SERPL CALC-MCNC: 128 MG/DL (ref 0–99)
LYMPHOCYTES # BLD AUTO: 1.4 X10E3/UL (ref 0.7–3.1)
LYMPHOCYTES NFR BLD AUTO: 29 %
MCH RBC QN AUTO: 28.6 PG (ref 26.6–33)
MCHC RBC AUTO-ENTMCNC: 32.6 G/DL (ref 31.5–35.7)
MCV RBC AUTO: 88 FL (ref 79–97)
MONOCYTES # BLD AUTO: 0.4 X10E3/UL (ref 0.1–0.9)
MONOCYTES NFR BLD AUTO: 8 %
NEUTROPHILS # BLD AUTO: 2.8 X10E3/UL (ref 1.4–7)
NEUTROPHILS NFR BLD AUTO: 60 %
PLATELET # BLD AUTO: 254 X10E3/UL (ref 150–450)
POTASSIUM SERPL-SCNC: 4.2 MMOL/L (ref 3.5–5.2)
PROT SERPL-MCNC: 7.2 G/DL (ref 6–8.5)
RBC # BLD AUTO: 4.61 X10E6/UL (ref 3.77–5.28)
SODIUM SERPL-SCNC: 141 MMOL/L (ref 134–144)
TRIGL SERPL-MCNC: 150 MG/DL (ref 0–149)
TSH SERPL DL<=0.005 MIU/L-ACNC: 2.04 UIU/ML (ref 0.45–4.5)
VLDLC SERPL CALC-MCNC: 26 MG/DL (ref 5–40)
WBC # BLD AUTO: 4.7 X10E3/UL (ref 3.4–10.8)

## 2022-01-04 ENCOUNTER — OFFICE VISIT (OUTPATIENT)
Dept: INTERNAL MEDICINE | Facility: CLINIC | Age: 67
End: 2022-01-04

## 2022-01-04 VITALS
WEIGHT: 157 LBS | HEART RATE: 65 BPM | HEIGHT: 58 IN | SYSTOLIC BLOOD PRESSURE: 156 MMHG | DIASTOLIC BLOOD PRESSURE: 90 MMHG | BODY MASS INDEX: 32.95 KG/M2 | OXYGEN SATURATION: 98 %

## 2022-01-04 DIAGNOSIS — E03.9 HYPOTHYROIDISM, UNSPECIFIED TYPE: ICD-10-CM

## 2022-01-04 DIAGNOSIS — E78.00 HYPERCHOLESTEROLEMIA: ICD-10-CM

## 2022-01-04 DIAGNOSIS — I10 ESSENTIAL (PRIMARY) HYPERTENSION: Primary | ICD-10-CM

## 2022-01-04 DIAGNOSIS — F40.243 FEAR OF FLYING: ICD-10-CM

## 2022-01-04 PROCEDURE — 99214 OFFICE O/P EST MOD 30 MIN: CPT | Performed by: INTERNAL MEDICINE

## 2022-01-04 RX ORDER — ALPRAZOLAM 0.25 MG/1
0.25 TABLET ORAL 3 TIMES DAILY PRN
Qty: 20 TABLET | Refills: 0 | Status: SHIPPED | OUTPATIENT
Start: 2022-01-04

## 2022-01-04 NOTE — PROGRESS NOTES
Subjective     Marie Foy is a 66 y.o. female who presents with   Chief Complaint   Patient presents with   • Hypertension   • Hyperlipidemia   • Hypothyroidism       History of Present Illness     The following data was reviewed by: Michelle Wyman MD on 01/04/2022:  Common labs    Common Labsle 11/1/21 11/11/21 11/11/21 12/28/21 12/28/21 12/28/21     1039 1039 1044 1044 1044   Glucose   96 98     BUN   22 (A) 19     Creatinine 0.80  0.87 0.85     eGFR Non  Am   65 71     eGFR African Am    81     Sodium   140 141     Potassium   4.4 4.2     Chloride   106 102     Calcium   9.5 9.9     Total Protein    7.2     Albumin   4.30 4.8     Total Bilirubin   0.4 0.6     Alkaline Phosphatase   71 88     AST (SGOT)   13 24     ALT (SGPT)   16 28     WBC  5.31    4.7   Hemoglobin  12.5    13.2   Hematocrit  39.0    40.5   Platelets  234    254   Total Cholesterol     229 (A)    Triglycerides     150 (A)    HDL Cholesterol     75    LDL Cholesterol      128 (A)    (A) Abnormal value       Comments are available for some flowsheets but are not being displayed.           HTN.  BP is up today.  HLD.  Cholesterol is up.  Forgets to take atorvastatin at times.   Hypothyroidism.  TSH is well controlled.         Review of Systems   Respiratory: Negative.    Cardiovascular: Negative.        The following portions of the patient's history were reviewed and updated as appropriate: allergies, current medications and problem list.    Patient Active Problem List    Diagnosis Date Noted   • Malignant neoplasm of descending colon (HCC) 02/04/2021   • Osteoporosis 06/30/2020     Note Last Updated: 6/30/2021     Prolia every six months.      • Primary localized osteoarthrosis of left lower leg 05/20/2020   • Essential (primary) hypertension 05/11/2020   • Anxiety 02/26/2016   • Gastroesophageal reflux disease 02/26/2016   • Herpes simplex type 2 infection 02/26/2016   • Hypercholesterolemia 02/26/2016   • Hypothyroidism 02/26/2016  "  • Overactive bladder 02/26/2016   • Fear of flying 02/26/2016     Note Last Updated: 2/26/2016     Description: flying and dental work     • Unilateral partial paralysis of vocal cords or larynx 02/26/2016     Note Last Updated: 2/26/2016     Description: chronic     • Colon polyps        Current Outpatient Medications on File Prior to Visit   Medication Sig Dispense Refill   • amLODIPine (NORVASC) 5 MG tablet TAKE ONE TABLET BY MOUTH DAILY 90 tablet 2   • atorvastatin (LIPITOR) 40 MG tablet Take 1 tablet by mouth Daily. 90 tablet 1   • calcium citrate-vitamin d (CITRACAL) 200-250 MG-UNIT tablet tablet Take 1 tablet by mouth Daily.     • docusate sodium (COLACE) 100 MG capsule Take 100 mg by mouth Daily.     • Ferrous Sulfate 28 MG tablet Take  by mouth. 3 days a week     • levothyroxine (SYNTHROID, LEVOTHROID) 100 MCG tablet Take 1 tablet by mouth Daily. 90 tablet 1   • omeprazole (priLOSEC) 40 MG capsule TAKE 1 CAPSULE BY MOUTH DAILY 90 capsule 2   • solifenacin (VESIcare) 5 MG tablet Take 1 tablet by mouth Daily. 90 tablet 3   • valACYclovir (VALTREX) 1000 MG tablet Take 1 tablet by mouth Daily. 90 tablet 3   • vitamin B-12 (CYANOCOBALAMIN) 1000 MCG tablet Take 1,000 mcg by mouth Daily.     • [DISCONTINUED] ALPRAZolam (XANAX) 0.25 MG tablet Take 1 tablet by mouth 3 (Three) Times a Day As Needed for Anxiety. 20 tablet 0     No current facility-administered medications on file prior to visit.       Objective     /90   Pulse 65   Ht 148 cm (58.27\")   Wt 71.2 kg (157 lb)   SpO2 98%   BMI 32.51 kg/m²     Physical Exam  Constitutional:       Appearance: She is well-developed.   HENT:      Head: Normocephalic and atraumatic.   Cardiovascular:      Rate and Rhythm: Normal rate and regular rhythm.      Heart sounds: Normal heart sounds.   Pulmonary:      Effort: Pulmonary effort is normal.      Breath sounds: Normal breath sounds.   Skin:     General: Skin is warm and dry.   Neurological:      Mental Status: " She is alert and oriented to person, place, and time.   Psychiatric:         Behavior: Behavior normal.         Assessment/Plan   Diagnoses and all orders for this visit:    1. Essential (primary) hypertension (Primary)    2. Hypercholesterolemia    3. Hypothyroidism, unspecified type    4. Fear of flying  -     ALPRAZolam (XANAX) 0.25 MG tablet; Take 1 tablet by mouth 3 (Three) Times a Day As Needed for Anxiety.  Dispense: 20 tablet; Refill: 0        Discussion    HTN.  The patient is instructed to monitor at home and call in checks.    HLD.  I encouraged compliance with atorvastatin.    Hypothyroidism.  Control is good.  The patient is advised to continue current dosage of levothyroxine.    Fear of flying. I refilled her prn Xanax and reviewed her Torito.         Future Appointments   Date Time Provider Department Center   1/24/2022  3:30 PM REFERRED INJECTION CHAIR EP  [facility]   3/7/2022 11:00 AM LAB CHAIR 1 DENNIS GORDILLO  [facility]   3/7/2022 11:40 AM Saul Ziegler MD [facility]   7/19/2022 11:10 AM [facility]   7/26/2022 11:15 AM Michelle Wyman MD [facility]

## 2022-01-24 ENCOUNTER — LAB (OUTPATIENT)
Dept: OTHER | Facility: HOSPITAL | Age: 67
End: 2022-01-24

## 2022-01-24 ENCOUNTER — INFUSION (OUTPATIENT)
Dept: ONCOLOGY | Facility: HOSPITAL | Age: 67
End: 2022-01-24

## 2022-01-24 VITALS — RESPIRATION RATE: 18 BRPM

## 2022-01-24 DIAGNOSIS — M81.0 OSTEOPOROSIS, UNSPECIFIED OSTEOPOROSIS TYPE, UNSPECIFIED PATHOLOGICAL FRACTURE PRESENCE: Primary | ICD-10-CM

## 2022-01-24 PROCEDURE — 96372 THER/PROPH/DIAG INJ SC/IM: CPT

## 2022-01-24 PROCEDURE — 80053 COMPREHEN METABOLIC PANEL: CPT | Performed by: INTERNAL MEDICINE

## 2022-01-24 PROCEDURE — 84100 ASSAY OF PHOSPHORUS: CPT | Performed by: INTERNAL MEDICINE

## 2022-01-24 PROCEDURE — 25010000002 DENOSUMAB 60 MG/ML SOLUTION PREFILLED SYRINGE: Performed by: INTERNAL MEDICINE

## 2022-01-24 PROCEDURE — 83735 ASSAY OF MAGNESIUM: CPT | Performed by: INTERNAL MEDICINE

## 2022-01-24 RX ADMIN — DENOSUMAB 60 MG: 60 INJECTION SUBCUTANEOUS at 15:21

## 2022-01-24 NOTE — NURSING NOTE
Arrived for prolia injection. Indication and side effects reviewed. Denies recent dental work. Labs and medications verified. Prolia administered in right arm without incidence. Instructed to call prescribing MD for any concerns or questions and instructed on how to schedule future appts.  Pt vu and discharged ambulatory.

## 2022-03-03 DIAGNOSIS — D50.0 IRON DEFICIENCY ANEMIA DUE TO CHRONIC BLOOD LOSS: ICD-10-CM

## 2022-03-03 DIAGNOSIS — M81.0 OSTEOPOROSIS, UNSPECIFIED OSTEOPOROSIS TYPE, UNSPECIFIED PATHOLOGICAL FRACTURE PRESENCE: Primary | ICD-10-CM

## 2022-03-03 DIAGNOSIS — C18.6 MALIGNANT NEOPLASM OF DESCENDING COLON: ICD-10-CM

## 2022-03-07 ENCOUNTER — LAB (OUTPATIENT)
Dept: LAB | Facility: HOSPITAL | Age: 67
End: 2022-03-07

## 2022-03-07 ENCOUNTER — OFFICE VISIT (OUTPATIENT)
Dept: ONCOLOGY | Facility: CLINIC | Age: 67
End: 2022-03-07

## 2022-03-07 ENCOUNTER — TELEPHONE (OUTPATIENT)
Dept: SURGERY | Facility: CLINIC | Age: 67
End: 2022-03-07

## 2022-03-07 VITALS
BODY MASS INDEX: 32.64 KG/M2 | WEIGHT: 155.5 LBS | SYSTOLIC BLOOD PRESSURE: 177 MMHG | OXYGEN SATURATION: 99 % | TEMPERATURE: 96.9 F | HEART RATE: 59 BPM | DIASTOLIC BLOOD PRESSURE: 79 MMHG | RESPIRATION RATE: 16 BRPM | HEIGHT: 58 IN

## 2022-03-07 DIAGNOSIS — M81.0 OSTEOPOROSIS, UNSPECIFIED OSTEOPOROSIS TYPE, UNSPECIFIED PATHOLOGICAL FRACTURE PRESENCE: ICD-10-CM

## 2022-03-07 DIAGNOSIS — D50.0 IRON DEFICIENCY ANEMIA DUE TO CHRONIC BLOOD LOSS: ICD-10-CM

## 2022-03-07 DIAGNOSIS — C18.6 MALIGNANT NEOPLASM OF DESCENDING COLON: Primary | ICD-10-CM

## 2022-03-07 DIAGNOSIS — C18.6 MALIGNANT NEOPLASM OF DESCENDING COLON: ICD-10-CM

## 2022-03-07 LAB
ALBUMIN SERPL-MCNC: 4.7 G/DL (ref 3.5–5.2)
ALBUMIN/GLOB SERPL: 1.5 G/DL (ref 1.1–2.4)
ALP SERPL-CCNC: 89 U/L (ref 38–116)
ALT SERPL W P-5'-P-CCNC: 15 U/L (ref 0–33)
ANION GAP SERPL CALCULATED.3IONS-SCNC: 8.9 MMOL/L (ref 5–15)
AST SERPL-CCNC: 17 U/L (ref 0–32)
BASOPHILS # BLD AUTO: 0.02 10*3/MM3 (ref 0–0.2)
BASOPHILS NFR BLD AUTO: 0.4 % (ref 0–1.5)
BILIRUB SERPL-MCNC: 0.5 MG/DL (ref 0.2–1.2)
BUN SERPL-MCNC: 21 MG/DL (ref 6–20)
BUN/CREAT SERPL: 24.7 (ref 7.3–30)
CALCIUM SPEC-SCNC: 9.9 MG/DL (ref 8.5–10.2)
CEA SERPL-MCNC: 2.16 NG/ML
CHLORIDE SERPL-SCNC: 104 MMOL/L (ref 98–107)
CO2 SERPL-SCNC: 27.1 MMOL/L (ref 22–29)
CREAT SERPL-MCNC: 0.85 MG/DL (ref 0.6–1.1)
DEPRECATED RDW RBC AUTO: 41.9 FL (ref 37–54)
EGFRCR SERPLBLD CKD-EPI 2021: 74.7 ML/MIN/1.73
EOSINOPHIL # BLD AUTO: 0.15 10*3/MM3 (ref 0–0.4)
EOSINOPHIL NFR BLD AUTO: 3.2 % (ref 0.3–6.2)
ERYTHROCYTE [DISTWIDTH] IN BLOOD BY AUTOMATED COUNT: 12.8 % (ref 12.3–15.4)
FERRITIN SERPL-MCNC: 84.6 NG/ML (ref 13–150)
GLOBULIN UR ELPH-MCNC: 3.2 GM/DL (ref 1.8–3.5)
GLUCOSE SERPL-MCNC: 101 MG/DL (ref 74–124)
HCT VFR BLD AUTO: 40.7 % (ref 34–46.6)
HGB BLD-MCNC: 13.2 G/DL (ref 12–15.9)
IMM GRANULOCYTES # BLD AUTO: 0.01 10*3/MM3 (ref 0–0.05)
IMM GRANULOCYTES NFR BLD AUTO: 0.2 % (ref 0–0.5)
IRON 24H UR-MRATE: 81 MCG/DL (ref 37–145)
IRON SATN MFR SERPL: 22 % (ref 14–48)
LYMPHOCYTES # BLD AUTO: 1.25 10*3/MM3 (ref 0.7–3.1)
LYMPHOCYTES NFR BLD AUTO: 26.3 % (ref 19.6–45.3)
MCH RBC QN AUTO: 29.6 PG (ref 26.6–33)
MCHC RBC AUTO-ENTMCNC: 32.4 G/DL (ref 31.5–35.7)
MCV RBC AUTO: 91.3 FL (ref 79–97)
MONOCYTES # BLD AUTO: 0.38 10*3/MM3 (ref 0.1–0.9)
MONOCYTES NFR BLD AUTO: 8 % (ref 5–12)
NEUTROPHILS NFR BLD AUTO: 2.95 10*3/MM3 (ref 1.7–7)
NEUTROPHILS NFR BLD AUTO: 61.9 % (ref 42.7–76)
NRBC BLD AUTO-RTO: 0 /100 WBC (ref 0–0.2)
PLATELET # BLD AUTO: 249 10*3/MM3 (ref 140–450)
PMV BLD AUTO: 9.2 FL (ref 6–12)
POTASSIUM SERPL-SCNC: 4.4 MMOL/L (ref 3.5–4.7)
PROT SERPL-MCNC: 7.9 G/DL (ref 6.3–8)
RBC # BLD AUTO: 4.46 10*6/MM3 (ref 3.77–5.28)
SODIUM SERPL-SCNC: 140 MMOL/L (ref 134–145)
TIBC SERPL-MCNC: 367 MCG/DL (ref 249–505)
TRANSFERRIN SERPL-MCNC: 262 MG/DL (ref 200–360)
WBC NRBC COR # BLD: 4.76 10*3/MM3 (ref 3.4–10.8)

## 2022-03-07 PROCEDURE — 83540 ASSAY OF IRON: CPT

## 2022-03-07 PROCEDURE — 82728 ASSAY OF FERRITIN: CPT

## 2022-03-07 PROCEDURE — 84466 ASSAY OF TRANSFERRIN: CPT

## 2022-03-07 PROCEDURE — 80053 COMPREHEN METABOLIC PANEL: CPT

## 2022-03-07 PROCEDURE — 36415 COLL VENOUS BLD VENIPUNCTURE: CPT

## 2022-03-07 PROCEDURE — 82378 CARCINOEMBRYONIC ANTIGEN: CPT | Performed by: INTERNAL MEDICINE

## 2022-03-07 PROCEDURE — 85025 COMPLETE CBC W/AUTO DIFF WBC: CPT

## 2022-03-07 PROCEDURE — 99214 OFFICE O/P EST MOD 30 MIN: CPT | Performed by: INTERNAL MEDICINE

## 2022-03-07 NOTE — TELEPHONE ENCOUNTER
Caller: RENAN RATLIFF    Relationship to patient: SELF    Best call back number: [phone]    Patient is needing: REQUESTING C-SCOPE PACKET BE MAILED TO HER ADDRESS. HUB UNABLE TO TRANSFER. VERIFIED ADDRESS [address] [place] [address]           The patient is a 23y Male complaining of altered mental status.

## 2022-04-19 ENCOUNTER — PREP FOR SURGERY (OUTPATIENT)
Dept: OTHER | Facility: HOSPITAL | Age: 67
End: 2022-04-19

## 2022-04-19 DIAGNOSIS — Z85.038 PERSONAL HISTORY OF COLON CANCER: Primary | ICD-10-CM

## 2022-04-20 PROBLEM — Z85.038 PERSONAL HISTORY OF COLON CANCER: Status: ACTIVE | Noted: 2022-04-20

## 2022-05-04 ENCOUNTER — TELEPHONE (OUTPATIENT)
Dept: ONCOLOGY | Facility: CLINIC | Age: 67
End: 2022-05-04

## 2022-05-04 NOTE — TELEPHONE ENCOUNTER
Caller: Marie Foy    Relationship: Self    Best call back number: [phone]    What is the best time to reach you: ANYTIME    Who are you requesting to speak with (clinical staff, provider,  specific staff member): SCHEDULING    What was the call regarding: PT NEEDS TO R/S HER 8/29 APPT - IS OFF WORK 8/22 IF THAT DAY IS OPEN.     Do you require a callback:YES

## 2022-05-12 ENCOUNTER — HOSPITAL ENCOUNTER (OUTPATIENT)
Facility: HOSPITAL | Age: 67
Setting detail: HOSPITAL OUTPATIENT SURGERY
Discharge: HOME | End: 2022-05-12
Attending: SURGERY | Admitting: SURGERY

## 2022-05-12 ENCOUNTER — ANESTHESIA EVENT (OUTPATIENT)
Dept: GASTROENTEROLOGY | Facility: HOSPITAL | Age: 67
End: 2022-05-12

## 2022-05-12 ENCOUNTER — ANESTHESIA (OUTPATIENT)
Dept: GASTROENTEROLOGY | Facility: HOSPITAL | Age: 67
End: 2022-05-12

## 2022-05-12 VITALS
DIASTOLIC BLOOD PRESSURE: 68 MMHG | OXYGEN SATURATION: 99 % | SYSTOLIC BLOOD PRESSURE: 162 MMHG | HEIGHT: 58 IN | HEART RATE: 62 BPM | WEIGHT: 147 LBS | BODY MASS INDEX: 30.86 KG/M2 | RESPIRATION RATE: 14 BRPM

## 2022-05-12 DIAGNOSIS — Z85.038 PERSONAL HISTORY OF COLON CANCER: ICD-10-CM

## 2022-05-12 PROCEDURE — 88305 TISSUE EXAM BY PATHOLOGIST: CPT | Performed by: SURGERY

## 2022-05-12 PROCEDURE — 45385 COLONOSCOPY W/LESION REMOVAL: CPT | Performed by: SURGERY

## 2022-05-12 PROCEDURE — 25010000002 PROPOFOL 10 MG/ML EMULSION: Performed by: ANESTHESIOLOGY

## 2022-05-12 PROCEDURE — S0260 H&P FOR SURGERY: HCPCS | Performed by: SURGERY

## 2022-05-12 RX ORDER — SODIUM CHLORIDE 0.9 % (FLUSH) 0.9 %
10 SYRINGE (ML) INJECTION EVERY 12 HOURS SCHEDULED
Status: DISCONTINUED | OUTPATIENT
Start: 2022-05-12 | End: 2022-05-12 | Stop reason: HOSPADM

## 2022-05-12 RX ORDER — SODIUM CHLORIDE, SODIUM LACTATE, POTASSIUM CHLORIDE, CALCIUM CHLORIDE 600; 310; 30; 20 MG/100ML; MG/100ML; MG/100ML; MG/100ML
30 INJECTION, SOLUTION INTRAVENOUS CONTINUOUS PRN
Status: DISCONTINUED | OUTPATIENT
Start: 2022-05-12 | End: 2022-05-12 | Stop reason: HOSPADM

## 2022-05-12 RX ORDER — SODIUM CHLORIDE 0.9 % (FLUSH) 0.9 %
10 SYRINGE (ML) INJECTION AS NEEDED
Status: DISCONTINUED | OUTPATIENT
Start: 2022-05-12 | End: 2022-05-12 | Stop reason: HOSPADM

## 2022-05-12 RX ORDER — PROPOFOL 10 MG/ML
VIAL (ML) INTRAVENOUS AS NEEDED
Status: DISCONTINUED | OUTPATIENT
Start: 2022-05-12 | End: 2022-05-12 | Stop reason: SURG

## 2022-05-12 RX ORDER — LIDOCAINE HYDROCHLORIDE 20 MG/ML
INJECTION, SOLUTION INFILTRATION; PERINEURAL AS NEEDED
Status: DISCONTINUED | OUTPATIENT
Start: 2022-05-12 | End: 2022-05-12 | Stop reason: SURG

## 2022-05-12 RX ADMIN — SODIUM CHLORIDE, POTASSIUM CHLORIDE, SODIUM LACTATE AND CALCIUM CHLORIDE 30 ML/HR: 600; 310; 30; 20 INJECTION, SOLUTION INTRAVENOUS at 11:09

## 2022-05-12 RX ADMIN — PROPOFOL 50 MG: 10 INJECTION, EMULSION INTRAVENOUS at 11:32

## 2022-05-12 RX ADMIN — LIDOCAINE HYDROCHLORIDE 60 MG: 20 INJECTION, SOLUTION INFILTRATION; PERINEURAL at 11:27

## 2022-05-12 RX ADMIN — PROPOFOL 140 MCG/KG/MIN: 10 INJECTION, EMULSION INTRAVENOUS at 11:27

## 2022-05-12 RX ADMIN — PROPOFOL 100 MG: 10 INJECTION, EMULSION INTRAVENOUS at 11:27

## 2022-05-12 NOTE — OP NOTE
Operative Note :   Martin Calvo MD    Marie Foy  1955    Procedure Date: 05/12/22    Pre-op Diagnosis:  · Personal history of colon cancer    Post-op Diagnosis:  · Colon polyp  · Widely patent colonic anastomosis    Procedure:   · Colonoscopy to cecum with snare polypectomy    Surgeon: Martin Calvo MD      Anesthesia: MAC    Indications:  · 66-year-old female with history of colon cancer having had partial transverse colectomy a little over a year ago presents for surveillance endoscopy.    Findings:   · Widely patent anastomosis  · Sigmoid colon polyp    Recommendations:   · Follow-up polyp pathology, likely recommend repeat colonoscopy 2 years based on prior history of colon cancer    Description of procedure:    After obtaining informed consent, patient was brought to the endoscopy suite and was sedated.  Digital rectal exam was undertaken and was unremarkable.  Flexible colonoscope was then introduced through the rectum and passed around to the level of the cecum under direct visualization.  The ileocecal valve and appendiceal orifice were normal.  The remainder of the cecum, ascending colon, hepatic flexure were normal.  In the transverse colon a widely patent anastomosis was identified.  The splenic flexure and descending colon were normal.  In the sigmoid colon at approximately 22 cm there was a benign-appearing polyp removed with the snare device.  The site was hemostatic.  The rectum was normal.    Martin Calvo MD  General and Endoscopic Surgery  [facility]    [address], [address]  [place], [place], [address]  P: [phone]  F: [phone]

## 2022-05-12 NOTE — DISCHARGE INSTRUCTIONS

## 2022-05-12 NOTE — ANESTHESIA POSTPROCEDURE EVALUATION
"Patient: Marie Foy    Procedure Summary     Date: 05/12/22 Room / Location: [facility] ENDOSCOPY 5 / [facility] ENDOSCOPY    Anesthesia Start: 1122 Anesthesia Stop: 1152    Procedure: COLONOSCOPY to anastamosis with hot snare polypectomy (N/A ) Diagnosis:       Personal history of colon cancer      (Personal history of colon cancer [Z85.038])    Surgeons: Martin Calvo MD Provider: Mynor Bonner MD    Anesthesia Type: MAC ASA Status: 3          Anesthesia Type: MAC    Vitals  Vitals Value Taken Time   /53 05/12/22 1150   Temp     Pulse 58 05/12/22 1150   Resp 16 05/12/22 1150   SpO2 96 % 05/12/22 1150           Post Anesthesia Care and Evaluation    Patient location during evaluation: bedside  Patient participation: complete - patient participated  Level of consciousness: sleepy but conscious and responsive to verbal stimuli  Pain management: adequate  Airway patency: patent  Anesthetic complications: No anesthetic complications  PONV Status: none  Cardiovascular status: acceptable  Respiratory status: acceptable  Hydration status: acceptable    Comments: /53 (BP Location: Left arm)   Pulse 58   Resp 16   Ht 147.3 cm (58\")   Wt 66.7 kg (147 lb)   SpO2 96%   BMI 30.72 kg/m²         "

## 2022-05-12 NOTE — H&P
Chief complaint: Personal history of colon cancer    Presenting illness: 66-year-old female presenting for colonoscopy.  Personal history of colon cancer status post transverse colectomy done March 2021.  She recovered well and now presents for follow-up surveillance endoscopy.  Past medical history: Hypertension, hypercholesterolemia, depression, hypothyroidism    Past surgical history: Airway surgery as a child, hysterectomy and bladder suspension, right knee surgery, breast biopsy, multiple orthopedic procedures.  Robotic partial colectomy done March 2021.    Medications: Xanax, Norvasc, Lipitor, Citracal, docusate, levothyroxine, Prilosec, Vesicare, Valtrex    Allergy: Codeine, penicillin    Social history: Non-smoker    Family history: Colon cancer in her mother    Physical exam:  Body mass index 30.7  General: Awake and alert, no distress  Eyes: Extraocular limits intact, no icterus  Head: Normocephalic, atraumatic  Respiratory: No use of accessory muscles, good bilateral chest expansion  Neck: Supple, trachea midline  Gastrointestinal: Abdomen soft benign, no hernia felt  Skin: Warm and dry    Assessment and plan:  -History of colon cancer, status post partial colectomy little over a year ago  -Plan for follow-up surveillance endoscopy today, risks include bleeding and perforation, patient agreeable to proceed    Martin Calvo MD  General and Endoscopic Surgery  [facility]    [address], [address]  [place], [place], [address]  P: [phone]  F: [phone]

## 2022-05-12 NOTE — ANESTHESIA PREPROCEDURE EVALUATION
Anesthesia Evaluation     Patient summary reviewed and Nursing notes reviewed   history of anesthetic complications: difficult airway  NPO Solid Status: > 8 hours  NPO Liquid Status: > 2 hours           Airway   Mallampati: I  TM distance: >3 FB  Neck ROM: full  No difficulty expected  Dental - normal exam     Pulmonary - negative pulmonary ROS and normal exam   Cardiovascular - normal exam    (+) hypertension, valvular problems/murmurs murmur, hyperlipidemia,       Neuro/Psych  (+) psychiatric history Anxiety and Depression,    GI/Hepatic/Renal/Endo    (+)  GERD,  thyroid problem hypothyroidism    Musculoskeletal     Abdominal    Substance History - negative use     OB/GYN negative ob/gyn ROS         Other   arthritis,    history of cancer                    Anesthesia Plan    ASA 3     MAC     intravenous induction     Anesthetic plan, all risks, benefits, and alternatives have been provided, discussed and informed consent has been obtained with: patient.        CODE STATUS:

## 2022-05-13 LAB
LAB AP CASE REPORT: NORMAL
PATH REPORT.FINAL DX SPEC: NORMAL
PATH REPORT.GROSS SPEC: NORMAL

## 2022-05-17 ENCOUNTER — TELEPHONE (OUTPATIENT)
Dept: SURGERY | Facility: CLINIC | Age: 67
End: 2022-05-17

## 2022-05-17 NOTE — TELEPHONE ENCOUNTER
----- Message from Martin Calvo MD sent at 5/17/2022 10:09 AM EDT -----  Please notify the patient that the polyp we found was benign and that based on her prior history of colon cancer I would recommend repeat colonoscopy again in 2 years.  Please place her in recall for the same.  Thank you.

## 2022-05-17 NOTE — PROGRESS NOTES
Please notify the patient that the polyp we found was benign and that based on her prior history of colon cancer I would recommend repeat colonoscopy again in 2 years.  Please place her in recall for the same.  Thank you.

## 2022-06-01 RX ORDER — LEVOTHYROXINE SODIUM 0.1 MG/1
100 TABLET ORAL DAILY
Qty: 90 TABLET | Refills: 1 | Status: SHIPPED | OUTPATIENT
Start: 2022-06-01 | End: 2022-11-29

## 2022-07-06 RX ORDER — AMLODIPINE BESYLATE 5 MG/1
TABLET ORAL
Qty: 90 TABLET | Refills: 2 | Status: SHIPPED | OUTPATIENT
Start: 2022-07-06

## 2022-07-18 DIAGNOSIS — I10 ESSENTIAL (PRIMARY) HYPERTENSION: ICD-10-CM

## 2022-07-18 DIAGNOSIS — E78.00 HYPERCHOLESTEROLEMIA: Primary | ICD-10-CM

## 2022-07-18 DIAGNOSIS — E03.9 HYPOTHYROIDISM, UNSPECIFIED TYPE: ICD-10-CM

## 2022-07-20 DIAGNOSIS — M81.0 OSTEOPOROSIS, UNSPECIFIED OSTEOPOROSIS TYPE, UNSPECIFIED PATHOLOGICAL FRACTURE PRESENCE: Primary | ICD-10-CM

## 2022-07-20 LAB
ALBUMIN SERPL-MCNC: 4.6 G/DL (ref 3.8–4.8)
ALBUMIN/GLOB SERPL: 1.9 {RATIO} (ref 1.2–2.2)
ALP SERPL-CCNC: 81 IU/L (ref 44–121)
ALT SERPL-CCNC: 16 IU/L (ref 0–32)
AST SERPL-CCNC: 18 IU/L (ref 0–40)
BASOPHILS # BLD AUTO: 0 X10E3/UL (ref 0–0.2)
BASOPHILS NFR BLD AUTO: 1 %
BILIRUB SERPL-MCNC: 0.4 MG/DL (ref 0–1.2)
BUN SERPL-MCNC: 18 MG/DL (ref 8–27)
BUN/CREAT SERPL: 22 (ref 12–28)
CALCIUM SERPL-MCNC: 9.7 MG/DL (ref 8.7–10.3)
CHLORIDE SERPL-SCNC: 105 MMOL/L (ref 96–106)
CHOLEST SERPL-MCNC: 212 MG/DL (ref 100–199)
CO2 SERPL-SCNC: 24 MMOL/L (ref 20–29)
CREAT SERPL-MCNC: 0.81 MG/DL (ref 0.57–1)
EGFRCR SERPLBLD CKD-EPI 2021: 79 ML/MIN/1.73
EOSINOPHIL # BLD AUTO: 0.3 X10E3/UL (ref 0–0.4)
EOSINOPHIL NFR BLD AUTO: 4 %
ERYTHROCYTE [DISTWIDTH] IN BLOOD BY AUTOMATED COUNT: 13.1 % (ref 11.7–15.4)
GLOBULIN SER CALC-MCNC: 2.4 G/DL (ref 1.5–4.5)
GLUCOSE SERPL-MCNC: 92 MG/DL (ref 65–99)
HCT VFR BLD AUTO: 36.6 % (ref 34–46.6)
HDLC SERPL-MCNC: 61 MG/DL
HGB BLD-MCNC: 12.3 G/DL (ref 11.1–15.9)
IMM GRANULOCYTES # BLD AUTO: 0 X10E3/UL (ref 0–0.1)
IMM GRANULOCYTES NFR BLD AUTO: 0 %
LDLC SERPL CALC-MCNC: 127 MG/DL (ref 0–99)
LYMPHOCYTES # BLD AUTO: 1.9 X10E3/UL (ref 0.7–3.1)
LYMPHOCYTES NFR BLD AUTO: 34 %
MCH RBC QN AUTO: 29 PG (ref 26.6–33)
MCHC RBC AUTO-ENTMCNC: 33.6 G/DL (ref 31.5–35.7)
MCV RBC AUTO: 86 FL (ref 79–97)
MONOCYTES # BLD AUTO: 0.4 X10E3/UL (ref 0.1–0.9)
MONOCYTES NFR BLD AUTO: 8 %
NEUTROPHILS # BLD AUTO: 3.1 X10E3/UL (ref 1.4–7)
NEUTROPHILS NFR BLD AUTO: 53 %
PLATELET # BLD AUTO: 276 X10E3/UL (ref 150–450)
POTASSIUM SERPL-SCNC: 4.2 MMOL/L (ref 3.5–5.2)
PROT SERPL-MCNC: 7 G/DL (ref 6–8.5)
RBC # BLD AUTO: 4.24 X10E6/UL (ref 3.77–5.28)
SODIUM SERPL-SCNC: 143 MMOL/L (ref 134–144)
T4 FREE SERPL-MCNC: 1.66 NG/DL (ref 0.82–1.77)
TRIGL SERPL-MCNC: 139 MG/DL (ref 0–149)
TSH SERPL DL<=0.005 MIU/L-ACNC: 0.45 UIU/ML (ref 0.45–4.5)
UNABLE TO VOID: NORMAL
VLDLC SERPL CALC-MCNC: 24 MG/DL (ref 5–40)
WBC # BLD AUTO: 5.7 X10E3/UL (ref 3.4–10.8)

## 2022-07-25 ENCOUNTER — INFUSION (OUTPATIENT)
Dept: ONCOLOGY | Facility: HOSPITAL | Age: 67
End: 2022-07-25

## 2022-07-25 ENCOUNTER — TELEPHONE (OUTPATIENT)
Dept: ONCOLOGY | Facility: HOSPITAL | Age: 67
End: 2022-07-25

## 2022-07-25 VITALS — TEMPERATURE: 98 F | RESPIRATION RATE: 18 BRPM

## 2022-07-25 DIAGNOSIS — M81.0 OSTEOPOROSIS, UNSPECIFIED OSTEOPOROSIS TYPE, UNSPECIFIED PATHOLOGICAL FRACTURE PRESENCE: Primary | ICD-10-CM

## 2022-07-25 PROCEDURE — 96372 THER/PROPH/DIAG INJ SC/IM: CPT

## 2022-07-25 PROCEDURE — 25010000002 DENOSUMAB 60 MG/ML SOLUTION PREFILLED SYRINGE: Performed by: INTERNAL MEDICINE

## 2022-07-25 RX ADMIN — DENOSUMAB 60 MG: 60 INJECTION SUBCUTANEOUS at 16:15

## 2022-07-25 NOTE — TELEPHONE ENCOUNTER
See MD response in staff message for approval to not draw additional labs prior to prolia.       Michelle Wyman MD Eades, Angela, RN  That is fine.  SLW             Previous Messages       ----- Message -----   From: Nissa Perez RN   Sent: 7/21/2022   3:27 PM EDT   To: Michelle Wyman MD     Pt had labs done 7/19 but additional labs signed in therapy plan.  Is it ok to proceed with prolia without additional labs?  Please advise.   Nissa Short     
regular rate and rhythm

## 2022-07-26 ENCOUNTER — OFFICE VISIT (OUTPATIENT)
Dept: INTERNAL MEDICINE | Facility: CLINIC | Age: 67
End: 2022-07-26

## 2022-07-26 VITALS
HEIGHT: 58 IN | BODY MASS INDEX: 32.12 KG/M2 | TEMPERATURE: 98.3 F | HEART RATE: 74 BPM | DIASTOLIC BLOOD PRESSURE: 70 MMHG | OXYGEN SATURATION: 96 % | WEIGHT: 153 LBS | SYSTOLIC BLOOD PRESSURE: 138 MMHG

## 2022-07-26 DIAGNOSIS — M81.0 OSTEOPOROSIS, UNSPECIFIED OSTEOPOROSIS TYPE, UNSPECIFIED PATHOLOGICAL FRACTURE PRESENCE: ICD-10-CM

## 2022-07-26 DIAGNOSIS — Z12.31 ENCOUNTER FOR SCREENING MAMMOGRAM FOR BREAST CANCER: ICD-10-CM

## 2022-07-26 DIAGNOSIS — Z00.00 MEDICARE ANNUAL WELLNESS VISIT, INITIAL: Primary | ICD-10-CM

## 2022-07-26 DIAGNOSIS — H91.90 HEARING LOSS, UNSPECIFIED HEARING LOSS TYPE, UNSPECIFIED LATERALITY: ICD-10-CM

## 2022-07-26 PROCEDURE — 1126F AMNT PAIN NOTED NONE PRSNT: CPT | Performed by: INTERNAL MEDICINE

## 2022-07-26 PROCEDURE — 1160F RVW MEDS BY RX/DR IN RCRD: CPT | Performed by: INTERNAL MEDICINE

## 2022-07-26 PROCEDURE — G0439 PPPS, SUBSEQ VISIT: HCPCS | Performed by: INTERNAL MEDICINE

## 2022-07-26 PROCEDURE — 1170F FXNL STATUS ASSESSED: CPT | Performed by: INTERNAL MEDICINE

## 2022-07-26 NOTE — PATIENT INSTRUCTIONS
Medicare Wellness  Personal Prevention Plan of Service     Date of Office Visit:    Encounter Provider:  Michelle Wyman MD  Place of Service:  [facility]  Patient Name: Marie Foy  :  1955    As part of the Medicare Wellness portion of your visit today, we are providing you with this personalized preventive plan of services (PPPS). This plan is based upon recommendations of the [place] Preventive Services Task Force (USPSTF) and the Advisory Committee on Immunization Practices (ACIP).    This lists the preventive care services that should be considered, and provides dates of when you are due. Items listed as completed are up-to-date and do not require any further intervention.    Health Maintenance   Topic Date Due    COVID-19 Vaccine (4 - Booster for Pfizer series) 2022    DXA SCAN  2022    ANNUAL WELLNESS VISIT  2022    INFLUENZA VACCINE  10/01/2022    LIPID PANEL  2023    MAMMOGRAM  2023    COLONOSCOPY  2024    TDAP/TD VACCINES (2 - Td or Tdap) 10/20/2024    HEPATITIS C SCREENING  Completed    Pneumococcal Vaccine 65+  Completed    ZOSTER VACCINE  Completed    PAP SMEAR  Discontinued       Orders Placed This Encounter   Procedures    Mammo Screening Digital Tomosynthesis Bilateral With CAD     Standing Status:   Future     Standing Expiration Date:   2023     Order Specific Question:   Reason for Exam:     Answer:   screen    DEXA Bone Density Axial     Standing Status:   Future     Order Specific Question:   Is patient taking or have taken long term Glucocorticoid (steroids)?     Answer:   No     Order Specific Question:   Does the patient have rheumatoid arthritis?     Answer:   No     Order Specific Question:   Reason for Exam:     Answer:   op     Order Specific Question:   Does this patient have a diabetic monitoring/medication delivering device on?     Answer:   No     Order Specific Question:   Release to patient      Answer:   Immediate    Ambulatory Referral to ENT (Otolaryngology)     Referral Priority:   Routine     Referral Type:   Consultation     Referral Reason:   Specialty Services Required     Referred to Provider:   Andrew Petty MD     Requested Specialty:   Otolaryngology     Number of Visits Requested:   1       Return in about 6 months (around 1/26/2023) for Recheck.

## 2022-07-26 NOTE — PROGRESS NOTES
The ABCs of the Annual Wellness Visit  Subsequent Medicare Wellness Visit    Chief Complaint   Patient presents with   • Medicare Wellness-subsequent     AWV       Subjective    History of Present Illness:  Marie Foy is a 67 y.o. female who presents for a Subsequent Medicare Wellness Visit.    The following data was reviewed by: Michelle Wyman MD on 07/26/2022:  Common labs    Common Labsle 1/24/22 3/7/22 3/7/22 7/19/22 7/19/22 7/19/22     1135 1135 1048 1048 1048   Glucose 98  101  92    BUN 21  21 (A)  18    Creatinine 0.86  0.85  0.81    eGFR Non African Am 66        Sodium 141  140  143    Potassium 4.2  4.4  4.2    Chloride 106  104  105    Calcium 9.6  9.9  9.7    Total Protein     7.0    Albumin 4.60  4.70  4.6    Total Bilirubin 0.4  0.5  0.4    Alkaline Phosphatase 83  89  81    AST (SGOT) 16  17  18    ALT (SGPT) 13  15  16    WBC  4.76  5.7     Hemoglobin  13.2  12.3     Hematocrit  40.7  36.6     Platelets  249  276     Total Cholesterol      212 (A)   Triglycerides      139   HDL Cholesterol      61   LDL Cholesterol       127 (A)   (A) Abnormal value            HTN.  Control is good.  HLD.  Control is good.    Hypothyroidism.  TSH is under control.    Anxiety.  Uses Xanax for dental procedures only.    OP.  She is maintained on Prolia.      The following portions of the patient's history were reviewed and   updated as appropriate: allergies, current medications, past family history, past medical history, past social history, past surgical history and problem list.    Compared to one year ago, the patient feels her physical   health is the same.    Compared to one year ago, the patient feels her mental   health is the same.    Recent Hospitalizations:  She was not admitted to the hospital during the last year.       Current Medical Providers:  Patient Care Team:  Michelle Wyman MD as PCP - General  Michelle Wyman MD as PCP - Family Medicine  Martin Calvo MD as Referring Physician  (General Surgery)  Saul Ziegler MD as Consulting Physician (Hematology and Oncology)    Outpatient Medications Prior to Visit   Medication Sig Dispense Refill   • ALPRAZolam (XANAX) 0.25 MG tablet Take 1 tablet by mouth 3 (Three) Times a Day As Needed for Anxiety. 20 tablet 0   • amLODIPine (NORVASC) 5 MG tablet TAKE 1 TABLET BY MOUTH DAILY 90 tablet 2   • atorvastatin (LIPITOR) 40 MG tablet Take 1 tablet by mouth Daily. 90 tablet 1   • calcium citrate-vitamin d (CITRACAL) 200-250 MG-UNIT tablet tablet Take 1 tablet by mouth Daily.     • docusate sodium (COLACE) 100 MG capsule Take 100 mg by mouth Daily.     • Ferrous Sulfate 28 MG tablet Take  by mouth. 3 days a week     • levothyroxine (SYNTHROID, LEVOTHROID) 100 MCG tablet TAKE 1 TABLET BY MOUTH DAILY. 90 tablet 1   • Multiple Vitamins-Minerals (MULTIVITAMINS PLUS ZINC PO) Take  by mouth.     • omeprazole (priLOSEC) 40 MG capsule TAKE 1 CAPSULE BY MOUTH DAILY 90 capsule 2   • solifenacin (VESIcare) 5 MG tablet Take 1 tablet by mouth Daily. 90 tablet 3   • valACYclovir (VALTREX) 1000 MG tablet Take 1 tablet by mouth Daily. (Patient taking differently: Take 1,000 mg by mouth 1 (One) Time Per Week.) 90 tablet 3   • vitamin B-12 (CYANOCOBALAMIN) 1000 MCG tablet Take 1,000 mcg by mouth Daily.       No facility-administered medications prior to visit.       No opioid medication identified on active medication list. I have reviewed chart for other potential  high risk medication/s and harmful drug interactions in the elderly.          Aspirin is not on active medication list.  Aspirin use is not indicated based on review of current medical condition/s. Risk of harm outweighs potential benefits.  .    Patient Active Problem List   Diagnosis   • Anxiety   • Gastroesophageal reflux disease   • Herpes simplex type 2 infection   • Hypercholesterolemia   • Hypothyroidism   • Overactive bladder   • Fear of flying   • Unilateral partial paralysis of vocal cords or larynx  "  • Colon polyps   • Essential (primary) hypertension   • Primary localized osteoarthrosis of left lower leg   • Osteoporosis   • Malignant neoplasm of descending colon (HCC)   • Personal history of colon cancer     Advance Care Planning  Advance Directive is not on file.  ACP discussion was held with the patient during this visit. Patient does not have an advance directive, information provided.          Objective    Vitals:    07/26/22 1127   BP: 138/70   BP Location: Left arm   Patient Position: Sitting   Cuff Size: Adult   Pulse: 74   Temp: 98.3 °F (36.8 °C)   TempSrc: Temporal   SpO2: 96%   Weight: 69.4 kg (153 lb)   Height: 147.3 cm (57.99\")   PainSc: 0-No pain     Estimated body mass index is 31.99 kg/m² as calculated from the following:    Height as of this encounter: 147.3 cm (57.99\").    Weight as of this encounter: 69.4 kg (153 lb).    BMI is >= 30 and <35. (Class 1 Obesity). The following options were offered after discussion;: exercise counseling/recommendations      Does the patient have evidence of cognitive impairment? No    Physical Exam  Lab Results   Component Value Date    CHLPL 212 (H) 07/19/2022    TRIG 139 07/19/2022    HDL 61 07/19/2022     (H) 07/19/2022    VLDL 24 07/19/2022            HEALTH RISK ASSESSMENT    Smoking Status:  Social History     Tobacco Use   Smoking Status Never Smoker   Smokeless Tobacco Never Used     Alcohol Consumption:  Social History     Substance and Sexual Activity   Alcohol Use Not Currently    Comment: OCASSIONALLY     Fall Risk Screen:    STEADI Fall Risk Assessment was completed, and patient is at MODERATE risk for falls. Assessment completed on:7/26/2022    Depression Screening:  PHQ-2/PHQ-9 Depression Screening 7/26/2022   Retired PHQ-9 Total Score -   Retired Total Score -   Little Interest or Pleasure in Doing Things 0-->not at all   Feeling Down, Depressed or Hopeless 0-->not at all   PHQ-9: Brief Depression Severity Measure Score 0       Health " Habits and Functional and Cognitive Screening:  Functional & Cognitive Status 7/26/2022   Do you have difficulty preparing food and eating? No   Do you have difficulty bathing yourself, getting dressed or grooming yourself? No   Do you have difficulty using the toilet? No   Do you have difficulty moving around from place to place? No   Do you have trouble with steps or getting out of a bed or a chair? No   Current Diet Low Fat Diet   Dental Exam Up to date   Eye Exam Not up to date   Exercise (times per week) 0 times per week   Current Exercises Include No Regular Exercise   Current Exercise Activities Include -   Do you need help using the phone?  -   Are you deaf or do you have serious difficulty hearing?  Yes   Do you need help with transportation? No   Do you need help shopping? No   Do you need help preparing meals?  No   Do you need help with housework?  No   Do you need help with laundry? No   Do you need help taking your medications? No   Do you need help managing money? No   Do you ever drive or ride in a car without wearing a seat belt? No   Have you felt unusual stress, anger or loneliness in the last month? No   Who do you live with? Alone   If you need help, do you have trouble finding someone available to you? No   Have you been bothered in the last four weeks by sexual problems? No   Do you have difficulty concentrating, remembering or making decisions? No       Age-appropriate Screening Schedule:  Refer to the list below for future screening recommendations based on patient's age, sex and/or medical conditions. Orders for these recommended tests are listed in the plan section. The patient has been provided with a written plan.    Health Maintenance   Topic Date Due   • DXA SCAN  06/19/2022   • INFLUENZA VACCINE  10/01/2022   • LIPID PANEL  07/19/2023   • MAMMOGRAM  09/16/2023   • COLONOSCOPY  05/12/2024   • TDAP/TD VACCINES (2 - Td or Tdap) 10/20/2024   • ZOSTER VACCINE  Completed   • PAP SMEAR   Discontinued              Assessment & Plan   CMS Preventative Services Quick Reference  Risk Factors Identified During Encounter  Immunizations Discussed/Encouraged (specific Immunizations; COVID19  The above risks/problems have been discussed with the patient.  Follow up actions/plans if indicated are seen below in the Assessment/Plan Section.  Pertinent information has been shared with the patient in the After Visit Summary.    Diagnoses and all orders for this visit:    1. Medicare annual wellness visit, initial (Primary)    2. Encounter for screening mammogram for breast cancer  -     Mammo Screening Digital Tomosynthesis Bilateral With CAD; Future    3. Osteoporosis, unspecified osteoporosis type, unspecified pathological fracture presence  -     DEXA Bone Density Axial; Future    4. Hearing loss, unspecified hearing loss type, unspecified laterality  -     Ambulatory Referral to ENT (Otolaryngology)        Follow Up:   Return in about 6 months (around 1/26/2023) for Recheck.     An After Visit Summary and PPPS were made available to the patient.

## 2022-08-22 ENCOUNTER — OFFICE VISIT (OUTPATIENT)
Dept: ONCOLOGY | Facility: CLINIC | Age: 67
End: 2022-08-22

## 2022-08-22 ENCOUNTER — LAB (OUTPATIENT)
Dept: LAB | Facility: HOSPITAL | Age: 67
End: 2022-08-22

## 2022-08-22 VITALS
SYSTOLIC BLOOD PRESSURE: 141 MMHG | HEIGHT: 58 IN | WEIGHT: 153.6 LBS | RESPIRATION RATE: 18 BRPM | TEMPERATURE: 97.7 F | DIASTOLIC BLOOD PRESSURE: 79 MMHG | BODY MASS INDEX: 32.24 KG/M2 | HEART RATE: 60 BPM | OXYGEN SATURATION: 97 %

## 2022-08-22 DIAGNOSIS — C18.6 MALIGNANT NEOPLASM OF DESCENDING COLON: ICD-10-CM

## 2022-08-22 DIAGNOSIS — D50.0 IRON DEFICIENCY ANEMIA DUE TO CHRONIC BLOOD LOSS: ICD-10-CM

## 2022-08-22 DIAGNOSIS — C18.6 MALIGNANT NEOPLASM OF DESCENDING COLON: Primary | ICD-10-CM

## 2022-08-22 LAB
ALBUMIN SERPL-MCNC: 4.7 G/DL (ref 3.5–5.2)
ALBUMIN/GLOB SERPL: 1.7 G/DL (ref 1.1–2.4)
ALP SERPL-CCNC: 82 U/L (ref 38–116)
ALT SERPL W P-5'-P-CCNC: 18 U/L (ref 0–33)
ANION GAP SERPL CALCULATED.3IONS-SCNC: 12.3 MMOL/L (ref 5–15)
AST SERPL-CCNC: 15 U/L (ref 0–32)
BASOPHILS # BLD AUTO: 0.03 10*3/MM3 (ref 0–0.2)
BASOPHILS NFR BLD AUTO: 0.6 % (ref 0–1.5)
BILIRUB SERPL-MCNC: 0.5 MG/DL (ref 0.2–1.2)
BUN SERPL-MCNC: 18 MG/DL (ref 6–20)
BUN/CREAT SERPL: 20.5 (ref 7.3–30)
CALCIUM SPEC-SCNC: 9.4 MG/DL (ref 8.5–10.2)
CEA SERPL-MCNC: 2.15 NG/ML
CHLORIDE SERPL-SCNC: 107 MMOL/L (ref 98–107)
CO2 SERPL-SCNC: 23.7 MMOL/L (ref 22–29)
CREAT SERPL-MCNC: 0.88 MG/DL (ref 0.6–1.1)
DEPRECATED RDW RBC AUTO: 43.6 FL (ref 37–54)
EGFRCR SERPLBLD CKD-EPI 2021: 71.2 ML/MIN/1.73
EOSINOPHIL # BLD AUTO: 0.31 10*3/MM3 (ref 0–0.4)
EOSINOPHIL NFR BLD AUTO: 6.1 % (ref 0.3–6.2)
ERYTHROCYTE [DISTWIDTH] IN BLOOD BY AUTOMATED COUNT: 13.2 % (ref 12.3–15.4)
FERRITIN SERPL-MCNC: 66.6 NG/ML (ref 13–150)
GLOBULIN UR ELPH-MCNC: 2.7 GM/DL (ref 1.8–3.5)
GLUCOSE SERPL-MCNC: 102 MG/DL (ref 74–124)
HCT VFR BLD AUTO: 38.9 % (ref 34–46.6)
HGB BLD-MCNC: 12.5 G/DL (ref 12–15.9)
IMM GRANULOCYTES # BLD AUTO: 0.01 10*3/MM3 (ref 0–0.05)
IMM GRANULOCYTES NFR BLD AUTO: 0.2 % (ref 0–0.5)
IRON 24H UR-MRATE: 59 MCG/DL (ref 37–145)
IRON SATN MFR SERPL: 16 % (ref 14–48)
LYMPHOCYTES # BLD AUTO: 1.48 10*3/MM3 (ref 0.7–3.1)
LYMPHOCYTES NFR BLD AUTO: 29.2 % (ref 19.6–45.3)
MCH RBC QN AUTO: 29.1 PG (ref 26.6–33)
MCHC RBC AUTO-ENTMCNC: 32.1 G/DL (ref 31.5–35.7)
MCV RBC AUTO: 90.5 FL (ref 79–97)
MONOCYTES # BLD AUTO: 0.39 10*3/MM3 (ref 0.1–0.9)
MONOCYTES NFR BLD AUTO: 7.7 % (ref 5–12)
NEUTROPHILS NFR BLD AUTO: 2.84 10*3/MM3 (ref 1.7–7)
NEUTROPHILS NFR BLD AUTO: 56.2 % (ref 42.7–76)
NRBC BLD AUTO-RTO: 0 /100 WBC (ref 0–0.2)
PLATELET # BLD AUTO: 232 10*3/MM3 (ref 140–450)
PMV BLD AUTO: 8.9 FL (ref 6–12)
POTASSIUM SERPL-SCNC: 4.1 MMOL/L (ref 3.5–4.7)
PROT SERPL-MCNC: 7.4 G/DL (ref 6.3–8)
RBC # BLD AUTO: 4.3 10*6/MM3 (ref 3.77–5.28)
SODIUM SERPL-SCNC: 143 MMOL/L (ref 134–145)
TIBC SERPL-MCNC: 360 MCG/DL (ref 249–505)
TRANSFERRIN SERPL-MCNC: 257 MG/DL (ref 200–360)
WBC NRBC COR # BLD: 5.06 10*3/MM3 (ref 3.4–10.8)

## 2022-08-22 PROCEDURE — 82378 CARCINOEMBRYONIC ANTIGEN: CPT | Performed by: INTERNAL MEDICINE

## 2022-08-22 PROCEDURE — 83540 ASSAY OF IRON: CPT

## 2022-08-22 PROCEDURE — 99214 OFFICE O/P EST MOD 30 MIN: CPT | Performed by: INTERNAL MEDICINE

## 2022-08-22 PROCEDURE — 84466 ASSAY OF TRANSFERRIN: CPT

## 2022-08-22 PROCEDURE — 85025 COMPLETE CBC W/AUTO DIFF WBC: CPT

## 2022-08-22 PROCEDURE — 36415 COLL VENOUS BLD VENIPUNCTURE: CPT

## 2022-08-22 PROCEDURE — 80053 COMPREHEN METABOLIC PANEL: CPT

## 2022-08-22 PROCEDURE — 82728 ASSAY OF FERRITIN: CPT

## 2022-08-22 NOTE — PROGRESS NOTES
Subjective     CHIEF COMPLAINT:      Chief Complaint   Patient presents with   • Follow-up       HISTORY OF PRESENT ILLNESS:     Marie Foy is a 67 y.o. female patient who returns today for follow up on her colon cancer and iron deficiency anemia.  She returns today for follow-up reporting no new symptoms.  Bowel movements alternate between diarrhea and constipation.  She is taking stool softener regularly.  She had a colonoscopy in May 2022 and will have a follow-up colonoscopy in 2024.    ROS:  Pertinent ROS is in the HPI.     Past medical, surgical, social and family history were reviewed.     MEDICATIONS:    Current Outpatient Medications:   •  ALPRAZolam (XANAX) 0.25 MG tablet, Take 1 tablet by mouth 3 (Three) Times a Day As Needed for Anxiety., Disp: 20 tablet, Rfl: 0  •  amLODIPine (NORVASC) 5 MG tablet, TAKE 1 TABLET BY MOUTH DAILY, Disp: 90 tablet, Rfl: 2  •  atorvastatin (LIPITOR) 40 MG tablet, Take 1 tablet by mouth Daily., Disp: 90 tablet, Rfl: 1  •  calcium citrate-vitamin d (CITRACAL) 200-250 MG-UNIT tablet tablet, Take 1 tablet by mouth Daily., Disp: , Rfl:   •  docusate sodium (COLACE) 100 MG capsule, Take 100 mg by mouth Daily., Disp: , Rfl:   •  Ferrous Sulfate 28 MG tablet, Take  by mouth. 3 days a week, Disp: , Rfl:   •  levothyroxine (SYNTHROID, LEVOTHROID) 100 MCG tablet, TAKE 1 TABLET BY MOUTH DAILY., Disp: 90 tablet, Rfl: 1  •  Multiple Vitamins-Minerals (MULTIVITAMINS PLUS ZINC PO), Take  by mouth., Disp: , Rfl:   •  omeprazole (priLOSEC) 40 MG capsule, TAKE 1 CAPSULE BY MOUTH DAILY, Disp: 90 capsule, Rfl: 2  •  solifenacin (VESIcare) 5 MG tablet, Take 1 tablet by mouth Daily., Disp: 90 tablet, Rfl: 3  •  valACYclovir (VALTREX) 1000 MG tablet, Take 1 tablet by mouth Daily. (Patient taking differently: Take 1,000 mg by mouth 1 (One) Time Per Week.), Disp: 90 tablet, Rfl: 3  •  vitamin B-12 (CYANOCOBALAMIN) 1000 MCG tablet, Take 1,000 mcg by mouth Daily., Disp: , Rfl:   Objective     VITAL  "SIGNS:     Vitals:    08/22/22 1143   BP: 141/79   Pulse: 60   Resp: 18   Temp: 97.7 °F (36.5 °C)   TempSrc: Temporal   SpO2: 97%   Weight: 69.7 kg (153 lb 9.6 oz)   Height: 147.3 cm (57.99\")   PainSc:   5   PainLoc: Knee  Comment: Left Knee     Body mass index is 32.11 kg/m².     Wt Readings from Last 5 Encounters:   08/22/22 69.7 kg (153 lb 9.6 oz)   07/26/22 69.4 kg (153 lb)   05/12/22 66.7 kg (147 lb)   03/07/22 70.5 kg (155 lb 8 oz)   01/04/22 71.2 kg (157 lb)     PHYSICAL EXAMINATION:   GENERAL: The patient appears in good general condition, not in acute distress.   SKIN: No ecchymosis.  EYES: No jaundice. No pallor.  LYMPHATICS: No cervical lymphadenopathy.  CHEST: Normal respiratory effort.  Lungs clear bilaterally.  No added sounds.  CVS: Normal S1-S2.  No murmurs.  ABDOMEN: Soft. No tenderness. No Hepatomegaly. No Splenomegaly. No masses.    DIAGNOSTIC DATA:     Results from last 7 days   Lab Units 08/22/22  1054   WBC 10*3/mm3 5.06   NEUTROS ABS 10*3/mm3 2.84   HEMOGLOBIN g/dL 12.5   HEMATOCRIT % 38.9   PLATELETS 10*3/mm3 232     Results from last 7 days   Lab Units 08/22/22  1054   SODIUM mmol/L 143   POTASSIUM mmol/L 4.1   CHLORIDE mmol/L 107   CO2 mmol/L 23.7   BUN mg/dL 18   CREATININE mg/dL 0.88   CALCIUM mg/dL 9.4   ALBUMIN g/dL 4.70   BILIRUBIN mg/dL 0.5   ALK PHOS U/L 82   ALT (SGPT) U/L 18   AST (SGOT) U/L 15   GLUCOSE mg/dL 102     Component      Latest Ref Rng & Units 11/11/2021 3/7/2022 8/22/2022   Iron      37 - 145 mcg/dL 40 81 59   Iron Saturation      14 - 48 % 12 (L) 22 16   Transferrin      200 - 360 mg/dL 246 262 257   TIBC      249 - 505 mcg/dL 344 367 360   Ferritin      13.00 - 150.00 ng/mL 63.20 84.60 66.60     Lab Results   Component Value Date    CEA 2.15 08/22/2022    CEA 2.16 03/07/2022    CEA 2.04 11/11/2021    CEA 2.59 08/19/2021    CEA 5.86 02/22/2021      Assessment & Plan    *Colon cancer arising from the distal transverse colon.  · Descending colon mass was identified on " screening colonoscopy on 1/26/2021.  · S/P on 3/2/2021 a laparoscopic transverse partial colectomy.  · Pathology exam revealed a  T3 N0 Grade 2 tumor with clear surgical margin. 14 lymph nodes were negative. There was no evidence of lymphovascular or perineural invasion.   · Tumor was MSI stable.  · Testing revealed no KRAS NRAS or BRAF mutations.  · Baseline CEA was elevated at 5.86.  · Since the tumor was stage IIA with adequate number of lymph nodes examined and with no high risk features, adjuvant chemotherapy was not recommended.  · CEA was 2.59 on 8/19/2021.  · CT scan on 11/1/2021 revealed no evidence of recurrence or metastasis.  · Colonoscopy on 5/12/2022 revealed a colonic polyp.  Pathology exam revealed tubulovillous adenoma.  · Dr. Calvo recommended repeating colonoscopy in 2 years.  · CEA is normal today at 2.15.  · She is doing well.  No evidence of recurrence of the colon cancer.  · I recommended repeating CT scans of the chest abdomen pelvis in November 2022 which will be 1-year from the last study.    *Iron deficiency anemia.   · It is attributed to blood losses from the colon cancer and from surgical blood losses.    · Hemoglobin was 11.1 on 3/3/2021.  · Patient's diet was low in iron rich food.    · We recommended increasing her intake of iron rich food.  · Patient started on 4/5/2021 ferrous sulfate 28 mg elemental iron 4 days a week.  · Hemoglobin improved to 12.1 on 8/19/2021.  · Hemoglobin was 12.5 on 11/11/2021.  · Patient is currently taking ferrous sulfate 325 mg 4 days a week.  · She is tolerating the iron.  · Hemoglobin is 12.5 today.  · Iron stores are adequate.    PLAN:    1.  Continue ferrous sulfate 4 days a week.  2.  Obtain CT scan of the chest abdomen pelvis in November 2022.    3.  We will see her in follow-up 1 week after with CBC CMP CEA ferritin iron panel.      Saul Ziegler MD  08/22/22

## 2022-08-23 ENCOUNTER — TELEPHONE (OUTPATIENT)
Dept: ONCOLOGY | Facility: CLINIC | Age: 67
End: 2022-08-23

## 2022-09-06 ENCOUNTER — OFFICE VISIT (OUTPATIENT)
Dept: ORTHOPEDIC SURGERY | Facility: CLINIC | Age: 67
End: 2022-09-06

## 2022-09-06 VITALS — WEIGHT: 154.7 LBS | HEIGHT: 58 IN | TEMPERATURE: 97.8 F | BODY MASS INDEX: 32.47 KG/M2

## 2022-09-06 DIAGNOSIS — M17.12 PRIMARY OSTEOARTHRITIS OF LEFT KNEE: ICD-10-CM

## 2022-09-06 DIAGNOSIS — R52 PAIN: Primary | ICD-10-CM

## 2022-09-06 PROCEDURE — 20610 DRAIN/INJ JOINT/BURSA W/O US: CPT | Performed by: ORTHOPAEDIC SURGERY

## 2022-09-06 PROCEDURE — 99213 OFFICE O/P EST LOW 20 MIN: CPT | Performed by: ORTHOPAEDIC SURGERY

## 2022-09-06 PROCEDURE — 73562 X-RAY EXAM OF KNEE 3: CPT | Performed by: ORTHOPAEDIC SURGERY

## 2022-09-06 RX ORDER — METHYLPREDNISOLONE ACETATE 80 MG/ML
80 INJECTION, SUSPENSION INTRA-ARTICULAR; INTRALESIONAL; INTRAMUSCULAR; SOFT TISSUE
Status: COMPLETED | OUTPATIENT
Start: 2022-09-06 | End: 2022-09-06

## 2022-09-06 RX ADMIN — METHYLPREDNISOLONE ACETATE 80 MG: 80 INJECTION, SUSPENSION INTRA-ARTICULAR; INTRALESIONAL; INTRAMUSCULAR; SOFT TISSUE at 11:46

## 2022-09-06 NOTE — PROGRESS NOTES
Patient: Marie Foy  YOB: 1955 67 y.o. female  Medical Record Number: [id number]    Chief Complaints:   Chief Complaint   Patient presents with   • Left Knee - Follow-up, Pain       History of Present Illness:Marie Foy is a 67 y.o. female who presents for follow-up of  ***    Allergies:   Allergies   Allergen Reactions   • Codeine Headache     Headache   • Morphine And Related Rash   • Penicillins Rash       Medications:   Current Outpatient Medications   Medication Sig Dispense Refill   • ALPRAZolam (XANAX) 0.25 MG tablet Take 1 tablet by mouth 3 (Three) Times a Day As Needed for Anxiety. 20 tablet 0   • amLODIPine (NORVASC) 5 MG tablet TAKE 1 TABLET BY MOUTH DAILY 90 tablet 2   • atorvastatin (LIPITOR) 40 MG tablet Take 1 tablet by mouth Daily. 90 tablet 1   • calcium citrate-vitamin d (CITRACAL) 200-250 MG-UNIT tablet tablet Take 1 tablet by mouth Daily.     • docusate sodium (COLACE) 100 MG capsule Take 100 mg by mouth Daily.     • Ferrous Sulfate 28 MG tablet Take  by mouth 4 (Four) Times a Week. 4 days a week     • levothyroxine (SYNTHROID, LEVOTHROID) 100 MCG tablet TAKE 1 TABLET BY MOUTH DAILY. 90 tablet 1   • Multiple Vitamins-Minerals (MULTIVITAMINS PLUS ZINC PO) Take  by mouth.     • omeprazole (priLOSEC) 40 MG capsule TAKE 1 CAPSULE BY MOUTH DAILY 90 capsule 2   • solifenacin (VESIcare) 5 MG tablet Take 1 tablet by mouth Daily. 90 tablet 3   • valACYclovir (VALTREX) 1000 MG tablet Take 1 tablet by mouth Daily. (Patient taking differently: Take 1,000 mg by mouth 1 (One) Time Per Week.) 90 tablet 3   • vitamin B-12 (CYANOCOBALAMIN) 1000 MCG tablet Take 1,000 mcg by mouth Daily.       No current facility-administered medications for this visit.         The following portions of the patient's history were reviewed and updated as appropriate: allergies, current medications, past family history, past medical history, past social history, past surgical history and problem list.    Review  "of Systems:   A 14 point review of systems was performed. All systems negative except pertinent positives/negative listed in HPI above    Physical Exam:   Vitals:    09/06/22 1103   Temp: 97.8 °F (36.6 °C)   Weight: 70.2 kg (154 lb 11.2 oz)   Height: 147.3 cm (58\")   PainSc:   8       General: A and O x 3, ASA, NAD    SCLERA:    Normal    DENTITION:   Normal  Knee:  left    ALIGNMENT:     Varus  ,   Patella  tracks  midline    GAIT:    Antalgic    SKIN:    No abnormality    RANGE OF MOTION:   0  -  120   DEG    STRENGTH:   4  / 5    LIGAMENTS:    No varus / valgus instability.   Negative  Lachman.    MENISCUS:     Negative   Felix       DISTAL PULSES:    Paplable    DISTAL SENSATION :   Intact    LYMPHATICS:     No   lymphadenopathy    OTHER:          - Positive trace  effusion      - Crepitance with ROM       Radiology:  Xrays 3views left knee(ap,lateral, sunrise) were ordered and reviewed for evaluation of knee pain demonstratingadvanced varus osteoarthritis with bone on bone articulation, subchondral cysts, and periarticular osteophytes  todays xrays were compared to previous xrays and demonstrate no change    Assessment/Plan:  Left knee   "

## 2022-09-06 NOTE — PROGRESS NOTES
Patient: Marie Foy  YOB: 1955 67 y.o. female  Medical Record Number: [id number]    Chief Complaints:   Chief Complaint   Patient presents with   • Left Knee - Follow-up, Pain       History of Present Illness:Marie Foy is a 67 y.o. female who presents for follow-up of left medial knee pain.  She has a progressive ache which is worsening.  It is limiting her activities.  Has had an injection by another physician over a year ago and she did get some response.  The pain is limiting but not at the point yet where she wants to consider surgical intervention.    Allergies:   Allergies   Allergen Reactions   • Codeine Headache     Headache   • Morphine And Related Rash   • Penicillins Rash       Medications:   Current Outpatient Medications   Medication Sig Dispense Refill   • ALPRAZolam (XANAX) 0.25 MG tablet Take 1 tablet by mouth 3 (Three) Times a Day As Needed for Anxiety. 20 tablet 0   • amLODIPine (NORVASC) 5 MG tablet TAKE 1 TABLET BY MOUTH DAILY 90 tablet 2   • atorvastatin (LIPITOR) 40 MG tablet Take 1 tablet by mouth Daily. 90 tablet 1   • calcium citrate-vitamin d (CITRACAL) 200-250 MG-UNIT tablet tablet Take 1 tablet by mouth Daily.     • docusate sodium (COLACE) 100 MG capsule Take 100 mg by mouth Daily.     • Ferrous Sulfate 28 MG tablet Take  by mouth 4 (Four) Times a Week. 4 days a week     • levothyroxine (SYNTHROID, LEVOTHROID) 100 MCG tablet TAKE 1 TABLET BY MOUTH DAILY. 90 tablet 1   • Multiple Vitamins-Minerals (MULTIVITAMINS PLUS ZINC PO) Take  by mouth.     • omeprazole (priLOSEC) 40 MG capsule TAKE 1 CAPSULE BY MOUTH DAILY 90 capsule 2   • solifenacin (VESIcare) 5 MG tablet Take 1 tablet by mouth Daily. 90 tablet 3   • valACYclovir (VALTREX) 1000 MG tablet Take 1 tablet by mouth Daily. (Patient taking differently: Take 1,000 mg by mouth 1 (One) Time Per Week.) 90 tablet 3   • vitamin B-12 (CYANOCOBALAMIN) 1000 MCG tablet Take 1,000 mcg by mouth Daily.       No current  "facility-administered medications for this visit.         The following portions of the patient's history were reviewed and updated as appropriate: allergies, current medications, past family history, past medical history, past social history, past surgical history and problem list.    Review of Systems:   A 14 point review of systems was performed. All systems negative except pertinent positives/negative listed in HPI above    Physical Exam:   Vitals:    09/06/22 1103   Temp: 97.8 °F (36.6 °C)   Weight: 70.2 kg (154 lb 11.2 oz)   Height: 147.3 cm (58\")   PainSc:   8       General: A and O x 3, ASA, NAD    SCLERA:    Normal    DENTITION:   Normal  Knee:  left    ALIGNMENT:     Varus  ,   Patella  tracks  midline    GAIT:    Antalgic    SKIN:    No abnormality    RANGE OF MOTION:   3-120   DEG    STRENGTH:   4  / 5    LIGAMENTS:    No varus / valgus instability.   Negative  Lachman.    MENISCUS:     Negative   Felix       DISTAL PULSES:    Paplable    DISTAL SENSATION :   Intact    LYMPHATICS:     No   lymphadenopathy    OTHER:          - Positive trACE effusion      - Crepitance with ROM         Radiology:  Xrays 3views left knee (ap,lateral, sunrise) were ordered and reviewed for evaluation of knee pain demonstratingadvanced varus osteoarthritis with bone on bone articulation, subchondral cysts, and periarticular osteophytes  todays xrays were compared to previous xrays and demonstrate no change    Assessment/Plan:  Left knee advanced osteoarthritis we will continue with conservative measures for now.  She needs to keep working on quad strengthening exercises.  I injected the knee as below.  She understands that this will progress to the knee replacement we will continue to follow her symptoms.  We will see her back as needed.  Large Joint Arthrocentesis: L knee  Date/Time: 9/6/2022 11:46 AM  Consent given by: patient  Site marked: site marked  Timeout: Immediately prior to procedure a time out was called to " verify the correct patient, procedure, equipment, support staff and site/side marked as required   Supporting Documentation  Indications: pain and joint swelling   Procedure Details  Location: knee - L knee  Preparation: Patient was prepped and draped in the usual sterile fashion  Needle size: 22 G  Approach: anterolateral  Medications administered: 80 mg methylPREDNISolone acetate 80 MG/ML; 2 mL lidocaine (cardiac)  Patient tolerance: patient tolerated the procedure well with no immediate complications

## 2022-09-19 ENCOUNTER — APPOINTMENT (OUTPATIENT)
Dept: WOMENS IMAGING | Facility: HOSPITAL | Age: 67
End: 2022-09-19

## 2022-09-19 PROCEDURE — 77067 SCR MAMMO BI INCL CAD: CPT | Performed by: RADIOLOGY

## 2022-09-19 PROCEDURE — 77063 BREAST TOMOSYNTHESIS BI: CPT | Performed by: RADIOLOGY

## 2022-10-03 ENCOUNTER — OFFICE VISIT (OUTPATIENT)
Dept: ORTHOPEDIC SURGERY | Facility: CLINIC | Age: 67
End: 2022-10-03

## 2022-10-03 VITALS — TEMPERATURE: 97.8 F | WEIGHT: 156.2 LBS | BODY MASS INDEX: 32.79 KG/M2 | HEIGHT: 58 IN

## 2022-10-03 DIAGNOSIS — M72.2 PLANTAR FASCIITIS: Primary | ICD-10-CM

## 2022-10-03 PROCEDURE — 99214 OFFICE O/P EST MOD 30 MIN: CPT | Performed by: ORTHOPAEDIC SURGERY

## 2022-10-03 PROCEDURE — 73630 X-RAY EXAM OF FOOT: CPT | Performed by: ORTHOPAEDIC SURGERY

## 2022-10-03 NOTE — PROGRESS NOTES
Ankle exam    Patient: Marie Foy    YOB: 1955 67 y.o. female    Chief Complaints: My heel hurts    History of Present Illness: Patient reports a several month history of intermittent sharp pain mostly in the plantar aspect of the left heel more so than posteriorly.      She does not recall any specific injury.  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    She spoke with her therapist where she works as a doing some stretching over a step and some against a wall and using ice massage.    I have treated her left foot nonoperatively for proximal fifth metatarsal fracture in the past and she is at operative treatment of her right fifth metatarsal fracture in the past.  I saw her in on 9/2/2021 for left knee pain and she had an injection.  (Please see those notes for details).  She is now seeing Dr. To for that and had an injection done last month.     ROS: Heel pain  Past Medical History:   Diagnosis Date   • Anxiety    • Arthritis    • Benign cardiac murmur    • Colon cancer (HCC)     REASON FOR SURGERY   • Colon polyps    • Constipation    • Depression     LOSS OF SPOUSE 4 YRS AGO.   • Fibrocystic breast    • Fracture, foot 2014 problem resolved   • Genital herpes    • Graves disease    • Hard to intubate     R/T SMALLER THAN NORMAL ADULT AIRWAY RELATED TO PREVIOUS SURGERY AND SCAR TISSUE.   • Hearing loss     RIGHT WORST THEN LEFT. AS RESULT CHRONIC OTITIS MEDIA. HOLE RIGHT EAR DRUM   • Heart murmur    • History of bipolar disorder     STATES NOT TREATED. NO SAVANNA PHASES   • History of substance abuse (HCC)    • Hoarseness     DAMAGE TO VOCAL CORD AS CHILD.    • Hyperlipidemia    • Hypertension    • Joint pain     swelling   • Knee swelling Unk   • Stress fracture 2014 problem resolved   • Thyroid disease        Physical Exam:   Vitals:    10/03/22 1326   Temp: 97.8 °F (36.6  "°C)   Weight: 70.9 kg (156 lb 3.2 oz)   Height: 147.3 cm (58\")   PainSc: 10-Worst pain ever     Well developed with normal mood.  On exam she has moderate discomfort palpation at the insertion of the left plantar fascia more so than along her Achilles insertion.  No palpable defects of the Achilles.  There is no exacerbation of pain with medial lateral calcaneal compression negative Tinel's over the medial hindfoot.  She had neutral dorsiflexion of the heel cord.      Radiology: 3 views of the left foot ordered to evaluate pain reviewed and no prior x-rays of the left foot available for comparison compared with x-rays of the left foot from 6/25/2012.  Previous x-rays had shown a fracture at the fifth metatarsal proximal metaphyseal diaphyseal junction which appears healed on today's x-rays.  There remains an os perineum which is without change in alignment compared to previous x-rays.  There is some increased plantar calcaneal spurring no evidence of fracture there is slight calcification at the insertion of the Achilles and prominent superior calcaneal tuberosity.  There is evidence of previous left ankle ORIF with one third semitubular plate.       Assessment/Plan: Left heel pain consistent with Planter fasciitis with mild insertional Achilles calcifications.      We discussed treatment going forward and discussed etiology of plantar fasciitis with her and information she was provided.    Outlined treatment program for consisting of use a night splint that was provided.  Also demonstrated gastroc soleus heel cord stretching exercises to do at least 4 or 5 times a day.  Instruction sheet was provided and demonstrated for her and she was fitted with a gel heel pad.    She may use Voltaren gel to apply the area twice daily and instructed to continue with ice massage..  She did not want to do any formal therapy.    Reviewed with her I would not recommend injection at this time until he had trial of more conservative " measures.    We will see her back in about 8 weeks but if not improved at all in 4 weeks she will let us know we will get an MRI of her left hindfoot prior to follow-up

## 2022-10-10 RX ORDER — ATORVASTATIN CALCIUM 40 MG/1
40 TABLET, FILM COATED ORAL DAILY
Qty: 90 TABLET | Refills: 1 | Status: SHIPPED | OUTPATIENT
Start: 2022-10-10

## 2022-11-02 ENCOUNTER — HOSPITAL ENCOUNTER (OUTPATIENT)
Dept: CT IMAGING | Facility: HOSPITAL | Age: 67
Discharge: HOME | End: 2022-11-02
Admitting: INTERNAL MEDICINE

## 2022-11-02 DIAGNOSIS — C18.6 MALIGNANT NEOPLASM OF DESCENDING COLON: ICD-10-CM

## 2022-11-02 PROCEDURE — 82565 ASSAY OF CREATININE: CPT

## 2022-11-02 PROCEDURE — 25010000002 IOPAMIDOL 61 % SOLUTION: Performed by: INTERNAL MEDICINE

## 2022-11-02 PROCEDURE — 74177 CT ABD & PELVIS W/CONTRAST: CPT

## 2022-11-02 PROCEDURE — 0 DIATRIZOATE MEGLUMINE & SODIUM PER 1 ML: Performed by: INTERNAL MEDICINE

## 2022-11-02 PROCEDURE — 71260 CT THORAX DX C+: CPT

## 2022-11-02 RX ADMIN — DIATRIZOATE MEGLUMINE AND DIATRIZOATE SODIUM 30 ML: 660; 100 LIQUID ORAL; RECTAL at 10:00

## 2022-11-02 RX ADMIN — IOPAMIDOL 90 ML: 612 INJECTION, SOLUTION INTRAVENOUS at 11:23

## 2022-11-03 LAB — CREAT BLDA-MCNC: 0.9 MG/DL (ref 0.6–1.3)

## 2022-11-08 ENCOUNTER — OFFICE VISIT (OUTPATIENT)
Dept: ONCOLOGY | Facility: CLINIC | Age: 67
End: 2022-11-08

## 2022-11-08 ENCOUNTER — LAB (OUTPATIENT)
Dept: LAB | Facility: HOSPITAL | Age: 67
End: 2022-11-08

## 2022-11-08 VITALS
WEIGHT: 158.6 LBS | BODY MASS INDEX: 33.29 KG/M2 | RESPIRATION RATE: 16 BRPM | SYSTOLIC BLOOD PRESSURE: 157 MMHG | HEIGHT: 58 IN | HEART RATE: 66 BPM | TEMPERATURE: 97.1 F | OXYGEN SATURATION: 98 % | DIASTOLIC BLOOD PRESSURE: 75 MMHG

## 2022-11-08 DIAGNOSIS — D50.0 IRON DEFICIENCY ANEMIA DUE TO CHRONIC BLOOD LOSS: ICD-10-CM

## 2022-11-08 DIAGNOSIS — E53.8 VITAMIN B12 DEFICIENCY: ICD-10-CM

## 2022-11-08 DIAGNOSIS — K76.9 LIVER LESION: ICD-10-CM

## 2022-11-08 DIAGNOSIS — C18.6 MALIGNANT NEOPLASM OF DESCENDING COLON: ICD-10-CM

## 2022-11-08 DIAGNOSIS — C18.6 MALIGNANT NEOPLASM OF DESCENDING COLON: Primary | ICD-10-CM

## 2022-11-08 LAB
ALBUMIN SERPL-MCNC: 4.7 G/DL (ref 3.5–5.2)
ALBUMIN/GLOB SERPL: 1.7 G/DL (ref 1.1–2.4)
ALP SERPL-CCNC: 75 U/L (ref 38–116)
ALT SERPL W P-5'-P-CCNC: 16 U/L (ref 0–33)
ANION GAP SERPL CALCULATED.3IONS-SCNC: 9.5 MMOL/L (ref 5–15)
AST SERPL-CCNC: 17 U/L (ref 0–32)
BASOPHILS # BLD AUTO: 0.03 10*3/MM3 (ref 0–0.2)
BASOPHILS NFR BLD AUTO: 0.6 % (ref 0–1.5)
BILIRUB SERPL-MCNC: 0.5 MG/DL (ref 0.2–1.2)
BUN SERPL-MCNC: 18 MG/DL (ref 6–20)
BUN/CREAT SERPL: 20.9 (ref 7.3–30)
CALCIUM SPEC-SCNC: 9.8 MG/DL (ref 8.5–10.2)
CEA SERPL-MCNC: 2.39 NG/ML
CHLORIDE SERPL-SCNC: 106 MMOL/L (ref 98–107)
CO2 SERPL-SCNC: 24.5 MMOL/L (ref 22–29)
CREAT SERPL-MCNC: 0.86 MG/DL (ref 0.6–1.1)
DEPRECATED RDW RBC AUTO: 42 FL (ref 37–54)
EGFRCR SERPLBLD CKD-EPI 2021: 73.2 ML/MIN/1.73
EOSINOPHIL # BLD AUTO: 0.17 10*3/MM3 (ref 0–0.4)
EOSINOPHIL NFR BLD AUTO: 3.3 % (ref 0.3–6.2)
ERYTHROCYTE [DISTWIDTH] IN BLOOD BY AUTOMATED COUNT: 12.6 % (ref 12.3–15.4)
FERRITIN SERPL-MCNC: 80 NG/ML (ref 13–150)
GLOBULIN UR ELPH-MCNC: 2.8 GM/DL (ref 1.8–3.5)
GLUCOSE SERPL-MCNC: 108 MG/DL (ref 74–124)
HCT VFR BLD AUTO: 37.6 % (ref 34–46.6)
HGB BLD-MCNC: 12.2 G/DL (ref 12–15.9)
IMM GRANULOCYTES # BLD AUTO: 0.02 10*3/MM3 (ref 0–0.05)
IMM GRANULOCYTES NFR BLD AUTO: 0.4 % (ref 0–0.5)
IRON 24H UR-MRATE: 77 MCG/DL (ref 37–145)
IRON SATN MFR SERPL: 20 % (ref 14–48)
LYMPHOCYTES # BLD AUTO: 1.66 10*3/MM3 (ref 0.7–3.1)
LYMPHOCYTES NFR BLD AUTO: 32 % (ref 19.6–45.3)
MCH RBC QN AUTO: 29.8 PG (ref 26.6–33)
MCHC RBC AUTO-ENTMCNC: 32.4 G/DL (ref 31.5–35.7)
MCV RBC AUTO: 91.7 FL (ref 79–97)
MONOCYTES # BLD AUTO: 0.39 10*3/MM3 (ref 0.1–0.9)
MONOCYTES NFR BLD AUTO: 7.5 % (ref 5–12)
NEUTROPHILS NFR BLD AUTO: 2.91 10*3/MM3 (ref 1.7–7)
NEUTROPHILS NFR BLD AUTO: 56.2 % (ref 42.7–76)
NRBC BLD AUTO-RTO: 0 /100 WBC (ref 0–0.2)
PLATELET # BLD AUTO: 227 10*3/MM3 (ref 140–450)
PMV BLD AUTO: 8.6 FL (ref 6–12)
POTASSIUM SERPL-SCNC: 4.3 MMOL/L (ref 3.5–4.7)
PROT SERPL-MCNC: 7.5 G/DL (ref 6.3–8)
RBC # BLD AUTO: 4.1 10*6/MM3 (ref 3.77–5.28)
SODIUM SERPL-SCNC: 140 MMOL/L (ref 134–145)
TIBC SERPL-MCNC: 378 MCG/DL (ref 249–505)
TRANSFERRIN SERPL-MCNC: 270 MG/DL (ref 200–360)
VIT B12 BLD-MCNC: 854 PG/ML (ref 211–946)
WBC NRBC COR # BLD: 5.18 10*3/MM3 (ref 3.4–10.8)

## 2022-11-08 PROCEDURE — 85025 COMPLETE CBC W/AUTO DIFF WBC: CPT

## 2022-11-08 PROCEDURE — 82728 ASSAY OF FERRITIN: CPT

## 2022-11-08 PROCEDURE — 80053 COMPREHEN METABOLIC PANEL: CPT

## 2022-11-08 PROCEDURE — 36415 COLL VENOUS BLD VENIPUNCTURE: CPT

## 2022-11-08 PROCEDURE — 83540 ASSAY OF IRON: CPT

## 2022-11-08 PROCEDURE — 84466 ASSAY OF TRANSFERRIN: CPT

## 2022-11-08 PROCEDURE — 99214 OFFICE O/P EST MOD 30 MIN: CPT | Performed by: INTERNAL MEDICINE

## 2022-11-08 PROCEDURE — 82607 VITAMIN B-12: CPT | Performed by: INTERNAL MEDICINE

## 2022-11-08 PROCEDURE — 82378 CARCINOEMBRYONIC ANTIGEN: CPT | Performed by: INTERNAL MEDICINE

## 2022-11-08 NOTE — PROGRESS NOTES
Subjective     CHIEF COMPLAINT:      Chief Complaint   Patient presents with   • Follow-up     Discuss CT Scan/fatigue     HISTORY OF PRESENT ILLNESS:     Marie Foy is a 67 y.o. female patient who returns today for follow up on her colon cancer and anemia. She returns today for follow up reporting continued problem with fatigue. She is not having problem with abdominal pain. She reports change in the bowel movements from diarrhea to constipation. No blood in the stool.     ROS:  Pertinent ROS is in the HPI.     Past medical, surgical, social and family history were reviewed.     MEDICATIONS:    Current Outpatient Medications:   •  ALPRAZolam (XANAX) 0.25 MG tablet, Take 1 tablet by mouth 3 (Three) Times a Day As Needed for Anxiety., Disp: 20 tablet, Rfl: 0  •  amLODIPine (NORVASC) 5 MG tablet, TAKE 1 TABLET BY MOUTH DAILY, Disp: 90 tablet, Rfl: 2  •  atorvastatin (LIPITOR) 40 MG tablet, TAKE 1 TABLET BY MOUTH DAILY., Disp: 90 tablet, Rfl: 1  •  calcium citrate-vitamin d (CITRACAL) 200-250 MG-UNIT tablet tablet, Take 1 tablet by mouth Daily., Disp: , Rfl:   •  docusate sodium (COLACE) 100 MG capsule, Take 100 mg by mouth Daily., Disp: , Rfl:   •  Ferrous Sulfate 28 MG tablet, Take  by mouth 4 (Four) Times a Week. 4 days a week, Disp: , Rfl:   •  levothyroxine (SYNTHROID, LEVOTHROID) 100 MCG tablet, TAKE 1 TABLET BY MOUTH DAILY., Disp: 90 tablet, Rfl: 1  •  Multiple Vitamins-Minerals (MULTIVITAMINS PLUS ZINC PO), Take  by mouth., Disp: , Rfl:   •  omeprazole (priLOSEC) 40 MG capsule, TAKE 1 CAPSULE BY MOUTH DAILY, Disp: 90 capsule, Rfl: 2  •  solifenacin (VESIcare) 5 MG tablet, Take 1 tablet by mouth Daily., Disp: 90 tablet, Rfl: 3  •  valACYclovir (VALTREX) 1000 MG tablet, Take 1 tablet by mouth Daily. (Patient taking differently: Take 1 tablet by mouth 1 (One) Time Per Week.), Disp: 90 tablet, Rfl: 3  •  vitamin B-12 (CYANOCOBALAMIN) 1000 MCG tablet, Take 1,000 mcg by mouth Daily., Disp: , Rfl:   Objective  "    VITAL SIGNS:     Vitals:    11/08/22 1117   BP: 157/75   Pulse: 66   Resp: 16   Temp: 97.1 °F (36.2 °C)   TempSrc: Temporal   SpO2: 98%   Weight: 71.9 kg (158 lb 9.6 oz)   Height: 147.3 cm (57.99\")   PainSc: 0-No pain     Body mass index is 33.16 kg/m².     Wt Readings from Last 5 Encounters:   11/08/22 71.9 kg (158 lb 9.6 oz)   10/03/22 70.9 kg (156 lb 3.2 oz)   09/06/22 70.2 kg (154 lb 11.2 oz)   08/22/22 69.7 kg (153 lb 9.6 oz)   07/26/22 69.4 kg (153 lb)     PHYSICAL EXAMINATION:   GENERAL: The patient appears in good general condition, not in acute distress.   SKIN: No ecchymosis.  EYES: No jaundice. No pallor.  LYMPHATICS: No cervical lymphadenopathy.  CHEST: Normal respiratory effort. Lungs clear bilaterally. No added sounds.   CVS: Normal S1 and S2. No murmurs.  ABDOMEN: Soft. No tenderness. No Hepatomegaly. No Splenomegaly. No masses.    DIAGNOSTIC DATA:     Results from last 7 days   Lab Units 11/08/22  1045   WBC 10*3/mm3 5.18   NEUTROS ABS 10*3/mm3 2.91   HEMOGLOBIN g/dL 12.2   HEMATOCRIT % 37.6   PLATELETS 10*3/mm3 227     Results from last 7 days   Lab Units 11/08/22  1045 11/02/22  1118   SODIUM mmol/L 140  --    POTASSIUM mmol/L 4.3  --    CHLORIDE mmol/L 106  --    CO2 mmol/L 24.5  --    BUN mg/dL 18  --    CREATININE mg/dL 0.86 0.90   CALCIUM mg/dL 9.8  --    ALBUMIN g/dL 4.70  --    BILIRUBIN mg/dL 0.5  --    ALK PHOS U/L 75  --    ALT (SGPT) U/L 16  --    AST (SGOT) U/L 17  --    GLUCOSE mg/dL 108  --          Lab 11/08/22  1238 11/08/22  1045   IRON  --  77   IRON SATURATION  --  20   TIBC  --  378   TRANSFERRIN  --  270   FERRITIN  --  80.00   VITAMIN B 12 854  --       Lab Results   Component Value Date    CEA 2.39 11/08/2022    CEA 2.15 08/22/2022    CEA 2.16 03/07/2022    CEA 2.04 11/11/2021    CEA 2.59 08/19/2021      CT chest abdomen pelvis on 11/2/2022:  1. No evidence for metastatic disease to the chest, abdomen or pelvis in  patient with previous partial colectomy.  2. 3 tiny " hepatic 5 mm and smaller low-density foci with 2 in the  lateral segment left lobe and 1 in the anterior right lobe and these are  most likely benign though could be followed on subsequent exam.  3. 1.8 cm right lower pole renal cyst.  4. Chronic area of sclerosis within the posterior-superior lateral right  femoral head/neck junction without change compared to 02/22/2021.    Assessment & Plan    *Colon cancer arising from the distal transverse colon.  · Descending colon mass was identified on screening colonoscopy on 1/26/2021.  · S/P laparoscopic transverse partial colectomy on 3/2/2021.  · Pathology exam revealed a  T3 N0 Grade 2 tumor with clear surgical margin. 14 lymph nodes were negative. There was no evidence of lymphovascular or perineural invasion.   · Tumor was MSI stable. No KRAS NRAS or BRAF mutations.  · Baseline CEA was elevated at 5.86.  · Since the tumor was stage IIA with adequate number of lymph nodes examined and with no high risk features, adjuvant chemotherapy was not recommended.  · CEA was 2.59 on 8/19/2021.  · CT scan on 11/1/2021 revealed no evidence of recurrence or metastasis.  · Colonoscopy on 5/12/2022 revealed a colonic polyp.  Pathology exam revealed tubulovillous adenoma.  · Dr. Calvo recommended repeating colonoscopy in 2 years (May 2024).  · CEA was 2.15 on 8/22/2022.    · CT scan on 11/2/2022 revealed no evidence of recurrence.  · 3 tiny hepatic lesions were seen that were considered to be most likely benign.  Continued follow-up was recommended.  · CEA is normal at 2.39 today.  · I reassured the patient about the results. I recommended repeating the CT scans in 1 year.     *Iron deficiency anemia.   · It is attributed to blood losses from the colon cancer and from surgical blood losses.    · Hemoglobin was 11.1 on 3/3/2021.  · Patient's diet was low in iron rich food.    · We recommended increasing her intake of iron rich food.  · Patient started on 4/5/2021 ferrous sulfate 28  mg elemental iron 4 days a week.  · Hemoglobin improved to 12.1 on 8/19/2021.  · Hemoglobin was 12.5 on 11/11/2021.  · She is taking ferrous sulfate 325 mg 4 days a week and is tolerating this dose.  · Hemoglobin decreased to 12.2 today.  · Ferritin is 80 today and transferrin saturation is 20%.    *Vitamin B12 deficiency.   · Vitamin B12 was 305 on 11/11/2022.  · She is taking vitamin B12 1000 mcg daily.   · Vitamin B12 level improved to 854 today.    PLAN:    1.  Increase ferrous sulfate to 5 days a week.   2.  Continue vitamin B12 1000 mcg daily.   3.  Follow up in 6 months with CBC CMP CEA ferritin iron panel and vitamin B12 levels.   4.  Plan to repeat CT scans in 1 year.       Saul Ziegler MD  11/08/22

## 2022-11-10 ENCOUNTER — HOSPITAL ENCOUNTER (OUTPATIENT)
Dept: BONE DENSITY | Facility: HOSPITAL | Age: 67
Discharge: HOME | End: 2022-11-10
Admitting: INTERNAL MEDICINE

## 2022-11-10 DIAGNOSIS — M81.0 OSTEOPOROSIS, UNSPECIFIED OSTEOPOROSIS TYPE, UNSPECIFIED PATHOLOGICAL FRACTURE PRESENCE: ICD-10-CM

## 2022-11-10 PROCEDURE — 77080 DXA BONE DENSITY AXIAL: CPT

## 2022-11-29 RX ORDER — LEVOTHYROXINE SODIUM 0.1 MG/1
100 TABLET ORAL DAILY
Qty: 90 TABLET | Refills: 1 | Status: SHIPPED | OUTPATIENT
Start: 2022-11-29

## 2022-12-13 RX ORDER — OMEPRAZOLE 40 MG/1
CAPSULE, DELAYED RELEASE ORAL
Qty: 90 CAPSULE | Refills: 2 | Status: SHIPPED | OUTPATIENT
Start: 2022-12-13

## 2023-01-24 DIAGNOSIS — E03.9 HYPOTHYROIDISM, UNSPECIFIED TYPE: ICD-10-CM

## 2023-01-24 DIAGNOSIS — I10 ESSENTIAL (PRIMARY) HYPERTENSION: ICD-10-CM

## 2023-01-24 DIAGNOSIS — E78.00 HYPERCHOLESTEROLEMIA: Primary | ICD-10-CM

## 2023-01-25 LAB
ALBUMIN SERPL-MCNC: 4.8 G/DL (ref 3.5–5.2)
ALBUMIN/GLOB SERPL: 1.8 G/DL
ALP SERPL-CCNC: 89 U/L (ref 39–117)
ALT SERPL-CCNC: 17 U/L (ref 1–33)
APPEARANCE UR: CLEAR
AST SERPL-CCNC: 17 U/L (ref 1–32)
BACTERIA #/AREA URNS HPF: ABNORMAL /HPF
BASOPHILS # BLD AUTO: 0.03 10*3/MM3 (ref 0–0.2)
BASOPHILS NFR BLD AUTO: 0.5 % (ref 0–1.5)
BILIRUB SERPL-MCNC: 0.6 MG/DL (ref 0–1.2)
BILIRUB UR QL STRIP: NEGATIVE
BUN SERPL-MCNC: 17 MG/DL (ref 8–23)
BUN/CREAT SERPL: 18.5 (ref 7–25)
CALCIUM SERPL-MCNC: 10 MG/DL (ref 8.6–10.5)
CASTS URNS MICRO: ABNORMAL
CHLORIDE SERPL-SCNC: 103 MMOL/L (ref 98–107)
CHOLEST SERPL-MCNC: 225 MG/DL (ref 0–200)
CO2 SERPL-SCNC: 29.5 MMOL/L (ref 22–29)
COLOR UR: YELLOW
CREAT SERPL-MCNC: 0.92 MG/DL (ref 0.57–1)
EGFRCR SERPLBLD CKD-EPI 2021: 67.5 ML/MIN/1.73
EOSINOPHIL # BLD AUTO: 0.16 10*3/MM3 (ref 0–0.4)
EOSINOPHIL NFR BLD AUTO: 2.5 % (ref 0.3–6.2)
EPI CELLS #/AREA URNS HPF: ABNORMAL /HPF
ERYTHROCYTE [DISTWIDTH] IN BLOOD BY AUTOMATED COUNT: 12.2 % (ref 12.3–15.4)
GLOBULIN SER CALC-MCNC: 2.7 GM/DL
GLUCOSE SERPL-MCNC: 108 MG/DL (ref 65–99)
GLUCOSE UR QL STRIP: NEGATIVE
HCT VFR BLD AUTO: 39.3 % (ref 34–46.6)
HDLC SERPL-MCNC: 79 MG/DL (ref 40–60)
HGB BLD-MCNC: 13.2 G/DL (ref 12–15.9)
HGB UR QL STRIP: NEGATIVE
IMM GRANULOCYTES # BLD AUTO: 0.03 10*3/MM3 (ref 0–0.05)
IMM GRANULOCYTES NFR BLD AUTO: 0.5 % (ref 0–0.5)
KETONES UR QL STRIP: NEGATIVE
LDLC SERPL CALC-MCNC: 122 MG/DL (ref 0–100)
LEUKOCYTE ESTERASE UR QL STRIP: ABNORMAL
LYMPHOCYTES # BLD AUTO: 1.92 10*3/MM3 (ref 0.7–3.1)
LYMPHOCYTES NFR BLD AUTO: 30.2 % (ref 19.6–45.3)
MCH RBC QN AUTO: 28.9 PG (ref 26.6–33)
MCHC RBC AUTO-ENTMCNC: 33.6 G/DL (ref 31.5–35.7)
MCV RBC AUTO: 86.2 FL (ref 79–97)
MONOCYTES # BLD AUTO: 0.49 10*3/MM3 (ref 0.1–0.9)
MONOCYTES NFR BLD AUTO: 7.7 % (ref 5–12)
NEUTROPHILS # BLD AUTO: 3.73 10*3/MM3 (ref 1.7–7)
NEUTROPHILS NFR BLD AUTO: 58.6 % (ref 42.7–76)
NITRITE UR QL STRIP: POSITIVE
NRBC BLD AUTO-RTO: 0 /100 WBC (ref 0–0.2)
PH UR STRIP: 6.5 [PH] (ref 5–8)
PLATELET # BLD AUTO: 275 10*3/MM3 (ref 140–450)
POTASSIUM SERPL-SCNC: 4.4 MMOL/L (ref 3.5–5.2)
PROT SERPL-MCNC: 7.5 G/DL (ref 6–8.5)
PROT UR QL STRIP: NEGATIVE
RBC # BLD AUTO: 4.56 10*6/MM3 (ref 3.77–5.28)
RBC #/AREA URNS HPF: ABNORMAL /HPF
SODIUM SERPL-SCNC: 142 MMOL/L (ref 136–145)
SP GR UR STRIP: 1.01 (ref 1–1.03)
TRIGL SERPL-MCNC: 141 MG/DL (ref 0–150)
TSH SERPL DL<=0.005 MIU/L-ACNC: 1.07 UIU/ML (ref 0.27–4.2)
UROBILINOGEN UR STRIP-MCNC: ABNORMAL MG/DL
VLDLC SERPL CALC-MCNC: 24 MG/DL (ref 5–40)
WBC # BLD AUTO: 6.36 10*3/MM3 (ref 3.4–10.8)
WBC #/AREA URNS HPF: ABNORMAL /HPF

## 2023-01-30 ENCOUNTER — DOCUMENTATION (OUTPATIENT)
Dept: ONCOLOGY | Facility: HOSPITAL | Age: 68
End: 2023-01-30
Payer: COMMERCIAL

## 2023-01-31 ENCOUNTER — OFFICE VISIT (OUTPATIENT)
Dept: INTERNAL MEDICINE | Facility: CLINIC | Age: 68
End: 2023-01-31
Payer: COMMERCIAL

## 2023-01-31 VITALS
SYSTOLIC BLOOD PRESSURE: 160 MMHG | HEIGHT: 58 IN | HEART RATE: 67 BPM | BODY MASS INDEX: 32.95 KG/M2 | DIASTOLIC BLOOD PRESSURE: 80 MMHG | OXYGEN SATURATION: 97 % | WEIGHT: 157 LBS

## 2023-01-31 DIAGNOSIS — R42 VERTIGO: ICD-10-CM

## 2023-01-31 DIAGNOSIS — I10 ESSENTIAL (PRIMARY) HYPERTENSION: Primary | ICD-10-CM

## 2023-01-31 DIAGNOSIS — E03.9 HYPOTHYROIDISM, UNSPECIFIED TYPE: ICD-10-CM

## 2023-01-31 DIAGNOSIS — E78.00 HYPERCHOLESTEROLEMIA: ICD-10-CM

## 2023-01-31 DIAGNOSIS — H72.93 RUPTURED TYMPANIC MEMBRANE, BILATERAL: ICD-10-CM

## 2023-01-31 PROCEDURE — 99214 OFFICE O/P EST MOD 30 MIN: CPT | Performed by: INTERNAL MEDICINE

## 2023-01-31 NOTE — PROGRESS NOTES
Subjective     Marie Foy is a 67 y.o. female who presents with   Chief Complaint   Patient presents with   • Hypertension   • Hyperlipidemia   • Hypothyroidism       History of Present Illness     The following data was reviewed by: Michelle Wyman MD on 01/31/2023:  Common labs    Common Labs 11/2/22 11/8/22 11/8/22 1/24/23 1/24/23 1/24/23     1045 1045 1057 1057 1057   Glucose   108  108 (A)    BUN   18  17    Creatinine 0.90  0.86  0.92    Sodium   140  142    Potassium   4.3  4.4    Chloride   106  103    Calcium   9.8  10.0    Total Protein     7.5    Albumin   4.70  4.8    Total Bilirubin   0.5  0.6    Alkaline Phosphatase   75  89    AST (SGOT)   17  17    ALT (SGPT)   16  17    WBC  5.18  6.36     Hemoglobin  12.2  13.2     Hematocrit  37.6  39.3     Platelets  227  275     Total Cholesterol      225 (A)   Triglycerides      141   HDL Cholesterol      79 (A)   LDL Cholesterol       122 (A)   (A) Abnormal value       Comments are available for some flowsheets but are not being displayed.           HTN.  Control is good at home.  She did not take medication this morning.  HLD. Cholesterol is 225.  She occasionally forgets atorvastatin at night.   Hypothyroidism.  TSH is well controlled.     C/o vertigo.  Going on a month when she gets out of bed.  Quickly resolves. No syncope.  No presyncope.     Review of Systems   Respiratory: Negative.    Cardiovascular: Negative.    Neurological: Positive for dizziness.       The following portions of the patient's history were reviewed and updated as appropriate: allergies, current medications and problem list.    Patient Active Problem List    Diagnosis Date Noted   • Personal history of colon cancer 04/20/2022     Note Last Updated: 4/20/2022     Added automatically from request for surgery [id number]     • Malignant neoplasm of descending colon (HCC) 02/04/2021   • Osteoporosis 06/30/2020     Note Last Updated: 6/30/2021     Prolia every six months.      • Primary  "localized osteoarthrosis of left lower leg 05/20/2020   • Essential (primary) hypertension 05/11/2020   • Anxiety 02/26/2016   • Gastroesophageal reflux disease 02/26/2016   • Herpes simplex type 2 infection 02/26/2016   • Hypercholesterolemia 02/26/2016   • Hypothyroidism 02/26/2016   • Overactive bladder 02/26/2016   • Fear of flying 02/26/2016     Note Last Updated: 2/26/2016     Description: flying and dental work     • Unilateral partial paralysis of vocal cords or larynx 02/26/2016     Note Last Updated: 2/26/2016     Description: chronic     • Colon polyps        Current Outpatient Medications on File Prior to Visit   Medication Sig Dispense Refill   • ALPRAZolam (XANAX) 0.25 MG tablet Take 1 tablet by mouth 3 (Three) Times a Day As Needed for Anxiety. 20 tablet 0   • amLODIPine (NORVASC) 5 MG tablet TAKE 1 TABLET BY MOUTH DAILY 90 tablet 2   • atorvastatin (LIPITOR) 40 MG tablet TAKE 1 TABLET BY MOUTH DAILY. 90 tablet 1   • calcium citrate-vitamin d (CITRACAL) 200-250 MG-UNIT tablet tablet Take 1 tablet by mouth Daily.     • docusate sodium (COLACE) 100 MG capsule Take 100 mg by mouth Daily.     • Ferrous Sulfate 28 MG tablet Take  by mouth Take As Directed. 5 days a week     • levothyroxine (SYNTHROID, LEVOTHROID) 100 MCG tablet TAKE 1 TABLET BY MOUTH DAILY. 90 tablet 1   • Multiple Vitamins-Minerals (MULTIVITAMINS PLUS ZINC PO) Take  by mouth.     • omeprazole (priLOSEC) 40 MG capsule TAKE 1 CAPSULE BY MOUTH DAILY 90 capsule 2   • solifenacin (VESIcare) 5 MG tablet Take 1 tablet by mouth Daily. 90 tablet 3   • valACYclovir (VALTREX) 1000 MG tablet Take 1 tablet by mouth Daily. (Patient taking differently: Take 1,000 mg by mouth 1 (One) Time Per Week.) 90 tablet 3   • vitamin B-12 (CYANOCOBALAMIN) 1000 MCG tablet Take 1,000 mcg by mouth Daily.       No current facility-administered medications on file prior to visit.       Objective     /80   Pulse 67   Ht 147.3 cm (57.99\")   Wt 71.2 kg (157 lb)   " SpO2 97%   BMI 32.82 kg/m²     Physical Exam  Constitutional:       Appearance: She is well-developed.   HENT:      Head: Normocephalic and atraumatic.      Right Ear: Hearing normal. Tympanic membrane is perforated.      Left Ear: Hearing normal. Tympanic membrane is perforated.      Mouth/Throat:      Pharynx: No oropharyngeal exudate or posterior oropharyngeal erythema.   Cardiovascular:      Rate and Rhythm: Normal rate and regular rhythm.      Heart sounds: Normal heart sounds.   Pulmonary:      Effort: Pulmonary effort is normal.      Breath sounds: Normal breath sounds.   Skin:     General: Skin is warm and dry.   Neurological:      Mental Status: She is alert and oriented to person, place, and time.   Psychiatric:         Behavior: Behavior normal.         Assessment & Plan   Diagnoses and all orders for this visit:    1. Essential (primary) hypertension (Primary)    2. Hypercholesterolemia    3. Hypothyroidism, unspecified type    4. Vertigo  -     Ambulatory Referral to Physical Therapy Evaluate and treat    5. Ruptured tympanic membrane, bilateral        Discussion    HTN.  The patient is instructed to monitor at home and call in checks.    HLD. Control is fair.  The patient is advised to continue current dosage of atorvastatin.   Hypothyroidism.  Control is good.  The patient is advised to continue current dosage of levothyroxine.    Vertigo.  Likely BPPV. Discussed pathophysiology of disease.  Refer for therapy.    Ruptured TMs.  She is advised to make f/u appointment with her ENT.             Future Appointments   Date Time Provider Department Center   2/2/2023  2:00 PM REFERRED INJECTION CHAIR [facility]  [facility]   5/16/2023 10:20 AM LAB CHAIR 5 [facility]  [facility]   5/16/2023 10:40 AM Saul Ziegler MD [facility]   8/1/2023 11:00 AM [facility]   8/8/2023 11:15 AM Michelle Wyman MD [facility]

## 2023-02-02 ENCOUNTER — INFUSION (OUTPATIENT)
Dept: ONCOLOGY | Facility: HOSPITAL | Age: 68
End: 2023-02-02
Payer: COMMERCIAL

## 2023-02-02 VITALS — BODY MASS INDEX: 32.81 KG/M2 | TEMPERATURE: 97.4 F | HEIGHT: 58 IN | RESPIRATION RATE: 18 BRPM

## 2023-02-02 DIAGNOSIS — M81.0 OSTEOPOROSIS, UNSPECIFIED OSTEOPOROSIS TYPE, UNSPECIFIED PATHOLOGICAL FRACTURE PRESENCE: Primary | ICD-10-CM

## 2023-02-02 PROCEDURE — 96372 THER/PROPH/DIAG INJ SC/IM: CPT

## 2023-02-02 PROCEDURE — 25010000002 DENOSUMAB 60 MG/ML SOLUTION PREFILLED SYRINGE: Performed by: INTERNAL MEDICINE

## 2023-02-02 RX ADMIN — DENOSUMAB 60 MG: 60 INJECTION SUBCUTANEOUS at 14:22

## 2023-04-18 RX ORDER — AMLODIPINE BESYLATE 5 MG/1
TABLET ORAL
Qty: 90 TABLET | Refills: 2 | Status: SHIPPED | OUTPATIENT
Start: 2023-04-18

## 2023-05-16 ENCOUNTER — LAB (OUTPATIENT)
Dept: LAB | Facility: HOSPITAL | Age: 68
End: 2023-05-16
Payer: MEDICARE

## 2023-05-16 ENCOUNTER — OFFICE VISIT (OUTPATIENT)
Dept: ONCOLOGY | Facility: CLINIC | Age: 68
End: 2023-05-16
Payer: MEDICARE

## 2023-05-16 VITALS
TEMPERATURE: 98.4 F | BODY MASS INDEX: 33.44 KG/M2 | RESPIRATION RATE: 16 BRPM | SYSTOLIC BLOOD PRESSURE: 151 MMHG | WEIGHT: 160 LBS | OXYGEN SATURATION: 96 % | DIASTOLIC BLOOD PRESSURE: 78 MMHG | HEART RATE: 63 BPM

## 2023-05-16 DIAGNOSIS — C18.6 MALIGNANT NEOPLASM OF DESCENDING COLON: Primary | ICD-10-CM

## 2023-05-16 DIAGNOSIS — E53.8 VITAMIN B12 DEFICIENCY: ICD-10-CM

## 2023-05-16 DIAGNOSIS — C18.6 MALIGNANT NEOPLASM OF DESCENDING COLON: ICD-10-CM

## 2023-05-16 DIAGNOSIS — K76.9 LIVER LESION: ICD-10-CM

## 2023-05-16 DIAGNOSIS — D50.0 IRON DEFICIENCY ANEMIA DUE TO CHRONIC BLOOD LOSS: ICD-10-CM

## 2023-05-16 LAB
ALBUMIN SERPL-MCNC: 4.3 G/DL (ref 3.5–5.2)
ALBUMIN/GLOB SERPL: 1.4 G/DL (ref 1.1–2.4)
ALP SERPL-CCNC: 74 U/L (ref 38–116)
ALT SERPL W P-5'-P-CCNC: 15 U/L (ref 0–33)
ANION GAP SERPL CALCULATED.3IONS-SCNC: 9.7 MMOL/L (ref 5–15)
AST SERPL-CCNC: 17 U/L (ref 0–32)
BASOPHILS # BLD AUTO: 0.03 10*3/MM3 (ref 0–0.2)
BASOPHILS NFR BLD AUTO: 0.6 % (ref 0–1.5)
BILIRUB SERPL-MCNC: 0.6 MG/DL (ref 0.2–1.2)
BUN SERPL-MCNC: 16 MG/DL (ref 6–20)
BUN/CREAT SERPL: 18.8 (ref 7.3–30)
CALCIUM SPEC-SCNC: 9.4 MG/DL (ref 8.5–10.2)
CEA SERPL-MCNC: 2.33 NG/ML
CHLORIDE SERPL-SCNC: 105 MMOL/L (ref 98–107)
CO2 SERPL-SCNC: 26.3 MMOL/L (ref 22–29)
CREAT SERPL-MCNC: 0.85 MG/DL (ref 0.6–1.1)
DEPRECATED RDW RBC AUTO: 43 FL (ref 37–54)
EGFRCR SERPLBLD CKD-EPI 2021: 74.3 ML/MIN/1.73
EOSINOPHIL # BLD AUTO: 0.16 10*3/MM3 (ref 0–0.4)
EOSINOPHIL NFR BLD AUTO: 3.1 % (ref 0.3–6.2)
ERYTHROCYTE [DISTWIDTH] IN BLOOD BY AUTOMATED COUNT: 13.1 % (ref 12.3–15.4)
FERRITIN SERPL-MCNC: 74.5 NG/ML (ref 13–150)
GLOBULIN UR ELPH-MCNC: 3.1 GM/DL (ref 1.8–3.5)
GLUCOSE SERPL-MCNC: 110 MG/DL (ref 74–124)
HCT VFR BLD AUTO: 39.1 % (ref 34–46.6)
HGB BLD-MCNC: 12.4 G/DL (ref 12–15.9)
IMM GRANULOCYTES # BLD AUTO: 0.02 10*3/MM3 (ref 0–0.05)
IMM GRANULOCYTES NFR BLD AUTO: 0.4 % (ref 0–0.5)
IRON 24H UR-MRATE: 57 MCG/DL (ref 37–145)
IRON SATN MFR SERPL: 16 % (ref 14–48)
LYMPHOCYTES # BLD AUTO: 1.54 10*3/MM3 (ref 0.7–3.1)
LYMPHOCYTES NFR BLD AUTO: 30.2 % (ref 19.6–45.3)
MCH RBC QN AUTO: 28.8 PG (ref 26.6–33)
MCHC RBC AUTO-ENTMCNC: 31.7 G/DL (ref 31.5–35.7)
MCV RBC AUTO: 90.9 FL (ref 79–97)
MONOCYTES # BLD AUTO: 0.43 10*3/MM3 (ref 0.1–0.9)
MONOCYTES NFR BLD AUTO: 8.4 % (ref 5–12)
NEUTROPHILS NFR BLD AUTO: 2.92 10*3/MM3 (ref 1.7–7)
NEUTROPHILS NFR BLD AUTO: 57.3 % (ref 42.7–76)
NRBC BLD AUTO-RTO: 0 /100 WBC (ref 0–0.2)
PLATELET # BLD AUTO: 239 10*3/MM3 (ref 140–450)
PMV BLD AUTO: 9 FL (ref 6–12)
POTASSIUM SERPL-SCNC: 4 MMOL/L (ref 3.5–4.7)
PROT SERPL-MCNC: 7.4 G/DL (ref 6.3–8)
RBC # BLD AUTO: 4.3 10*6/MM3 (ref 3.77–5.28)
SODIUM SERPL-SCNC: 141 MMOL/L (ref 134–145)
TIBC SERPL-MCNC: 361 MCG/DL (ref 249–505)
TRANSFERRIN SERPL-MCNC: 258 MG/DL (ref 200–360)
VIT B12 BLD-MCNC: 800 PG/ML (ref 211–946)
WBC NRBC COR # BLD: 5.1 10*3/MM3 (ref 3.4–10.8)

## 2023-05-16 PROCEDURE — 82728 ASSAY OF FERRITIN: CPT

## 2023-05-16 PROCEDURE — 1159F MED LIST DOCD IN RCRD: CPT | Performed by: INTERNAL MEDICINE

## 2023-05-16 PROCEDURE — 83540 ASSAY OF IRON: CPT

## 2023-05-16 PROCEDURE — 36415 COLL VENOUS BLD VENIPUNCTURE: CPT

## 2023-05-16 PROCEDURE — 85025 COMPLETE CBC W/AUTO DIFF WBC: CPT

## 2023-05-16 PROCEDURE — 82378 CARCINOEMBRYONIC ANTIGEN: CPT | Performed by: INTERNAL MEDICINE

## 2023-05-16 PROCEDURE — 3078F DIAST BP <80 MM HG: CPT | Performed by: INTERNAL MEDICINE

## 2023-05-16 PROCEDURE — 82607 VITAMIN B-12: CPT | Performed by: INTERNAL MEDICINE

## 2023-05-16 PROCEDURE — 1160F RVW MEDS BY RX/DR IN RCRD: CPT | Performed by: INTERNAL MEDICINE

## 2023-05-16 PROCEDURE — 99214 OFFICE O/P EST MOD 30 MIN: CPT | Performed by: INTERNAL MEDICINE

## 2023-05-16 PROCEDURE — 3077F SYST BP >= 140 MM HG: CPT | Performed by: INTERNAL MEDICINE

## 2023-05-16 PROCEDURE — 80053 COMPREHEN METABOLIC PANEL: CPT

## 2023-05-16 PROCEDURE — 84466 ASSAY OF TRANSFERRIN: CPT

## 2023-05-16 PROCEDURE — 1126F AMNT PAIN NOTED NONE PRSNT: CPT | Performed by: INTERNAL MEDICINE

## 2023-05-16 NOTE — PROGRESS NOTES
Subjective     CHIEF COMPLAINT:      Chief Complaint   Patient presents with   • Follow-up     No concerns     HISTORY OF PRESENT ILLNESS:     Marie Foy is a 67 y.o. female patient who returns today for follow up on her colon cancer and anemia.  She returns today for follow-up reporting no new symptoms.  Since the colon surgery, she has more frequent bowel movements.  Bowel movements are more liquidy.  She is no longer having to take the stool softener.  The dose of the oral iron was increased at the last visit to 5 days a week.  She did not notice a change in her bowel movements with this dose increase.  She is not noticing blood in the stool or urine but she reports that the stool changes color to dark when she takes the iron.    ROS:  Pertinent ROS is in the HPI.     Past medical, surgical, social and family history were reviewed.     MEDICATIONS:    Current Outpatient Medications:   •  ALPRAZolam (XANAX) 0.25 MG tablet, Take 1 tablet by mouth 3 (Three) Times a Day As Needed for Anxiety., Disp: 20 tablet, Rfl: 0  •  amLODIPine (NORVASC) 5 MG tablet, TAKE 1 TABLET BY MOUTH DAILY, Disp: 90 tablet, Rfl: 2  •  atorvastatin (LIPITOR) 40 MG tablet, TAKE 1 TABLET BY MOUTH DAILY., Disp: 90 tablet, Rfl: 1  •  calcium citrate-vitamin d (CITRACAL) 200-250 MG-UNIT tablet tablet, Take 1 tablet by mouth Daily., Disp: , Rfl:   •  docusate sodium (COLACE) 100 MG capsule, Take 1 capsule by mouth Every Other Day., Disp: , Rfl:   •  Ferrous Sulfate 28 MG tablet, Take  by mouth Take As Directed. 5 days a week, Disp: , Rfl:   •  levothyroxine (SYNTHROID, LEVOTHROID) 100 MCG tablet, TAKE 1 TABLET BY MOUTH DAILY., Disp: 90 tablet, Rfl: 1  •  Multiple Vitamins-Minerals (MULTIVITAMINS PLUS ZINC PO), Take  by mouth., Disp: , Rfl:   •  omeprazole (priLOSEC) 40 MG capsule, TAKE 1 CAPSULE BY MOUTH DAILY, Disp: 90 capsule, Rfl: 2  •  solifenacin (VESIcare) 5 MG tablet, Take 1 tablet by mouth Daily., Disp: 90 tablet, Rfl: 3  •   valACYclovir (VALTREX) 1000 MG tablet, Take 1 tablet by mouth Daily. (Patient taking differently: Take 1 tablet by mouth Take As Directed. 3 times a week), Disp: 90 tablet, Rfl: 3  •  vitamin B-12 (CYANOCOBALAMIN) 1000 MCG tablet, Take 1 tablet by mouth Daily., Disp: , Rfl:   Objective     VITAL SIGNS:     Vitals:    05/16/23 1043   BP: 151/78   Pulse: 63   Resp: 16   Temp: 98.4 °F (36.9 °C)   TempSrc: Temporal   SpO2: 96%   Weight: 72.6 kg (160 lb)   PainSc: 0-No pain     Body mass index is 33.44 kg/m².     Wt Readings from Last 5 Encounters:   05/16/23 72.6 kg (160 lb)   01/31/23 71.2 kg (157 lb)   11/08/22 71.9 kg (158 lb 9.6 oz)   10/03/22 70.9 kg (156 lb 3.2 oz)   09/06/22 70.2 kg (154 lb 11.2 oz)     PHYSICAL EXAMINATION:   GENERAL: The patient appears in good general condition, not in acute distress.   SKIN: No ecchymosis.  EYES: No jaundice. No pallor.  LYMPHATICS: No cervical lymphadenopathy.  CHEST: Normal respiratory effort.  Lungs clear bilaterally.  No added sounds.  CVS: Normal S1-S2.  No murmurs.  ABDOMEN: Soft. No tenderness. No Hepatomegaly. No Splenomegaly. No masses.    DIAGNOSTIC DATA:     Results from last 7 days   Lab Units 05/16/23  1026   WBC 10*3/mm3 5.10   NEUTROS ABS 10*3/mm3 2.92   HEMOGLOBIN g/dL 12.4   HEMATOCRIT % 39.1   PLATELETS 10*3/mm3 239     Results from last 7 days   Lab Units 05/16/23  1026   SODIUM mmol/L 141   POTASSIUM mmol/L 4.0   CHLORIDE mmol/L 105   CO2 mmol/L 26.3   BUN mg/dL 16   CREATININE mg/dL 0.85   CALCIUM mg/dL 9.4   ALBUMIN g/dL 4.3   BILIRUBIN mg/dL 0.6   ALK PHOS U/L 74   ALT (SGPT) U/L 15   AST (SGOT) U/L 17   GLUCOSE mg/dL 110         Lab 05/16/23  1026   IRON 57   IRON SATURATION 16   TIBC 361   TRANSFERRIN 258   FERRITIN 74.50      Lab Results   Component Value Date    CEA 2.33 05/16/2023    CEA 2.39 11/08/2022    CEA 2.15 08/22/2022    CEA 2.16 03/07/2022    CEA 2.04 11/11/2021      Assessment & Plan    *Colon cancer arising from the distal transverse  colon.  · Descending colon mass was identified on screening colonoscopy on 1/26/2021.  · S/P laparoscopic transverse partial colectomy on 3/2/2021.  · Pathology exam revealed a  T3 N0 Grade 2 tumor with clear surgical margin. 14 lymph nodes were negative. There was no evidence of lymphovascular or perineural invasion.   · Tumor was MSI stable. No KRAS NRAS or BRAF mutations.  · Baseline CEA was elevated at 5.86.  · Since the tumor was stage IIA with adequate number of lymph nodes examined and with no high risk features, adjuvant chemotherapy was not recommended.  · CT scan on 11/1/2021 revealed no evidence of recurrence or metastasis.  · Colonoscopy on 5/12/2022 revealed a colonic polyp.  Pathology exam revealed tubulovillous adenoma.  · Dr. Calvo recommended repeating colonoscopy in 2 years (May 2024).  · CT scan on 11/2/2022 revealed no evidence of recurrence.  · 3 tiny hepatic lesions were seen that were considered to be most likely benign.  Continued follow-up was recommended.  · CEA was 2.38 on 11/8/2023.  · CEA is 2.33 today.  · No clinical evidence of recurrence of the colon cancer on evaluation today.  · Recommended repeating CT scan in 6 months-November 2023.    *Iron deficiency anemia.   · It is attributed to blood losses from the colon cancer and from surgical blood losses.    · Hemoglobin was 11.1 on 3/3/2021.  · Patient's diet was low in iron rich food.    · We recommended increasing her intake of iron rich food.  · Patient started on 4/5/2021 ferrous sulfate 28 mg elemental iron 4 days a week.  · Hemoglobin improved to 12.1 on 8/19/2021.  · Hemoglobin was 12.5 on 11/11/2021.  · She is taking ferrous sulfate 325 mg 4 days a week and is tolerating this dose.  · Hemoglobin was 12.2 on 11/8/2022.  · Ferritin was 80 and transferrin saturation was 20%.  · Ferrous sulfate was increased to 5 days a week.  · Hemoglobin is 12.4 today.  · Ferritin is 74 and transferrin saturation is 16%.  · She has intermittent  fatigue.    *Vitamin B12 deficiency.   · Vitamin B12 was 305 on 11/11/2022.  · She is taking vitamin B12 1000 mcg daily.   · Vitamin B12 improved to 854 on 11/8/2023.  · Hemoglobin is 12.4 today.    PLAN:    1.  Continue ferrous sulfate 5 days a week.    2.  Continue vitamin B12 1000 mcg daily.    3.  Obtain a follow-up CT scan of the chest abdomen pelvis in 6 months.  We will see her 1 week after the CT scan with CBC CMP CEA ferritin iron panel vitamin B12 and folate levels.       Saul Ziegler MD  05/16/23

## 2023-06-01 RX ORDER — SOLIFENACIN SUCCINATE 5 MG/1
TABLET, FILM COATED ORAL
Qty: 90 TABLET | Refills: 3 | Status: SHIPPED | OUTPATIENT
Start: 2023-06-01

## 2023-06-05 RX ORDER — LEVOTHYROXINE SODIUM 0.1 MG/1
100 TABLET ORAL DAILY
Qty: 90 TABLET | Refills: 1 | Status: SHIPPED | OUTPATIENT
Start: 2023-06-05

## 2023-06-12 RX ORDER — ATORVASTATIN CALCIUM 40 MG/1
40 TABLET, FILM COATED ORAL DAILY
Qty: 90 TABLET | Refills: 1 | Status: SHIPPED | OUTPATIENT
Start: 2023-06-12

## 2023-06-12 NOTE — TELEPHONE ENCOUNTER
Caller: Patel Foyisidro RODRIGUEZ    Relationship: Self    Best call back number: [phone]    Requested Prescriptions:   Requested Prescriptions     Pending Prescriptions Disp Refills    atorvastatin (LIPITOR) 40 MG tablet 90 tablet 1     Sig: Take 1 tablet by mouth Daily.        Pharmacy where request should be sent: [facility] - [facility] [address] - [phone]  - [phone] FX     Last office visit with prescribing clinician: 1/31/2023   Last telemedicine visit with prescribing clinician: Visit date not found   Next office visit with prescribing clinician: 8/8/2023     Additional details provided by patient:     Does the patient have less than a 3 day supply:  [x] Yes  [] No    Would you like a call back once the refill request has been completed: [] Yes [] No    If the office needs to give you a call back, can they leave a voicemail: [] Yes [] No    Aleksandra Lr   06/12/23 14:27 EDT

## 2023-07-03 NOTE — NURSING NOTE
Arrived  for prolia injection. Indication and side effects reviewed. Denies recent dental work. Labs and medications verified. Prolia administered in right arm without incidence. Instructed to call prescribing MD for any concerns or questions and instructed on how to schedule future appts.  Pt vu and discharged ambulatory post her 30 minute observation without complaint..     Aklief counseling:  Patient advised to apply a pea-sized amount only at bedtime and wait 30 minutes after washing their face before applying.  If too drying, patient may add a non-comedogenic moisturizer.  The most commonly reported side effects including irritation, redness, scaling, dryness, stinging, burning, itching, and increased risk of sunburn.  The patient verbalized understanding of the proper use and possible adverse effects of retinoids.  All of the patient's questions and concerns were addressed.

## 2023-07-31 DIAGNOSIS — M81.0 OSTEOPOROSIS, UNSPECIFIED OSTEOPOROSIS TYPE, UNSPECIFIED PATHOLOGICAL FRACTURE PRESENCE: Primary | ICD-10-CM

## 2023-08-01 DIAGNOSIS — E78.00 HYPERCHOLESTEROLEMIA: Primary | ICD-10-CM

## 2023-08-01 DIAGNOSIS — I10 ESSENTIAL (PRIMARY) HYPERTENSION: ICD-10-CM

## 2023-08-01 DIAGNOSIS — E03.9 HYPOTHYROIDISM, UNSPECIFIED TYPE: ICD-10-CM

## 2023-08-02 LAB
ALBUMIN SERPL-MCNC: 4.7 G/DL (ref 3.5–5.2)
ALBUMIN/GLOB SERPL: 2 G/DL
ALP SERPL-CCNC: 82 U/L (ref 39–117)
ALT SERPL-CCNC: 21 U/L (ref 1–33)
APPEARANCE UR: CLEAR
AST SERPL-CCNC: 19 U/L (ref 1–32)
BACTERIA #/AREA URNS HPF: NORMAL /HPF
BASOPHILS # BLD AUTO: 0.03 10*3/MM3 (ref 0–0.2)
BASOPHILS NFR BLD AUTO: 0.6 % (ref 0–1.5)
BILIRUB SERPL-MCNC: 0.7 MG/DL (ref 0–1.2)
BILIRUB UR QL STRIP: NEGATIVE
BUN SERPL-MCNC: 23 MG/DL (ref 8–23)
BUN/CREAT SERPL: 24.7 (ref 7–25)
CALCIUM SERPL-MCNC: 9.7 MG/DL (ref 8.6–10.5)
CASTS URNS MICRO: NORMAL
CHLORIDE SERPL-SCNC: 106 MMOL/L (ref 98–107)
CHOLEST SERPL-MCNC: 228 MG/DL (ref 0–200)
CO2 SERPL-SCNC: 26.2 MMOL/L (ref 22–29)
COLOR UR: YELLOW
CREAT SERPL-MCNC: 0.93 MG/DL (ref 0.57–1)
EGFRCR SERPLBLD CKD-EPI 2021: 66.3 ML/MIN/1.73
EOSINOPHIL # BLD AUTO: 0.1 10*3/MM3 (ref 0–0.4)
EOSINOPHIL NFR BLD AUTO: 2 % (ref 0.3–6.2)
EPI CELLS #/AREA URNS HPF: NORMAL /HPF
ERYTHROCYTE [DISTWIDTH] IN BLOOD BY AUTOMATED COUNT: 12.9 % (ref 12.3–15.4)
GLOBULIN SER CALC-MCNC: 2.3 GM/DL
GLUCOSE SERPL-MCNC: 110 MG/DL (ref 65–99)
GLUCOSE UR QL STRIP: NEGATIVE
HCT VFR BLD AUTO: 38.2 % (ref 34–46.6)
HDLC SERPL-MCNC: 69 MG/DL (ref 40–60)
HGB BLD-MCNC: 12.8 G/DL (ref 12–15.9)
HGB UR QL STRIP: NEGATIVE
IMM GRANULOCYTES # BLD AUTO: 0.01 10*3/MM3 (ref 0–0.05)
IMM GRANULOCYTES NFR BLD AUTO: 0.2 % (ref 0–0.5)
KETONES UR QL STRIP: NEGATIVE
LDLC SERPL CALC-MCNC: 141 MG/DL (ref 0–100)
LEUKOCYTE ESTERASE UR QL STRIP: ABNORMAL
LYMPHOCYTES # BLD AUTO: 1.61 10*3/MM3 (ref 0.7–3.1)
LYMPHOCYTES NFR BLD AUTO: 31.8 % (ref 19.6–45.3)
MCH RBC QN AUTO: 29.3 PG (ref 26.6–33)
MCHC RBC AUTO-ENTMCNC: 33.5 G/DL (ref 31.5–35.7)
MCV RBC AUTO: 87.4 FL (ref 79–97)
MONOCYTES # BLD AUTO: 0.42 10*3/MM3 (ref 0.1–0.9)
MONOCYTES NFR BLD AUTO: 8.3 % (ref 5–12)
NEUTROPHILS # BLD AUTO: 2.9 10*3/MM3 (ref 1.7–7)
NEUTROPHILS NFR BLD AUTO: 57.1 % (ref 42.7–76)
NITRITE UR QL STRIP: NEGATIVE
NRBC BLD AUTO-RTO: 0 /100 WBC (ref 0–0.2)
PH UR STRIP: 7 [PH] (ref 5–8)
PLATELET # BLD AUTO: 249 10*3/MM3 (ref 140–450)
POTASSIUM SERPL-SCNC: 4.2 MMOL/L (ref 3.5–5.2)
PROT SERPL-MCNC: 7 G/DL (ref 6–8.5)
PROT UR QL STRIP: NEGATIVE
RBC # BLD AUTO: 4.37 10*6/MM3 (ref 3.77–5.28)
RBC #/AREA URNS HPF: NORMAL /HPF
SODIUM SERPL-SCNC: 144 MMOL/L (ref 136–145)
SP GR UR STRIP: 1.01 (ref 1–1.03)
T4 FREE SERPL-MCNC: 1.87 NG/DL (ref 0.93–1.7)
TRIGL SERPL-MCNC: 101 MG/DL (ref 0–150)
TSH SERPL DL<=0.005 MIU/L-ACNC: 0.32 UIU/ML (ref 0.27–4.2)
UROBILINOGEN UR STRIP-MCNC: ABNORMAL MG/DL
VLDLC SERPL CALC-MCNC: 18 MG/DL (ref 5–40)
WBC # BLD AUTO: 5.07 10*3/MM3 (ref 3.4–10.8)
WBC #/AREA URNS HPF: NORMAL /HPF

## 2023-08-03 ENCOUNTER — INFUSION (OUTPATIENT)
Dept: ONCOLOGY | Facility: HOSPITAL | Age: 68
End: 2023-08-03
Payer: COMMERCIAL

## 2023-08-03 ENCOUNTER — APPOINTMENT (OUTPATIENT)
Dept: OTHER | Facility: HOSPITAL | Age: 68
End: 2023-08-03
Payer: COMMERCIAL

## 2023-08-03 VITALS — HEIGHT: 58 IN | BODY MASS INDEX: 33.44 KG/M2 | RESPIRATION RATE: 15 BRPM | TEMPERATURE: 98.2 F

## 2023-08-03 DIAGNOSIS — M81.0 OSTEOPOROSIS, UNSPECIFIED OSTEOPOROSIS TYPE, UNSPECIFIED PATHOLOGICAL FRACTURE PRESENCE: Primary | ICD-10-CM

## 2023-08-03 PROCEDURE — 96372 THER/PROPH/DIAG INJ SC/IM: CPT

## 2023-08-03 PROCEDURE — 25010000002 DENOSUMAB 60 MG/ML SOLUTION PREFILLED SYRINGE: Performed by: INTERNAL MEDICINE

## 2023-08-03 RX ADMIN — DENOSUMAB 60 MG: 60 INJECTION SUBCUTANEOUS at 14:19

## 2023-08-08 ENCOUNTER — OFFICE VISIT (OUTPATIENT)
Dept: INTERNAL MEDICINE | Facility: CLINIC | Age: 68
End: 2023-08-08
Payer: COMMERCIAL

## 2023-08-08 VITALS
HEART RATE: 66 BPM | SYSTOLIC BLOOD PRESSURE: 130 MMHG | OXYGEN SATURATION: 97 % | DIASTOLIC BLOOD PRESSURE: 72 MMHG | BODY MASS INDEX: 32.54 KG/M2 | WEIGHT: 155 LBS | HEIGHT: 58 IN

## 2023-08-08 DIAGNOSIS — E03.9 HYPOTHYROIDISM, UNSPECIFIED TYPE: ICD-10-CM

## 2023-08-08 DIAGNOSIS — E78.00 HYPERCHOLESTEROLEMIA: ICD-10-CM

## 2023-08-08 DIAGNOSIS — I10 ESSENTIAL (PRIMARY) HYPERTENSION: ICD-10-CM

## 2023-08-08 DIAGNOSIS — M81.0 OSTEOPOROSIS, UNSPECIFIED OSTEOPOROSIS TYPE, UNSPECIFIED PATHOLOGICAL FRACTURE PRESENCE: ICD-10-CM

## 2023-08-08 DIAGNOSIS — R06.09 DOE (DYSPNEA ON EXERTION): ICD-10-CM

## 2023-08-08 DIAGNOSIS — Z00.00 MEDICARE ANNUAL WELLNESS VISIT, SUBSEQUENT: Primary | ICD-10-CM

## 2023-08-08 PROBLEM — Z85.038 PERSONAL HISTORY OF COLON CANCER: Status: RESOLVED | Noted: 2022-04-20 | Resolved: 2023-08-08

## 2023-08-08 RX ORDER — ATORVASTATIN CALCIUM 80 MG/1
80 TABLET, FILM COATED ORAL DAILY
Qty: 90 TABLET | Refills: 3 | Status: SHIPPED | OUTPATIENT
Start: 2023-08-08

## 2023-08-08 NOTE — PATIENT INSTRUCTIONS
Medicare Wellness  Personal Prevention Plan of Service     Date of Office Visit:    Encounter Provider:  Michelle Wyman MD  Place of Service:  [facility]  Patient Name: Marie Foy  :  1955    As part of the Medicare Wellness portion of your visit today, we are providing you with this personalized preventive plan of services (PPPS). This plan is based upon recommendations of the [place] Preventive Services Task Force (USPSTF) and the Advisory Committee on Immunization Practices (ACIP).    This lists the preventive care services that should be considered, and provides dates of when you are due. Items listed as completed are up-to-date and do not require any further intervention.    Health Maintenance   Topic Date Due    COVID-19 Vaccine (5 - Pfizer series) 2022    ANNUAL WELLNESS VISIT  2023    INFLUENZA VACCINE  10/01/2023    COLONOSCOPY  2024    LIPID PANEL  2024    MAMMOGRAM  2024    TDAP/TD VACCINES (2 - Td or Tdap) 10/20/2024    DXA SCAN  11/10/2024    HEPATITIS C SCREENING  Completed    Pneumococcal Vaccine 65+  Completed    ZOSTER VACCINE  Completed    PAP SMEAR  Discontinued       No orders of the defined types were placed in this encounter.      No follow-ups on file.

## 2023-08-08 NOTE — PROGRESS NOTES
The ABCs of the Annual Wellness Visit  Subsequent Medicare Wellness Visit    Subjective    Marie Foy is a 68 y.o. female who presents for a Subsequent Medicare Wellness Visit.    The following portions of the patient's history were reviewed and   updated as appropriate: allergies, current medications, past family history, past medical history, past social history, past surgical history, and problem list.    Compared to one year ago, the patient feels her physical   health is the same.    Compared to one year ago, the patient feels her mental   health is the same.    Recent Hospitalizations:  She was not admitted to the hospital during the last year.       Current Medical Providers:  Patient Care Team:  Michelle Wyman MD as PCP - General  Michelle Wyman MD as PCP - Family Medicine  Martin Calvo MD as Referring Physician (General Surgery)  Saul Ziegler MD as Consulting Physician (Hematology and Oncology)  Michelle Wyman MD as Referring Physician (Internal Medicine)    Outpatient Medications Prior to Visit   Medication Sig Dispense Refill    ALPRAZolam (XANAX) 0.25 MG tablet Take 1 tablet by mouth 3 (Three) Times a Day As Needed for Anxiety. 20 tablet 0    amLODIPine (NORVASC) 5 MG tablet TAKE 1 TABLET BY MOUTH DAILY 90 tablet 2    calcium citrate-vitamin d (CITRACAL) 200-250 MG-UNIT tablet tablet Take 1 tablet by mouth Daily.      docusate sodium (COLACE) 100 MG capsule Take 1 capsule by mouth Every Other Day.      Ferrous Sulfate 28 MG tablet Take  by mouth Take As Directed. 5 days a week      levothyroxine (SYNTHROID, LEVOTHROID) 100 MCG tablet Take 1 tablet by mouth Daily. 90 tablet 1    Multiple Vitamins-Minerals (MULTIVITAMINS PLUS ZINC PO) Take  by mouth.      omeprazole (priLOSEC) 40 MG capsule TAKE 1 CAPSULE BY MOUTH DAILY 90 capsule 2    solifenacin (VESICARE) 5 MG tablet TAKE 1 TABLET BY MOUTH ONCE DAILY 90 tablet 3    valACYclovir (VALTREX) 1000 MG tablet Take 1 tablet by mouth  "Daily. (Patient taking differently: Take 1 tablet by mouth Take As Directed. 3 times a week) 90 tablet 3    vitamin B-12 (CYANOCOBALAMIN) 1000 MCG tablet Take 1 tablet by mouth Daily.      atorvastatin (LIPITOR) 40 MG tablet Take 1 tablet by mouth Daily. 90 tablet 1     No facility-administered medications prior to visit.       No opioid medication identified on active medication list. I have reviewed chart for other potential  high risk medication/s and harmful drug interactions in the elderly.        Aspirin is not on active medication list.  Aspirin use is not indicated based on review of current medical condition/s. Risk of harm outweighs potential benefits.  .    Patient Active Problem List   Diagnosis    Anxiety    Gastroesophageal reflux disease    Herpes simplex type 2 infection    Hypercholesterolemia    Hypothyroidism    Overactive bladder    Fear of flying    Unilateral partial paralysis of vocal cords or larynx    Colon polyps    Essential (primary) hypertension    Primary localized osteoarthrosis of left lower leg    Osteoporosis    Malignant neoplasm of descending colon     Advance Care Planning   Advance Care Planning     Advance Directive is not on file.  ACP discussion was declined by the patient. Patient does not have an advance directive, information provided.     Objective    Vitals:    08/08/23 1117   BP: 130/72   Pulse: 66   SpO2: 97%   Weight: 70.3 kg (155 lb)   Height: 147.3 cm (57.99\")   PainSc: 0-No pain     Estimated body mass index is 32.4 kg/mý as calculated from the following:    Height as of this encounter: 147.3 cm (57.99\").    Weight as of this encounter: 70.3 kg (155 lb).    BMI is >= 30 and <35. (Class 1 Obesity). The following options were offered after discussion;: exercise counseling/recommendations      Does the patient have evidence of cognitive impairment? No    Lab Results   Component Value Date    CHLPL 228 (H) 08/01/2023    TRIG 101 08/01/2023    HDL 69 (H) 08/01/2023    "  (H) 2023    VLDL 18 2023        HEALTH RISK ASSESSMENT    Smoking Status:  Social History     Tobacco Use   Smoking Status Never   Smokeless Tobacco Never     Alcohol Consumption:  Social History     Substance and Sexual Activity   Alcohol Use Not Currently    Comment: OCASSIONALLY     Fall Risk Screen:    STEW Fall Risk Assessment was completed, and patient is at LOW risk for falls.Assessment completed on:2023    Depression Screenin/8/2023    11:16 AM   PHQ-2/PHQ-9 Depression Screening   Little Interest or Pleasure in Doing Things 0-->not at all   Feeling Down, Depressed or Hopeless 0-->not at all   PHQ-9: Brief Depression Severity Measure Score 0       Health Habits and Functional and Cognitive Screenin/8/2023    11:16 AM   Functional & Cognitive Status   Do you have difficulty preparing food and eating? No   Do you have difficulty bathing yourself, getting dressed or grooming yourself? No   Do you have difficulty using the toilet? No   Do you have difficulty moving around from place to place? No   Do you have trouble with steps or getting out of a bed or a chair? No   Current Diet Well Balanced Diet   Dental Exam Up to date   Eye Exam Up to date   Exercise (times per week) 5 times per week   Current Exercises Include Walking   Do you need help using the phone?  No   Are you deaf or do you have serious difficulty hearing?  No   Do you need help to go to places out of walking distance? No   Do you need help shopping? No   Do you need help preparing meals?  No   Do you need help with housework?  No   Do you need help with laundry? No   Do you need help taking your medications? No   Do you need help managing money? No   Do you ever drive or ride in a car without wearing a seat belt? No   Have you felt unusual stress, anger or loneliness in the last month? No   Who do you live with? Alone   If you need help, do you have trouble finding someone available to you? No   Have you  been bothered in the last four weeks by sexual problems? No   Do you have difficulty concentrating, remembering or making decisions? No       Age-appropriate Screening Schedule:  Refer to the list below for future screening recommendations based on patient's age, sex and/or medical conditions. Orders for these recommended tests are listed in the plan section. The patient has been provided with a written plan.    Health Maintenance   Topic Date Due    ANNUAL WELLNESS VISIT  07/26/2023    COVID-19 Vaccine (5 - Pfizer series) 08/10/2023 (Originally 2/17/2022)    INFLUENZA VACCINE  10/01/2023    COLONOSCOPY  05/12/2024    LIPID PANEL  08/01/2024    MAMMOGRAM  09/19/2024    TDAP/TD VACCINES (2 - Td or Tdap) 10/20/2024    DXA SCAN  11/10/2024    HEPATITIS C SCREENING  Completed    Pneumococcal Vaccine 65+  Completed    ZOSTER VACCINE  Completed    PAP SMEAR  Discontinued                  CMS Preventative Services Quick Reference  Risk Factors Identified During Encounter  Immunizations Discussed/Encouraged: COVID19  The above risks/problems have been discussed with the patient.  Pertinent information has been shared with the patient in the After Visit Summary.  An After Visit Summary and PPPS were made available to the patient.    Follow Up:   Next Medicare Wellness visit to be scheduled in 1 year.       Additional E&M Note during same encounter follows:  Patient has multiple medical problems which are significant and separately identifiable that require additional work above and beyond the Medicare Wellness Visit.      Chief Complaint  Medicare Wellness-subsequent, Hypertension, Hyperlipidemia, and Hypothyroidism    Subjective        HPI  Marie Foy is also being seen today for     BARAJAS.  Going on months.  Some edema in legs.  No PND.  No orthopnea.  Heart palpitations associated.  She gets chest pain but not with exertion.      HTN.  Blood pressure is under good control.  HLD.  LDL cholesterol is under good control.    "Hypothyroidism.  TSH is in normal range.        Review of Systems   Respiratory:  Positive for shortness of breath.    Cardiovascular:  Positive for chest pain, palpitations and leg swelling.     Objective   Vital Signs:  /72   Pulse 66   Ht 147.3 cm (57.99\")   Wt 70.3 kg (155 lb)   SpO2 97%   BMI 32.40 kg/mý     Physical Exam  Constitutional:       Appearance: She is well-developed.   HENT:      Head: Normocephalic and atraumatic.      Right Ear: Hearing, tympanic membrane and external ear normal.      Left Ear: Hearing, tympanic membrane and external ear normal.      Nose: Nose normal.   Neck:      Thyroid: No thyromegaly.   Cardiovascular:      Rate and Rhythm: Normal rate and regular rhythm.      Heart sounds: Normal heart sounds. No murmur heard.  Pulmonary:      Effort: Pulmonary effort is normal.      Breath sounds: Normal breath sounds.   Abdominal:      General: There is no distension.      Palpations: Abdomen is soft.      Tenderness: There is no abdominal tenderness.   Musculoskeletal:      Cervical back: Neck supple.   Lymphadenopathy:      Cervical: No cervical adenopathy.   Skin:     General: Skin is warm and dry.   Neurological:      Mental Status: She is alert and oriented to person, place, and time.   Psychiatric:         Speech: Speech normal.         Behavior: Behavior normal.         Thought Content: Thought content normal.         Judgment: Judgment normal.        The following data was reviewed by: Michelle Wyman MD on 08/08/2023:  Common labs          1/24/2023    10:57 5/16/2023    10:26 8/1/2023    10:39   Common Labs   Glucose 108  110  110    BUN 17  16  23    Creatinine 0.92  0.85  0.93    Sodium 142  141  144    Potassium 4.4  4.0  4.2    Chloride 103  105  106    Calcium 10.0  9.4  9.7    Total Protein 7.5   7.0    Albumin 4.8  4.3  4.7    Total Bilirubin 0.6  0.6  0.7    Alkaline Phosphatase 89  74  82    AST (SGOT) 17  17  19    ALT (SGPT) 17  15  21    WBC 6.36  5.10  " 5.07    Hemoglobin 13.2  12.4  12.8    Hematocrit 39.3  39.1  38.2    Platelets 275  239  249    Total Cholesterol 225   228    Triglycerides 141   101    HDL Cholesterol 79   69    LDL Cholesterol  122   141          ECG 12 Lead    Date/Time: 8/8/2023 12:01 PM  Performed by: Michelle Wyman MD  Authorized by: Michelle Wyman MD   Comparison: compared with previous ECG   Similar to previous ECG  Rhythm: sinus rhythm  Rate: normal  Conduction: conduction normal  ST Segments: ST segments normal  T Waves: T waves normal  QRS axis: normal    Clinical impression: normal ECG            Assessment and Plan   Diagnoses and all orders for this visit:    1. Medicare annual wellness visit, subsequent (Primary)    2. Essential (primary) hypertension    3. Hypercholesterolemia    4. Hypothyroidism, unspecified type    5. Osteoporosis, unspecified osteoporosis type, unspecified pathological fracture presence    6. BARAJAS (dyspnea on exertion)  -     XR Chest PA & Lateral  -     Pulmonary Function Test; Future  -     Adult Stress Echo W/ Cont or Stress Agent if Necessary Per Protocol; Future  -     Holter Monitor - 48 Hour; Future    Other orders  -     atorvastatin (LIPITOR) 80 MG tablet; Take 1 tablet by mouth Daily.  Dispense: 90 tablet; Refill: 3  -     ECG 12 Lead      HTN. Control is good.  The patient is advised to continue current dosage of amlodipine.  HLD.  Control is not optimal.  Increase atorvastatin to 80 mg qhs.    Hypothyroidism. Control is good.  The patient is advised to continue current dosage of levothyroxine.    OP. I recommend to get 1200 mg of calcium and 1000 IUs of vitamin D through diet and supplements and to participate in a weight based exercise to prevent loss of bone mineral density. Bone mineral will be monitored every two years.  Continue Prolia.    BARAJAS.  New.  EKG and CXR are unremarkable.  Further evaluation with stress echo, holter and PFTs.               Follow Up   Return in about 6 months  (around 2/8/2024) for Recheck.  Patient was given instructions and counseling regarding her condition or for health maintenance advice. Please see specific information pulled into the AVS if appropriate.

## 2023-08-23 ENCOUNTER — HOSPITAL ENCOUNTER (OUTPATIENT)
Dept: RESPIRATORY THERAPY | Facility: HOSPITAL | Age: 68
Discharge: HOME | End: 2023-08-23
Payer: COMMERCIAL

## 2023-08-23 ENCOUNTER — LAB (OUTPATIENT)
Dept: LAB | Facility: HOSPITAL | Age: 68
End: 2023-08-23
Payer: COMMERCIAL

## 2023-08-23 DIAGNOSIS — R06.09 DOE (DYSPNEA ON EXERTION): ICD-10-CM

## 2023-08-23 DIAGNOSIS — M81.0 OSTEOPOROSIS, UNSPECIFIED OSTEOPOROSIS TYPE, UNSPECIFIED PATHOLOGICAL FRACTURE PRESENCE: ICD-10-CM

## 2023-08-23 LAB
25(OH)D3 SERPL-MCNC: 33 NG/ML (ref 30–100)
ALBUMIN SERPL-MCNC: 5 G/DL (ref 3.5–5.2)
ALBUMIN/GLOB SERPL: 2.2 G/DL
ALP SERPL-CCNC: 78 U/L (ref 39–117)
ALT SERPL W P-5'-P-CCNC: 19 U/L (ref 1–33)
ANION GAP SERPL CALCULATED.3IONS-SCNC: 11 MMOL/L (ref 5–15)
AST SERPL-CCNC: 19 U/L (ref 1–32)
BILIRUB SERPL-MCNC: 0.5 MG/DL (ref 0–1.2)
BUN SERPL-MCNC: 19 MG/DL (ref 8–23)
BUN/CREAT SERPL: 23.2 (ref 7–25)
CALCIUM SPEC-SCNC: 9.9 MG/DL (ref 8.6–10.5)
CHLORIDE SERPL-SCNC: 107 MMOL/L (ref 98–107)
CO2 SERPL-SCNC: 24 MMOL/L (ref 22–29)
CREAT SERPL-MCNC: 0.82 MG/DL (ref 0.57–1)
EGFRCR SERPLBLD CKD-EPI 2021: 77.1 ML/MIN/1.73
GLOBULIN UR ELPH-MCNC: 2.3 GM/DL
GLUCOSE SERPL-MCNC: 107 MG/DL (ref 65–99)
MAGNESIUM SERPL-MCNC: 2.1 MG/DL (ref 1.6–2.4)
PHOSPHATE SERPL-MCNC: 2.6 MG/DL (ref 2.5–4.5)
POTASSIUM SERPL-SCNC: 3.9 MMOL/L (ref 3.5–5.2)
PROT SERPL-MCNC: 7.3 G/DL (ref 6–8.5)
SODIUM SERPL-SCNC: 142 MMOL/L (ref 136–145)

## 2023-08-23 PROCEDURE — 84100 ASSAY OF PHOSPHORUS: CPT

## 2023-08-23 PROCEDURE — 94640 AIRWAY INHALATION TREATMENT: CPT

## 2023-08-23 PROCEDURE — 94729 DIFFUSING CAPACITY: CPT

## 2023-08-23 PROCEDURE — 83735 ASSAY OF MAGNESIUM: CPT

## 2023-08-23 PROCEDURE — 94060 EVALUATION OF WHEEZING: CPT

## 2023-08-23 PROCEDURE — 80053 COMPREHEN METABOLIC PANEL: CPT

## 2023-08-23 PROCEDURE — 82306 VITAMIN D 25 HYDROXY: CPT

## 2023-08-23 RX ORDER — ALBUTEROL SULFATE 2.5 MG/3ML
2.5 SOLUTION RESPIRATORY (INHALATION) ONCE
Status: COMPLETED | OUTPATIENT
Start: 2023-08-23 | End: 2023-08-23

## 2023-08-23 RX ADMIN — ALBUTEROL SULFATE 2.5 MG: 2.5 SOLUTION RESPIRATORY (INHALATION) at 11:04

## 2023-09-18 ENCOUNTER — TELEPHONE (OUTPATIENT)
Dept: INTERNAL MEDICINE | Facility: CLINIC | Age: 68
End: 2023-09-18
Payer: COMMERCIAL

## 2023-09-18 ENCOUNTER — HOSPITAL ENCOUNTER (OUTPATIENT)
Dept: CARDIOLOGY | Facility: HOSPITAL | Age: 68
Discharge: HOME | End: 2023-09-18
Admitting: INTERNAL MEDICINE
Payer: COMMERCIAL

## 2023-09-18 VITALS
HEIGHT: 58 IN | WEIGHT: 155 LBS | HEART RATE: 78 BPM | SYSTOLIC BLOOD PRESSURE: 148 MMHG | DIASTOLIC BLOOD PRESSURE: 78 MMHG | BODY MASS INDEX: 32.54 KG/M2

## 2023-09-18 DIAGNOSIS — R06.09 DOE (DYSPNEA ON EXERTION): ICD-10-CM

## 2023-09-18 LAB
AORTIC ARCH: 2.3 CM
ASCENDING AORTA: 3.2 CM
BH CV ECHO MEAS - ACS: 1.64 CM
BH CV ECHO MEAS - AO MAX PG: 7.3 MMHG
BH CV ECHO MEAS - AO MEAN PG: 4 MMHG
BH CV ECHO MEAS - AO ROOT DIAM: 2.9 CM
BH CV ECHO MEAS - AO V2 MAX: 135 CM/SEC
BH CV ECHO MEAS - AO V2 VTI: 29.8 CM
BH CV ECHO MEAS - AVA(I,D): 2.21 CM2
BH CV ECHO MEAS - EDV(CUBED): 103.8 ML
BH CV ECHO MEAS - EDV(MOD-SP2): 56 ML
BH CV ECHO MEAS - EDV(MOD-SP4): 62 ML
BH CV ECHO MEAS - EF(MOD-BP): 68.9 %
BH CV ECHO MEAS - EF(MOD-SP2): 69.6 %
BH CV ECHO MEAS - EF(MOD-SP4): 69.4 %
BH CV ECHO MEAS - ESV(CUBED): 18.8 ML
BH CV ECHO MEAS - ESV(MOD-SP2): 17 ML
BH CV ECHO MEAS - ESV(MOD-SP4): 19 ML
BH CV ECHO MEAS - FS: 43.5 %
BH CV ECHO MEAS - IVS/LVPW: 1 CM
BH CV ECHO MEAS - IVSD: 1 CM
BH CV ECHO MEAS - LAT PEAK E' VEL: 8.7 CM/SEC
BH CV ECHO MEAS - LV MASS(C)D: 164.5 GRAMS
BH CV ECHO MEAS - LV MAX PG: 5.4 MMHG
BH CV ECHO MEAS - LV MEAN PG: 3 MMHG
BH CV ECHO MEAS - LV V1 MAX: 116 CM/SEC
BH CV ECHO MEAS - LV V1 VTI: 26.2 CM
BH CV ECHO MEAS - LVIDD: 4.7 CM
BH CV ECHO MEAS - LVIDS: 2.7 CM
BH CV ECHO MEAS - LVOT AREA: 2.5 CM2
BH CV ECHO MEAS - LVOT DIAM: 1.79 CM
BH CV ECHO MEAS - LVPWD: 1 CM
BH CV ECHO MEAS - MED PEAK E' VEL: 5.2 CM/SEC
BH CV ECHO MEAS - MR MAX PG: 70.3 MMHG
BH CV ECHO MEAS - MR MAX VEL: 419.3 CM/SEC
BH CV ECHO MEAS - MV A DUR: 0.1 SEC
BH CV ECHO MEAS - MV A MAX VEL: 103 CM/SEC
BH CV ECHO MEAS - MV DEC SLOPE: 411.8 CM/SEC2
BH CV ECHO MEAS - MV DEC TIME: 0.16 MSEC
BH CV ECHO MEAS - MV E MAX VEL: 108 CM/SEC
BH CV ECHO MEAS - MV E/A: 1.05
BH CV ECHO MEAS - MV MAX PG: 6.4 MMHG
BH CV ECHO MEAS - MV MEAN PG: 2.42 MMHG
BH CV ECHO MEAS - MV P1/2T: 87.6 MSEC
BH CV ECHO MEAS - MV V2 VTI: 39.3 CM
BH CV ECHO MEAS - MVA(P1/2T): 2.5 CM2
BH CV ECHO MEAS - MVA(VTI): 1.68 CM2
BH CV ECHO MEAS - PA ACC TIME: 0.09 SEC
BH CV ECHO MEAS - PA V2 MAX: 116.4 CM/SEC
BH CV ECHO MEAS - PULM A REVS DUR: 0.1 SEC
BH CV ECHO MEAS - PULM A REVS VEL: 30.8 CM/SEC
BH CV ECHO MEAS - PULM DIAS VEL: 37.7 CM/SEC
BH CV ECHO MEAS - PULM S/D: 1.3
BH CV ECHO MEAS - PULM SYS VEL: 48.8 CM/SEC
BH CV ECHO MEAS - QP/QS: 0.87
BH CV ECHO MEAS - RV MAX PG: 3 MMHG
BH CV ECHO MEAS - RV V1 MAX: 86.1 CM/SEC
BH CV ECHO MEAS - RV V1 VTI: 18.2 CM
BH CV ECHO MEAS - RVOT DIAM: 2.01 CM
BH CV ECHO MEAS - SUP REN AO DIAM: 2.1 CM
BH CV ECHO MEAS - SV(LVOT): 65.9 ML
BH CV ECHO MEAS - SV(MOD-SP2): 39 ML
BH CV ECHO MEAS - SV(MOD-SP4): 43 ML
BH CV ECHO MEAS - SV(RVOT): 57.6 ML
BH CV ECHO MEASUREMENTS AVERAGE E/E' RATIO: 15.54
BH CV XLRA - RV BASE: 3 CM
BH CV XLRA - RV LENGTH: 6.7 CM
BH CV XLRA - RV MID: 2.35 CM
BH CV XLRA - TDI S': 20.6 CM/SEC
LEFT ATRIUM VOLUME INDEX: 33.5 ML/M2
SINUS: 3.1 CM
STJ: 2.7 CM

## 2023-09-18 PROCEDURE — 93306 TTE W/DOPPLER COMPLETE: CPT

## 2023-09-18 RX ORDER — OMEPRAZOLE 40 MG/1
CAPSULE, DELAYED RELEASE ORAL
Qty: 90 CAPSULE | Refills: 2 | Status: SHIPPED | OUTPATIENT
Start: 2023-09-18

## 2023-09-18 NOTE — TELEPHONE ENCOUNTER
Caller: Marie Foy    Relationship: Self    Best call back number: [phone]*    What orders are you requesting (i.e. lab or imaging): CHEMICAL STRESS TEST    In what timeframe would the patient need to come in: ASAP    Where will you receive your lab/imaging services: [facility]    Additional notes: PATIENT CALLING STATING THAT [facility] WOULD NOT DO THE STRESS TEST TODAY BECAUSE THE ORDER NEEDED TO BE FOR A CHEMICAL STRESS TEST. THE PATIENT STATES THAT THEY WILL NEED ANOTHER ORDER.  THE PATIENT STATES THAT SHE DID HAVE THE ECHO PERFORMED TODAY.

## 2023-09-19 DIAGNOSIS — R06.09 EXERTIONAL DYSPNEA: Primary | ICD-10-CM

## 2023-09-20 ENCOUNTER — TELEPHONE (OUTPATIENT)
Dept: INTERNAL MEDICINE | Facility: CLINIC | Age: 68
End: 2023-09-20
Payer: COMMERCIAL

## 2023-09-20 ENCOUNTER — PATIENT MESSAGE (OUTPATIENT)
Dept: INTERNAL MEDICINE | Facility: CLINIC | Age: 68
End: 2023-09-20
Payer: COMMERCIAL

## 2023-09-20 RX ORDER — ATORVASTATIN CALCIUM 80 MG/1
80 TABLET, FILM COATED ORAL DAILY
Qty: 90 TABLET | Refills: 3 | Status: SHIPPED | OUTPATIENT
Start: 2023-09-20

## 2023-09-20 NOTE — TELEPHONE ENCOUNTER
Dr Wyman sent an RX to [facility] for atorvastatin 80 mg om 8/8/23 and it was received. You can resend it if they patient hasn't received it

## 2023-09-28 ENCOUNTER — APPOINTMENT (OUTPATIENT)
Dept: WOMENS IMAGING | Facility: HOSPITAL | Age: 68
End: 2023-09-28
Payer: COMMERCIAL

## 2023-09-28 PROCEDURE — 77067 SCR MAMMO BI INCL CAD: CPT | Performed by: RADIOLOGY

## 2023-09-28 PROCEDURE — 77063 BREAST TOMOSYNTHESIS BI: CPT | Performed by: RADIOLOGY

## 2023-09-30 ENCOUNTER — TELEPHONE (OUTPATIENT)
Dept: INTERNAL MEDICINE | Facility: CLINIC | Age: 68
End: 2023-09-30
Payer: COMMERCIAL

## 2023-09-30 DIAGNOSIS — R06.09 EXERTIONAL DYSPNEA: Primary | ICD-10-CM

## 2023-10-16 ENCOUNTER — APPOINTMENT (OUTPATIENT)
Dept: CARDIOLOGY | Facility: HOSPITAL | Age: 68
End: 2023-10-16
Payer: MEDICARE

## 2023-10-16 ENCOUNTER — HOSPITAL ENCOUNTER (OUTPATIENT)
Dept: CARDIOLOGY | Facility: HOSPITAL | Age: 68
Discharge: HOME | End: 2023-10-16
Admitting: INTERNAL MEDICINE
Payer: MEDICARE

## 2023-10-16 VITALS — WEIGHT: 156.53 LBS | HEIGHT: 58 IN | BODY MASS INDEX: 32.86 KG/M2

## 2023-10-16 DIAGNOSIS — R06.09 EXERTIONAL DYSPNEA: ICD-10-CM

## 2023-10-16 LAB
BH CV NUCLEAR PRIOR STUDY: 1
BH CV REST NUCLEAR ISOTOPE DOSE: 11.4 MCI
BH CV STRESS BP STAGE 1: NORMAL
BH CV STRESS COMMENTS STAGE 1: NORMAL
BH CV STRESS DOSE REGADENOSON STAGE 1: 0.4
BH CV STRESS DURATION MIN STAGE 1: 0
BH CV STRESS DURATION SEC STAGE 1: 10
BH CV STRESS HR STAGE 1: 111
BH CV STRESS NUCLEAR ISOTOPE DOSE: 35.3 MCI
BH CV STRESS PROTOCOL 1: NORMAL
BH CV STRESS RECOVERY BP: NORMAL MMHG
BH CV STRESS RECOVERY HR: 73 BPM
BH CV STRESS STAGE 1: 1
LV EF NUC BP: 77 %
MAXIMAL PREDICTED HEART RATE: 150 BPM
PERCENT MAX PREDICTED HR: 74 %
STRESS BASELINE BP: NORMAL MMHG
STRESS BASELINE HR: 70 BPM
STRESS PERCENT HR: 87 %
STRESS POST EXERCISE DUR SEC: 10 SEC
STRESS POST PEAK BP: NORMAL MMHG
STRESS POST PEAK HR: 111 BPM
STRESS TARGET HR: 128 BPM

## 2023-10-16 PROCEDURE — 78452 HT MUSCLE IMAGE SPECT MULT: CPT

## 2023-10-16 PROCEDURE — 78452 HT MUSCLE IMAGE SPECT MULT: CPT | Performed by: STUDENT IN AN ORGANIZED HEALTH CARE EDUCATION/TRAINING PROGRAM

## 2023-10-16 PROCEDURE — 25010000002 REGADENOSON 0.4 MG/5ML SOLUTION: Performed by: INTERNAL MEDICINE

## 2023-10-16 PROCEDURE — 0 TECHNETIUM TETROFOSMIN KIT: Performed by: INTERNAL MEDICINE

## 2023-10-16 PROCEDURE — 93016 CV STRESS TEST SUPVJ ONLY: CPT | Performed by: STUDENT IN AN ORGANIZED HEALTH CARE EDUCATION/TRAINING PROGRAM

## 2023-10-16 PROCEDURE — A9502 TC99M TETROFOSMIN: HCPCS | Performed by: INTERNAL MEDICINE

## 2023-10-16 PROCEDURE — 93018 CV STRESS TEST I&R ONLY: CPT | Performed by: STUDENT IN AN ORGANIZED HEALTH CARE EDUCATION/TRAINING PROGRAM

## 2023-10-16 PROCEDURE — 93017 CV STRESS TEST TRACING ONLY: CPT

## 2023-10-16 RX ORDER — REGADENOSON 0.08 MG/ML
0.4 INJECTION, SOLUTION INTRAVENOUS
Status: COMPLETED | OUTPATIENT
Start: 2023-10-16 | End: 2023-10-16

## 2023-10-16 RX ADMIN — TETROFOSMIN 1 DOSE: 1.38 INJECTION, POWDER, LYOPHILIZED, FOR SOLUTION INTRAVENOUS at 13:05

## 2023-10-16 RX ADMIN — REGADENOSON 0.4 MG: 0.08 INJECTION, SOLUTION INTRAVENOUS at 13:05

## 2023-10-16 RX ADMIN — TETROFOSMIN 1 DOSE: 1.38 INJECTION, POWDER, LYOPHILIZED, FOR SOLUTION INTRAVENOUS at 12:22

## 2023-10-19 RX ORDER — ATORVASTATIN CALCIUM 80 MG/1
80 TABLET, FILM COATED ORAL DAILY
Qty: 90 TABLET | Refills: 3 | Status: SHIPPED | OUTPATIENT
Start: 2023-10-19

## 2023-10-30 ENCOUNTER — HOSPITAL ENCOUNTER (OUTPATIENT)
Dept: CT IMAGING | Facility: HOSPITAL | Age: 68
Discharge: HOME | End: 2023-10-30
Admitting: INTERNAL MEDICINE
Payer: COMMERCIAL

## 2023-10-30 DIAGNOSIS — K76.9 LIVER LESION: ICD-10-CM

## 2023-10-30 DIAGNOSIS — D50.0 IRON DEFICIENCY ANEMIA DUE TO CHRONIC BLOOD LOSS: ICD-10-CM

## 2023-10-30 DIAGNOSIS — C18.6 MALIGNANT NEOPLASM OF DESCENDING COLON: ICD-10-CM

## 2023-10-30 DIAGNOSIS — E53.8 VITAMIN B12 DEFICIENCY: ICD-10-CM

## 2023-10-30 PROCEDURE — 0 DIATRIZOATE MEGLUMINE & SODIUM PER 1 ML: Performed by: INTERNAL MEDICINE

## 2023-10-30 PROCEDURE — 82565 ASSAY OF CREATININE: CPT

## 2023-10-30 PROCEDURE — 25510000001 IOPAMIDOL 61 % SOLUTION: Performed by: INTERNAL MEDICINE

## 2023-10-30 PROCEDURE — 71260 CT THORAX DX C+: CPT

## 2023-10-30 PROCEDURE — 74177 CT ABD & PELVIS W/CONTRAST: CPT

## 2023-10-30 RX ADMIN — DIATRIZOATE MEGLUMINE AND DIATRIZOATE SODIUM 30 ML: 660; 100 LIQUID ORAL; RECTAL at 10:00

## 2023-10-30 RX ADMIN — IOPAMIDOL 90 ML: 612 INJECTION, SOLUTION INTRAVENOUS at 11:03

## 2023-10-31 LAB — CREAT BLDA-MCNC: 0.9 MG/DL (ref 0.6–1.3)

## 2023-11-09 ENCOUNTER — LAB (OUTPATIENT)
Dept: LAB | Facility: HOSPITAL | Age: 68
End: 2023-11-09
Payer: COMMERCIAL

## 2023-11-09 ENCOUNTER — OFFICE VISIT (OUTPATIENT)
Dept: ONCOLOGY | Facility: CLINIC | Age: 68
End: 2023-11-09
Payer: COMMERCIAL

## 2023-11-09 VITALS
TEMPERATURE: 97.7 F | OXYGEN SATURATION: 96 % | DIASTOLIC BLOOD PRESSURE: 74 MMHG | WEIGHT: 159.6 LBS | BODY MASS INDEX: 33.5 KG/M2 | HEART RATE: 61 BPM | SYSTOLIC BLOOD PRESSURE: 146 MMHG | RESPIRATION RATE: 18 BRPM | HEIGHT: 58 IN

## 2023-11-09 DIAGNOSIS — K76.9 LIVER LESION: ICD-10-CM

## 2023-11-09 DIAGNOSIS — E53.8 VITAMIN B12 DEFICIENCY: ICD-10-CM

## 2023-11-09 DIAGNOSIS — Z85.038 HISTORY OF COLON CANCER: Primary | ICD-10-CM

## 2023-11-09 DIAGNOSIS — D50.0 IRON DEFICIENCY ANEMIA DUE TO CHRONIC BLOOD LOSS: ICD-10-CM

## 2023-11-09 DIAGNOSIS — C18.6 MALIGNANT NEOPLASM OF DESCENDING COLON: ICD-10-CM

## 2023-11-09 LAB
ALBUMIN SERPL-MCNC: 4.5 G/DL (ref 3.5–5.2)
ALBUMIN/GLOB SERPL: 2 G/DL
ALP SERPL-CCNC: 76 U/L (ref 39–117)
ALT SERPL W P-5'-P-CCNC: 25 U/L (ref 1–33)
ANION GAP SERPL CALCULATED.3IONS-SCNC: 12 MMOL/L (ref 5–15)
AST SERPL-CCNC: 22 U/L (ref 1–32)
BASOPHILS # BLD AUTO: 0.03 10*3/MM3 (ref 0–0.2)
BASOPHILS NFR BLD AUTO: 0.6 % (ref 0–1.5)
BILIRUB SERPL-MCNC: 0.6 MG/DL (ref 0–1.2)
BUN SERPL-MCNC: 20 MG/DL (ref 8–23)
BUN/CREAT SERPL: 23.5 (ref 7–25)
CALCIUM SPEC-SCNC: 9.3 MG/DL (ref 8.6–10.5)
CEA SERPL-MCNC: 2.8 NG/ML
CHLORIDE SERPL-SCNC: 103 MMOL/L (ref 98–107)
CO2 SERPL-SCNC: 24 MMOL/L (ref 22–29)
CREAT SERPL-MCNC: 0.85 MG/DL (ref 0.6–1.1)
DEPRECATED RDW RBC AUTO: 41.3 FL (ref 37–54)
EGFRCR SERPLBLD CKD-EPI 2021: 73.8 ML/MIN/1.73
EOSINOPHIL # BLD AUTO: 0.15 10*3/MM3 (ref 0–0.4)
EOSINOPHIL NFR BLD AUTO: 3.1 % (ref 0.3–6.2)
ERYTHROCYTE [DISTWIDTH] IN BLOOD BY AUTOMATED COUNT: 12.5 % (ref 12.3–15.4)
FERRITIN SERPL-MCNC: 117 NG/ML (ref 13–150)
FOLATE SERPL-MCNC: 14.4 NG/ML (ref 4.78–24.2)
GLOBULIN UR ELPH-MCNC: 2.3 GM/DL
GLUCOSE SERPL-MCNC: 94 MG/DL (ref 65–99)
HCT VFR BLD AUTO: 38.9 % (ref 34–46.6)
HGB BLD-MCNC: 12.8 G/DL (ref 12–15.9)
IMM GRANULOCYTES # BLD AUTO: 0.01 10*3/MM3 (ref 0–0.05)
IMM GRANULOCYTES NFR BLD AUTO: 0.2 % (ref 0–0.5)
IRON 24H UR-MRATE: 62 MCG/DL (ref 37–145)
IRON SATN MFR SERPL: 18 % (ref 20–50)
LYMPHOCYTES # BLD AUTO: 1.58 10*3/MM3 (ref 0.7–3.1)
LYMPHOCYTES NFR BLD AUTO: 33 % (ref 19.6–45.3)
MCH RBC QN AUTO: 29.7 PG (ref 26.6–33)
MCHC RBC AUTO-ENTMCNC: 32.9 G/DL (ref 31.5–35.7)
MCV RBC AUTO: 90.3 FL (ref 79–97)
MONOCYTES # BLD AUTO: 0.38 10*3/MM3 (ref 0.1–0.9)
MONOCYTES NFR BLD AUTO: 7.9 % (ref 5–12)
NEUTROPHILS NFR BLD AUTO: 2.64 10*3/MM3 (ref 1.7–7)
NEUTROPHILS NFR BLD AUTO: 55.2 % (ref 42.7–76)
NRBC BLD AUTO-RTO: 0 /100 WBC (ref 0–0.2)
PLATELET # BLD AUTO: 256 10*3/MM3 (ref 140–450)
PMV BLD AUTO: 9 FL (ref 6–12)
POTASSIUM SERPL-SCNC: 4.8 MMOL/L (ref 3.5–5.2)
PROT SERPL-MCNC: 6.8 G/DL (ref 6–8.5)
RBC # BLD AUTO: 4.31 10*6/MM3 (ref 3.77–5.28)
SODIUM SERPL-SCNC: 139 MMOL/L (ref 136–145)
TIBC SERPL-MCNC: 349 MCG/DL (ref 298–536)
TRANSFERRIN SERPL-MCNC: 249 MG/DL (ref 200–360)
VIT B12 BLD-MCNC: 610 PG/ML (ref 211–946)
WBC NRBC COR # BLD: 4.79 10*3/MM3 (ref 3.4–10.8)

## 2023-11-09 PROCEDURE — 82746 ASSAY OF FOLIC ACID SERUM: CPT | Performed by: INTERNAL MEDICINE

## 2023-11-09 PROCEDURE — 85025 COMPLETE CBC W/AUTO DIFF WBC: CPT

## 2023-11-09 PROCEDURE — 80053 COMPREHEN METABOLIC PANEL: CPT

## 2023-11-09 PROCEDURE — 82728 ASSAY OF FERRITIN: CPT

## 2023-11-09 PROCEDURE — 84466 ASSAY OF TRANSFERRIN: CPT

## 2023-11-09 PROCEDURE — 36415 COLL VENOUS BLD VENIPUNCTURE: CPT

## 2023-11-09 PROCEDURE — 82378 CARCINOEMBRYONIC ANTIGEN: CPT | Performed by: INTERNAL MEDICINE

## 2023-11-09 PROCEDURE — 83540 ASSAY OF IRON: CPT

## 2023-11-09 PROCEDURE — 82607 VITAMIN B-12: CPT | Performed by: INTERNAL MEDICINE

## 2023-11-09 NOTE — PROGRESS NOTES
Subjective     CHIEF COMPLAINT:      Chief Complaint   Patient presents with    Follow-up     DISCUSS CT SCAN     HISTORY OF PRESENT ILLNESS:     Maire Foy is a 68 y.o. female patient who returns today for follow up on her colon cancer and anemia.  She returns today for follow-up reporting some fatigue.  Due to the problem with the stool being sticky and difficult to have a complete bowel movement, she stopped taking the iron a month ago.  Prior to that, she was taken at 5 days a week as instructed.    ROS:  Pertinent ROS is in the HPI.     Past medical, surgical, social and family history were reviewed.     MEDICATIONS:    Current Outpatient Medications:     ALPRAZolam (XANAX) 0.25 MG tablet, Take 1 tablet by mouth 3 (Three) Times a Day As Needed for Anxiety., Disp: 20 tablet, Rfl: 0    amLODIPine (NORVASC) 5 MG tablet, TAKE 1 TABLET BY MOUTH DAILY, Disp: 90 tablet, Rfl: 2    atorvastatin (LIPITOR) 80 MG tablet, Take 1 tablet by mouth Daily., Disp: 90 tablet, Rfl: 3    calcium citrate-vitamin d (CITRACAL) 200-250 MG-UNIT tablet tablet, Take 1 tablet by mouth Daily., Disp: , Rfl:     docusate sodium (COLACE) 100 MG capsule, Take 1 capsule by mouth Every Other Day., Disp: , Rfl:     Ferrous Sulfate 28 MG tablet, Take  by mouth Take As Directed. 5 days a week, Disp: , Rfl:     levothyroxine (SYNTHROID, LEVOTHROID) 100 MCG tablet, Take 1 tablet by mouth Daily., Disp: 90 tablet, Rfl: 1    Multiple Vitamins-Minerals (MULTIVITAMINS PLUS ZINC PO), Take  by mouth., Disp: , Rfl:     omeprazole (priLOSEC) 40 MG capsule, TAKE 1 CAPSULE BY MOUTH DAILY, Disp: 90 capsule, Rfl: 2    solifenacin (VESICARE) 5 MG tablet, TAKE 1 TABLET BY MOUTH ONCE DAILY, Disp: 90 tablet, Rfl: 3    valACYclovir (VALTREX) 1000 MG tablet, Take 1 tablet by mouth Daily. (Patient taking differently: Take 1 tablet by mouth Take As Directed. 3 times a week), Disp: 90 tablet, Rfl: 3    vitamin B-12 (CYANOCOBALAMIN) 1000 MCG tablet, Take 1 tablet by  "mouth Daily., Disp: , Rfl:   Objective     VITAL SIGNS:     Vitals:    11/09/23 1511   BP: 146/74   Pulse: 61   Resp: 18   Temp: 97.7 °F (36.5 °C)   TempSrc: Temporal   SpO2: 96%   Weight: 72.4 kg (159 lb 9.6 oz)   Height: 147.3 cm (57.99\")   PainSc: 0-No pain     Body mass index is 33.37 kg/m².     Wt Readings from Last 5 Encounters:   11/09/23 72.4 kg (159 lb 9.6 oz)   10/16/23 71 kg (156 lb 8.4 oz)   09/18/23 70.3 kg (155 lb)   08/08/23 70.3 kg (155 lb)   05/16/23 72.6 kg (160 lb)     PHYSICAL EXAMINATION:   GENERAL: The patient appears in good general condition, not in acute distress.   SKIN: No ecchymosis.  EYES: No jaundice. No Pallor.  CHEST: Normal respiratory effort.  Lungs clear bilaterally.  No added sounds.  CVS: No edema.  ABDOMEN: Soft. No tenderness. No Hepatomegaly. No Splenomegaly. No masses.  EXTREMITIES: No noted deformity.     DIAGNOSTIC DATA:     Results from last 7 days   Lab Units 11/09/23  1459   WBC 10*3/mm3 4.79   NEUTROS ABS 10*3/mm3 2.64   HEMOGLOBIN g/dL 12.8   HEMATOCRIT % 38.9   PLATELETS 10*3/mm3 256     Results from last 7 days   Lab Units 11/09/23  1459   SODIUM mmol/L 139   POTASSIUM mmol/L 4.8   CHLORIDE mmol/L 103   CO2 mmol/L 24.0   BUN mg/dL 20   CREATININE mg/dL 0.85   CALCIUM mg/dL 9.3   ALBUMIN g/dL 4.5   BILIRUBIN mg/dL 0.6   ALK PHOS U/L 76   ALT (SGPT) U/L 25   AST (SGOT) U/L 22   GLUCOSE mg/dL 94         Lab 11/09/23  1459   IRON 62   IRON SATURATION (TSAT) 18*   TIBC 349   TRANSFERRIN 249   FERRITIN 117.00   FOLATE 14.40   VITAMIN B 12 610      Lab Results   Component Value Date    CEA 2.80 11/09/2023    CEA 2.33 05/16/2023    CEA 2.39 11/08/2022    CEA 2.15 08/22/2022    CEA 2.16 03/07/2022      CT chest abdomen pelvis on 10/30/2023:  Chest:     There is by apical pleural-parenchymal scarring. Dependent atelectasis  is seen in the lungs. No consolidation, pleural effusion, significant  size nodule or mass, or pneumothorax are seen. The central airways are  patent.   "   No enlarged supraclavicular, axillary, mediastinal, or hilar lymph nodes  are seen. The mediastinal vasculature is normal in caliber. The heart is  normal in size and configuration, without pericardial effusion.     Abdomen and pelvis:     There is stable 4 mm hepatic segment 2 lesion is seen on series 3, image  65. No new or enlarged hepatic masses are seen. There is a small  sliding-type hiatal hernia. Cysts and lesions that are technically  indeterminate, likely cysts, are seen in the kidneys, including an  indeterminate right lower pole lesion measuring 1.6 x 1.9 cm on series  2, image 108. Postsurgical changes are seen from right hemicolectomy.  There is a stable nodular deposit on series 2, image 155, which probably  represents the ovary.     The gallbladder, spleen, adrenal glands, pancreas, small bowel, urinary  bladder, and abdominal vasculature are normal. The uterus is absent. No  intraperitoneal fluid collection or free gas are seen. No enlarged lymph  nodes are demonstrated.     Bone windows demonstrate degenerative changes, without suspicious  osseous lesion.     IMPRESSION:  1. Postsurgical changes of right hemicolectomy with stable nodular right  sided focus that likely represents the ovary     2. Stable tiny hepatic segment 2 lesion, likely a cyst     3. Stable right renal lesion, probably a cyst     4. Additional stable incidental findings as above.    Assessment & Plan    *Colon cancer arising from the distal transverse colon.  Descending colon mass was identified on screening colonoscopy on 1/26/2021.  S/P laparoscopic transverse partial colectomy on 3/2/2021.  Pathology exam revealed a  T3 N0 Grade 2 tumor with clear surgical margin. 14 lymph nodes were negative. There was no evidence of lymphovascular or perineural invasion.   Tumor was MSI stable. No KRAS NRAS or BRAF mutations.  Baseline CEA was elevated at 5.86.  Since the tumor was stage IIA with adequate number of lymph nodes examined  and with no high risk features, adjuvant chemotherapy was not recommended.  CT scan on 11/1/2021 revealed no evidence of recurrence or metastasis.  Colonoscopy on 5/12/2022 revealed a colonic polyp.  Pathology exam revealed tubulovillous adenoma.  Dr. Calvo recommended repeating colonoscopy in 2 years (May 2024).  CT scan on 11/2/2022 revealed no evidence of recurrence.  3 tiny hepatic lesions were seen that were considered to be most likely benign.  Continued follow-up was recommended.  CEA was 2.38 on 11/8/2022.  CT chest abdomen pelvis on 10/30/2023 showed no evidence of recurrence or metastasis.  There was a stable tiny hepatic segment 2 lesion likely representing a cyst.  11/9/2023: CEA 2.80.    *Iron deficiency anemia.   It is attributed to blood losses from the colon cancer and from surgical blood losses.    Hemoglobin was 11.1 on 3/3/2021.  Patient's diet was low in iron rich food.    We recommended increasing her intake of iron rich food.  Patient started on 4/5/2021 ferrous sulfate 28 mg elemental iron 4 days a week.  Hemoglobin improved to 12.1 on 8/19/2021.  Hemoglobin was 12.5 on 11/11/2021.  She is taking ferrous sulfate 325 mg 4 days a week and is tolerating this dose.  Hemoglobin was 12.2 on 11/8/2022.  Ferritin was 80 and transferrin saturation was 20%.  Ferrous sulfate was increased to 5 days a week.  5/16/2023: Hemoglobin was 12.4.   Ferritin was 74.  Transferrin saturation was 16%.   She was continued on ferrous sulfate 5 days a week.  11/9/2023: Hemoglobin improved 12.8.  Ferritin improved to 117.  Transferrin saturation improved to 18%.  Patient reports that she stopped taking the oral iron a month ago (problem with the stool consistency).  She reports some fatigue.    *Vitamin B12 deficiency.   Vitamin B12 was 305 on 11/11/2022.  She is taking vitamin B12 1000 mcg daily.   Vitamin B12 improved to 854 on 11/8/2023.  5/16/2023: Vitamin B12 was 800.  11/9/2023: Vitamin B12 adequate at  610.    PLAN:    1.  Restart ferrous sulfate once weekly.   2.  Continue Vitamin B12 daily.  3.  Patient is going to have follow-up colonoscopy in March 2024.  4.  Follow-up in 6 months.  CBC CMP CEA ferritin iron panel vitamin B12 folate levels will be obtained.  5.  Plan to repeat CT scan in 1 year but sooner if there are any changes.        Saul Ziegler MD  11/09/23

## 2023-12-11 RX ORDER — VALACYCLOVIR HYDROCHLORIDE 1 G/1
1000 TABLET, FILM COATED ORAL DAILY
Qty: 90 TABLET | Refills: 3 | Status: SHIPPED | OUTPATIENT
Start: 2023-12-11

## 2023-12-11 RX ORDER — LEVOTHYROXINE SODIUM 0.1 MG/1
100 TABLET ORAL DAILY
Qty: 90 TABLET | Refills: 1 | Status: SHIPPED | OUTPATIENT
Start: 2023-12-11

## 2024-01-22 RX ORDER — AMLODIPINE BESYLATE 5 MG/1
TABLET ORAL
Qty: 90 TABLET | Refills: 2 | Status: SHIPPED | OUTPATIENT
Start: 2024-01-22

## 2024-01-23 NOTE — TELEPHONE ENCOUNTER
----- Message from Saul Ziegler MD sent at 8/22/2022  2:14 PM EDT -----  Please inform the patient that the tumor marker CEA was normal.  Her iron stores are currently adequate.  Please ask her to continue the oral iron 4 days a week.    Thank you    
Reviewed Dr. Ziegler's note and instructions with pt, she v/u.    
DISCHARGE

## 2024-01-25 ENCOUNTER — CLINICAL SUPPORT (OUTPATIENT)
Dept: ORTHOPEDIC SURGERY | Facility: CLINIC | Age: 69
End: 2024-01-25
Payer: MEDICARE

## 2024-01-25 VITALS — HEIGHT: 58 IN | WEIGHT: 160 LBS | BODY MASS INDEX: 33.58 KG/M2 | TEMPERATURE: 97.3 F

## 2024-01-25 DIAGNOSIS — M25.562 LEFT KNEE PAIN, UNSPECIFIED CHRONICITY: Primary | ICD-10-CM

## 2024-01-25 DIAGNOSIS — M17.12 PRIMARY OSTEOARTHRITIS OF LEFT KNEE: ICD-10-CM

## 2024-01-25 RX ORDER — METHYLPREDNISOLONE ACETATE 80 MG/ML
80 INJECTION, SUSPENSION INTRA-ARTICULAR; INTRALESIONAL; INTRAMUSCULAR; SOFT TISSUE
Status: COMPLETED | OUTPATIENT
Start: 2024-01-25 | End: 2024-01-25

## 2024-01-25 RX ORDER — LIDOCAINE HYDROCHLORIDE 10 MG/ML
5 INJECTION, SOLUTION EPIDURAL; INFILTRATION; INTRACAUDAL; PERINEURAL
Status: COMPLETED | OUTPATIENT
Start: 2024-01-25 | End: 2024-01-25

## 2024-01-25 RX ADMIN — LIDOCAINE HYDROCHLORIDE 5 ML: 10 INJECTION, SOLUTION EPIDURAL; INFILTRATION; INTRACAUDAL; PERINEURAL at 10:59

## 2024-01-25 RX ADMIN — METHYLPREDNISOLONE ACETATE 80 MG: 80 INJECTION, SUSPENSION INTRA-ARTICULAR; INTRALESIONAL; INTRAMUSCULAR; SOFT TISSUE at 10:59

## 2024-01-25 NOTE — PROGRESS NOTES
1/25/2024    Marie Foy is here today for worsening knee pain. Pt has undergone injection of the knee in the past with good resolution of symptoms. Pt is requesting a repeat injection.     KNEE Injection Procedure Note:    Large Joint Arthrocentesis: L knee  Date/Time: 1/25/2024 10:59 AM  Consent given by: patient  Site marked: site marked  Timeout: Immediately prior to procedure a time out was called to verify the correct patient, procedure, equipment, support staff and site/side marked as required   Supporting Documentation  Indications: pain and joint swelling   Procedure Details  Location: knee - L knee  Preparation: Patient was prepped and draped in the usual sterile fashion  Needle size: 22 G  Approach: anterolateral  Medications administered: 80 mg methylPREDNISolone acetate 80 MG/ML; 5 mL lidocaine PF 1% 1 %  Patient tolerance: patient tolerated the procedure well with no immediate complications      (3 mL of lidocaine was used for anesthetic purposes)    X-rays: 3 views left knee (AP, lateral, sunrise views) were ordered and reviewed for evaluation of left knee pain which demonstrate advanced varus deformity with bone-on-bone articulation, subchondral sclerosis and cystic changes, as well as periarticular osteophytes and loose bodies present.  In comparison to previous films patient demonstrate some arthritic progression.    At the conclusion of the injection I discussed the importance of continued quad strengthening exercises on a daily basis. I will see the patient back if the symptoms should fail to improve or worsen.    Willie Quintanilla, SUPRIYA  1/25/2024

## 2024-02-01 DIAGNOSIS — E78.00 HYPERCHOLESTEROLEMIA: Primary | ICD-10-CM

## 2024-02-01 DIAGNOSIS — I10 ESSENTIAL (PRIMARY) HYPERTENSION: ICD-10-CM

## 2024-02-01 DIAGNOSIS — E03.9 HYPOTHYROIDISM, UNSPECIFIED TYPE: ICD-10-CM

## 2024-02-02 LAB
ALBUMIN SERPL-MCNC: 4.9 G/DL (ref 3.5–5.2)
ALBUMIN/GLOB SERPL: 2.2 G/DL
ALP SERPL-CCNC: 92 U/L (ref 39–117)
ALT SERPL-CCNC: 19 U/L (ref 1–33)
AST SERPL-CCNC: 15 U/L (ref 1–32)
BASOPHILS # BLD AUTO: 0.04 10*3/MM3 (ref 0–0.2)
BASOPHILS NFR BLD AUTO: 0.5 % (ref 0–1.5)
BILIRUB SERPL-MCNC: 0.8 MG/DL (ref 0–1.2)
BUN SERPL-MCNC: 16 MG/DL (ref 8–23)
BUN/CREAT SERPL: 16.8 (ref 7–25)
CALCIUM SERPL-MCNC: 9.7 MG/DL (ref 8.6–10.5)
CHLORIDE SERPL-SCNC: 105 MMOL/L (ref 98–107)
CHOLEST SERPL-MCNC: 187 MG/DL (ref 0–200)
CO2 SERPL-SCNC: 27.6 MMOL/L (ref 22–29)
CREAT SERPL-MCNC: 0.95 MG/DL (ref 0.57–1)
EGFRCR SERPLBLD CKD-EPI 2021: 64.6 ML/MIN/1.73
EOSINOPHIL # BLD AUTO: 0.24 10*3/MM3 (ref 0–0.4)
EOSINOPHIL NFR BLD AUTO: 3.3 % (ref 0.3–6.2)
ERYTHROCYTE [DISTWIDTH] IN BLOOD BY AUTOMATED COUNT: 12.7 % (ref 12.3–15.4)
GLOBULIN SER CALC-MCNC: 2.2 GM/DL
GLUCOSE SERPL-MCNC: 94 MG/DL (ref 65–99)
HCT VFR BLD AUTO: 39.3 % (ref 34–46.6)
HDLC SERPL-MCNC: 77 MG/DL (ref 40–60)
HGB BLD-MCNC: 12.6 G/DL (ref 12–15.9)
IMM GRANULOCYTES # BLD AUTO: 0.03 10*3/MM3 (ref 0–0.05)
IMM GRANULOCYTES NFR BLD AUTO: 0.4 % (ref 0–0.5)
LDLC SERPL CALC-MCNC: 89 MG/DL (ref 0–100)
LYMPHOCYTES # BLD AUTO: 1.92 10*3/MM3 (ref 0.7–3.1)
LYMPHOCYTES NFR BLD AUTO: 26.4 % (ref 19.6–45.3)
MCH RBC QN AUTO: 27.9 PG (ref 26.6–33)
MCHC RBC AUTO-ENTMCNC: 32.1 G/DL (ref 31.5–35.7)
MCV RBC AUTO: 86.9 FL (ref 79–97)
MONOCYTES # BLD AUTO: 0.55 10*3/MM3 (ref 0.1–0.9)
MONOCYTES NFR BLD AUTO: 7.6 % (ref 5–12)
NEUTROPHILS # BLD AUTO: 4.5 10*3/MM3 (ref 1.7–7)
NEUTROPHILS NFR BLD AUTO: 61.8 % (ref 42.7–76)
NRBC BLD AUTO-RTO: 0 /100 WBC (ref 0–0.2)
PLATELET # BLD AUTO: 274 10*3/MM3 (ref 140–450)
POTASSIUM SERPL-SCNC: 4.3 MMOL/L (ref 3.5–5.2)
PROT SERPL-MCNC: 7.1 G/DL (ref 6–8.5)
RBC # BLD AUTO: 4.52 10*6/MM3 (ref 3.77–5.28)
SODIUM SERPL-SCNC: 142 MMOL/L (ref 136–145)
TRIGL SERPL-MCNC: 121 MG/DL (ref 0–150)
TSH SERPL DL<=0.005 MIU/L-ACNC: 0.48 UIU/ML (ref 0.27–4.2)
VLDLC SERPL CALC-MCNC: 21 MG/DL (ref 5–40)
WBC # BLD AUTO: 7.28 10*3/MM3 (ref 3.4–10.8)

## 2024-02-05 ENCOUNTER — DOCUMENTATION (OUTPATIENT)
Dept: ONCOLOGY | Facility: HOSPITAL | Age: 69
End: 2024-02-05
Payer: COMMERCIAL

## 2024-02-06 ENCOUNTER — INFUSION (OUTPATIENT)
Dept: ONCOLOGY | Facility: HOSPITAL | Age: 69
End: 2024-02-06
Payer: COMMERCIAL

## 2024-02-06 VITALS — TEMPERATURE: 98.2 F

## 2024-02-06 DIAGNOSIS — M81.0 OSTEOPOROSIS, UNSPECIFIED OSTEOPOROSIS TYPE, UNSPECIFIED PATHOLOGICAL FRACTURE PRESENCE: Primary | ICD-10-CM

## 2024-02-06 PROCEDURE — 25010000002 DENOSUMAB 60 MG/ML SOLUTION PREFILLED SYRINGE: Performed by: INTERNAL MEDICINE

## 2024-02-06 PROCEDURE — 96372 THER/PROPH/DIAG INJ SC/IM: CPT

## 2024-02-06 RX ADMIN — DENOSUMAB 60 MG: 60 INJECTION SUBCUTANEOUS at 10:47

## 2024-02-09 ENCOUNTER — OFFICE VISIT (OUTPATIENT)
Dept: INTERNAL MEDICINE | Facility: CLINIC | Age: 69
End: 2024-02-09
Payer: COMMERCIAL

## 2024-02-09 VITALS
HEIGHT: 58 IN | WEIGHT: 157 LBS | HEART RATE: 62 BPM | DIASTOLIC BLOOD PRESSURE: 78 MMHG | BODY MASS INDEX: 32.95 KG/M2 | OXYGEN SATURATION: 95 % | SYSTOLIC BLOOD PRESSURE: 138 MMHG

## 2024-02-09 DIAGNOSIS — E03.9 HYPOTHYROIDISM, UNSPECIFIED TYPE: ICD-10-CM

## 2024-02-09 DIAGNOSIS — I10 ESSENTIAL (PRIMARY) HYPERTENSION: Primary | ICD-10-CM

## 2024-02-09 DIAGNOSIS — E78.00 HYPERCHOLESTEROLEMIA: ICD-10-CM

## 2024-02-09 DIAGNOSIS — Z12.11 COLON CANCER SCREENING: ICD-10-CM

## 2024-02-09 RX ORDER — AMLODIPINE BESYLATE 10 MG/1
10 TABLET ORAL DAILY
Qty: 90 TABLET | Refills: 3 | Status: SHIPPED | OUTPATIENT
Start: 2024-02-09

## 2024-02-09 NOTE — PROGRESS NOTES
Subjective     Marie Foy is a 68 y.o. female who presents with   Chief Complaint   Patient presents with    Hypertension    Hyperlipidemia    Hypothyroidism       Hypertension  Pertinent negatives include no chest pain, headaches, palpitations or shortness of breath.   Hyperlipidemia  Exacerbating diseases include hypothyroidism. Pertinent negatives include no chest pain or shortness of breath.   Hypothyroidism  Pertinent negatives include no chest pain or headaches.        The following data was reviewed by: Michelle Wyman MD on 02/09/2024:  Common labs          10/30/2023    10:16 11/9/2023    14:59 2/2/2024    11:27   Common Labs   Glucose  94  94    BUN  20  16    Creatinine 0.90  0.85  0.95    Sodium  139  142    Potassium  4.8  4.3    Chloride  103  105    Calcium  9.3  9.7    Total Protein   7.1    Albumin  4.5  4.9    Total Bilirubin  0.6  0.8    Alkaline Phosphatase  76  92    AST (SGOT)  22  15    ALT (SGPT)  25  19    WBC  4.79  7.28    Hemoglobin  12.8  12.6    Hematocrit  38.9  39.3    Platelets  256  274    Total Cholesterol   187    Triglycerides   121    HDL Cholesterol   77    LDL Cholesterol    89      HTN.   Running in the 150s at home.  HLD.  Good LDL control.   Hypothyroidism.  TSH in normal range.      Review of Systems   Respiratory:  Negative for shortness of breath.    Cardiovascular:  Negative for chest pain and palpitations.   Neurological:  Negative for headaches.       The following portions of the patient's history were reviewed and updated as appropriate: allergies, current medications and problem list.    Patient Active Problem List    Diagnosis Date Noted    Malignant neoplasm of descending colon 02/04/2021    Osteoporosis 06/30/2020     Note Last Updated: 6/30/2021     Prolia every six months.       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 20 tablet 0    atorvastatin (LIPITOR) 80 MG tablet Take 1 tablet by mouth Daily. 90 tablet 3    calcium citrate-vitamin d (CITRACAL) 200-250 MG-UNIT tablet tablet Take 1 tablet by mouth Daily.      docusate sodium (COLACE) 100 MG capsule Take 1 capsule by mouth Every Other Day.      Ferrous Sulfate 28 MG tablet Take  by mouth 1 (One) Time Per Week.      levothyroxine (SYNTHROID, LEVOTHROID) 100 MCG tablet TAKE 1 TABLET BY MOUTH ONCE DAILY 90 tablet 1    Multiple Vitamins-Minerals (MULTIVITAMINS PLUS ZINC PO) Take  by mouth.      omeprazole (priLOSEC) 40 MG capsule TAKE 1 CAPSULE BY MOUTH DAILY 90 capsule 2    solifenacin (VESICARE) 5 MG tablet TAKE 1 TABLET BY MOUTH ONCE DAILY 90 tablet 3    valACYclovir (VALTREX) 1000 MG tablet TAKE 1 TABLET BY MOUTH DAILY. 90 tablet 3    vitamin B-12 (CYANOCOBALAMIN) 1000 MCG tablet Take 1 tablet by mouth Daily.      [DISCONTINUED] amLODIPine (NORVASC) 5 MG tablet TAKE 1 TABLET BY MOUTH DAILY 90 tablet 2     No current facility-administered medications on file prior to visit.       Objective     /78   Pulse 62   Ht 147.3 cm (57.99\")   Wt 71.2 kg (157 lb)   SpO2 95%   BMI 32.82 kg/m²     Physical Exam  Constitutional:       Appearance: She is well-developed.   HENT:      Head: Normocephalic and atraumatic.   Cardiovascular:      Rate and Rhythm: Normal rate and regular rhythm.      Heart " sounds: Normal heart sounds.   Pulmonary:      Effort: Pulmonary effort is normal.      Breath sounds: Normal breath sounds.   Skin:     General: Skin is warm and dry.   Neurological:      Mental Status: She is alert and oriented to person, place, and time.   Psychiatric:         Behavior: Behavior normal.         Assessment & Plan   Diagnoses and all orders for this visit:    1. Essential (primary) hypertension (Primary)    2. Hypercholesterolemia    3. Hypothyroidism, unspecified type    4. Colon cancer screening  -     Ambulatory Referral For Screening Colonoscopy    Other orders  -     amLODIPine (NORVASC) 10 MG tablet; Take 1 tablet by mouth Daily.  Dispense: 90 tablet; Refill: 3        Discussion    HTN.  Not optimal control.  Increase amlodipine to 10 mg.  The patient is instructed to monitor at home and call in checks.    HLD. Control is good.  The patient is advised to continue current dosage of atorvastatin.    Hypothyroidism.  Control is good.  The patient is advised to continue current dosage of levothyroxine.      F/u AWV in six months.            Future Appointments   Date Time Provider Department Center   2/9/2024 11:00 AM Michelle Wyman MD [facility]   5/9/2024  2:20 PM LAB CHAIR 2 [facility]ORDILLO  LAB [facility]   5/9/2024  2:40 PM Saul Ziegler MD [facility]

## 2024-03-01 ENCOUNTER — PREP FOR SURGERY (OUTPATIENT)
Dept: OTHER | Facility: HOSPITAL | Age: 69
End: 2024-03-01
Payer: COMMERCIAL

## 2024-03-01 DIAGNOSIS — Z85.038 PERSONAL HISTORY OF COLON CANCER: Primary | ICD-10-CM

## 2024-05-09 ENCOUNTER — OFFICE VISIT (OUTPATIENT)
Dept: ONCOLOGY | Facility: CLINIC | Age: 69
End: 2024-05-09
Payer: COMMERCIAL

## 2024-05-09 ENCOUNTER — LAB (OUTPATIENT)
Dept: LAB | Facility: HOSPITAL | Age: 69
End: 2024-05-09
Payer: COMMERCIAL

## 2024-05-09 VITALS
TEMPERATURE: 97.9 F | WEIGHT: 159 LBS | RESPIRATION RATE: 16 BRPM | DIASTOLIC BLOOD PRESSURE: 75 MMHG | OXYGEN SATURATION: 96 % | SYSTOLIC BLOOD PRESSURE: 144 MMHG | BODY MASS INDEX: 33.37 KG/M2 | HEIGHT: 58 IN | HEART RATE: 69 BPM

## 2024-05-09 DIAGNOSIS — Z85.038 HISTORY OF COLON CANCER: ICD-10-CM

## 2024-05-09 DIAGNOSIS — E53.8 VITAMIN B12 DEFICIENCY: ICD-10-CM

## 2024-05-09 DIAGNOSIS — K76.9 LIVER LESION: ICD-10-CM

## 2024-05-09 DIAGNOSIS — M81.0 OSTEOPOROSIS, UNSPECIFIED OSTEOPOROSIS TYPE, UNSPECIFIED PATHOLOGICAL FRACTURE PRESENCE: ICD-10-CM

## 2024-05-09 DIAGNOSIS — D50.0 IRON DEFICIENCY ANEMIA DUE TO CHRONIC BLOOD LOSS: ICD-10-CM

## 2024-05-09 DIAGNOSIS — Z85.038 HISTORY OF COLON CANCER: Primary | ICD-10-CM

## 2024-05-09 LAB
ALBUMIN SERPL-MCNC: 4.6 G/DL (ref 3.5–5.2)
ALBUMIN/GLOB SERPL: 1.6 G/DL
ALP SERPL-CCNC: 82 U/L (ref 39–117)
ALT SERPL W P-5'-P-CCNC: 21 U/L (ref 1–33)
ANION GAP SERPL CALCULATED.3IONS-SCNC: 12.6 MMOL/L (ref 5–15)
AST SERPL-CCNC: 20 U/L (ref 1–32)
BASOPHILS # BLD AUTO: 0.04 10*3/MM3 (ref 0–0.2)
BASOPHILS NFR BLD AUTO: 0.8 % (ref 0–1.5)
BILIRUB SERPL-MCNC: 0.6 MG/DL (ref 0–1.2)
BUN SERPL-MCNC: 20 MG/DL (ref 8–23)
BUN/CREAT SERPL: 22.7 (ref 7–25)
CALCIUM SPEC-SCNC: 9.6 MG/DL (ref 8.6–10.5)
CEA SERPL-MCNC: 2.3 NG/ML
CHLORIDE SERPL-SCNC: 105 MMOL/L (ref 98–107)
CO2 SERPL-SCNC: 22.4 MMOL/L (ref 22–29)
CREAT SERPL-MCNC: 0.88 MG/DL (ref 0.57–1)
DEPRECATED RDW RBC AUTO: 41.1 FL (ref 37–54)
EGFRCR SERPLBLD CKD-EPI 2021: 70.8 ML/MIN/1.73
EOSINOPHIL # BLD AUTO: 0.25 10*3/MM3 (ref 0–0.4)
EOSINOPHIL NFR BLD AUTO: 4.9 % (ref 0.3–6.2)
ERYTHROCYTE [DISTWIDTH] IN BLOOD BY AUTOMATED COUNT: 12.4 % (ref 12.3–15.4)
FERRITIN SERPL-MCNC: 107 NG/ML (ref 13–150)
FOLATE SERPL-MCNC: 13.8 NG/ML (ref 4.78–24.2)
GLOBULIN UR ELPH-MCNC: 2.9 GM/DL
GLUCOSE SERPL-MCNC: 102 MG/DL (ref 65–99)
HCT VFR BLD AUTO: 39.7 % (ref 34–46.6)
HGB BLD-MCNC: 12.7 G/DL (ref 12–15.9)
IMM GRANULOCYTES # BLD AUTO: 0.01 10*3/MM3 (ref 0–0.05)
IMM GRANULOCYTES NFR BLD AUTO: 0.2 % (ref 0–0.5)
IRON 24H UR-MRATE: 55 MCG/DL (ref 37–145)
IRON SATN MFR SERPL: 15 % (ref 20–50)
LYMPHOCYTES # BLD AUTO: 1.46 10*3/MM3 (ref 0.7–3.1)
LYMPHOCYTES NFR BLD AUTO: 28.3 % (ref 19.6–45.3)
MCH RBC QN AUTO: 28.6 PG (ref 26.6–33)
MCHC RBC AUTO-ENTMCNC: 32 G/DL (ref 31.5–35.7)
MCV RBC AUTO: 89.4 FL (ref 79–97)
MONOCYTES # BLD AUTO: 0.4 10*3/MM3 (ref 0.1–0.9)
MONOCYTES NFR BLD AUTO: 7.8 % (ref 5–12)
NEUTROPHILS NFR BLD AUTO: 2.99 10*3/MM3 (ref 1.7–7)
NEUTROPHILS NFR BLD AUTO: 58 % (ref 42.7–76)
NRBC BLD AUTO-RTO: 0 /100 WBC (ref 0–0.2)
PLATELET # BLD AUTO: 269 10*3/MM3 (ref 140–450)
PMV BLD AUTO: 9.1 FL (ref 6–12)
POTASSIUM SERPL-SCNC: 4.3 MMOL/L (ref 3.5–5.2)
PROT SERPL-MCNC: 7.5 G/DL (ref 6–8.5)
RBC # BLD AUTO: 4.44 10*6/MM3 (ref 3.77–5.28)
SODIUM SERPL-SCNC: 140 MMOL/L (ref 136–145)
TIBC SERPL-MCNC: 362 MCG/DL (ref 298–536)
TRANSFERRIN SERPL-MCNC: 243 MG/DL (ref 200–360)
VIT B12 BLD-MCNC: 521 PG/ML (ref 211–946)
WBC NRBC COR # BLD AUTO: 5.15 10*3/MM3 (ref 3.4–10.8)

## 2024-05-09 PROCEDURE — 82746 ASSAY OF FOLIC ACID SERUM: CPT | Performed by: INTERNAL MEDICINE

## 2024-05-09 PROCEDURE — 80053 COMPREHEN METABOLIC PANEL: CPT

## 2024-05-09 PROCEDURE — 84466 ASSAY OF TRANSFERRIN: CPT

## 2024-05-09 PROCEDURE — 82607 VITAMIN B-12: CPT | Performed by: INTERNAL MEDICINE

## 2024-05-09 PROCEDURE — 85025 COMPLETE CBC W/AUTO DIFF WBC: CPT

## 2024-05-09 PROCEDURE — 82378 CARCINOEMBRYONIC ANTIGEN: CPT | Performed by: INTERNAL MEDICINE

## 2024-05-09 PROCEDURE — 83540 ASSAY OF IRON: CPT

## 2024-05-09 PROCEDURE — 82728 ASSAY OF FERRITIN: CPT

## 2024-05-09 PROCEDURE — 36415 COLL VENOUS BLD VENIPUNCTURE: CPT

## 2024-05-10 ENCOUNTER — TELEPHONE (OUTPATIENT)
Dept: ONCOLOGY | Facility: CLINIC | Age: 69
End: 2024-05-10
Payer: COMMERCIAL

## 2024-05-20 ENCOUNTER — PREP FOR SURGERY (OUTPATIENT)
Dept: OTHER | Facility: HOSPITAL | Age: 69
End: 2024-05-20
Payer: COMMERCIAL

## 2024-05-20 DIAGNOSIS — Z86.010 HISTORY OF ADENOMATOUS POLYP OF COLON: ICD-10-CM

## 2024-05-20 DIAGNOSIS — Z85.038 PERSONAL HISTORY OF COLON CANCER: Primary | ICD-10-CM

## 2024-05-21 PROBLEM — Z85.038 PERSONAL HISTORY OF COLON CANCER: Status: ACTIVE | Noted: 2024-05-20

## 2024-05-21 PROBLEM — Z86.0101 HISTORY OF ADENOMATOUS POLYP OF COLON: Status: ACTIVE | Noted: 2024-05-20

## 2024-05-21 PROBLEM — Z86.010 HISTORY OF ADENOMATOUS POLYP OF COLON: Status: ACTIVE | Noted: 2024-05-20

## 2024-06-13 RX ORDER — OMEPRAZOLE 40 MG/1
CAPSULE, DELAYED RELEASE ORAL
Qty: 90 CAPSULE | Refills: 2 | Status: SHIPPED | OUTPATIENT
Start: 2024-06-13

## 2024-06-13 RX ORDER — LEVOTHYROXINE SODIUM 0.1 MG/1
100 TABLET ORAL DAILY
Qty: 90 TABLET | Refills: 1 | Status: SHIPPED | OUTPATIENT
Start: 2024-06-13

## 2024-06-18 ENCOUNTER — ANESTHESIA EVENT (OUTPATIENT)
Dept: GASTROENTEROLOGY | Facility: HOSPITAL | Age: 69
End: 2024-06-18
Payer: COMMERCIAL

## 2024-06-18 ENCOUNTER — HOSPITAL ENCOUNTER (OUTPATIENT)
Facility: HOSPITAL | Age: 69
Setting detail: HOSPITAL OUTPATIENT SURGERY
Discharge: HOME | End: 2024-06-18
Attending: SURGERY | Admitting: SURGERY
Payer: MEDICARE

## 2024-06-18 ENCOUNTER — ANESTHESIA (OUTPATIENT)
Dept: GASTROENTEROLOGY | Facility: HOSPITAL | Age: 69
End: 2024-06-18
Payer: COMMERCIAL

## 2024-06-18 VITALS
HEART RATE: 83 BPM | DIASTOLIC BLOOD PRESSURE: 73 MMHG | OXYGEN SATURATION: 95 % | BODY MASS INDEX: 33.17 KG/M2 | SYSTOLIC BLOOD PRESSURE: 157 MMHG | HEIGHT: 58 IN | WEIGHT: 158 LBS | RESPIRATION RATE: 16 BRPM

## 2024-06-18 PROCEDURE — 25810000003 LACTATED RINGERS PER 1000 ML: Performed by: SURGERY

## 2024-06-18 PROCEDURE — 25010000002 GLYCOPYRROLATE 0.2 MG/ML SOLUTION

## 2024-06-18 PROCEDURE — 25010000002 PROPOFOL 1000 MG/100ML EMULSION

## 2024-06-18 PROCEDURE — S0260 H&P FOR SURGERY: HCPCS | Performed by: SURGERY

## 2024-06-18 PROCEDURE — 25010000002 ONDANSETRON PER 1 MG

## 2024-06-18 PROCEDURE — G0105 COLORECTAL SCRN; HI RISK IND: HCPCS | Performed by: SURGERY

## 2024-06-18 RX ORDER — LIDOCAINE HYDROCHLORIDE 10 MG/ML
0.5 INJECTION, SOLUTION INFILTRATION; PERINEURAL ONCE AS NEEDED
Status: DISCONTINUED | OUTPATIENT
Start: 2024-06-18 | End: 2024-06-18 | Stop reason: HOSPADM

## 2024-06-18 RX ORDER — GLYCOPYRROLATE 0.2 MG/ML
INJECTION INTRAMUSCULAR; INTRAVENOUS AS NEEDED
Status: DISCONTINUED | OUTPATIENT
Start: 2024-06-18 | End: 2024-06-18 | Stop reason: SURG

## 2024-06-18 RX ORDER — PROPOFOL 10 MG/ML
INJECTION, EMULSION INTRAVENOUS AS NEEDED
Status: DISCONTINUED | OUTPATIENT
Start: 2024-06-18 | End: 2024-06-18 | Stop reason: SURG

## 2024-06-18 RX ORDER — SODIUM CHLORIDE, SODIUM LACTATE, POTASSIUM CHLORIDE, CALCIUM CHLORIDE 600; 310; 30; 20 MG/100ML; MG/100ML; MG/100ML; MG/100ML
1000 INJECTION, SOLUTION INTRAVENOUS CONTINUOUS
Status: DISCONTINUED | OUTPATIENT
Start: 2024-06-18 | End: 2024-06-18 | Stop reason: HOSPADM

## 2024-06-18 RX ORDER — LIDOCAINE HYDROCHLORIDE 20 MG/ML
INJECTION, SOLUTION INFILTRATION; PERINEURAL AS NEEDED
Status: DISCONTINUED | OUTPATIENT
Start: 2024-06-18 | End: 2024-06-18 | Stop reason: SURG

## 2024-06-18 RX ORDER — ONDANSETRON 2 MG/ML
4 INJECTION INTRAMUSCULAR; INTRAVENOUS ONCE
Status: COMPLETED | OUTPATIENT
Start: 2024-06-18 | End: 2024-06-18

## 2024-06-18 RX ORDER — SODIUM CHLORIDE 0.9 % (FLUSH) 0.9 %
10 SYRINGE (ML) INJECTION AS NEEDED
Status: DISCONTINUED | OUTPATIENT
Start: 2024-06-18 | End: 2024-06-18 | Stop reason: HOSPADM

## 2024-06-18 RX ADMIN — LIDOCAINE HYDROCHLORIDE 70 MG: 20 INJECTION, SOLUTION INFILTRATION; PERINEURAL at 13:11

## 2024-06-18 RX ADMIN — PROPOFOL INJECTABLE EMULSION 150 MCG/KG/MIN: 10 INJECTION, EMULSION INTRAVENOUS at 13:12

## 2024-06-18 RX ADMIN — SODIUM CHLORIDE, POTASSIUM CHLORIDE, SODIUM LACTATE AND CALCIUM CHLORIDE 1000 ML: 600; 310; 30; 20 INJECTION, SOLUTION INTRAVENOUS at 12:43

## 2024-06-18 RX ADMIN — ONDANSETRON 4 MG: 2 INJECTION INTRAMUSCULAR; INTRAVENOUS at 13:48

## 2024-06-18 RX ADMIN — GLYCOPYRROLATE 0.2 MG: 0.2 INJECTION INTRAMUSCULAR; INTRAVENOUS at 13:20

## 2024-06-18 RX ADMIN — PROPOFOL INJECTABLE EMULSION 70 MG: 10 INJECTION, EMULSION INTRAVENOUS at 13:11

## 2024-06-18 NOTE — ANESTHESIA PREPROCEDURE EVALUATION
Anesthesia Evaluation     Patient summary reviewed and Nursing notes reviewed   history of anesthetic complications:  difficult airway               Airway   Mallampati: III  Possible difficult intubation  Dental      Pulmonary - negative pulmonary ROS   Cardiovascular     ECG reviewed  Rhythm: regular  Rate: normal    (+) hypertension, valvular problems/murmurs MR, hyperlipidemia      Neuro/Psych  (+) psychiatric history Anxiety and Depression  GI/Hepatic/Renal/Endo    (+) morbid obesity, GERD, thyroid problem hypothyroidism    Musculoskeletal     Abdominal    Substance History - negative use     OB/GYN negative ob/gyn ROS         Other   arthritis,   history of cancer        Phys Exam Other: 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    CODE STATUS:

## 2024-06-18 NOTE — DISCHARGE INSTRUCTIONS
For the next 24 hours patient needs to be with a responsible adult.    For 24 hours DO NOT drive, operate machinery, appliances, drink alcohol, make important decisions or sign legal documents.    Start with a light or bland diet if you are feeling sick to your stomach otherwise advance to regular diet as tolerated.    Follow recommendations on procedure report if provided by your doctor.    Call Dr. Calvo for problems [phone]    Problems may include but not limited to: large amounts of bleeding, trouble breathing, repeated vomiting, severe unrelieved pain, fever or chills.

## 2024-06-18 NOTE — H&P
Cc: Personal history of colon cancer    History of presenting illness: This is a quite nice 68-year-old female presenting for colonoscopy.  Last colonoscopy done May 2022 which was unremarkable.  She underwent a robotic transverse colectomy in March 2021.  She has recovered well.    Past medical history: Hypertension, hypercholesterolemia, depression, hypothyroidism    Past surgical history: Airway surgery as a child, hysterectomy and bladder suspension, right knee surgery, breast biopsy, robotic transverse colectomy March 2021    Medications: Xanax, Norvasc, Lipitor, Citracal, docusate, levothyroxine, Prilosec, Vesicare, Valtrex    Allergies: Codeine, penicillin, morphine    Social history: She is a non-smoker    Family history: Positive for colon cancer in her mother    Physical exam:  Body mass index: 33.0 (previously 30.7)  General: Awake and alert, no distress  Head: Normocephalic, atraumatic  Neck: Supple, trachea midline  Eyes: Extraocular movements intact, no icterus  Respiratory: No use of accessory muscles, good bilateral chest expansion  Abdomen: Soft, benign, no hernia felt  Skin: Warm and dry      Prior colonoscopy from 2022 reviewed demonstrating widely patent anastomosis and no other significant findings.    Assessment and plan:  -Personal history of colon cancer, now approximately 3 years out from resection  -Plan for colonoscopy today, risks include bleeding and perforation, patient agreeable to proceed    Martin Calvo MD  General and Endoscopic Surgery  [facility]    [address], [address]  [place], [place], [address]  P: [phone]  F: [phone]

## 2024-06-18 NOTE — OP NOTE
Operative Note :   Martin Calvo MD    Marie Foy  1955    Procedure Date: 06/18/24    Pre-op Diagnosis:  Personal history of colon cancer    Post-op Diagnosis:  Diverticulosis    Procedure:   Colonoscopy to terminal ileum    Surgeon: Martin Calvo MD      Anesthesia: MAC    Indications:  68-year-old female presenting for colonoscopy.  She has a history of colon cancer having undergone partial transverse colectomy in 2021.  Approximately 2 years ago she underwent colonoscopy on a surveillance basis and presents today for further evaluation.  No other known troubles in the interim.    Findings:   Widely patent anastomosis  Mild diverticulosis of the sigmoid colon  Unremarkable terminal ileum    Recommendations:   Repeat colonoscopy in 3 years based on prior history of colon cancer    Description of procedure:    After obtaining informed consent, patient was brought to the endoscopy suite and was sedated.  Digital rectal exam was undertaken and was unremarkable.  The flexible colonoscope was then introduced through the rectum and passed around to the level of the cecum under direct visualization.  The ileocecal valve was intubated and the visualized terminal ileum was unremarkable.  The scope was pulled back into the cecum which was normal as was the remainder of the ascending colon, hepatic flexure and residual transverse colon.  An anastomosis was identified and was widely patent and characterized by normal-appearing mucosa.  The remaining descending colon was unremarkable.  In the sigmoid colon there were mild diverticular changes.  The rectum was normal.    Martin Calvo MD  General and Endoscopic Surgery  [facility]    [address] [place] [address], [address]  [place], [place], [address]  P: [phone]  F: [phone]

## 2024-06-18 NOTE — ANESTHESIA POSTPROCEDURE EVALUATION
Patient: Marie Foy    Procedure Summary       Date: 06/18/24 Room / Location: [facility] ENDOSCOPY 10 / [facility] ENDOSCOPY    Anesthesia Start: 1307 Anesthesia Stop: 1335    Procedure: COLONOSCOPY to cecum Diagnosis:       Personal history of colon cancer      History of adenomatous polyp of colon      (Personal history of colon cancer [Z85.038])      (History of adenomatous polyp of colon [Z86.010])    Surgeons: Martin Calvo MD Provider: Johan Andrade MD    Anesthesia Type: MAC ASA Status: 3            Anesthesia Type: MAC    Vitals  Vitals Value Taken Time   /73 06/18/24 1415   Temp     Pulse 77 06/18/24 1435   Resp 16 06/18/24 1414   SpO2 96 % 06/18/24 1435   Vitals shown include unfiled device data.        Post Anesthesia Care and Evaluation    Patient location during evaluation: PACU  Patient participation: complete - patient participated  Level of consciousness: awake and alert  Pain management: adequate    Airway patency: patent  Anesthetic complications: No anesthetic complications    Cardiovascular status: acceptable  Respiratory status: acceptable  Hydration status: acceptable    Comments: --------------------            06/18/24               1414     --------------------   BP:       157/73     Pulse:      83       Resp:       16       SpO2:      95%      --------------------

## 2024-08-12 DIAGNOSIS — M81.0 OSTEOPOROSIS, UNSPECIFIED OSTEOPOROSIS TYPE, UNSPECIFIED PATHOLOGICAL FRACTURE PRESENCE: Primary | ICD-10-CM

## 2024-08-15 ENCOUNTER — LAB (OUTPATIENT)
Dept: OTHER | Facility: HOSPITAL | Age: 69
End: 2024-08-15
Payer: COMMERCIAL

## 2024-08-15 ENCOUNTER — INFUSION (OUTPATIENT)
Dept: ONCOLOGY | Facility: HOSPITAL | Age: 69
End: 2024-08-15
Payer: COMMERCIAL

## 2024-08-15 DIAGNOSIS — M81.0 OSTEOPOROSIS, UNSPECIFIED OSTEOPOROSIS TYPE, UNSPECIFIED PATHOLOGICAL FRACTURE PRESENCE: Primary | ICD-10-CM

## 2024-08-15 DIAGNOSIS — M81.0 OSTEOPOROSIS, UNSPECIFIED OSTEOPOROSIS TYPE, UNSPECIFIED PATHOLOGICAL FRACTURE PRESENCE: ICD-10-CM

## 2024-08-15 LAB
ALBUMIN SERPL-MCNC: 4.5 G/DL (ref 3.5–5.2)
ALBUMIN/GLOB SERPL: 1.5 G/DL
ALP SERPL-CCNC: 88 U/L (ref 39–117)
ALT SERPL W P-5'-P-CCNC: 19 U/L (ref 1–33)
ANION GAP SERPL CALCULATED.3IONS-SCNC: 10.7 MMOL/L (ref 5–15)
AST SERPL-CCNC: 17 U/L (ref 1–32)
BILIRUB SERPL-MCNC: 0.6 MG/DL (ref 0–1.2)
BUN SERPL-MCNC: 23 MG/DL (ref 8–23)
BUN/CREAT SERPL: 24.5 (ref 7–25)
CALCIUM SPEC-SCNC: 9.8 MG/DL (ref 8.6–10.5)
CHLORIDE SERPL-SCNC: 105 MMOL/L (ref 98–107)
CO2 SERPL-SCNC: 27.3 MMOL/L (ref 22–29)
CREAT SERPL-MCNC: 0.94 MG/DL (ref 0.57–1)
EGFRCR SERPLBLD CKD-EPI 2021: 65 ML/MIN/1.73
GLOBULIN UR ELPH-MCNC: 3 GM/DL
GLUCOSE SERPL-MCNC: 129 MG/DL (ref 65–99)
MAGNESIUM SERPL-MCNC: 1.9 MG/DL (ref 1.6–2.4)
PHOSPHATE SERPL-MCNC: 2.9 MG/DL (ref 2.5–4.5)
POTASSIUM SERPL-SCNC: 4 MMOL/L (ref 3.5–5.2)
PROT SERPL-MCNC: 7.5 G/DL (ref 6–8.5)
SODIUM SERPL-SCNC: 143 MMOL/L (ref 136–145)

## 2024-08-15 PROCEDURE — 96372 THER/PROPH/DIAG INJ SC/IM: CPT

## 2024-08-15 PROCEDURE — 84100 ASSAY OF PHOSPHORUS: CPT | Performed by: INTERNAL MEDICINE

## 2024-08-15 PROCEDURE — 80053 COMPREHEN METABOLIC PANEL: CPT | Performed by: INTERNAL MEDICINE

## 2024-08-15 PROCEDURE — 83735 ASSAY OF MAGNESIUM: CPT | Performed by: INTERNAL MEDICINE

## 2024-08-15 PROCEDURE — 25010000002 DENOSUMAB 60 MG/ML SOLUTION PREFILLED SYRINGE: Performed by: INTERNAL MEDICINE

## 2024-08-15 RX ADMIN — DENOSUMAB 60 MG: 60 INJECTION SUBCUTANEOUS at 16:09

## 2024-09-13 DIAGNOSIS — E78.00 HYPERCHOLESTEROLEMIA: Primary | ICD-10-CM

## 2024-09-13 DIAGNOSIS — I10 ESSENTIAL (PRIMARY) HYPERTENSION: ICD-10-CM

## 2024-09-13 DIAGNOSIS — E03.9 HYPOTHYROIDISM, UNSPECIFIED TYPE: ICD-10-CM

## 2024-09-19 LAB
ALBUMIN SERPL-MCNC: 4.7 G/DL (ref 3.8–4.8)
ALP SERPL-CCNC: 89 IU/L (ref 44–121)
ALT SERPL-CCNC: 16 IU/L (ref 0–32)
AST SERPL-CCNC: 15 IU/L (ref 0–40)
BASOPHILS # BLD AUTO: 0 X10E3/UL (ref 0–0.2)
BASOPHILS NFR BLD AUTO: 1 %
BILIRUB SERPL-MCNC: 0.6 MG/DL (ref 0–1.2)
BUN SERPL-MCNC: 24 MG/DL (ref 8–27)
BUN/CREAT SERPL: 29 (ref 12–28)
CALCIUM SERPL-MCNC: 9.7 MG/DL (ref 8.7–10.3)
CHLORIDE SERPL-SCNC: 103 MMOL/L (ref 96–106)
CHOLEST SERPL-MCNC: 216 MG/DL (ref 100–199)
CO2 SERPL-SCNC: 23 MMOL/L (ref 20–29)
CREAT SERPL-MCNC: 0.83 MG/DL (ref 0.57–1)
EGFRCR SERPLBLD CKD-EPI 2021: 75 ML/MIN/1.73
EOSINOPHIL # BLD AUTO: 0.4 X10E3/UL (ref 0–0.4)
EOSINOPHIL NFR BLD AUTO: 6 %
ERYTHROCYTE [DISTWIDTH] IN BLOOD BY AUTOMATED COUNT: 12.7 % (ref 11.7–15.4)
GLOBULIN SER CALC-MCNC: 2.5 G/DL (ref 1.5–4.5)
GLUCOSE SERPL-MCNC: 107 MG/DL (ref 70–99)
GLUCOSE UR QL STRIP: NORMAL
HCT VFR BLD AUTO: 39.3 % (ref 34–46.6)
HDLC SERPL-MCNC: 67 MG/DL
HGB BLD-MCNC: 12.7 G/DL (ref 11.1–15.9)
IMM GRANULOCYTES # BLD AUTO: 0 X10E3/UL (ref 0–0.1)
IMM GRANULOCYTES NFR BLD AUTO: 0 %
KETONES UR QL STRIP: NORMAL
LDLC SERPL CALC-MCNC: 123 MG/DL (ref 0–99)
LYMPHOCYTES # BLD AUTO: 2.1 X10E3/UL (ref 0.7–3.1)
LYMPHOCYTES NFR BLD AUTO: 32 %
MCH RBC QN AUTO: 28.7 PG (ref 26.6–33)
MCHC RBC AUTO-ENTMCNC: 32.3 G/DL (ref 31.5–35.7)
MCV RBC AUTO: 89 FL (ref 79–97)
MONOCYTES # BLD AUTO: 0.5 X10E3/UL (ref 0.1–0.9)
MONOCYTES NFR BLD AUTO: 8 %
NEUTROPHILS # BLD AUTO: 3.6 X10E3/UL (ref 1.4–7)
NEUTROPHILS NFR BLD AUTO: 53 %
PH UR STRIP: NORMAL [PH]
PLATELET # BLD AUTO: 283 X10E3/UL (ref 150–450)
POTASSIUM SERPL-SCNC: 4.6 MMOL/L (ref 3.5–5.2)
PROT SERPL-MCNC: 7.2 G/DL (ref 6–8.5)
PROT UR QL STRIP: NORMAL
RBC # BLD AUTO: 4.42 X10E6/UL (ref 3.77–5.28)
SODIUM SERPL-SCNC: 140 MMOL/L (ref 134–144)
SP GR UR STRIP: NORMAL
SPECIMEN STATUS: NORMAL
TRIGL SERPL-MCNC: 149 MG/DL (ref 0–149)
TSH SERPL DL<=0.005 MIU/L-ACNC: 0.72 UIU/ML (ref 0.45–4.5)
UNABLE TO VOID: NORMAL
VLDLC SERPL CALC-MCNC: 26 MG/DL (ref 5–40)
WBC # BLD AUTO: 6.7 X10E3/UL (ref 3.4–10.8)

## 2024-09-25 ENCOUNTER — OFFICE VISIT (OUTPATIENT)
Dept: INTERNAL MEDICINE | Facility: CLINIC | Age: 69
End: 2024-09-25
Payer: MEDICARE

## 2024-09-25 VITALS
HEIGHT: 58 IN | DIASTOLIC BLOOD PRESSURE: 80 MMHG | HEART RATE: 67 BPM | SYSTOLIC BLOOD PRESSURE: 124 MMHG | OXYGEN SATURATION: 98 % | WEIGHT: 153 LBS | BODY MASS INDEX: 32.12 KG/M2

## 2024-09-25 DIAGNOSIS — E78.00 HYPERCHOLESTEROLEMIA: ICD-10-CM

## 2024-09-25 DIAGNOSIS — F41.9 CHRONIC ANXIETY: ICD-10-CM

## 2024-09-25 DIAGNOSIS — I10 ESSENTIAL (PRIMARY) HYPERTENSION: ICD-10-CM

## 2024-09-25 DIAGNOSIS — Z00.00 MEDICARE ANNUAL WELLNESS VISIT, SUBSEQUENT: Primary | ICD-10-CM

## 2024-09-25 DIAGNOSIS — E03.9 HYPOTHYROIDISM, UNSPECIFIED TYPE: ICD-10-CM

## 2024-09-25 DIAGNOSIS — M81.0 OSTEOPOROSIS, UNSPECIFIED OSTEOPOROSIS TYPE, UNSPECIFIED PATHOLOGICAL FRACTURE PRESENCE: ICD-10-CM

## 2024-09-25 PROBLEM — Z85.038 PERSONAL HISTORY OF COLON CANCER: Status: RESOLVED | Noted: 2024-05-20 | Resolved: 2024-09-25

## 2024-09-25 PROBLEM — Z86.010 HISTORY OF ADENOMATOUS POLYP OF COLON: Status: RESOLVED | Noted: 2024-05-20 | Resolved: 2024-09-25

## 2024-09-25 PROBLEM — Z86.0101 HISTORY OF ADENOMATOUS POLYP OF COLON: Status: RESOLVED | Noted: 2024-05-20 | Resolved: 2024-09-25

## 2024-09-25 PROBLEM — R73.03 PREDIABETES: Status: ACTIVE | Noted: 2024-09-25

## 2024-09-25 PROCEDURE — 99214 OFFICE O/P EST MOD 30 MIN: CPT | Performed by: INTERNAL MEDICINE

## 2024-09-25 PROCEDURE — 3079F DIAST BP 80-89 MM HG: CPT | Performed by: INTERNAL MEDICINE

## 2024-09-25 PROCEDURE — 1170F FXNL STATUS ASSESSED: CPT | Performed by: INTERNAL MEDICINE

## 2024-09-25 PROCEDURE — 3074F SYST BP LT 130 MM HG: CPT | Performed by: INTERNAL MEDICINE

## 2024-09-25 PROCEDURE — 1159F MED LIST DOCD IN RCRD: CPT | Performed by: INTERNAL MEDICINE

## 2024-09-25 PROCEDURE — G0439 PPPS, SUBSEQ VISIT: HCPCS | Performed by: INTERNAL MEDICINE

## 2024-09-25 PROCEDURE — 1160F RVW MEDS BY RX/DR IN RCRD: CPT | Performed by: INTERNAL MEDICINE

## 2024-09-25 PROCEDURE — 1126F AMNT PAIN NOTED NONE PRSNT: CPT | Performed by: INTERNAL MEDICINE

## 2024-09-25 RX ORDER — ALPRAZOLAM 0.25 MG
0.25 TABLET ORAL 3 TIMES DAILY PRN
Qty: 20 TABLET | Refills: 0 | Status: SHIPPED | OUTPATIENT
Start: 2024-09-25

## 2024-09-26 ENCOUNTER — OFFICE VISIT (OUTPATIENT)
Dept: ORTHOPEDIC SURGERY | Facility: CLINIC | Age: 69
End: 2024-09-26
Payer: MEDICARE

## 2024-09-26 VITALS — HEIGHT: 58 IN | WEIGHT: 158.4 LBS | BODY MASS INDEX: 33.25 KG/M2 | TEMPERATURE: 98.2 F

## 2024-09-26 DIAGNOSIS — M17.12 PRIMARY OSTEOARTHRITIS OF LEFT KNEE: ICD-10-CM

## 2024-09-26 DIAGNOSIS — R52 PAIN: Primary | ICD-10-CM

## 2024-09-26 PROBLEM — M17.9 OA (OSTEOARTHRITIS) OF KNEE: Status: ACTIVE | Noted: 2024-09-26

## 2024-09-26 PROCEDURE — 1159F MED LIST DOCD IN RCRD: CPT | Performed by: ORTHOPAEDIC SURGERY

## 2024-09-26 PROCEDURE — 73562 X-RAY EXAM OF KNEE 3: CPT | Performed by: ORTHOPAEDIC SURGERY

## 2024-09-26 PROCEDURE — 99214 OFFICE O/P EST MOD 30 MIN: CPT | Performed by: ORTHOPAEDIC SURGERY

## 2024-09-26 PROCEDURE — 1160F RVW MEDS BY RX/DR IN RCRD: CPT | Performed by: ORTHOPAEDIC SURGERY

## 2024-09-26 RX ORDER — MELOXICAM 7.5 MG/1
15 TABLET ORAL ONCE
OUTPATIENT
Start: 2024-09-26 | End: 2024-09-26

## 2024-09-26 RX ORDER — PREGABALIN 150 MG/1
150 CAPSULE ORAL ONCE
OUTPATIENT
Start: 2024-09-26 | End: 2024-09-26

## 2024-09-26 RX ORDER — CHLORHEXIDINE GLUCONATE 500 MG/1
CLOTH TOPICAL 2 TIMES DAILY
OUTPATIENT
Start: 2024-09-26

## 2024-09-30 ENCOUNTER — APPOINTMENT (OUTPATIENT)
Dept: WOMENS IMAGING | Facility: HOSPITAL | Age: 69
End: 2024-09-30
Payer: COMMERCIAL

## 2024-09-30 PROCEDURE — 77063 BREAST TOMOSYNTHESIS BI: CPT | Performed by: RADIOLOGY

## 2024-09-30 PROCEDURE — 77067 SCR MAMMO BI INCL CAD: CPT | Performed by: RADIOLOGY

## 2024-10-09 NOTE — PROGRESS NOTES
[facility]  Oncology Clinic  Follow-up Visit Note    CC:  Metastatic breast cancer    HISTORY OF PRESENT ILLNESS:  Jami Perez is a 81 year old female with a diagnosis of metastatic breast cancer.  The oncologic history is as follows:    Oncologic history:  --11/25/08 biopsy-proven right breast cancer, lower inner quadrant - clinical stage IIA (T2 N0 M0), lobular carcinoma, 29 mm maximum dimension, grade 1, ER/NC positive, HER2 negative, multi gene recurrence risk score 23 (intermediate)  --12/11/08 right breast lumpectomy, sentinel lymph node biopsy - 2.9 cm, grade 1 invasive lobular carcinoma, (-) LVI, negative margins.  Tumor ER positive, NC positive, HER2 negative.  She had 3 lymph nodes removed, for T2 N0 disease.  Oncotype 23  --2/2/09-2/27/09 complete adjuvant radiation therapy, 5256 cGy XRT, right breast  --3/2/09-11/18/09 initiate anastrozole 1 mg daily, March 2009-November 2009, treatment discontinued secondary to side effects  --11/30/09-7/9/10 treated with exemestane, discontinued secondary to side effects  --9/25/15 right breast, 12 o'clock position, grade 1 invasive lobular carcinoma, ER positive/NC positive, HER2 negative.  Left axillary lymph node demonstrating metastatic lobular carcinoma.  PET/CT scan - uptake within large right breast mass, left axilla.  Additionally with right supraclavicular mass demonstrating metastatic poorly differentiated adenocarcinoma, consistent with metastatic lobular carcinoma breast origin  --10/2/15 start tamoxifen 20 mg daily  --11/25/15 transition tamoxifen to Femara  --11/25/15 initiate palbociclib 125 mg daily, with subsequent dose reductions, ultimately 75 mg daily per 21 of each 28 day cycle  --4/4/22 PET/CT scan - hypermetabolic right breast mass with hypermetabolic right breast skin thickening, consistent with local recurrence  --4/12/22 presentation in context of disease progression, diagnostic mammography bilateral - bilateral  Transitional Care Follow Up Visit  Subjective     Marie Foy is a 65 y.o. female who presents for a transitional care management visit.    Within 48 business hours after discharge our office contacted her via telephone to coordinate her care and needs.      I reviewed and discussed the details of that call along with the discharge summary, hospital problems, inpatient lab results, inpatient diagnostic studies, and consultation reports with Marie.     Current outpatient and discharge medications have been reconciled for the patient.  Reviewed by: Michelle Wyman MD      Date of TCM Phone Call 3/4/2021   [facility]   Date of Admission 3/2/2021   Date of Discharge 3/4/2021   Discharge Disposition Home or Self Care     Risk for Readmission (LACE) Score: 7 (3/4/2021  6:00 AM)      History of Present Illness   Course During Hospital Stay:  Patient presents in hospital f/u.  She was admitted for colon cancer resection.  I have reviewed discharge summary, labs, scans, cardiovascular studies and medication changes.         The following portions of the patient's history were reviewed and updated as appropriate: allergies, current medications, past family history, past medical history, past social history, past surgical history and problem list.    Review of Systems   Constitutional: Negative for fever.   Respiratory: Negative.    Cardiovascular: Negative.        Objective   Physical Exam  Constitutional:       Appearance: She is well-developed.   HENT:      Head: Normocephalic and atraumatic.   Pulmonary:      Effort: Pulmonary effort is normal.   Abdominal:      General: There is no distension.      Palpations: Abdomen is soft. There is no mass.      Tenderness: There is no abdominal tenderness. There is no guarding or rebound.   Neurological:      Mental Status: She is alert.         Assessment/Plan   Diagnoses and all orders for this visit:    1. Malignant neoplasm of descending colon (CMS/HCC)  (Primary)      Patient is doing very well s/p partial colon resection.   F/u plans noted to see oncology next week.              skin thickening and bilateral solid masses, recommended for biopsy  --4/22/22 bilateral breast biopsies, 12:00 positions, 5 cm from nipple  (a) left breast - invasive lobular carcinoma, moderately differentiated, the office (35%).  HER2 negative by IHC (0)   (B) right breast - invasive lobular carcinoma, moderately differentiated, ER positive (70%), VA negative.  HER2 negative by IHC (0)    Patient is a pleasant 81 year old woman with previous history of hormone receptor positive, HER2 negative grade 1 lobular carcinoma of the right breast, diagnosed initially 2008. She initially underwent right breast lumpectomy, sentinel lymph node biopsy as of 12/11/08, pathology from which confirmed at the time stage IIA (T2 N0 M0) lobular carcinoma of the right breast, grade 1, ER/pr positive, HER2 negative.  She underwent Oncotype DX testing, with intermediate risk of relapse (23), declined adjuvant chemotherapy.  She subsequently proceeded with adjuvant radiation treatment, completed as of February 2009, thereafter proceed with anti endocrine therapy with anastrozole as of March 2009. She did not tolerate anti endocrine therapy, and ultimately discontinued treatment as of July 2010.    She subsequently re-presented with right breast relapse as of September 2015. Biopsy again confirmed ER/VA positive, grade 1 invasive lobular carcinoma, with subsequent PET/CT scan demonstrating uptake within large right breast mass, left axilla, additionally with biopsy of right supraclavicular mass lesion demonstrating metastatic poorly differentiated adenocarcinoma, consistent with metastatic lobular carcinoma.    She initiated tamoxifen 20 mg daily, and was thereafter transitioned to Femara concurrent with palbociclib 125 mg daily.     She re-presented in context of progressive disease, with enlargement of supraclavicular mass, and new breast lesions as of April 2022, prompting PET/CT imaging confirming recurrent disease to breast.  She has  since proceeded to repeat biopsy 4/22/22 confirming invasive lobular carcinoma, ER/OR positive, HER2 negative.  She subsequently initiated treatment with Xeloda.    She experienced excellent disease control, although has experienced progressive in breast disease on the right as of 7/31/2024 CT imaging of the chest/abdomen/pelvis, without findings for progression in other sites.  Mammogram and MRI confirmed in breast disease progression, and she proceeded to right breast simple mastectomy 9/17/2024 under the care of Dr. Lehman, pathology from which demonstrated invasive moderately differentiated lobular carcinoma measuring 5.0 cm, pT2, with margins of resection negative for malignancy, and skin with incidental seborrheic keratosis.  HER2 was repeated on sample and resulted intermediate (2+), with fluorescence in situ hybridization nonamplified.    Interval history:  Pat is seen alone today to discuss re-initiation of capecitabine.  Treatment has been somewhat intermittent in context of dysesthesia, and lower extremity blister formation.  She has generally recovered well over the course of the preceding month since completing mastectomy 9/17/2024.  She has questions regarding anticipated further clinical course, and options for subsequent systemic therapy.    Patient Active Problem List    Diagnosis Date Noted    Breast cancer in female (CMD) 09/17/2024     Priority: Low    Post-menopausal 05/15/2024     Priority: Low    Secondary hyperparathyroidism  (CMD) 09/26/2022     Priority: Low    Asthma with COPD (chronic obstructive pulmonary disease)  (CMD) 09/06/2022     Priority: Low    S/P lumbar fusion 08/02/2021     Priority: Low    Urinary retention 06/20/2021     Priority: Low    Spondylolisthesis 06/08/2021     Priority: Low    Left lumbar radiculopathy 06/08/2021     Priority: Low    Osteoporosis 08/20/2020     Priority: Low    Hemiparesis as late effect of cerebrovascular accident (CVA) (CMS/[facility]) LEFT mild 3/2015  08/20/2020     Priority: Low    Vitamin D deficiency 08/20/2020     Priority: Low    Enthesopathy of hip region 05/27/2020     Priority: Low    Constipation 08/08/2019     Priority: Low    Mixed irritable bowel syndrome 02/27/2019     Priority: Low    Personal history of colonic polyps 02/27/2019     Priority: Low    Osteoarthritis of knee 02/07/2018     Priority: Low     Formatting of this note might be different from the original.  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  Ca Breast Female NOS      Malignant neoplasm of upper-outer quadrant of female breast (CMD) 10/06/2015     Priority: Low     Ca Breast Female NOS      Personal history of transient ischemic attack (TIA), and cerebral infarction without residual deficits 03/26/2015     Priority: Low     Stroke (CVA) NOS      Primary hypertension 03/03/2015     Priority: Low     Hypertension (HTN) Essential NOS    Formatting of this note might be different from the original.  Hypertension (HTN) Essential NOS         Past Medical History:   Diagnosis Date    Allergy     Anesthesia complication     low BP with back surgery in 2021    Asthma (CMD)     Breast cancer  (CMD)     Cerebral infarction (CMD) 2014    mild - slight left sided weakness    Chicken pox     Chronic constipation     Chronic diarrhea     Chronic pain     COPD (chronic obstructive pulmonary disease)  (CMD)     Delayed emergence from anesthesia     slow to wake     Essential hypertension 03/03/2015    Hypertension (HTN) Essential NOS    GERD (gastroesophageal reflux disease)     Hypertension     Measles     Mixed hyperlipidemia 10/18/2017    Mixed irritable bowel syndrome 02/27/2019    Osteopenia 12/08/2017    Osteoporosis     Personal history of colonic polyps 02/27/2019    Stroke  (CMD) 2014    Thyroid condition     thyroid nodules     Past Surgical History:   Procedure Laterality Date    Breast lumpectomy Right 01/01/2008    Breast lumpectomy Right 2009    Colonoscopy  01/22/2019    Esophagogastroduodenoscopy  01/22/2019    Hysterectomy  2005    Inguinal hernia repair Right 08/01/2019    Ir spine injection  04/06/2021    Lumbar fusion Bilateral 06/15/2021    L3-4-5 Lami, L4-5 fusion with medtronic instrumentation by Dr. Adrian    Spine surgery  06/15/2021    L3 4 5 LAMINECTOMY AND L4-5 PEDICLE SCREW FIXATION AND FUSION WITH BONE GRAFT AND INSTRUMENTATION WITH BMAC     Total hip replacement Right 2010    Total knee replacement Left 03/26/2018     Family History   Problem Relation Age of Onset    Heart disease Father     Hypertension Father     Hyperlipidemia Father     Heart disease Brother     Hypertension Brother      ALLERGIES:   Allergen Reactions    Loratadine-Pseudoephedrine Other (See Comments)     Claritin D - Caused high blood pressure     Oxycodone DIZZINESS    Oxycodone-Acetaminophen GI UPSET       Social History     Social History Narrative    Grew up in [place], lived in [place] for 50 years, worked as a surgical nurse and retired in 2000. Moved back to [place] into a senior living facility, has family in [place]. Has 3 adult children.    , lives at [address]), family in area including [facility].    Current Outpatient Medications   Medication Sig Dispense Refill    capecitabine (XELODA) 500 MG tablet Take 2 tablets by mouth in the morning and 2 tablets in the evening. 56 tablet 0    clopidogrel (PLAVIX) 75 MG  tablet Take 1 tablet by mouth daily 90 tablet 3    pantoprazole (PROTONIX) 40 MG tablet Take 1 tablet by mouth in the morning and 1 tablet in the evening. 60 tablet 0    fluticasone-umeclidin-vilanterol (Trelegy Ellipta) 200-62.5-25 MCG/ACT inhaler Inhale 1 puff into the lungs daily. Rinse mouth and throat after each use 60 each 11    fluticasone (FLONASE) 50 MCG/ACT nasal spray Spray 2 sprays in each nostril daily. 16 g 1    HYDROcodone-acetaminophen (NORCO) 5-325 MG per tablet Take 1 tablet by mouth every 6 hours as needed for Pain. (Patient not taking: Reported on 10/9/2024) 15 tablet 0    influenza virus trivalent vaccine inactivated (Fluzone High-Dose) 0.5 ML prefilled syringe for injection Inject 0.5 mLs into the muscle 1 time for 1 dose 0.5 mL 0    Calcium Citrate Powder Take 500 mg by mouth daily.      Multiple Vitamins-Minerals (PRESERVISION AREDS 2 PO) Take 1 tablet by mouth daily.      Combivent Respimat  MCG/ACT inhaler Inhale 1 puff into the lungs every 4 hours as needed for shortness of breath. 4 g 5    triamcinolone (ARISTOCORT) 0.1 % cream Apply once a day to itchy rash of back as needed for itching 80 g 11    losartan (COZAAR) 25 MG tablet Take 1 tablet by mouth daily.  90 tablet 1    atorvastatin (LIPITOR) 40 MG tablet Take 1 tablet by mouth daily. 90 tablet 3    clobetasol (TEMOVATE) 0.05 % ointment Apply topically twice a day to affected area on left ear as needed. 15 g 2    Calcium Carbonate-Vitamin D (CALTRATE 600+D PO) Take by mouth daily.      MELATONIN ER PO Take 2 mg by mouth as needed.      prochlorperazine (COMPAZINE) 10 MG tablet Take 1 tablet by mouth every 6 hours as needed for Nausea or Vomiting. (Patient not taking: Reported on 10/9/2024) 30 tablet 3    Cholecalciferol 50 mcg (2,000 units) tablet Take 2,000 Units by mouth daily. With Vitamin K      acetaminophen (TYLENOL) 500 MG tablet Take 1,000 mg by mouth nightly.      acetaminophen (TYLENOL) 650 MG CR tablet Take 650 mg by  mouth 3 times daily as needed for Pain. Tylenol arthritis      Lactobacillus (FLORAJEN ACIDOPHILUS PO) Take 1 tablet by mouth daily.       Ascorbic Acid (VITAMIN C) 500 MG chewable tablet Chew 500 mg by mouth daily.       BIOTIN PO Take 1 tablet by mouth daily. Hair, skin, nails 2500 mcg      MULTIPLE VITAMIN PO Take 2 tablets by mouth daily. gummies - Centrum Multiple vitamin       No current facility-administered medications for this visit.       REVIEW OF SYSTEMS:  Reviewed from nurse’s notes and concur.  PHYSICAL EXAM:  Visit Vitals  /64 (BP Location: LUE - Left upper extremity, Patient Position: Sitting, Cuff Size: Regular)   Pulse (!) 53   Temp 98.2 °F (36.8 °C)   Resp 14   Wt 76.2 kg (168 lb 1.6 oz)   SpO2 99%   BMI 31.76 kg/m²           ECOG [10/09/24 1147]   ECOG Performance Status 1       Gen: NAD, alert oriented x3  CVS: RRR s1 s2 no r/m/g  Lungs: CTA b/l no rales, wheezing or rhonchi  Abdomen: NT ND BS +  Ext: No peripheral edema.  There is stigmata of healing plantar palmar erythrodysesthesia to the bilateral feet.  There is some residual xerosis        Previous exam elements, not performed today:  Breasts - right breast with diffuse associated erythema, superficial skin nodules, induration with associated peau de orange.  Supraclavicular lymphadenopathy noted.  Mouth - Mucous membranes moist, pharynx normal without lesions.  Neck - Supple, no significant adenopathy.   Lymphatics - No palpable lymphadenopathy.  Chest - Clear to auscultation, no wheezes, rales or rhonchi, symmetric air entry.  Heart - Normal rate, regular rhythm, normal S1, S2, no murmurs, rubs, clicks or gallops.   Neurological - Alert, oriented, normal speech, no focal findings or movement disorder noted.  Musculoskeletal - No joint tenderness, deformity or swelling.  Extremities - Peripheral pulses normal, no pedal edema, no clubbing or cyanosis.      Labs reviewed and discussed with patient:  WBC (K/mcL)   Date Value    09/09/2024 3.1 (L)     RBC (mil/mcL)   Date Value   09/09/2024 3.71 (L)     HCT (%)   Date Value   09/09/2024 32.7 (L)     HGB (g/dL)   Date Value   09/09/2024 10.5 (L)     PLT (K/mcL)   Date Value   09/09/2024 280      Sodium (mmol/L)   Date Value   09/09/2024 139     Potassium (mmol/L)   Date Value   09/09/2024 4.4     Chloride (mmol/L)   Date Value   09/09/2024 105     Glucose (mg/dL)   Date Value   09/09/2024 96     Calcium (mg/dL)   Date Value   09/09/2024 8.6     Carbon Dioxide (mmol/L)   Date Value   09/09/2024 25     BUN (mg/dL)   Date Value   09/09/2024 10     Creatinine (mg/dL)   Date Value   09/09/2024 0.67       GOT/AST (Units/L)   Date Value   09/09/2024 36     GPT/ALT (Units/L)   Date Value   09/09/2024 35     No results found for: \"GGTP\"  Alkaline Phosphatase (Units/L)   Date Value   09/09/2024 109     Bilirubin, Total (mg/dL)   Date Value   09/09/2024 0.7     Imaging results reviewed and discussed with patient, as discussed above as per HPI, and as follows:    7/31/24 CT C/A/P -  IMPRESSION:  1. Surgical changes in the right breast, with increased prominence of  asymmetric masslike soft tissue in the upper right breast. Recommend  dedicated breast imaging to evaluate for local progression.  2. No findings to suggest metastatic disease in the chest abdomen or  pelvis.  3. Moderate size hiatal hernia.  4. Evidence of old granulomatous disease in the chest and upper abdomen.  5. Diverticulosis, without acute inflammation.    8/29/2024, nuclear medicine bone scan:  --Negative for evidence of metastatic disease    MRI breast, 8/28/2024  Significant progressive disease of the right breast with a large lobular enhancing mass with multiple satellite lesions highly suspicious for progressive invasive malignancy, with multifocal nodular enhancement within the central aspect of the left breast with greatest area of pathologic enhancement slightly medial to the nipple line near the 9 o'clock position, anterior  to middle third, could represent the same abnormality measured with ultrasound to 2 o'clock position, with additional areas of nodular enhancement within the lateral aspect of the left breast, demonstrating more vague progressive enhancement characteristic potentially up to 4.4 cm anterior posterior dimension    ASSESSMENT AND PLAN:  In summary, Jami Perez is a 81 year old female with a history of metastatic breast cancer, initially diagnosed 2008 now with recurrence 2015 treated with Ibrance/letrozole, now with relapse in 2022 and on capecitabine. Pat is presenting today in follow-up.      #Recurrent, metastatic breast cancer  --Patient is currently on oral capecitabine, she has been intermittently been utilizing medication due to foot pain, which is now improved. She is also recovered from recent dental work (crown placement).   --She has experienced in breast disease progression on right, now status post simple mastectomy 9/17/2024, with clear surgical margins, and with HER2 intermediate disease (2+ by IHC, nonamplified by fluorescence in situ hybridization)  --I discussed with her subsequent systemic therapy options, and recommended restaging imaging including CT imaging of chest/abdomen/pelvis now  --We discussed risks and benefits of reinitiation of capecitabine versus consideration for trastuzumab deruxtecan, and after discussion of risks and benefits, she elects to proceed with capecitabine now  --I therefore recommended that she restart capecitabine, and this has been prescribed to mail order pharmacy  --As based on further clinical course/tolerance, we will consider whether capecitabine is to be continued for now, versus transition to Enhertu    #Foot blisters/ulcers  --We have previously referred her to wound care    Discussed oral chemotherapy adherence and side effects with patient.  Patient is taking medication as prescribed.  Discussed with patient the natural history, course and prognosis of  illness.    Therapeutic options discussed and explained.  Side effects, risks and benefits, and alternatives discussed.  Patient acknowledges understanding of disease and treatment plan.     All questions answered to her satisfaction, and total at least 45 minutes were spent in combined care today.    Car Baer MD

## 2024-10-28 ENCOUNTER — HOSPITAL ENCOUNTER (OUTPATIENT)
Facility: HOSPITAL | Age: 69
Discharge: HOME | End: 2024-10-28
Admitting: INTERNAL MEDICINE
Payer: COMMERCIAL

## 2024-10-28 DIAGNOSIS — Z85.038 HISTORY OF COLON CANCER: ICD-10-CM

## 2024-10-28 DIAGNOSIS — K76.9 LIVER LESION: ICD-10-CM

## 2024-10-28 DIAGNOSIS — D50.0 IRON DEFICIENCY ANEMIA DUE TO CHRONIC BLOOD LOSS: ICD-10-CM

## 2024-10-28 DIAGNOSIS — E53.8 VITAMIN B12 DEFICIENCY: ICD-10-CM

## 2024-10-28 PROCEDURE — 71260 CT THORAX DX C+: CPT

## 2024-10-28 PROCEDURE — 0 DIATRIZOATE MEGLUMINE & SODIUM PER 1 ML: Performed by: INTERNAL MEDICINE

## 2024-10-28 PROCEDURE — 74177 CT ABD & PELVIS W/CONTRAST: CPT

## 2024-10-28 PROCEDURE — 25510000001 IOPAMIDOL 61 % SOLUTION: Performed by: INTERNAL MEDICINE

## 2024-10-28 RX ORDER — DIATRIZOATE MEGLUMINE AND DIATRIZOATE SODIUM 660; 100 MG/ML; MG/ML
30 SOLUTION ORAL; RECTAL
Status: COMPLETED | OUTPATIENT
Start: 2024-10-28 | End: 2024-10-28

## 2024-10-28 RX ORDER — IOPAMIDOL 612 MG/ML
100 INJECTION, SOLUTION INTRAVASCULAR
Status: COMPLETED | OUTPATIENT
Start: 2024-10-28 | End: 2024-10-28

## 2024-10-28 RX ADMIN — IOPAMIDOL 85 ML: 612 INJECTION, SOLUTION INTRAVENOUS at 14:36

## 2024-10-28 RX ADMIN — DIATRIZOATE MEGLUMINE AND DIATRIZOATE SODIUM 30 ML: 600; 100 SOLUTION ORAL; RECTAL at 13:13

## 2024-11-07 ENCOUNTER — OFFICE VISIT (OUTPATIENT)
Dept: ONCOLOGY | Facility: CLINIC | Age: 69
End: 2024-11-07
Payer: COMMERCIAL

## 2024-11-07 ENCOUNTER — LAB (OUTPATIENT)
Dept: LAB | Facility: HOSPITAL | Age: 69
End: 2024-11-07
Payer: COMMERCIAL

## 2024-11-07 VITALS
SYSTOLIC BLOOD PRESSURE: 134 MMHG | BODY MASS INDEX: 32.66 KG/M2 | TEMPERATURE: 98.3 F | DIASTOLIC BLOOD PRESSURE: 80 MMHG | RESPIRATION RATE: 16 BRPM | WEIGHT: 155.6 LBS | HEART RATE: 71 BPM | OXYGEN SATURATION: 98 % | HEIGHT: 58 IN

## 2024-11-07 DIAGNOSIS — K76.9 LIVER LESION: ICD-10-CM

## 2024-11-07 DIAGNOSIS — E53.8 VITAMIN B12 DEFICIENCY: ICD-10-CM

## 2024-11-07 DIAGNOSIS — N20.0 RENAL STONE: ICD-10-CM

## 2024-11-07 DIAGNOSIS — K81.9 CHOLECYSTITIS: ICD-10-CM

## 2024-11-07 DIAGNOSIS — Z85.038 HISTORY OF COLON CANCER: Primary | ICD-10-CM

## 2024-11-07 DIAGNOSIS — D50.0 IRON DEFICIENCY ANEMIA DUE TO CHRONIC BLOOD LOSS: ICD-10-CM

## 2024-11-07 DIAGNOSIS — Z85.038 HISTORY OF COLON CANCER: ICD-10-CM

## 2024-11-07 LAB
ALBUMIN SERPL-MCNC: 4.6 G/DL (ref 3.5–5.2)
ALBUMIN/GLOB SERPL: 1.3 G/DL
ALP SERPL-CCNC: 80 U/L (ref 39–117)
ALT SERPL W P-5'-P-CCNC: 16 U/L (ref 1–33)
ANION GAP SERPL CALCULATED.3IONS-SCNC: 16.2 MMOL/L (ref 5–15)
AST SERPL-CCNC: 21 U/L (ref 1–32)
BASOPHILS # BLD AUTO: 0.04 10*3/MM3 (ref 0–0.2)
BASOPHILS NFR BLD AUTO: 0.7 % (ref 0–1.5)
BILIRUB SERPL-MCNC: 0.6 MG/DL (ref 0–1.2)
BUN SERPL-MCNC: 16 MG/DL (ref 8–23)
BUN/CREAT SERPL: 19.5 (ref 7–25)
CALCIUM SPEC-SCNC: 9.3 MG/DL (ref 8.6–10.5)
CEA SERPL-MCNC: 3.03 NG/ML
CHLORIDE SERPL-SCNC: 104 MMOL/L (ref 98–107)
CO2 SERPL-SCNC: 21.8 MMOL/L (ref 22–29)
CREAT SERPL-MCNC: 0.82 MG/DL (ref 0.57–1)
DEPRECATED RDW RBC AUTO: 41.9 FL (ref 37–54)
EGFRCR SERPLBLD CKD-EPI 2021: 76.6 ML/MIN/1.73
EOSINOPHIL # BLD AUTO: 0.43 10*3/MM3 (ref 0–0.4)
EOSINOPHIL NFR BLD AUTO: 7.6 % (ref 0.3–6.2)
ERYTHROCYTE [DISTWIDTH] IN BLOOD BY AUTOMATED COUNT: 12.8 % (ref 12.3–15.4)
FERRITIN SERPL-MCNC: 104 NG/ML (ref 13–150)
FOLATE SERPL-MCNC: 7.27 NG/ML (ref 4.78–24.2)
GLOBULIN UR ELPH-MCNC: 3.6 GM/DL
GLUCOSE SERPL-MCNC: 101 MG/DL (ref 65–99)
HCT VFR BLD AUTO: 42.6 % (ref 34–46.6)
HGB BLD-MCNC: 13.7 G/DL (ref 12–15.9)
IMM GRANULOCYTES # BLD AUTO: 0.02 10*3/MM3 (ref 0–0.05)
IMM GRANULOCYTES NFR BLD AUTO: 0.4 % (ref 0–0.5)
IRON 24H UR-MRATE: 60 MCG/DL (ref 37–145)
IRON SATN MFR SERPL: 15 % (ref 20–50)
LYMPHOCYTES # BLD AUTO: 1.87 10*3/MM3 (ref 0.7–3.1)
LYMPHOCYTES NFR BLD AUTO: 33 % (ref 19.6–45.3)
MCH RBC QN AUTO: 28.8 PG (ref 26.6–33)
MCHC RBC AUTO-ENTMCNC: 32.2 G/DL (ref 31.5–35.7)
MCV RBC AUTO: 89.7 FL (ref 79–97)
MONOCYTES # BLD AUTO: 0.4 10*3/MM3 (ref 0.1–0.9)
MONOCYTES NFR BLD AUTO: 7.1 % (ref 5–12)
NEUTROPHILS NFR BLD AUTO: 2.91 10*3/MM3 (ref 1.7–7)
NEUTROPHILS NFR BLD AUTO: 51.2 % (ref 42.7–76)
NRBC BLD AUTO-RTO: 0 /100 WBC (ref 0–0.2)
PLATELET # BLD AUTO: 282 10*3/MM3 (ref 140–450)
PMV BLD AUTO: 8.9 FL (ref 6–12)
POTASSIUM SERPL-SCNC: 3.8 MMOL/L (ref 3.5–5.2)
PROT SERPL-MCNC: 8.2 G/DL (ref 6–8.5)
RBC # BLD AUTO: 4.75 10*6/MM3 (ref 3.77–5.28)
SODIUM SERPL-SCNC: 142 MMOL/L (ref 136–145)
TIBC SERPL-MCNC: 402 MCG/DL (ref 298–536)
TRANSFERRIN SERPL-MCNC: 270 MG/DL (ref 200–360)
VIT B12 BLD-MCNC: 723 PG/ML (ref 211–946)
WBC NRBC COR # BLD AUTO: 5.67 10*3/MM3 (ref 3.4–10.8)

## 2024-11-07 PROCEDURE — 85025 COMPLETE CBC W/AUTO DIFF WBC: CPT

## 2024-11-07 PROCEDURE — 82728 ASSAY OF FERRITIN: CPT

## 2024-11-07 PROCEDURE — 82746 ASSAY OF FOLIC ACID SERUM: CPT | Performed by: INTERNAL MEDICINE

## 2024-11-07 PROCEDURE — 80053 COMPREHEN METABOLIC PANEL: CPT

## 2024-11-07 PROCEDURE — 84466 ASSAY OF TRANSFERRIN: CPT

## 2024-11-07 PROCEDURE — 36415 COLL VENOUS BLD VENIPUNCTURE: CPT

## 2024-11-07 PROCEDURE — 82607 VITAMIN B-12: CPT | Performed by: INTERNAL MEDICINE

## 2024-11-07 PROCEDURE — 82378 CARCINOEMBRYONIC ANTIGEN: CPT | Performed by: INTERNAL MEDICINE

## 2024-11-07 PROCEDURE — 83540 ASSAY OF IRON: CPT

## 2024-11-07 NOTE — PROGRESS NOTES
Subjective     CHIEF COMPLAINT:      Chief Complaint   Patient presents with    Follow-up     HISTORY OF PRESENT ILLNESS:     Marie Foy is a 69 y.o. female patient who returns today for follow up on her colon cancer.  She returns today for follow-up after undergoing CT scan of the chest abdomen pelvis.  She reports frequent bowel movements.  She reports becoming full soon after eating.  No pain in the right upper quadrant.  No pain in the right flank area.    ROS:  Pertinent ROS is in the HPI.     Past medical, surgical, social and family history were reviewed.     MEDICATIONS:    Current Outpatient Medications:     ALPRAZolam (XANAX) 0.25 MG tablet, Take 1 tablet by mouth 3 (Three) Times a Day As Needed for Anxiety., Disp: 20 tablet, Rfl: 0    amLODIPine (NORVASC) 10 MG tablet, Take 1 tablet by mouth Daily., Disp: 90 tablet, Rfl: 3    atorvastatin (LIPITOR) 80 MG tablet, Take 1 tablet by mouth Daily., Disp: 90 tablet, Rfl: 3    calcium citrate-vitamin d (CITRACAL) 200-250 MG-UNIT tablet tablet, Take 1 tablet by mouth Daily., Disp: , Rfl:     docusate sodium (COLACE) 100 MG capsule, Take 1 capsule by mouth Every Other Day., Disp: , Rfl:     Ferrous Sulfate 28 MG tablet, Take  by mouth 1 (One) Time Per Week., Disp: , Rfl:     levothyroxine (SYNTHROID, LEVOTHROID) 100 MCG tablet, TAKE 1 TABLET BY MOUTH ONCE DAILY, Disp: 90 tablet, Rfl: 1    Multiple Vitamins-Minerals (MULTIVITAMINS PLUS ZINC PO), Take  by mouth., Disp: , Rfl:     omeprazole (priLOSEC) 40 MG capsule, TAKE 1 CAPSULE BY MOUTH DAILY, Disp: 90 capsule, Rfl: 2    solifenacin (VESICARE) 5 MG tablet, TAKE 1 TABLET BY MOUTH ONCE DAILY, Disp: 90 tablet, Rfl: 3    valACYclovir (VALTREX) 1000 MG tablet, TAKE 1 TABLET BY MOUTH DAILY., Disp: 90 tablet, Rfl: 3    vitamin B-12 (CYANOCOBALAMIN) 1000 MCG tablet, Take 1 tablet by mouth Daily., Disp: , Rfl:     Objective     VITAL SIGNS:     Vitals:    11/07/24 1558   BP: 134/80   Pulse: 71   Resp: 16   Temp: 98.3  "°F (36.8 °C)   TempSrc: Oral   SpO2: 98%   Weight: 70.6 kg (155 lb 9.6 oz)   Height: 147.3 cm (58\")   PainSc: 0-No pain     Body mass index is 32.52 kg/m².     Wt Readings from Last 5 Encounters:   11/07/24 70.6 kg (155 lb 9.6 oz)   09/26/24 71.8 kg (158 lb 6.4 oz)   09/25/24 69.4 kg (153 lb)   06/18/24 71.7 kg (158 lb)   05/09/24 72.1 kg (159 lb)     PHYSICAL EXAMINATION:   GENERAL: The patient appears in good general condition, not in acute distress.   SKIN: No Ecchymosis.  EYES: No jaundice. No Pallor.  CHEST: Normal respiratory effort.   ABDOMEN: Soft. No tenderness. No Hepatomegaly. No Splenomegaly. No masses.  Petty sign negative.  EXTREMITIES: No joint deformity.     DIAGNOSTIC DATA:     Results from last 7 days   Lab Units 11/07/24  1551   WBC 10*3/mm3 5.67   NEUTROS ABS 10*3/mm3 2.91   HEMOGLOBIN g/dL 13.7   HEMATOCRIT % 42.6   PLATELETS 10*3/mm3 282     Results from last 7 days   Lab Units 11/07/24  1551   SODIUM mmol/L 142   POTASSIUM mmol/L 3.8   CHLORIDE mmol/L 104   CO2 mmol/L 21.8*   BUN mg/dL 16   CREATININE mg/dL 0.82   CALCIUM mg/dL 9.3   ALBUMIN g/dL 4.6   BILIRUBIN mg/dL 0.6   ALK PHOS U/L 80   ALT (SGPT) U/L 16   AST (SGOT) U/L 21   GLUCOSE mg/dL 101*         Lab 11/07/24  1551   IRON 60   IRON SATURATION (TSAT) 15*   TIBC 402   TRANSFERRIN 270   FERRITIN 104.00   FOLATE 7.27   VITAMIN B 12 723      Lab Results   Component Value Date    CEA 3.03 11/07/2024    CEA 2.30 05/09/2024    CEA 2.80 11/09/2023    CEA 2.33 05/16/2023    CEA 2.39 11/08/2022      CT chest abdomen pelvis on 10/28/2024:  FINDINGS CHEST:       No pathologically enlarged thoracic lymph nodes are identified.  The heart is mildly enlarged. There is trace pericardial fluid. The  ascending aorta is mildly ectatic to 3.8 cm. There is mild calcific  aortic atherosclerosis.The pleural spaces are clear.  There is degenerative disc disease.  The trachea is largely clear. There is scattered mucous plugging in  distal airways. There is " mild bilateral curvilinear atelectasis and or  scarring. A tiny nodule in the posterolateral left lower lobe is similar  dating back to at least 2021 and likely benign.     FINDINGS ABDOMEN/PELVIS:     The liver is normal in size. A few hypodense foci in the liver are too  small to accurately characterize but similar to prior. There is  suspected cholelithiasis. There is anterior gallbladder wall thickening  and enhancement. The spleen is normal in size. The pancreas is within  normal limits. The adrenal glands are within normal limits. A probable  inferior right renal cyst is similar to prior. There is a 0.4 cm stone  at the right ureterovesical junction, which is new from prior. There is  only minimal asymmetric prominence of the right renal collecting system,  although this is similar to prior.  No pathologically enlarged abdominal or pelvic lymph nodes are  identified. There is moderate calcific atherosclerosis.  There is a tiny hiatal hernia. There are postsurgical changes from  partial colonic resection with a transverse colonic anastomosis. There  is no bowel obstruction. There are scattered colonic diverticula. The  appendix is visualized. The bladder is poorly distended and not well  assessed. The uterus is not seen. There is no adnexal mass.  There is degenerative disc disease. There is a chronic sclerotic  proximal right femoral lesion.     IMPRESSION:     1.  New 0.4 cm stone at the right UVJ with only minimal asymmetric  prominence of the right renal collecting system, which is similar to  prior, suggesting no corresponding obstruction.  2.  Suspected cholelithiasis with anterior gallbladder wall thickening  and enhancement, incompletely assessed. Correlate with biliary enzymes  and gallbladder ultrasound and/or contrast-enhanced MRCP.  3.  Hypodense foci in the liver are too small to accurately characterize  but similar to prior.  4.  Status post partial colonic resection. No obstruction.    Assessment  & Plan    *Colon cancer arising from the distal transverse colon.  Descending colon mass was identified on screening colonoscopy on 1/26/2021.  S/P laparoscopic transverse partial colectomy on 3/2/2021.  Pathology exam revealed a  T3 N0 Grade 2 tumor with clear surgical margin. 14 lymph nodes were negative. There was no evidence of lymphovascular or perineural invasion.   Tumor was MSI stable. No KRAS NRAS or BRAF mutations.  Baseline CEA was elevated at 5.86.  Since the tumor was stage IIA with adequate number of lymph nodes examined and with no high risk features, adjuvant chemotherapy was not recommended.  CT scan on 11/1/2021 revealed no evidence of recurrence or metastasis.  Colonoscopy on 5/12/2022 revealed a colonic polyp.  Pathology exam revealed tubulovillous adenoma.  CT scan on 11/2/2022 revealed no evidence of recurrence.  3 tiny hepatic lesions were seen that were considered to be most likely benign.  Continued follow-up was recommended.  CEA was 2.38 on 11/8/2022.  CT chest abdomen pelvis on 10/30/2023 showed no evidence of recurrence or metastasis.  There was a stable tiny hepatic segment 2 lesion likely representing a cyst.  11/9/2023: CEA 2.80.  5/9/2024: CEA improved to 2.30.  Colonoscopy on 6/18/2024 revealed no lesions or new polyps.  Follow-up colonoscopy after 3 years was recommended.  CT scan on 10/28/2024 revealed no evidence of recurrence or metastasis.    CEA 3.03.    *Iron deficiency anemia.   It is attributed to blood losses from the colon cancer and from surgical blood losses.    Hemoglobin was 11.1 on 3/3/2021.  Patient's diet was low in iron rich food.    We recommended increasing her intake of iron rich food.  Patient started on 4/5/2021 ferrous sulfate 28 mg elemental iron 4 days a week.  Hemoglobin improved to 12.1 on 8/19/2021.  Hemoglobin was 12.5 on 11/11/2021.  Hemoglobin was 12.2 on 11/8/2022.  Ferritin was 80 and transferrin saturation was 20%.  Ferrous sulfate was increased  to 5 days a week.  5/16/2023: Hemoglobin was 12.4.   Ferritin was 74.  Transferrin saturation was 16%.   She was continued on ferrous sulfate 5 days a week.  11/9/2023: Hemoglobin improved 12.8.  Ferritin improved to 117.  Transferrin saturation improved to 18%.  Patient reports that she stopped taking the oral iron a month ago (problem with the stool consistency).  She was restarted on ferrous sulfate 325 mg once weekly.  5/9/2024: Hemoglobin 12.7.  Ferritin 107.  Transferrin saturation decreased to 15%.  Ferrous sulfate was increased to 3 days a week.  11/7/2024: Hemoglobin 13.7.  Ferritin 104.  Transferrin saturation 15%.    *Vitamin B12 deficiency.   Vitamin B12 was 305 on 11/11/2022.  She is taking vitamin B12 1000 mcg daily.   Vitamin B12 improved to 854 on 11/8/2023.  5/16/2023: Vitamin B12 was 800.  11/9/2023: Vitamin B12 adequate at 610.  She was continued on vitamin B12 daily.  5/9/2024: B12 521.  She was continued on vitamin B12 daily.  11/7/2024: Vitamin B12 improved to 723.    *Suspected cholecystitis.  CT on 10/28/2024 revealed gallstones.    There was thickening of the gallbladder wall.  Patient reports feeling full soon after she eats.    No pain in the right upper quadrant and no tenderness on exam.    *Stone at the right UVJ.  This was identified on CT on 10/20/2024.  She is not having pain in the area.    No changes of hydronephrosis.    PLAN:    1.  Obtain gallbladder ultrasound.  2.  Refer back to general surgery, Dr. Calvo for evaluation and management of the suspected cholecystitis.  3.  Refer to urology.  4.  Continue oral iron 3 days a week and vitamin B12 daily.  5.  Follow-up in 6 months.  Will obtain CBC CMP CEA ferritin iron panel vitamin B12 and folate levels.        Saul Ziegler MD  11/07/24

## 2024-11-19 ENCOUNTER — HOSPITAL ENCOUNTER (OUTPATIENT)
Dept: ULTRASOUND IMAGING | Facility: HOSPITAL | Age: 69
Discharge: HOME | End: 2024-11-19
Admitting: INTERNAL MEDICINE
Payer: COMMERCIAL

## 2024-11-19 DIAGNOSIS — K81.9 CHOLECYSTITIS: ICD-10-CM

## 2024-11-19 PROCEDURE — 76705 ECHO EXAM OF ABDOMEN: CPT

## 2024-11-25 ENCOUNTER — OFFICE VISIT (OUTPATIENT)
Dept: SURGERY | Facility: CLINIC | Age: 69
End: 2024-11-25
Payer: MEDICARE

## 2024-11-25 VITALS
BODY MASS INDEX: 33.17 KG/M2 | DIASTOLIC BLOOD PRESSURE: 78 MMHG | WEIGHT: 158 LBS | SYSTOLIC BLOOD PRESSURE: 135 MMHG | OXYGEN SATURATION: 98 % | HEART RATE: 68 BPM | HEIGHT: 58 IN

## 2024-11-25 DIAGNOSIS — R93.2 ABNORMAL GALLBLADDER ULTRASOUND: Primary | ICD-10-CM

## 2024-11-25 PROCEDURE — 1159F MED LIST DOCD IN RCRD: CPT | Performed by: SURGERY

## 2024-11-25 PROCEDURE — 3075F SYST BP GE 130 - 139MM HG: CPT | Performed by: SURGERY

## 2024-11-25 PROCEDURE — 3078F DIAST BP <80 MM HG: CPT | Performed by: SURGERY

## 2024-11-25 PROCEDURE — 1160F RVW MEDS BY RX/DR IN RCRD: CPT | Performed by: SURGERY

## 2024-11-25 PROCEDURE — 99214 OFFICE O/P EST MOD 30 MIN: CPT | Performed by: SURGERY

## 2024-11-25 NOTE — PROGRESS NOTES
Cc: Abnormal gallbladder imaging    History of presenting illness:   This is a nice 69-year-old female known to me from having undergone a transverse colectomy for a T3 N0 colon cancer in March 2021.  She has recovered well from this and has had no evidence of problems subsequently.  She has been followed for this and had a CT scan recently which showed some possible thickening of the gallbladder wall and a question of possible stones.  She really does not have any symptoms which correlate well with this.  She has some occasional reflux symptoms but no real right upper quadrant pain.  No postprandial nausea although she has had a couple of episodes of nausea and vomiting which does not seem to be related to eating.  In general however, she has no significant abdominal complaints.  The CT that she had was followed with an ultrasound which suggested possible sludge within the gallbladder with no evidence of wall thickening.    Past Medical History: Colon cancer, hypertension, hypercholesterolemia, depression, hypothyroidism    Past Surgical History: Airway surgery as a child, hysterectomy and bladder suspension, right knee surgery, breast biopsy, robotic transverse colectomy done March 2021.  Colonoscopy most recently June 2024.    Medications: Xanax, Norvasc, Lipitor, Colace, Synthroid, iron, Prilosec, Vesicare, vitamin B12    Allergies: Codeine, penicillin, morphine    Social History: She is a non-smoker    Family History: Positive for colon cancer in her mother    Review of Systems:  Constitutional: Denies fever, chills, change in weight  Neck: no swollen glands or dysphagia or odynophagia  Respiratory: negative for SOB, cough, hemoptysis or wheezing  Cardiovascular: negative for chest pain, palpitations or peripheral edema  Gastrointestinal: Positive for occasional episodes of nausea and some vomiting, no real abdominal pain      Physical Exam:  BMI: 33.0  General: alert and oriented, appropriate, no acute  distress  Eyes: No scleral icterus, extraocular movements are intact  Neck: Supple without lymphadenopathy or thyromegaly, trachea is in the midline  Respiratory: There is good bilateral chest expansion, no use of accessory muscles is noted  Cardiovascular: No jugular venous distention or peripheral edema is seen  Gastrointestinal: Soft and benign, no mass or hernia    Laboratory data: Most recent CEA is 3.0 in the normal range, previously around 2.3.  CBC unremarkable.  Chemistry is normal with normal liver function studies and total bilirubin of 0.6.    Imaging data: Recent CT images reviewed by me.  I agree that there may be some hyperenhancement of the gallbladder wall and perceived mild thickening but no inflammatory changes noted.  I do not clearly identify any stones.  Ultrasound reviewed and the gallbladder does not appear particularly distended.  There is no wall thickening.  There may be some sludge but no obvious stones.      Assessment and plan:   -Abnormal imaging of the gallbladder without clear evidence of cholecystitis  -Clinically there is low index of suspicion for significant gallbladder problems.  I have recommended proceeding with a HIDA scan and if this is normal I would not recommend pursuing cholecystectomy at this time in the absence of more significant symptoms.  -We will contact patient with HIDA scan results once available  -Personal history of colon cancer, now more than 3 years out from colectomy, recovering well without evidence of cancer recurrence      Martin Calvo MD, FACS  General, Minimally Invasive and Endoscopic Surgery  [facility]    [address], [address]  [place], [place], [address]  P: [phone]  F: [phone]

## 2024-12-03 ENCOUNTER — HOSPITAL ENCOUNTER (OUTPATIENT)
Dept: NUCLEAR MEDICINE | Facility: HOSPITAL | Age: 69
Discharge: HOME | End: 2024-12-03
Payer: OTHER MISCELLANEOUS

## 2024-12-03 ENCOUNTER — HOSPITAL ENCOUNTER (EMERGENCY)
Facility: HOSPITAL | Age: 69
Discharge: HOME | End: 2024-12-03
Attending: EMERGENCY MEDICINE | Admitting: EMERGENCY MEDICINE
Payer: OTHER MISCELLANEOUS

## 2024-12-03 ENCOUNTER — APPOINTMENT (OUTPATIENT)
Dept: CT IMAGING | Facility: HOSPITAL | Age: 69
End: 2024-12-03
Payer: OTHER MISCELLANEOUS

## 2024-12-03 VITALS
BODY MASS INDEX: 32.75 KG/M2 | HEART RATE: 88 BPM | DIASTOLIC BLOOD PRESSURE: 44 MMHG | OXYGEN SATURATION: 96 % | SYSTOLIC BLOOD PRESSURE: 138 MMHG | TEMPERATURE: 97.7 F | RESPIRATION RATE: 17 BRPM | WEIGHT: 156 LBS | HEIGHT: 58 IN

## 2024-12-03 DIAGNOSIS — M51.369 DEGENERATION OF INTERVERTEBRAL DISC OF LUMBAR REGION, UNSPECIFIED WHETHER PAIN PRESENT: ICD-10-CM

## 2024-12-03 DIAGNOSIS — R93.2 ABNORMAL GALLBLADDER ULTRASOUND: ICD-10-CM

## 2024-12-03 DIAGNOSIS — M51.34 DDD (DEGENERATIVE DISC DISEASE), THORACIC: Primary | ICD-10-CM
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
## 2024-12-03 PROCEDURE — 78227 HEPATOBIL SYST IMAGE W/DRUG: CPT

## 2024-12-03 PROCEDURE — 99284 EMERGENCY DEPT VISIT MOD MDM: CPT

## 2024-12-03 PROCEDURE — 25010000002 SINCALIDE PER 5 MCG: Performed by: SURGERY

## 2024-12-03 PROCEDURE — 34310000005 TECHNETIUM TC 99M MEBROFENIN KIT: Performed by: SURGERY

## 2024-12-03 PROCEDURE — A9537 TC99M MEBROFENIN: HCPCS | Performed by: SURGERY

## 2024-12-03 PROCEDURE — 72131 CT LUMBAR SPINE W/O DYE: CPT

## 2024-12-03 PROCEDURE — 72128 CT CHEST SPINE W/O DYE: CPT

## 2024-12-03 RX ORDER — LIDOCAINE 4 G/G
1 PATCH TOPICAL ONCE
Status: DISCONTINUED | OUTPATIENT
Start: 2024-12-03 | End: 2024-12-03 | Stop reason: HOSPADM

## 2024-12-03 RX ORDER — KIT FOR THE PREPARATION OF TECHNETIUM TC 99M MEBROFENIN 45 MG/10ML
1 INJECTION, POWDER, LYOPHILIZED, FOR SOLUTION INTRAVENOUS
Status: COMPLETED | OUTPATIENT
Start: 2024-12-03 | End: 2024-12-03

## 2024-12-03 RX ADMIN — MEBROFENIN 1 DOSE: 45 INJECTION, POWDER, LYOPHILIZED, FOR SOLUTION INTRAVENOUS at 10:10

## 2024-12-03 RX ADMIN — LIDOCAINE 1 PATCH: 4 PATCH TOPICAL at 00:35

## 2024-12-03 RX ADMIN — SINCALIDE 1.4 MCG: 5 INJECTION, POWDER, LYOPHILIZED, FOR SOLUTION INTRAVENOUS at 11:20

## 2024-12-03 NOTE — ED TRIAGE NOTES
Pt arrived ambulatory to ED. Pt reports a trip and fall at work earlier tonight. The pt reports pain in her mid back. Denies any numbness of tingling. Denies any LOC. Pt is not on blood thinners.

## 2024-12-03 NOTE — Clinical Note
[facility] EMERGENCY DEPARTMENT  [address] [facility] [address]  Phone: [phone]    Marie Foy was seen and treated in our emergency department on 12/3/2024.  She may return to work on 12/03/2024.         Thank you for choosing [facility].    Callie Sterling PA-C

## 2024-12-03 NOTE — ED PROVIDER NOTES
MD ATTESTATION NOTE    The MARRY and I have discussed this patient's history, physical exam, and treatment plan.  I have reviewed the documentation and personally had a face to face interaction with the patient. I affirm the documentation and agree with the treatment and plan.  The attached note describes my personal findings.        SHARED APC FACE TO FACE: I performed a substantive part of the MDM during the patient's E/M visit. I personally evaluated and examined the patient. I personally made or approved the documented management plan and acknowledge its risk of complications.      Brief HPI: Patient presents for evaluation of back injury.  Patient fell backwards at work into a cabinet.  Injured her thoracic spine.  Patient has no focal weakness or numbness.  No head injury.    PHYSICAL EXAM  ED Triage Vitals   Temp Heart Rate Resp BP SpO2   12/03/24 0020 12/03/24 0020 12/03/24 0020 12/03/24 0025 12/03/24 0020   97.7 °F (36.5 °C) 88 18 (!) 182/95 94 %      Temp src Heart Rate Source Patient Position BP Location FiO2 (%)   12/03/24 0020 -- 12/03/24 0025 12/03/24 0025 --   Tympanic  Sitting Right arm          GENERAL: no acute distress  HENT: nares patent  EYES: no scleral icterus  CV: regular rhythm, normal rate  RESPIRATORY: normal effort  ABDOMEN: soft  MUSCULOSKELETAL: no deformity.  Tenderness to thoracic spine  NEURO: alert, moves all extremities, follows commands  PSYCH:  calm, cooperative  SKIN: warm, dry    Vital signs and nursing notes reviewed.    ED Course as of 12/03/24 0731   Tue Dec 03, 2024   0027 I discussed the case with Dr. Sidhu and he agrees to evaluate the patient at the bedside.    [CC]   0105 CT Lumbar Spine Without Contrast  My independent interpretation of the CT of the thoracic and lumbar spine is degenerative changes but no acute fracture [CC]   0207 I rechecked the patient.  I discussed the patient's radiology findings (including all incidental findings), diagnosis, and plan for  discharge.  A repeat exam reveals no new worrisome changes from my initial exam findings.  The patient understands that the fact that they are being discharged does not denote that nothing is abnormal, it indicates that no clinical emergency is present and that they must follow-up as directed in order to properly maintain their health.  Follow-up instructions (specifically listed below) and return to ER precautions were given at this time.  I specifically instructed the patient to follow-up with their PCP.  The patient understands and agrees with the plan, and is ready for discharge.  All questions answered. [CC]      ED Course User Index  [CC] Callie Sterling PA-C         Plan: discharge       Stan Sidhu MD  12/03/24 0731

## 2024-12-03 NOTE — ED PROVIDER NOTES
EMERGENCY DEPARTMENT ENCOUNTER  Room Number:  08/08  PCP: Michelle Wyman MD  Independent Historians: Patient      HPI:  Chief Complaint: had concerns including Fall.     A complete HPI/ROS/PMH/PSH/SH/FH are unobtainable due to: None    Chronic or social conditions impacting patient care (Social Determinants of Health): None      Context: Marie Foy is a 69 y.o. female with a medical history of hypertension, osteoporosis, and GERD presents emergency department complaining of mid and low back pain after falling and hitting her back on a cabinet.  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  She denies hitting her head or LOC.  She has been ambulatory since the accident.  She reports most of her pain is in her mid right side of her back and her low back.  She denies history of back surgeries.  She denies numbness, tingling, weakness in the extremities.  She denies chest pain or shortness of breath.  She denies nausea or vomiting.  She is not anticoagulated.  She has no other complaints.      Review of prior external notes (non-ED) -and- Review of prior external test results outside of this encounter:   I reviewed the DEXA bone scan from 11/10/2022, patient had diffuse osteopenia through the lumbar spine and left hip    I reviewed labs from 11/7/2024, hemoglobin 13.7, creatinine 0.82      PAST MEDICAL HISTORY  Active Ambulatory Problems     Diagnosis Date Noted    Anxiety 02/26/2016    Gastroesophageal reflux disease 02/26/2016    Herpes simplex type 2 infection 02/26/2016    Hypercholesterolemia 02/26/2016    Hypothyroidism 02/26/2016    Overactive bladder 02/26/2016    Unilateral partial paralysis of vocal cords or larynx 02/26/2016    Essential (primary) hypertension 05/11/2020    Primary localized osteoarthrosis of left lower leg 05/20/2020    Osteoporosis 06/30/2020    Malignant  neoplasm of descending colon 02/04/2021    Prediabetes 09/25/2024    OA (osteoarthritis) of knee 09/26/2024     Resolved Ambulatory Problems     Diagnosis Date Noted    Hypertonicity of bladder 02/26/2016    Osteopenia 02/26/2016    Fear of flying 02/26/2016    Colon polyps     Fixation hardware in foot 04/21/2016    Closed nondisplaced fracture of fifth right metatarsal bone 04/21/2016    Incidental lung nodule 09/11/2017    Hormone replacement therapy (HRT) 06/21/2019    Personal history of colon cancer 04/20/2022    Personal history of colon cancer 05/20/2024    History of adenomatous polyp of colon 05/20/2024     Past Medical History:   Diagnosis Date    Arthritis     Benign cardiac murmur     Colon cancer     Constipation     Depression     Fibrocystic breast     Fracture, foot 2014 problem resolved    Genital herpes     GERD (gastroesophageal reflux disease) unk    Graves disease     Hard to intubate     Hearing loss     Heart murmur     History of bipolar disorder     History of substance abuse     Hoarseness     Hyperlipidemia     Hypertension     Joint pain     Knee swelling Unk    Stress fracture 2014 problem resolved    Thyroid disease          PAST SURGICAL HISTORY  Past Surgical History:   Procedure Laterality Date    ANKLE SURGERY Left     ORIF WITH HARDWARE    BREAST BIOPSY Right     BENIGN    CATARACT EXTRACTION W/ INTRAOCULAR LENS IMPLANT      LEFT AND RIGHT    COLON RESECTION N/A 03/02/2021    Procedure: LAPAROSCOPIC TRANSVERSE COLECTOMY WITH DAVINCI ROBOT;  Surgeon: Martin Calvo MD;  [facility] [facility]  [place] [facility] [place]  Laterality: N/A;    COLONOSCOPY N/A 08/23/2016    Procedure: COLONOSCOPY TO CECUM & T.I.  WITH SALINE INJECTION, HOT SNARE POLYPECTOMY, COLD POLYPECTOMY AND CLIP PLACEMENT X 1;  Surgeon: Darryl Garcia MD;  Location: [facility] ENDOSCOPY;  Service:     COLONOSCOPY N/A 01/26/2021    Procedure: COLONOSCOPY to cecum with cold biopsyies and cold biopsy polypectomy and  tattoo needle injection of colonic ulcer;  Surgeon: Darryl Garcia MD;  Location: [facility] ENDOSCOPY;  Service: Gastroenterology;  Laterality: N/A;  pre: hx of olyps  post:  colonic ulcer, polyp    COLONOSCOPY N/A 05/12/2022    Procedure: COLONOSCOPY to anastamosis with hot snare polypectomy;  Surgeon: Martin Calvo MD;  Location: [facility] ENDOSCOPY;  Service: General;  Laterality: N/A;  Pre: History of colon cancer  Post: polyp    COLONOSCOPY N/A 6/18/2024    Procedure: COLONOSCOPY to cecum;  Surgeon: Martin Calvo MD;  Location: [facility] ENDOSCOPY;  Service: General;  Laterality: N/A;  pre personal hx colon ca post diverticulosis    COLPORRHAPHY  2008    EAR TUBES      MEENA. MULTI TIMES    EYE SURGERY  Unknown    cateracts    FOOT SURGERY Right     STRESS FRACTURE. SURGERY X2 WITH HARDWARE IN PLACE    HAND SURGERY Right     FOR FRACTURE WITH HARDWARE IN PLACE    HYSTERECTOMY  2005    WITH BLADDER SLING    JOINT REPLACEMENT  2011    OTHER SURGICAL HISTORY  2006    TVT    REPLACEMENT TOTAL KNEE Right     SACROCOLPOPEXY  2007    POSTERIOR COLPORRHAPHY, PERINEORRHAPHY      THROAT SURGERY      childhood-PUNCTURED ESOPHAGUS AT AGE OF 10.  MULTI SURGERY TO AREA    TOTAL KNEE ARTHROPLASTY  2011         FAMILY HISTORY  Family History   Problem Relation Age of Onset    Hypertension Mother     Heart failure Mother     Diabetes Mother     Colon polyps Mother     Colon cancer Mother     Pancreatic cancer Father     Anxiety disorder Daughter         bi-polar    Malig Hyperthermia Neg Hx          SOCIAL HISTORY  Social History     Socioeconomic History    Marital status:     Number of children: 5   Tobacco Use    Smoking status: Never     Passive exposure: Never    Smokeless tobacco: Never   Vaping Use    Vaping status: Never Used   Substance and Sexual Activity    Alcohol use: Yes     Comment: OCASSIONALLY    Drug use: Never    Sexual activity: Not Currently     Comment: Not sexually active          ALLERGIES  Penicillins, Codeine, and Morphine and codeine      REVIEW OF SYSTEMS  Included in HPI  All systems reviewed and negative except for those discussed in HPI.      PHYSICAL EXAM    I have reviewed the triage vital signs and nursing notes.    ED Triage Vitals [12/03/24 0020]   Temp Heart Rate Resp BP SpO2   97.7 °F (36.5 °C) 88 18 -- 94 %      Temp src Heart Rate Source Patient Position BP Location FiO2 (%)   Tympanic -- -- -- --       Physical Exam  Constitutional:       General: She is not in acute distress.     Appearance: Normal appearance.   HENT:      Head: Normocephalic and atraumatic.      Nose: Nose normal.      Mouth/Throat:      Mouth: Mucous membranes are moist.   Eyes:      Extraocular Movements: Extraocular movements intact.      Pupils: Pupils are equal, round, and reactive to light.   Cardiovascular:      Rate and Rhythm: Normal rate and regular rhythm.      Pulses: Normal pulses.      Heart sounds: Normal heart sounds.   Pulmonary:      Effort: Pulmonary effort is normal. No respiratory distress.      Breath sounds: Normal breath sounds.   Abdominal:      General: Abdomen is flat. There is no distension.      Palpations: Abdomen is soft.      Tenderness: There is no abdominal tenderness.   Musculoskeletal:         General: Normal range of motion.      Cervical back: Normal range of motion and neck supple.      Comments: No midline C, T, L-spine tenderness but there is right sided paraspinal tenderness in the thoracic spine and right posterior rib tenderness.  No step-offs or crepitus.  No bruising or overlying skin changes.  Spontaneously moving all extremities, sensorimotor grossly intact.       Skin:     General: Skin is warm and dry.      Capillary Refill: Capillary refill takes less than 2 seconds.   Neurological:      General: No focal deficit present.      Mental Status: She is alert and oriented to person, place, and time.   Psychiatric:         Mood and Affect: Mood normal.          Behavior: Behavior normal.         RADIOLOGY  CT Lumbar Spine Without Contrast    Result Date: 12/3/2024  Patient: RENAN RATLIFF  Time Out: 02:02 Exam(s): CT L SPINE EXAM:   CT Lumbar Spine Without Intravenous Contrast CLINICAL HISTORY:    Reason for exam: fall, back pain. TECHNIQUE:   Axial computed tomography images of the lumbar spine without intravenous contrast.  CTDI is 18.37 mGy and DLP is 448.3 mGy-cm.  This CT exam was performed according to the principle of ALARA (As Low As Reasonably Achievable) by using one or more of the following dose reduction techniques: automated exposure control, adjustment of the mA and or kV according to patient size, and or use of iterative reconstruction technique. COMPARISON:   No relevant prior studies available. FINDINGS:   Vertebrae:  Mild multilevel degenerative disc disease and facet arthrosis throughout the lumbar spine.  No acute fracture or subluxation is seen.  The lumbar spine vertebral body heights are within normal limits.  No acute compression fracture or burst fracture is seen.   Discs spinal canal neural foramina:  See above.   Soft tissues:  Unremarkable.   Vasculature:  The abdominal aorta is mildly calcified but nondilated. IMPRESSION:     1.  Mild multilevel degenerative disc disease and facet arthrosis throughout the lumbar spine.  No acute fracture or subluxation is seen. 2.  The lumbar spine vertebral body heights are within normal limits.  No acute compression fracture or burst fracture is seen.     Electronically signed by Stanley Hong MD on 12-03-24 at 0202    CT Thoracic Spine Without Contrast    Result Date: 12/3/2024  Patient: RENAN RATLIFF  Time Out: 01:45 Exam(s): CT T SPINE EXAM:   CT Thoracic Spine Without Intravenous Contrast CLINICAL HISTORY:    Reason for exam: fall, back pain. TECHNIQUE:   Axial computed tomography images of the thoracic spine without intravenous contrast.  CTDI is 16.43 mGy and DLP is 519.4 mGy-cm.  This CT exam was  performed according to the principle of ALARA (As Low As Reasonably Achievable) by using one or more of the following dose reduction techniques: automated exposure control, adjustment of the mA and or kV according to patient size, and or use of iterative reconstruction technique. COMPARISON:   No relevant prior studies available. FINDINGS:   Vertebrae:  Mild multilevel degenerative changes throughout the thoracic spine with anterior osteophyte formation throughout multiple mid to lower thoracic levels.  The facets and spinous processes are intact and normally aligned.  No acute fracture or subluxation is seen.  The thoracic spine vertebral body heights are within normal limits.  There is an old healed 10% superior endplate compression fracture of T3.  No acute compression fracture or burst fracture is seen.   Discs spinal canal neural foramina:  No acute findings.  No spinal canal stenosis.   Soft tissues:  Unremarkable. IMPRESSION:     1.  Mild multilevel degenerative changes throughout the thoracic spine with anterior osteophyte formation throughout multiple mid to lower thoracic levels.  The facets and spinous processes are intact and normally aligned.  No acute fracture or subluxation is seen. 2.  The thoracic spine vertebral body heights are within normal limits.  There is an old healed 10% superior endplate compression fracture of T3.  No acute compression fracture or burst fracture is seen.     Electronically signed by Stanley Hong MD on 12-03-24 at 0145       MEDICATIONS GIVEN IN ER  Medications   Lidocaine 4 % 1 patch (1 patch Transdermal Medication Applied 12/3/24 0035)           OUTPATIENT MEDICATION MANAGEMENT:  Current Facility-Administered Medications Ordered in Epic   Medication Dose Route Frequency Provider Last Rate Last Admin    Lidocaine 4 % 1 patch  1 patch Transdermal Once Callie Sterling PA-C   1 patch at 12/03/24 0035     Current Outpatient Medications Ordered in Epic   Medication Sig  Dispense Refill    ALPRAZolam (XANAX) 0.25 MG tablet Take 1 tablet by mouth 3 (Three) Times a Day As Needed for Anxiety. 20 tablet 0    amLODIPine (NORVASC) 10 MG tablet Take 1 tablet by mouth Daily. 90 tablet 3    atorvastatin (LIPITOR) 80 MG tablet Take 1 tablet by mouth Daily. 90 tablet 3    calcium citrate-vitamin d (CITRACAL) 200-250 MG-UNIT tablet tablet Take 1 tablet by mouth Daily.      docusate sodium (COLACE) 100 MG capsule Take 1 capsule by mouth Every Other Day.      Ferrous Sulfate 28 MG tablet Take  by mouth 1 (One) Time Per Week. 3 days a week      levothyroxine (SYNTHROID, LEVOTHROID) 100 MCG tablet TAKE 1 TABLET BY MOUTH ONCE DAILY 90 tablet 1    Multiple Vitamins-Minerals (MULTIVITAMINS PLUS ZINC PO) Take  by mouth.      omeprazole (priLOSEC) 40 MG capsule TAKE 1 CAPSULE BY MOUTH DAILY 90 capsule 2    solifenacin (VESICARE) 5 MG tablet TAKE 1 TABLET BY MOUTH ONCE DAILY 90 tablet 3    valACYclovir (VALTREX) 1000 MG tablet TAKE 1 TABLET BY MOUTH DAILY. 90 tablet 3    vitamin B-12 (CYANOCOBALAMIN) 1000 MCG tablet Take 1 tablet by mouth Daily.             PROGRESS, DATA ANALYSIS, CONSULTS, AND MEDICAL DECISION MAKING  ORDERS PLACED DURING THIS VISIT:  Orders Placed This Encounter   Procedures    CT Thoracic Spine Without Contrast    CT Lumbar Spine Without Contrast       All labs have been independently interpreted by me.  All radiology studies have been reviewed by me. All EKG's have been independently viewed and interpreted by me.  Discussion below represents my analysis of pertinent findings related to patient's condition, differential diagnosis, treatment plan and final disposition.    Differential diagnosis includes but is not limited to:   My differential diagnosis for back pain includes but is not limited to:  Musculoskeletal strain, contusion, retroperitoneal hematoma, disc protrusion, vertebral fracture, transverse process fracture, rib fracture, facet syndrome, sacroiliac joint strain,  sciatica, renal injury, splenic injury, pancreatic injury, osteoarthritis, lumbar spondylosis, spinal stenosis, ankylosing spondylitis, sacroiliac joint inflammation, pancreatitis, perforated peptic ulcer, diverticulitis, endometriosis, chronic PID, epidural abscess, osteomyelitis, retroperitoneal abscess, pyelonephritis, pneumonia, subphrenic abscess, tuberculosis, neurofibroma, meningioma, multiple myeloma, lymphoma, metastatic cancer, primary cancer, AAA, aortic dissection, spinal ischemia, referred pain, ureterolithiasis    ED Course:  ED Course as of 12/03/24 0212   Tue Dec 03, 2024   0027 I discussed the case with Dr. Sidhu and he agrees to evaluate the patient at the bedside.    [CC]   0105 CT Lumbar Spine Without Contrast  My independent interpretation of the CT of the thoracic and lumbar spine is degenerative changes but no acute fracture [CC]   0207 I rechecked the patient.  I discussed the patient's radiology findings (including all incidental findings), diagnosis, and plan for discharge.  A repeat exam reveals no new worrisome changes from my initial exam findings.  The patient understands that the fact that they are being discharged does not denote that nothing is abnormal, it indicates that no clinical emergency is present and that they must follow-up as directed in order to properly maintain their health.  Follow-up instructions (specifically listed below) and return to ER precautions were given at this time.  I specifically instructed the patient to follow-up with their PCP.  The patient understands and agrees with the plan, and is ready for discharge.  All questions answered. [CC]      ED Course User Index  [CC] Callie Sterling PA-C           AS OF 02:12 EST VITALS:    BP - 138/44  HR - 88  TEMP - 97.7 °F (36.5 °C) (Tympanic)  O2 SATS - 94%        MDM:  Patient is a 69-year-old female presents emergency department today with back pain after a fall at work.  On arrival here in the emergency  department vitals are reassuring, she is afebrile.  On my exam the patient is tender to palpation along the thoracic and lumbar spine.  She was evaluated with CT of the thoracic and lumbar spine and no acute fractures were identified.  She does have diffuse degenerative changes.  Patient's pain is adequately controlled here in the emergency department and she is ambulatory.  She has a nonfocal neurologic exam and is neurovascularly intact distally.  Encourage patient to use ice or heat on the area and take Tylenol for pain.  Return precautions were given.  She is appropriate for discharge with outpatient follow-up.          DIAGNOSIS  Final diagnoses:   Fall, initial encounter   DDD (degenerative disc disease), thoracic   Degeneration of intervertebral disc of lumbar region, unspecified whether pain present         DISPOSITION  ED Disposition       ED Disposition   Discharge    Condition   Stable    Comment   --                    Please note that portions of this document were completed with a voice recognition program.    Note Disclaimer: At [facility], we believe that sharing information builds trust and better relationships. You are receiving this note because you recently visited [facility]. It is possible you will see health information before a provider has talked with you about it. This kind of information can be easy to misunderstand. To help you fully understand what it means for your health, we urge you to discuss this note with your provider.     Callie Sterling PA-C  12/03/24 0214

## 2024-12-04 ENCOUNTER — DOCUMENTATION (OUTPATIENT)
Dept: SURGERY | Facility: CLINIC | Age: 69
End: 2024-12-04
Payer: COMMERCIAL

## 2024-12-09 ENCOUNTER — PRE-ADMISSION TESTING (OUTPATIENT)
Dept: PREADMISSION TESTING | Facility: HOSPITAL | Age: 69
End: 2024-12-09
Payer: COMMERCIAL

## 2024-12-09 VITALS
DIASTOLIC BLOOD PRESSURE: 69 MMHG | OXYGEN SATURATION: 97 % | TEMPERATURE: 97 F | RESPIRATION RATE: 18 BRPM | HEART RATE: 65 BPM | SYSTOLIC BLOOD PRESSURE: 173 MMHG | WEIGHT: 156 LBS | HEIGHT: 58 IN | BODY MASS INDEX: 32.75 KG/M2

## 2024-12-09 LAB
ANION GAP SERPL CALCULATED.3IONS-SCNC: 10 MMOL/L (ref 5–15)
BUN SERPL-MCNC: 15 MG/DL (ref 8–23)
BUN/CREAT SERPL: 16.5 (ref 7–25)
CALCIUM SPEC-SCNC: 9.5 MG/DL (ref 8.6–10.5)
CHLORIDE SERPL-SCNC: 105 MMOL/L (ref 98–107)
CO2 SERPL-SCNC: 24 MMOL/L (ref 22–29)
CREAT SERPL-MCNC: 0.91 MG/DL (ref 0.57–1)
DEPRECATED RDW RBC AUTO: 41.5 FL (ref 37–54)
EGFRCR SERPLBLD CKD-EPI 2021: 67.6 ML/MIN/1.73
ERYTHROCYTE [DISTWIDTH] IN BLOOD BY AUTOMATED COUNT: 13 % (ref 12.3–15.4)
GLUCOSE SERPL-MCNC: 100 MG/DL (ref 65–99)
HCT VFR BLD AUTO: 37.9 % (ref 34–46.6)
HGB BLD-MCNC: 12.7 G/DL (ref 12–15.9)
MCH RBC QN AUTO: 29.3 PG (ref 26.6–33)
MCHC RBC AUTO-ENTMCNC: 33.5 G/DL (ref 31.5–35.7)
MCV RBC AUTO: 87.5 FL (ref 79–97)
PLATELET # BLD AUTO: 267 10*3/MM3 (ref 140–450)
PMV BLD AUTO: 8.9 FL (ref 6–12)
POTASSIUM SERPL-SCNC: 4.4 MMOL/L (ref 3.5–5.2)
RBC # BLD AUTO: 4.33 10*6/MM3 (ref 3.77–5.28)
SODIUM SERPL-SCNC: 139 MMOL/L (ref 136–145)
WBC NRBC COR # BLD AUTO: 5.42 10*3/MM3 (ref 3.4–10.8)

## 2024-12-09 PROCEDURE — 36415 COLL VENOUS BLD VENIPUNCTURE: CPT

## 2024-12-09 PROCEDURE — 85027 COMPLETE CBC AUTOMATED: CPT

## 2024-12-09 PROCEDURE — 80048 BASIC METABOLIC PNL TOTAL CA: CPT

## 2024-12-09 PROCEDURE — 93005 ELECTROCARDIOGRAM TRACING: CPT

## 2024-12-09 RX ORDER — SOLIFENACIN SUCCINATE 5 MG/1
TABLET, FILM COATED ORAL
Qty: 90 TABLET | Refills: 3 | Status: SHIPPED | OUTPATIENT
Start: 2024-12-09

## 2024-12-09 RX ORDER — ATORVASTATIN CALCIUM 80 MG/1
80 TABLET, FILM COATED ORAL DAILY
Qty: 90 TABLET | Refills: 3 | Status: SHIPPED | OUTPATIENT
Start: 2024-12-09

## 2024-12-09 RX ORDER — LEVOTHYROXINE SODIUM 100 UG/1
100 TABLET ORAL DAILY
Qty: 90 TABLET | Refills: 1 | Status: SHIPPED | OUTPATIENT
Start: 2024-12-09

## 2024-12-09 NOTE — DISCHARGE INSTRUCTIONS
Take the following medications the morning of surgery:  LEVOTHYROXINE, AMLODIPINE & OMEPRAZOLE      If you are on prescription narcotic pain medication to control your pain you may also take that medication the morning of surgery.      General Instructions:     Do not eat solid food after midnight the night before surgery.  Clear liquids day of surgery are allowed but must be stopped at least two hours before your hospital arrival time.       Allowed clear liquids      Water, sodas, and tea or coffee with no cream or milk added.       12 to 20 ounces of a clear liquid that contains carbohydrates is recommended.  If non-diabetic, have Gatorade or Powerade.  If diabetic, have G2 or Powerade Zero.     Do not have liquids red in color.  Do not consume chicken, beef, pork or vegetable broth or bouillon cubes of any variety as they are not considered clear liquids and are not allowed.      Infants may have breast milk up to four hours before surgery.  Infants drinking formula may drink formula up to six hours before surgery.   Patients who avoid smoking, chewing tobacco and alcohol for 4 weeks prior to surgery have a reduced risk of post-operative complications.  Quit smoking as many days before surgery as you can.  Do not smoke, use chewing tobacco or drink alcohol the day of surgery.   If applicable bring your C-PAP/ BI-PAP machine in with you to preop day of surgery.  Bring any papers given to you in the doctor’s office.  Wear clean comfortable clothes.  Do not wear contact lenses, false eyelashes or make-up.  Bring a case for your glasses.   Bring crutches or walker if applicable.  Remove all piercings.  Leave jewelry and any other valuables at home.  Hair extensions with metal clips must be removed prior to surgery.  The Pre-Admission Testing nurse will instruct you to bring medications if unable to obtain an accurate list in Pre-Admission Testing.            Preventing a Surgical Site Infection:  For 2 to 3 days  before surgery, avoid shaving with a razor because the razor can irritate skin and make it easier to develop an infection.    Any areas of open skin can increase the risk of a post-operative wound infection by allowing bacteria to enter and travel throughout the body.  Notify your surgeon if you have any skin wounds / rashes even if it is not near the expected surgical site.  The area will need assessed to determine if surgery should be delayed until it is healed.  The night prior to surgery shower using a fresh bar of anti-bacterial soap (such as Dial) and clean washcloth.  Sleep in a clean bed with clean clothing.  Do not allow pets to sleep with you.  Shower on the morning of surgery using a fresh bar of anti-bacterial soap (such as Dial) and clean washcloth.  Dry with a clean towel and dress in clean clothing.  Ask your surgeon if you will be receiving antibiotics prior to surgery.  Make sure you, your family, and all healthcare providers clean their hands with soap and water or an alcohol based hand  before caring for you or your wound.    Day of surgery:  Your arrival time is approximately two hours before your scheduled surgery time.  Please note if you have an early arrival time the surgery doors do not open before 5:00 AM.  Upon arrival, a Pre-op nurse and Anesthesiologist will review your health history, obtain vital signs, and answer questions you may have.  The only belongings needed at this time will be a list of your home medications and if applicable your C-PAP/BI-PAP machine.  A Pre-op nurse will start an IV and you may receive medication in preparation for surgery, including something to help you relax.     Please be aware that surgery does come with discomfort.  We want to make every effort to control your discomfort so please discuss any uncontrolled symptoms with your nurse.   Your doctor will most likely have prescribed pain medications.      If you are going home after surgery you will  receive individualized written care instructions before being discharged.  A responsible adult must drive you to and from the hospital on the day of your surgery and ideally stay with you through the night.   .  Discharge prescriptions can be filled by the hospital pharmacy during regular pharmacy hours.  If you are having surgery late in the day/evening your prescription may be e-prescribed to your pharmacy.  Please verify your pharmacy hours or chose a 24 hour pharmacy to avoid not having access to your prescription because your pharmacy has closed for the day.    If you are staying overnight following surgery, you will be transported to your hospital room following the recovery period.  [facility] has all private rooms.    If you have any questions please call Pre-Admission Testing at [phone].  Deductibles and co-payments are collected on the day of service. Please be prepared to pay the required co-pay, deductible or deposit on the day of service as defined by your plan.    Call your surgeon immediately if you experience any of the following symptoms:  Sore Throat  Shortness of Breath or difficulty breathing  Cough  Chills  Body soreness or muscle pain  Headache  Fever  New loss of taste or smell  Do not arrive for your surgery ill.  Your procedure will need to be rescheduled to another time.  You will need to call your physician before the day of surgery to avoid any unnecessary exposure to hospital staff as well as other patients.        CHLORHEXIDINE CLOTH INSTRUCTIONS  The morning of surgery follow these instructions using the Chlorhexidine cloths you've been given.  These steps reduce bacteria on the body.  Do not use the cloths near your eyes, ears mouth, genitalia or on open wounds.  Throw the cloths away after use but do not try to flush them down a toilet.      Open and remove one cloth at a time from the package.    Leave the cloth unfolded and begin the bathing.  Massage the skin  with the cloths using gentle pressure to remove bacteria.  Do not scrub harshly.   Follow the steps below with one 2% CHG cloth per area (6 total cloths).  One cloth for neck, shoulders and chest.  One cloth for both arms, hands, fingers and underarms (do underarms last).  One cloth for the abdomen followed by groin.  One cloth for right leg and foot including between the toes.  One cloth for left leg and foot including between the toes.  The last cloth is to be used for the back of the neck, back and buttocks.    Allow the CHG to air dry 3 minutes on the skin which will give it time to work and decrease the chance of irritation.  The skin may feel sticky until it is dry.  Do not rinse with water or any other liquid or you will lose the beneficial effects of the CHG.  If mild skin irritation occurs, do rinse the skin to remove the CHG.  Report this to the nurse at time of admission.  Do not apply lotions, creams, ointments, deodorants or perfumes after using the clothes. Dress in clean clothes before coming to the hospital.

## 2024-12-10 LAB
QT INTERVAL: 427 MS
QTC INTERVAL: 444 MS

## 2024-12-11 ENCOUNTER — TELEPHONE (OUTPATIENT)
Dept: CASE MANAGEMENT | Facility: OTHER | Age: 69
End: 2024-12-11
Payer: COMMERCIAL

## 2024-12-11 NOTE — TELEPHONE ENCOUNTER
Outreach attempts were made x 3 with  for patient including ACM contact requesting she outreach with any needs related to her ED visit and to offer and discuss case management services.   Betsy Anthony RN Ambulatory

## 2024-12-19 ENCOUNTER — OFFICE VISIT (OUTPATIENT)
Dept: ORTHOPEDIC SURGERY | Facility: CLINIC | Age: 69
End: 2024-12-19
Payer: COMMERCIAL

## 2024-12-19 VITALS
DIASTOLIC BLOOD PRESSURE: 79 MMHG | SYSTOLIC BLOOD PRESSURE: 148 MMHG | TEMPERATURE: 96.6 F | HEIGHT: 58 IN | WEIGHT: 159.4 LBS | BODY MASS INDEX: 33.46 KG/M2

## 2024-12-19 DIAGNOSIS — R52 PAIN: Primary | ICD-10-CM

## 2024-12-19 NOTE — H&P
Patient: Marie Foy    Date of Admission: 12/23/2024    YOB: 1955    Medical Record Number: [id number]    Admitting Physician: Dr. Saul To    Reason for Admission: End Stage Left Knee OA    History of Present Illness: 69 y.o. female presents with severe end stage knee osteoarthritis which has not been responsive to the full compliment of conservative measures. Despite conservative attempts, there is still severe, constant activity-limiting pain. Given the severity of the pain, the patient has elected to proceed with knee replacement.    Allergies:   Allergies   Allergen Reactions    Penicillins Rash     Beta lactam allergy details  Antibiotic reaction: rash, hives  Age at reaction: child  Dose to reaction time: (!) minutes  Reason for antibiotic: unknown  Epinephrine required for reaction?: no  Tolerated antibiotics: unknown       Codeine Headache     Headache    Morphine And Codeine Rash         Current Medications:  Home Medications:    Current Outpatient Medications on File Prior to Visit   Medication Sig    ALPRAZolam (XANAX) 0.25 MG tablet Take 1 tablet by mouth 3 (Three) Times a Day As Needed for Anxiety.    amLODIPine (NORVASC) 10 MG tablet Take 1 tablet by mouth Daily. (Patient taking differently: Take 0.5 tablets by mouth 2 (Two) Times a Day.)    atorvastatin (LIPITOR) 80 MG tablet TAKE 1 TABLET BY MOUTH DAILY.    calcium citrate-vitamin d (CITRACAL) 200-250 MG-UNIT tablet tablet Take 1 tablet by mouth Daily.    Ferrous Sulfate 28 MG tablet Take  by mouth. 3 days a week    levothyroxine (SYNTHROID, LEVOTHROID) 100 MCG tablet TAKE 1 TABLET BY MOUTH ONCE DAILY    Multiple Vitamins-Minerals (MULTIVITAMINS PLUS ZINC PO) Take  by mouth.    omeprazole (priLOSEC) 40 MG capsule TAKE 1 CAPSULE BY MOUTH DAILY    solifenacin (VESICARE) 5 MG tablet TAKE 1 TABLET BY MOUTH ONCE DAILY    valACYclovir (VALTREX) 1000 MG tablet TAKE 1 TABLET BY MOUTH DAILY. (Patient taking differently: Take 1 tablet by  mouth. 3 TIMES WEEKLY)    vitamin B-12 (CYANOCOBALAMIN) 1000 MCG tablet Take 1 tablet by mouth Daily.     No current facility-administered medications on file prior to visit.     PRN Meds:.    PMH:     Past Medical History:   Diagnosis Date    Anxiety     Arthritis     Arthritis of back     Benign cardiac murmur     Colon cancer     Colon polyps     Constipation     Depression     LOSS OF SPOUSE 4 YRS AGO.    Fibrocystic breast     Fracture, foot 2014 problem resolved    Genital herpes     GERD (gastroesophageal reflux disease) unk    controlled w/meds    Graves disease     Hard to intubate     R/T SMALLER THAN NORMAL ADULT AIRWAY RELATED TO PREVIOUS SURGERY AND SCAR TISSUE.    Hearing loss     RIGHT WORST THEN LEFT. AS RESULT CHRONIC OTITIS MEDIA. HOLE RIGHT EAR DRUM    Heart murmur     History of bipolar disorder     STATES NOT TREATED. NO SAVANNA PHASES    History of substance abuse     Hoarseness     DAMAGE TO VOCAL CORD AS CHILD.     Hyperlipidemia     Hypertension     Joint pain     swelling    Knee swelling Unk    Osteopenia unk    Prolia injections    Stress fracture 2014 problem resolved    Thyroid disease        PF/Surg/Soc Hx:     Past Surgical History:   Procedure Laterality Date    ANKLE OPEN REDUCTION INTERNAL FIXATION  1998    ANKLE SURGERY Left     ORIF WITH HARDWARE    BREAST BIOPSY Right     BENIGN    CATARACT EXTRACTION W/ INTRAOCULAR LENS IMPLANT      LEFT AND RIGHT    COLON RESECTION N/A 03/02/2021    Procedure: LAPAROSCOPIC TRANSVERSE COLECTOMY WITH DAVINCI ROBOT;  Surgeon: Martin Calvo MD;  Location: [facility] MAIN OR;  Service: DaVinci;  Laterality: N/A;    COLONOSCOPY N/A 08/23/2016    Procedure: COLONOSCOPY TO CECUM & T.I.  WITH SALINE INJECTION, HOT SNARE POLYPECTOMY, COLD POLYPECTOMY AND CLIP PLACEMENT X 1;  Surgeon: Darryl Garcia MD;  Location: [facility] ENDOSCOPY;  Service:     COLONOSCOPY N/A 01/26/2021    Procedure: COLONOSCOPY to cecum with cold biopsyies and cold biopsy polypectomy  and tattoo needle injection of colonic ulcer;  Surgeon: Darryl Garcia MD;  Location:  [facility];  Service: Gastroenterology;  Laterality: N/A;  pre: hx of olyps  post:  colonic ulcer, polyp    COLONOSCOPY N/A 05/12/2022    Procedure: COLONOSCOPY to anastamosis with hot snare polypectomy;  Surgeon: Martin Calvo MD;  Location:  [facility];  Service: General;  Laterality: N/A;  Pre: History of colon cancer  Post: polyp    COLONOSCOPY N/A 06/18/2024    Procedure: COLONOSCOPY to cecum;  Surgeon: Martin Calvo MD;  Location:  [facility];  Service: General;  Laterality: N/A;  pre personal hx colon ca post diverticulosis    COLPORRHAPHY  2008    EAR TUBES      MEENA. MULTI TIMES    EYE SURGERY  Unknown    cateracts    FOOT SURGERY Right     STRESS FRACTURE. SURGERY X2 WITH HARDWARE IN PLACE    HAND SURGERY Right     FOR FRACTURE WITH HARDWARE IN PLACE    HYSTERECTOMY  2005    WITH BLADDER SLING    JOINT REPLACEMENT  2011    OTHER SURGICAL HISTORY  2006    TVT    REPLACEMENT TOTAL KNEE Right     SACROCOLPOPEXY  2007    POSTERIOR COLPORRHAPHY, PERINEORRHAPHY      THROAT SURGERY      childhood-PUNCTURED ESOPHAGUS AT AGE OF 10.  MULTI SURGERY TO AREA    TOTAL KNEE ARTHROPLASTY Right 2011        Social History     Occupational History     Employer: [employer] [address], [place]   Tobacco Use    Smoking status: Never     Passive exposure: Never    Smokeless tobacco: Never   Vaping Use    Vaping status: Never Used   Substance and Sexual Activity    Alcohol use: Not Currently     Comment: OCASSIONALLY    Drug use: Never    Sexual activity: Not Currently     Comment: Not sexually active      Social History     Social History Narrative    Not on file        Family History   Problem Relation Age of Onset    Hypertension Mother     Heart failure Mother     Diabetes Mother     Colon polyps Mother     Colon cancer Mother     Pancreatic cancer Father     Anxiety disorder Daughter   "       bi-polar    Malig Hyperthermia Neg Hx          Review of Systems:   A 14 point review of systems was performed, pertinent positives discussed above, all other systems are negative    Physical Exam: 69 y.o. female  Vital Signs :   Vitals:    12/19/24 1426   BP: 148/79   Temp: 96.6 °F (35.9 °C)   Weight: 72.3 kg (159 lb 6.4 oz)   Height: 147.3 cm (58\")   PainSc: 0-No pain   PainLoc: Knee     General: Alert and Oriented x 3, No acute distress.  Psych: mood and affect appropriate; recent and remote memory intact  Eyes: conjunctivae clear; pupils equally round and reactive, sclerae anicteric  CV: RRR  Resp: normal respiratory effort  Skin: no rashes or wounds; normal turgor    Xrays:  -3 views (AP, lateral, and sunrise) were reviewed demonstrating end-stage OA with bone on bone articulation.  -A full length AP xray was ordered and reviewed today for purposes of operative alignment demonstrating end stage arthritic findings. There are no previous full length films for review    Assessment:  End-stage Left knee osteoarthritis. Conservative measures have failed.      Plan:  The plan is to proceed with Left Total Knee Replacement. The patient voiced understanding of the risks, benefits, and alternative forms of treatment that were discussed with Dr To at the time of scheduling.  23-hour home health    Jaclyn Huber, APRN  12/19/2024         "

## 2024-12-19 NOTE — H&P (VIEW-ONLY)
Patient: Marie Foy    Date of Admission: 12/23/2024    YOB: 1955    Medical Record Number: [id number]    Admitting Physician: Dr. Saul To    Reason for Admission: End Stage Left Knee OA    History of Present Illness: 69 y.o. female presents with severe end stage knee osteoarthritis which has not been responsive to the full compliment of conservative measures. Despite conservative attempts, there is still severe, constant activity-limiting pain. Given the severity of the pain, the patient has elected to proceed with knee replacement.    Allergies:   Allergies   Allergen Reactions    Penicillins Rash     Beta lactam allergy details  Antibiotic reaction: rash, hives  Age at reaction: child  Dose to reaction time: (!) minutes  Reason for antibiotic: unknown  Epinephrine required for reaction?: no  Tolerated antibiotics: unknown       Codeine Headache     Headache    Morphine And Codeine Rash         Current Medications:  Home Medications:    Current Outpatient Medications on File Prior to Visit   Medication Sig    ALPRAZolam (XANAX) 0.25 MG tablet Take 1 tablet by mouth 3 (Three) Times a Day As Needed for Anxiety.    amLODIPine (NORVASC) 10 MG tablet Take 1 tablet by mouth Daily. (Patient taking differently: Take 0.5 tablets by mouth 2 (Two) Times a Day.)    atorvastatin (LIPITOR) 80 MG tablet TAKE 1 TABLET BY MOUTH DAILY.    calcium citrate-vitamin d (CITRACAL) 200-250 MG-UNIT tablet tablet Take 1 tablet by mouth Daily.    Ferrous Sulfate 28 MG tablet Take  by mouth. 3 days a week    levothyroxine (SYNTHROID, LEVOTHROID) 100 MCG tablet TAKE 1 TABLET BY MOUTH ONCE DAILY    Multiple Vitamins-Minerals (MULTIVITAMINS PLUS ZINC PO) Take  by mouth.    omeprazole (priLOSEC) 40 MG capsule TAKE 1 CAPSULE BY MOUTH DAILY    solifenacin (VESICARE) 5 MG tablet TAKE 1 TABLET BY MOUTH ONCE DAILY    valACYclovir (VALTREX) 1000 MG tablet TAKE 1 TABLET BY MOUTH DAILY. (Patient taking differently: Take 1 tablet by  mouth. 3 TIMES WEEKLY)    vitamin B-12 (CYANOCOBALAMIN) 1000 MCG tablet Take 1 tablet by mouth Daily.     No current facility-administered medications on file prior to visit.     PRN Meds:.    PMH:     Past Medical History:   Diagnosis Date    Anxiety     Arthritis     Arthritis of back     Benign cardiac murmur     Colon cancer     Colon polyps     Constipation     Depression     LOSS OF SPOUSE 4 YRS AGO.    Fibrocystic breast     Fracture, foot 2014 problem resolved    Genital herpes     GERD (gastroesophageal reflux disease) unk    controlled w/meds    Graves disease     Hard to intubate     R/T SMALLER THAN NORMAL ADULT AIRWAY RELATED TO PREVIOUS SURGERY AND SCAR TISSUE.    Hearing loss     RIGHT WORST THEN LEFT. AS RESULT CHRONIC OTITIS MEDIA. HOLE RIGHT EAR DRUM    Heart murmur     History of bipolar disorder     STATES NOT TREATED. NO SAVANNA PHASES    History of substance abuse     Hoarseness     DAMAGE TO VOCAL CORD AS CHILD.     Hyperlipidemia     Hypertension     Joint pain     swelling    Knee swelling Unk    Osteopenia unk    Prolia injections    Stress fracture 2014 problem resolved    Thyroid disease        PF/Surg/Soc Hx:     Past Surgical History:   Procedure Laterality Date    ANKLE OPEN REDUCTION INTERNAL FIXATION  1998    ANKLE SURGERY Left     ORIF WITH HARDWARE    BREAST BIOPSY Right     BENIGN    CATARACT EXTRACTION W/ INTRAOCULAR LENS IMPLANT      LEFT AND RIGHT    COLON RESECTION N/A 03/02/2021    Procedure: LAPAROSCOPIC TRANSVERSE COLECTOMY WITH DAVINCI ROBOT;  Surgeon: Martin Calvo MD;  Location: [facility] MAIN OR;  Service: DaVinci;  Laterality: N/A;    COLONOSCOPY N/A 08/23/2016    Procedure: COLONOSCOPY TO CECUM & T.I.  WITH SALINE INJECTION, HOT SNARE POLYPECTOMY, COLD POLYPECTOMY AND CLIP PLACEMENT X 1;  Surgeon: Darryl Garcia MD;  Location: [facility] ENDOSCOPY;  Service:     COLONOSCOPY N/A 01/26/2021    Procedure: COLONOSCOPY to cecum with cold biopsyies and cold biopsy polypectomy  and tattoo needle injection of colonic ulcer;  Surgeon: Darryl Garcia MD;  Location:  [facility];  Service: Gastroenterology;  Laterality: N/A;  pre: hx of olyps  post:  colonic ulcer, polyp    COLONOSCOPY N/A 05/12/2022    Procedure: COLONOSCOPY to anastamosis with hot snare polypectomy;  Surgeon: Martin Calvo MD;  Location:  [facility];  Service: General;  Laterality: N/A;  Pre: History of colon cancer  Post: polyp    COLONOSCOPY N/A 06/18/2024    Procedure: COLONOSCOPY to cecum;  Surgeon: Martin Calvo MD;  Location:  [facility];  Service: General;  Laterality: N/A;  pre personal hx colon ca post diverticulosis    COLPORRHAPHY  2008    EAR TUBES      MEENA. MULTI TIMES    EYE SURGERY  Unknown    cateracts    FOOT SURGERY Right     STRESS FRACTURE. SURGERY X2 WITH HARDWARE IN PLACE    HAND SURGERY Right     FOR FRACTURE WITH HARDWARE IN PLACE    HYSTERECTOMY  2005    WITH BLADDER SLING    JOINT REPLACEMENT  2011    OTHER SURGICAL HISTORY  2006    TVT    REPLACEMENT TOTAL KNEE Right     SACROCOLPOPEXY  2007    POSTERIOR COLPORRHAPHY, PERINEORRHAPHY      THROAT SURGERY      childhood-PUNCTURED ESOPHAGUS AT AGE OF 10.  MULTI SURGERY TO AREA    TOTAL KNEE ARTHROPLASTY Right 2011        Social History     Occupational History     Employer: [employer] [address], [place]   Tobacco Use    Smoking status: Never     Passive exposure: Never    Smokeless tobacco: Never   Vaping Use    Vaping status: Never Used   Substance and Sexual Activity    Alcohol use: Not Currently     Comment: OCASSIONALLY    Drug use: Never    Sexual activity: Not Currently     Comment: Not sexually active      Social History     Social History Narrative    Not on file        Family History   Problem Relation Age of Onset    Hypertension Mother     Heart failure Mother     Diabetes Mother     Colon polyps Mother     Colon cancer Mother     Pancreatic cancer Father     Anxiety disorder Daughter   "       bi-polar    Malig Hyperthermia Neg Hx          Review of Systems:   A 14 point review of systems was performed, pertinent positives discussed above, all other systems are negative    Physical Exam: 69 y.o. female  Vital Signs :   Vitals:    12/19/24 1426   BP: 148/79   Temp: 96.6 °F (35.9 °C)   Weight: 72.3 kg (159 lb 6.4 oz)   Height: 147.3 cm (58\")   PainSc: 0-No pain   PainLoc: Knee     General: Alert and Oriented x 3, No acute distress.  Psych: mood and affect appropriate; recent and remote memory intact  Eyes: conjunctivae clear; pupils equally round and reactive, sclerae anicteric  CV: RRR  Resp: normal respiratory effort  Skin: no rashes or wounds; normal turgor    Xrays:  -3 views (AP, lateral, and sunrise) were reviewed demonstrating end-stage OA with bone on bone articulation.  -A full length AP xray was ordered and reviewed today for purposes of operative alignment demonstrating end stage arthritic findings. There are no previous full length films for review    Assessment:  End-stage Left knee osteoarthritis. Conservative measures have failed.      Plan:  The plan is to proceed with Left Total Knee Replacement. The patient voiced understanding of the risks, benefits, and alternative forms of treatment that were discussed with Dr To at the time of scheduling.  23-hour home health    Jaclyn Huber, APRN  12/19/2024         "

## 2024-12-20 ENCOUNTER — TELEPHONE (OUTPATIENT)
Dept: ORTHOPEDIC SURGERY | Facility: CLINIC | Age: 69
End: 2024-12-20
Payer: COMMERCIAL

## 2024-12-23 ENCOUNTER — APPOINTMENT (OUTPATIENT)
Dept: GENERAL RADIOLOGY | Facility: HOSPITAL | Age: 69
End: 2024-12-23
Payer: COMMERCIAL

## 2024-12-23 ENCOUNTER — ANESTHESIA (OUTPATIENT)
Dept: PERIOP | Facility: HOSPITAL | Age: 69
End: 2024-12-23
Payer: COMMERCIAL

## 2024-12-23 ENCOUNTER — ANESTHESIA EVENT (OUTPATIENT)
Dept: PERIOP | Facility: HOSPITAL | Age: 69
End: 2024-12-23
Payer: COMMERCIAL

## 2024-12-23 ENCOUNTER — HOSPITAL ENCOUNTER (OUTPATIENT)
Facility: HOSPITAL | Age: 69
Discharge: HOME HEALTH | End: 2024-12-24
Attending: ORTHOPAEDIC SURGERY | Admitting: ORTHOPAEDIC SURGERY
Payer: COMMERCIAL

## 2024-12-23 DIAGNOSIS — M17.12 PRIMARY OSTEOARTHRITIS OF LEFT KNEE: ICD-10-CM

## 2024-12-23 DIAGNOSIS — Z96.652 S/P TOTAL KNEE REPLACEMENT, LEFT: Primary | ICD-10-CM

## 2024-12-23 PROCEDURE — 25010000002 SUGAMMADEX 200 MG/2ML SOLUTION: Performed by: NURSE ANESTHETIST, CERTIFIED REGISTERED

## 2024-12-23 PROCEDURE — 25010000003 BUPIVACAINE LIPOSOME 1.3 % SUSPENSION 20 ML VIAL: Performed by: ORTHOPAEDIC SURGERY

## 2024-12-23 PROCEDURE — 25810000003 SODIUM CHLORIDE 0.9 % SOLUTION 250 ML FLEX CONT: Performed by: ORTHOPAEDIC SURGERY

## 2024-12-23 PROCEDURE — 25010000002 ACETAMINOPHEN 10 MG/ML SOLUTION: Performed by: NURSE ANESTHETIST, CERTIFIED REGISTERED

## 2024-12-23 PROCEDURE — 25010000002 ROPIVACAINE PER 1 MG: Performed by: ANESTHESIOLOGY

## 2024-12-23 PROCEDURE — G0378 HOSPITAL OBSERVATION PER HR: HCPCS

## 2024-12-23 PROCEDURE — 25010000002 DEXAMETHASONE PER 1 MG: Performed by: ANESTHESIOLOGY

## 2024-12-23 PROCEDURE — C1776 JOINT DEVICE (IMPLANTABLE): HCPCS | Performed by: ORTHOPAEDIC SURGERY

## 2024-12-23 PROCEDURE — 25810000003 LACTATED RINGERS PER 1000 ML: Performed by: NURSE ANESTHETIST, CERTIFIED REGISTERED

## 2024-12-23 PROCEDURE — 25010000002 MIDAZOLAM PER 1 MG: Performed by: ANESTHESIOLOGY

## 2024-12-23 PROCEDURE — 25010000002 LIDOCAINE PF 2% 2 % SOLUTION: Performed by: NURSE ANESTHETIST, CERTIFIED REGISTERED

## 2024-12-23 PROCEDURE — 25010000002 FENTANYL CITRATE (PF) 50 MCG/ML SOLUTION: Performed by: ANESTHESIOLOGY

## 2024-12-23 PROCEDURE — 73560 X-RAY EXAM OF KNEE 1 OR 2: CPT

## 2024-12-23 PROCEDURE — 25010000002 VANCOMYCIN 1 G RECONSTITUTED SOLUTION 1 EACH VIAL: Performed by: ORTHOPAEDIC SURGERY

## 2024-12-23 PROCEDURE — 25010000002 DEXAMETHASONE SODIUM PHOSPHATE 20 MG/5ML SOLUTION: Performed by: NURSE ANESTHETIST, CERTIFIED REGISTERED

## 2024-12-23 PROCEDURE — 27447 TOTAL KNEE ARTHROPLASTY: CPT | Performed by: ORTHOPAEDIC SURGERY

## 2024-12-23 PROCEDURE — C1713 ANCHOR/SCREW BN/BN,TIS/BN: HCPCS | Performed by: ORTHOPAEDIC SURGERY

## 2024-12-23 PROCEDURE — C9290 INJ, BUPIVACAINE LIPOSOME: HCPCS | Performed by: ORTHOPAEDIC SURGERY

## 2024-12-23 PROCEDURE — 25010000002 CEFAZOLIN PER 500 MG: Performed by: ORTHOPAEDIC SURGERY

## 2024-12-23 PROCEDURE — 25010000002 PROPOFOL 10 MG/ML EMULSION: Performed by: NURSE ANESTHETIST, CERTIFIED REGISTERED

## 2024-12-23 PROCEDURE — 25810000003 LACTATED RINGERS PER 1000 ML: Performed by: ANESTHESIOLOGY

## 2024-12-23 PROCEDURE — 25010000002 HYDROMORPHONE 1 MG/ML SOLUTION: Performed by: NURSE ANESTHETIST, CERTIFIED REGISTERED

## 2024-12-23 PROCEDURE — 25010000002 PROPOFOL 200 MG/20ML EMULSION: Performed by: NURSE ANESTHETIST, CERTIFIED REGISTERED

## 2024-12-23 PROCEDURE — 27447 TOTAL KNEE ARTHROPLASTY: CPT | Performed by: SPECIALIST/TECHNOLOGIST, OTHER

## 2024-12-23 PROCEDURE — 25010000002 MAGNESIUM SULFATE PER 500 MG OF MAGNESIUM: Performed by: NURSE ANESTHETIST, CERTIFIED REGISTERED

## 2024-12-23 PROCEDURE — 25010000002 CEFAZOLIN PER 500 MG: Performed by: NURSE PRACTITIONER

## 2024-12-23 PROCEDURE — 25810000003 LACTATED RINGERS SOLUTION: Performed by: ORTHOPAEDIC SURGERY

## 2024-12-23 PROCEDURE — 25010000002 FENTANYL CITRATE (PF) 100 MCG/2ML SOLUTION: Performed by: NURSE ANESTHETIST, CERTIFIED REGISTERED

## 2024-12-23 DEVICE — GENESIS II NON-POROUS TIBIAL                                    BASEPLATE SIZE 2 LEFT
Type: IMPLANTABLE DEVICE | Site: KNEE | Status: FUNCTIONAL
Brand: GENESIS II

## 2024-12-23 DEVICE — GENESIS II BICONVEX PATELLA 26MM
Type: IMPLANTABLE DEVICE | Site: KNEE | Status: FUNCTIONAL
Brand: GENESIS II

## 2024-12-23 DEVICE — PALACOS® R IS A FAST-CURING, RADIOPAQUE, POLY(METHYL METHACRYLATE)-BASED BONE CEMENT.PALACOS ® R CONTAINS THE X-RAY CONTRAST MEDIUM ZIRCONIUM DIOXIDE. TO IMPROVE VISIBILITY IN THE SURGICAL FIELD PALACOS ® R HAS BEEN COLOURED WITH CHLOROPHYLL (E141). THE BONE CEMENT IS PREPARED DIRECTLY BEFORE USE BY MIXING A POLYMER POWDER COMPONENT WITH A LIQUID MONOMER COMPONENT. A DUCTILE DOUGH FORMS WHICH CURES WITHIN A FEW MINUTES.
Type: IMPLANTABLE DEVICE | Site: KNEE | Status: FUNCTIONAL
Brand: PALACOS®

## 2024-12-23 DEVICE — LEGION PS HIGH FLEX XLPE SZ 1-2 10MM
Type: IMPLANTABLE DEVICE | Site: KNEE | Status: FUNCTIONAL
Brand: LEGION

## 2024-12-23 DEVICE — IMPLANTABLE DEVICE: Type: IMPLANTABLE DEVICE | Status: FUNCTIONAL

## 2024-12-23 DEVICE — KNOTLESS TISSUE CONTROL DEVICE, UNDYED UNIDIRECTIONAL (ANTIBACTERIAL) SYNTHETIC ABSORBABLE DEVICE
Type: IMPLANTABLE DEVICE | Site: KNEE | Status: FUNCTIONAL
Brand: STRATAFIX

## 2024-12-23 DEVICE — LEGION NARROW POSTERIOR STABILIZED                                    OXINIUM SIZE 3N LEFT
Type: IMPLANTABLE DEVICE | Site: KNEE | Status: FUNCTIONAL
Brand: LEGION

## 2024-12-23 DEVICE — VIOLET ANTIBACTERIAL POLYDIOXANONE, KNOTLESS TISSUE CONTROL DEVICE
Type: IMPLANTABLE DEVICE | Site: KNEE | Status: FUNCTIONAL
Brand: STRATAFIX

## 2024-12-23 RX ORDER — DEXAMETHASONE SODIUM PHOSPHATE 4 MG/ML
INJECTION, SOLUTION INTRA-ARTICULAR; INTRALESIONAL; INTRAMUSCULAR; INTRAVENOUS; SOFT TISSUE AS NEEDED
Status: DISCONTINUED | OUTPATIENT
Start: 2024-12-23 | End: 2024-12-23 | Stop reason: SURG

## 2024-12-23 RX ORDER — FENTANYL CITRATE 50 UG/ML
50 INJECTION, SOLUTION INTRAMUSCULAR; INTRAVENOUS
Status: DISCONTINUED | OUTPATIENT
Start: 2024-12-23 | End: 2024-12-23 | Stop reason: HOSPADM

## 2024-12-23 RX ORDER — ATROPINE SULFATE 0.4 MG/ML
0.4 INJECTION, SOLUTION INTRAMUSCULAR; INTRAVENOUS; SUBCUTANEOUS ONCE AS NEEDED
Status: DISCONTINUED | OUTPATIENT
Start: 2024-12-23 | End: 2024-12-23 | Stop reason: HOSPADM

## 2024-12-23 RX ORDER — ROPIVACAINE HYDROCHLORIDE 5 MG/ML
INJECTION, SOLUTION EPIDURAL; INFILTRATION; PERINEURAL
Status: COMPLETED | OUTPATIENT
Start: 2024-12-23 | End: 2024-12-23

## 2024-12-23 RX ORDER — PROMETHAZINE HYDROCHLORIDE 25 MG/1
12.5 TABLET ORAL EVERY 4 HOURS PRN
Status: DISCONTINUED | OUTPATIENT
Start: 2024-12-23 | End: 2024-12-24 | Stop reason: HOSPADM

## 2024-12-23 RX ORDER — OXYBUTYNIN CHLORIDE 5 MG/1
5 TABLET, EXTENDED RELEASE ORAL DAILY
Status: DISCONTINUED | OUTPATIENT
Start: 2024-12-23 | End: 2024-12-24 | Stop reason: HOSPADM

## 2024-12-23 RX ORDER — FENTANYL CITRATE 50 UG/ML
25 INJECTION, SOLUTION INTRAMUSCULAR; INTRAVENOUS
Status: DISCONTINUED | OUTPATIENT
Start: 2024-12-23 | End: 2024-12-23 | Stop reason: HOSPADM

## 2024-12-23 RX ORDER — ACETAMINOPHEN 10 MG/ML
INJECTION, SOLUTION INTRAVENOUS AS NEEDED
Status: DISCONTINUED | OUTPATIENT
Start: 2024-12-23 | End: 2024-12-23 | Stop reason: SURG

## 2024-12-23 RX ORDER — HYDROCODONE BITARTRATE AND ACETAMINOPHEN 7.5; 325 MG/1; MG/1
1 TABLET ORAL EVERY 4 HOURS PRN
Status: DISCONTINUED | OUTPATIENT
Start: 2024-12-23 | End: 2024-12-24 | Stop reason: HOSPADM

## 2024-12-23 RX ORDER — IPRATROPIUM BROMIDE AND ALBUTEROL SULFATE 2.5; .5 MG/3ML; MG/3ML
3 SOLUTION RESPIRATORY (INHALATION) ONCE AS NEEDED
Status: DISCONTINUED | OUTPATIENT
Start: 2024-12-23 | End: 2024-12-23 | Stop reason: HOSPADM

## 2024-12-23 RX ORDER — HYDROCODONE BITARTRATE AND ACETAMINOPHEN 7.5; 325 MG/1; MG/1
2 TABLET ORAL EVERY 4 HOURS PRN
Status: DISCONTINUED | OUTPATIENT
Start: 2024-12-23 | End: 2024-12-24 | Stop reason: HOSPADM

## 2024-12-23 RX ORDER — DIPHENHYDRAMINE HYDROCHLORIDE 50 MG/ML
12.5 INJECTION INTRAMUSCULAR; INTRAVENOUS
Status: DISCONTINUED | OUTPATIENT
Start: 2024-12-23 | End: 2024-12-23 | Stop reason: HOSPADM

## 2024-12-23 RX ORDER — PANTOPRAZOLE SODIUM 40 MG/1
40 TABLET, DELAYED RELEASE ORAL
Status: DISCONTINUED | OUTPATIENT
Start: 2024-12-24 | End: 2024-12-24 | Stop reason: HOSPADM

## 2024-12-23 RX ORDER — LEVOTHYROXINE SODIUM 100 UG/1
100 TABLET ORAL DAILY
Status: DISCONTINUED | OUTPATIENT
Start: 2024-12-24 | End: 2024-12-24 | Stop reason: HOSPADM

## 2024-12-23 RX ORDER — FAMOTIDINE 10 MG/ML
20 INJECTION, SOLUTION INTRAVENOUS ONCE
Status: COMPLETED | OUTPATIENT
Start: 2024-12-23 | End: 2024-12-23

## 2024-12-23 RX ORDER — ASPIRIN 81 MG/1
81 TABLET ORAL EVERY 12 HOURS SCHEDULED
Status: DISCONTINUED | OUTPATIENT
Start: 2024-12-24 | End: 2024-12-24 | Stop reason: HOSPADM

## 2024-12-23 RX ORDER — ROCURONIUM BROMIDE 10 MG/ML
INJECTION, SOLUTION INTRAVENOUS AS NEEDED
Status: DISCONTINUED | OUTPATIENT
Start: 2024-12-23 | End: 2024-12-23 | Stop reason: SURG

## 2024-12-23 RX ORDER — DEXAMETHASONE SODIUM PHOSPHATE 4 MG/ML
INJECTION, SOLUTION INTRA-ARTICULAR; INTRALESIONAL; INTRAMUSCULAR; INTRAVENOUS; SOFT TISSUE
Status: COMPLETED | OUTPATIENT
Start: 2024-12-23 | End: 2024-12-23

## 2024-12-23 RX ORDER — SODIUM CHLORIDE 0.9 % (FLUSH) 0.9 %
3 SYRINGE (ML) INJECTION EVERY 12 HOURS SCHEDULED
Status: DISCONTINUED | OUTPATIENT
Start: 2024-12-23 | End: 2024-12-23 | Stop reason: HOSPADM

## 2024-12-23 RX ORDER — ATORVASTATIN CALCIUM 20 MG/1
80 TABLET, FILM COATED ORAL NIGHTLY
Status: DISCONTINUED | OUTPATIENT
Start: 2024-12-23 | End: 2024-12-24 | Stop reason: HOSPADM

## 2024-12-23 RX ORDER — MIDAZOLAM HYDROCHLORIDE 1 MG/ML
0.5 INJECTION, SOLUTION INTRAMUSCULAR; INTRAVENOUS
Status: DISCONTINUED | OUTPATIENT
Start: 2024-12-23 | End: 2024-12-23 | Stop reason: HOSPADM

## 2024-12-23 RX ORDER — HYDROCODONE BITARTRATE AND ACETAMINOPHEN 5; 325 MG/1; MG/1
1 TABLET ORAL ONCE AS NEEDED
Status: COMPLETED | OUTPATIENT
Start: 2024-12-23 | End: 2024-12-23

## 2024-12-23 RX ORDER — MELOXICAM 15 MG/1
15 TABLET ORAL ONCE
Status: DISCONTINUED | OUTPATIENT
Start: 2024-12-23 | End: 2024-12-23 | Stop reason: HOSPADM

## 2024-12-23 RX ORDER — MAGNESIUM HYDROXIDE 1200 MG/15ML
LIQUID ORAL AS NEEDED
Status: DISCONTINUED | OUTPATIENT
Start: 2024-12-23 | End: 2024-12-23 | Stop reason: HOSPADM

## 2024-12-23 RX ORDER — POLYETHYLENE GLYCOL 3350 17 G/17G
17 POWDER, FOR SOLUTION ORAL 2 TIMES DAILY
Qty: 238 G | Refills: 0 | Status: SHIPPED | OUTPATIENT
Start: 2024-12-23 | End: 2024-12-31

## 2024-12-23 RX ORDER — NALOXONE HCL 0.4 MG/ML
0.2 VIAL (ML) INJECTION AS NEEDED
Status: DISCONTINUED | OUTPATIENT
Start: 2024-12-23 | End: 2024-12-23 | Stop reason: HOSPADM

## 2024-12-23 RX ORDER — ONDANSETRON 4 MG/1
4 TABLET, FILM COATED ORAL EVERY 8 HOURS PRN
Qty: 10 TABLET | Refills: 0 | Status: SHIPPED | OUTPATIENT
Start: 2024-12-23

## 2024-12-23 RX ORDER — PROPOFOL 10 MG/ML
INJECTION, EMULSION INTRAVENOUS AS NEEDED
Status: DISCONTINUED | OUTPATIENT
Start: 2024-12-23 | End: 2024-12-23 | Stop reason: SURG

## 2024-12-23 RX ORDER — HYDROMORPHONE HYDROCHLORIDE 1 MG/ML
0.25 INJECTION, SOLUTION INTRAMUSCULAR; INTRAVENOUS; SUBCUTANEOUS
Status: DISCONTINUED | OUTPATIENT
Start: 2024-12-23 | End: 2024-12-23 | Stop reason: HOSPADM

## 2024-12-23 RX ORDER — FLUMAZENIL 0.1 MG/ML
0.2 INJECTION INTRAVENOUS AS NEEDED
Status: DISCONTINUED | OUTPATIENT
Start: 2024-12-23 | End: 2024-12-23 | Stop reason: HOSPADM

## 2024-12-23 RX ORDER — TRANEXAMIC ACID 100 MG/ML
INJECTION, SOLUTION INTRAVENOUS AS NEEDED
Status: DISCONTINUED | OUTPATIENT
Start: 2024-12-23 | End: 2024-12-23 | Stop reason: SURG

## 2024-12-23 RX ORDER — SODIUM CHLORIDE, SODIUM LACTATE, POTASSIUM CHLORIDE, CALCIUM CHLORIDE 600; 310; 30; 20 MG/100ML; MG/100ML; MG/100ML; MG/100ML
9 INJECTION, SOLUTION INTRAVENOUS CONTINUOUS
Status: ACTIVE | OUTPATIENT
Start: 2024-12-23 | End: 2024-12-24

## 2024-12-23 RX ORDER — PROMETHAZINE HYDROCHLORIDE 25 MG/1
25 TABLET ORAL ONCE AS NEEDED
Status: DISCONTINUED | OUTPATIENT
Start: 2024-12-23 | End: 2024-12-23 | Stop reason: HOSPADM

## 2024-12-23 RX ORDER — MELOXICAM 7.5 MG/1
15 TABLET ORAL ONCE
Status: DISCONTINUED | OUTPATIENT
Start: 2024-12-23 | End: 2024-12-23

## 2024-12-23 RX ORDER — LIDOCAINE HYDROCHLORIDE 20 MG/ML
INJECTION, SOLUTION EPIDURAL; INFILTRATION; INTRACAUDAL; PERINEURAL AS NEEDED
Status: DISCONTINUED | OUTPATIENT
Start: 2024-12-23 | End: 2024-12-23 | Stop reason: SURG

## 2024-12-23 RX ORDER — HYDRALAZINE HYDROCHLORIDE 20 MG/ML
5 INJECTION INTRAMUSCULAR; INTRAVENOUS
Status: DISCONTINUED | OUTPATIENT
Start: 2024-12-23 | End: 2024-12-23 | Stop reason: HOSPADM

## 2024-12-23 RX ORDER — SODIUM CHLORIDE 0.9 % (FLUSH) 0.9 %
3-10 SYRINGE (ML) INJECTION AS NEEDED
Status: DISCONTINUED | OUTPATIENT
Start: 2024-12-23 | End: 2024-12-23 | Stop reason: HOSPADM

## 2024-12-23 RX ORDER — ONDANSETRON 4 MG/1
4 TABLET, ORALLY DISINTEGRATING ORAL EVERY 6 HOURS PRN
Status: DISCONTINUED | OUTPATIENT
Start: 2024-12-23 | End: 2024-12-24 | Stop reason: HOSPADM

## 2024-12-23 RX ORDER — ACETAMINOPHEN 325 MG/1
650 TABLET ORAL EVERY 6 HOURS PRN
Status: DISCONTINUED | OUTPATIENT
Start: 2024-12-23 | End: 2024-12-24 | Stop reason: HOSPADM

## 2024-12-23 RX ORDER — HYDROCODONE BITARTRATE AND ACETAMINOPHEN 7.5; 325 MG/1; MG/1
1 TABLET ORAL EVERY 4 HOURS PRN
Status: DISCONTINUED | OUTPATIENT
Start: 2024-12-23 | End: 2024-12-23 | Stop reason: HOSPADM

## 2024-12-23 RX ORDER — PROMETHAZINE HYDROCHLORIDE 25 MG/1
25 SUPPOSITORY RECTAL ONCE AS NEEDED
Status: DISCONTINUED | OUTPATIENT
Start: 2024-12-23 | End: 2024-12-23 | Stop reason: HOSPADM

## 2024-12-23 RX ORDER — CHLORHEXIDINE GLUCONATE 500 MG/1
CLOTH TOPICAL 2 TIMES DAILY
Status: DISCONTINUED | OUTPATIENT
Start: 2024-12-23 | End: 2024-12-24 | Stop reason: HOSPADM

## 2024-12-23 RX ORDER — ALPRAZOLAM 0.25 MG/1
0.25 TABLET ORAL 3 TIMES DAILY PRN
Status: DISCONTINUED | OUTPATIENT
Start: 2024-12-23 | End: 2024-12-24 | Stop reason: HOSPADM

## 2024-12-23 RX ORDER — ASPIRIN 81 MG/1
TABLET ORAL
Qty: 60 TABLET | Refills: 0 | Status: SHIPPED | OUTPATIENT
Start: 2024-12-23

## 2024-12-23 RX ORDER — MAGNESIUM SULFATE HEPTAHYDRATE 500 MG/ML
INJECTION, SOLUTION INTRAMUSCULAR; INTRAVENOUS AS NEEDED
Status: DISCONTINUED | OUTPATIENT
Start: 2024-12-23 | End: 2024-12-23 | Stop reason: SURG

## 2024-12-23 RX ORDER — HYDROCODONE BITARTRATE AND ACETAMINOPHEN 7.5; 325 MG/1; MG/1
1 TABLET ORAL EVERY 4 HOURS PRN
Qty: 40 TABLET | Refills: 0 | Status: SHIPPED | OUTPATIENT
Start: 2024-12-23 | End: 2024-12-24 | Stop reason: HOSPADM

## 2024-12-23 RX ORDER — FENTANYL CITRATE 50 UG/ML
INJECTION, SOLUTION INTRAMUSCULAR; INTRAVENOUS AS NEEDED
Status: DISCONTINUED | OUTPATIENT
Start: 2024-12-23 | End: 2024-12-23 | Stop reason: SURG

## 2024-12-23 RX ORDER — PREGABALIN 75 MG/1
150 CAPSULE ORAL ONCE
Status: COMPLETED | OUTPATIENT
Start: 2024-12-23 | End: 2024-12-23

## 2024-12-23 RX ORDER — ONDANSETRON 2 MG/ML
4 INJECTION INTRAMUSCULAR; INTRAVENOUS ONCE AS NEEDED
Status: DISCONTINUED | OUTPATIENT
Start: 2024-12-23 | End: 2024-12-24 | Stop reason: HOSPADM

## 2024-12-23 RX ORDER — MELOXICAM 15 MG/1
15 TABLET ORAL DAILY
Qty: 14 TABLET | Refills: 0 | Status: SHIPPED | OUTPATIENT
Start: 2024-12-23

## 2024-12-23 RX ORDER — LABETALOL HYDROCHLORIDE 5 MG/ML
5 INJECTION, SOLUTION INTRAVENOUS
Status: DISCONTINUED | OUTPATIENT
Start: 2024-12-23 | End: 2024-12-23 | Stop reason: HOSPADM

## 2024-12-23 RX ORDER — AMLODIPINE BESYLATE 5 MG/1
5 TABLET ORAL 2 TIMES DAILY
Status: DISCONTINUED | OUTPATIENT
Start: 2024-12-23 | End: 2024-12-24 | Stop reason: HOSPADM

## 2024-12-23 RX ORDER — SODIUM CHLORIDE, SODIUM LACTATE, POTASSIUM CHLORIDE, CALCIUM CHLORIDE 600; 310; 30; 20 MG/100ML; MG/100ML; MG/100ML; MG/100ML
INJECTION, SOLUTION INTRAVENOUS CONTINUOUS PRN
Status: DISCONTINUED | OUTPATIENT
Start: 2024-12-23 | End: 2024-12-23 | Stop reason: SURG

## 2024-12-23 RX ORDER — EPHEDRINE SULFATE 50 MG/ML
5 INJECTION, SOLUTION INTRAVENOUS ONCE AS NEEDED
Status: DISCONTINUED | OUTPATIENT
Start: 2024-12-23 | End: 2024-12-23 | Stop reason: HOSPADM

## 2024-12-23 RX ORDER — ONDANSETRON 2 MG/ML
4 INJECTION INTRAMUSCULAR; INTRAVENOUS ONCE AS NEEDED
Status: DISCONTINUED | OUTPATIENT
Start: 2024-12-23 | End: 2024-12-23 | Stop reason: HOSPADM

## 2024-12-23 RX ORDER — MIDAZOLAM HYDROCHLORIDE 1 MG/ML
1 INJECTION, SOLUTION INTRAMUSCULAR; INTRAVENOUS
Status: DISCONTINUED | OUTPATIENT
Start: 2024-12-23 | End: 2024-12-23 | Stop reason: HOSPADM

## 2024-12-23 RX ADMIN — SODIUM CHLORIDE, POTASSIUM CHLORIDE, SODIUM LACTATE AND CALCIUM CHLORIDE: 600; 310; 30; 20 INJECTION, SOLUTION INTRAVENOUS at 11:28

## 2024-12-23 RX ADMIN — TRANEXAMIC ACID 1000 MG: 100 INJECTION, SOLUTION INTRAVENOUS at 11:59

## 2024-12-23 RX ADMIN — SODIUM CHLORIDE 2 G: 900 INJECTION INTRAVENOUS at 20:59

## 2024-12-23 RX ADMIN — MAGNESIUM SULFATE HEPTAHYDRATE 2 G: 500 INJECTION, SOLUTION INTRAMUSCULAR; INTRAVENOUS at 11:39

## 2024-12-23 RX ADMIN — ATORVASTATIN CALCIUM 80 MG: 20 TABLET, FILM COATED ORAL at 20:52

## 2024-12-23 RX ADMIN — LIDOCAINE HYDROCHLORIDE 100 MG: 20 INJECTION, SOLUTION EPIDURAL; INFILTRATION; INTRACAUDAL; PERINEURAL at 11:34

## 2024-12-23 RX ADMIN — HYDROCODONE BITARTRATE AND ACETAMINOPHEN 1 TABLET: 7.5; 325 TABLET ORAL at 22:19

## 2024-12-23 RX ADMIN — HYDROCODONE BITARTRATE AND ACETAMINOPHEN 1 TABLET: 7.5; 325 TABLET ORAL at 18:01

## 2024-12-23 RX ADMIN — FENTANYL CITRATE 50 MCG: 50 INJECTION, SOLUTION INTRAMUSCULAR; INTRAVENOUS at 11:39

## 2024-12-23 RX ADMIN — ROCURONIUM BROMIDE 60 MG: 10 INJECTION, SOLUTION INTRAVENOUS at 11:34

## 2024-12-23 RX ADMIN — TRANEXAMIC ACID 1000 MG: 100 INJECTION, SOLUTION INTRAVENOUS at 12:20

## 2024-12-23 RX ADMIN — SODIUM CHLORIDE, POTASSIUM CHLORIDE, SODIUM LACTATE AND CALCIUM CHLORIDE 500 ML: 600; 310; 30; 20 INJECTION, SOLUTION INTRAVENOUS at 09:24

## 2024-12-23 RX ADMIN — PREGABALIN 150 MG: 75 CAPSULE ORAL at 09:24

## 2024-12-23 RX ADMIN — SODIUM CHLORIDE 1000 MG: 0.9 INJECTION, SOLUTION INTRAVENOUS at 09:44

## 2024-12-23 RX ADMIN — ACETAMINOPHEN 1000 MG: 1000 INJECTION INTRAVENOUS at 11:39

## 2024-12-23 RX ADMIN — FENTANYL CITRATE 50 MCG: 50 INJECTION, SOLUTION INTRAMUSCULAR; INTRAVENOUS at 09:42

## 2024-12-23 RX ADMIN — PROPOFOL 150 MCG/KG/MIN: 10 INJECTION, EMULSION INTRAVENOUS at 11:34

## 2024-12-23 RX ADMIN — MIDAZOLAM 0.5 MG: 1 INJECTION INTRAMUSCULAR; INTRAVENOUS at 09:42

## 2024-12-23 RX ADMIN — HYDROMORPHONE HYDROCHLORIDE 0.5 MG: 1 INJECTION, SOLUTION INTRAMUSCULAR; INTRAVENOUS; SUBCUTANEOUS at 12:57

## 2024-12-23 RX ADMIN — SUGAMMADEX 200 MG: 100 INJECTION, SOLUTION INTRAVENOUS at 12:49

## 2024-12-23 RX ADMIN — FAMOTIDINE 20 MG: 10 INJECTION INTRAVENOUS at 09:42

## 2024-12-23 RX ADMIN — DEXAMETHASONE SODIUM PHOSPHATE 8 MG: 4 INJECTION, SOLUTION INTRAMUSCULAR; INTRAVENOUS at 11:39

## 2024-12-23 RX ADMIN — HYDROCODONE BITARTRATE AND ACETAMINOPHEN 1 TABLET: 5; 325 TABLET ORAL at 13:39

## 2024-12-23 RX ADMIN — PROPOFOL 150 MG: 10 INJECTION, EMULSION INTRAVENOUS at 11:34

## 2024-12-23 RX ADMIN — FENTANYL CITRATE 50 MCG: 50 INJECTION, SOLUTION INTRAMUSCULAR; INTRAVENOUS at 11:58

## 2024-12-23 RX ADMIN — SODIUM CHLORIDE, POTASSIUM CHLORIDE, SODIUM LACTATE AND CALCIUM CHLORIDE 9 ML/HR: 600; 310; 30; 20 INJECTION, SOLUTION INTRAVENOUS at 11:25

## 2024-12-23 RX ADMIN — Medication 3 ML: at 09:43

## 2024-12-23 RX ADMIN — DEXAMETHASONE SODIUM PHOSPHATE 4 MG: 4 INJECTION, SOLUTION INTRA-ARTICULAR; INTRALESIONAL; INTRAMUSCULAR; INTRAVENOUS; SOFT TISSUE at 09:45

## 2024-12-23 RX ADMIN — AMLODIPINE BESYLATE 5 MG: 5 TABLET ORAL at 20:52

## 2024-12-23 RX ADMIN — ROPIVACAINE HYDROCHLORIDE 20 ML: 5 INJECTION EPIDURAL; INFILTRATION; PERINEURAL at 09:45

## 2024-12-23 RX ADMIN — SODIUM CHLORIDE 2 G: 900 INJECTION INTRAVENOUS at 11:24

## 2024-12-23 RX ADMIN — OXYBUTYNIN CHLORIDE 5 MG: 5 TABLET, EXTENDED RELEASE ORAL at 18:00

## 2024-12-23 NOTE — OP NOTE
Name: Marie Foy    YOB: 1955    DATE OF SURGERY: 12/23/2024    PREOPERATIVE DIAGNOSIS: Left knee end-stage osteoarthritis    POSTOPERATIVE DIAGNOSIS: Left knee end-stage osteoarthritis    PROCEDURE PERFORMED: Left total knee replacement     SURGEON: Saul To M.D.    ASSISTANT: ALESSANDRO PICKERING    A surgical assistant was integral in ensuring a successful outcome with this procedure.  The assistant was utilized to assist in positioning the patient, draping the patient, was used throughout the case to provide with retraction of tissues, suctioning of blood and body fluids for visualization, positioning of the extremity to allow for proper exposure so that I could perform the procedure.  Without the use of a surgical assistant during this procedure I feel that the outcome may have been compromised or would have been suboptimal or at risk for complications.    IMPLANTS: [employer]:     Implant Name Type Inv. Item Serial No.  Lot No. LRB No. Used Action   CMT BONE PALACOS R HI/VISC 1X40 - [id number] Implant CMT BONE PALACOS R HI/VISC 1X40  [facility] [id number] Left 1 Implanted   DEV CONTRL TISS STRATAFIX SYMM PDS PLUS JAMEY CT-1 60CM - [id number] Implant DEV CONTRL TISS STRATAFIX SYMM PDS PLUS JAMEY CT-1 60CM  [employer]  DIV [employer] J [employer] J [id number] Left 1 Implanted   DEV CONTRL TISS STRATAFIXSPIRALMNCRYL PLSPS2 REV3/0 45CM - [id number] Implant DEV CONTRL TISS STRATAFIXSPIRALMNCRYL PLSPS2 REV3/0 45CM  [employer]  DIV OF J AND J [id number] Left 1 Implanted   PAT GEN2 BICONVEX [id number] - [id number] Implant PAT GEN2 BICONVEX [id number]  [employer] [id number] Left 1 Implanted   BASE TIB/KN GEN2 NONPOR TI SZ2 LT - [id number] Implant BASE TIB/KN GEN2 NONPOR TI SZ2 LT  [employer] [id number] Left 1 Implanted   COMP FEM LEGION OXINIUM PS NRW SZ3N RT - [id number] Implant COMP FEM LEGION OXINIUM PS NRW SZ3N RT  [employer] [id number] Left 1 Implanted   INSRT ART/KN LEGION PS HF XLPE SZ1TO2  10MM - [id number] Implant INSRT ART/KN LEGION PS HF XLPE SZ1TO2 10MM  [employer] [id number] Left 1 Implanted       Estimated Blood Loss: 200cc  Specimens : none  Complications: none    DESCRIPTION OF PROCEDURE: The patient was taken to the operating room and placed in the supine position. A sequential compression device was carefully placed on the non-operative leg. Preoperative antibiotics were administered. Surgical time out was performed. After adequate induction of anesthesia, the leg was prepped and draped in the usual sterile fashion, exsanguinated with an Esmarch bandage and the tourniquet inflated to 250 mmHg. A midline incision was performed followed by a medial parapatellar arthrotomy. The patella was subluxed laterally.  A portion of the fat pad, ACL, and anterior horns of the meniscus were excised.  A drill hole was then placed in the center of the femoral canal in line with the canal.  It was irrigated and suctioned.  The intramedullary deng was then placed and a 5 degree distal valgus cut was performed after the block pinned in place appropriately.  Cut surface was then removed.  The sizing and rotation guide was then placed and seated appropriately.  It was sized and then the drill holes for the 4-in-1 cutting guide were placed in 3 degrees of external rotation based off of the posterior condyles.  The 4-in-1 cutting block was then placed and the femoral cuts were performed.  The excess bone was removed and the cut surfaces looked good.  At this point we placed the retractors around the proximal tibia and a slight release of the PCL fibers off of the posterior proximal tibia was performed.  We used the extramedullary tibial alignment guide and it was aligned appropriately and then the depth was set and the block pinned in place.  The tibial cut was then performed and the alignment guide was removed.  The tibial cut was removed and the cut surface looked good.  The posterior horns of the menisci  were then removed as well as the posterior osteophytes.  Flexion extension blocks were then used to check the balance of the knee. The tibial cut surface was then sized with the sizing templates and the tibial and femoral trial were then placed. The knee was placed in full extension and then the tibial tray rotation was then matched to the femoral rotation and marked.    Attention was then placed to the patella. The patella was noted to track centrally through range of motion. The patella was then sized with the trials. The thickness of the patella was then measured. The patella was resurfaced and the surrounding osteophytes were removed. The preoperative thickness was reproduced. The patella tracked centrally through range of motion.  We then checked the balance with the trial implants in place and there was excellent medial lateral and flexion-extension balance.    At this point all trial components were removed, the knee was copiously irrigated with pulsed lavage, and the knee was injected with anesthetic cocktail solution. The cut surfaces were then dried with clean lap sponges, and the components were cemented tibia, followed by femur, then patella. The knee was held in full extension and all excess cement was removed. The knee was held still until the cement had completely hardened. We then placed the trial polyethylene spacer which resulted in full extension and excellent flexion-extension balance. We placed the final polyethylene spacer.   The knee was then copiously irrigated. The tourniquet was then released. There was excellent hemostasis. We placed a one-eighth inch Hemovac drain. We closed the knee in multiple layers in standard fashion. Sterile dressing were applied. At the end of the case, the sponge and needle counts were reported as being correct. There were no known complications. The patient was then transported to the recovery room.      Saul To M.D.

## 2024-12-23 NOTE — ANESTHESIA POSTPROCEDURE EVALUATION
"Patient: Marie Foy    Procedure Summary       Date: 12/23/24 Room / Location:  [facility] [address] [place] [address] [facility]    Anesthesia Start: 1128 Anesthesia Stop: 1316    Procedure: TOTAL KNEE ARTHROPLASTY (Left: Knee) Diagnosis:       Primary osteoarthritis of left knee      (Primary osteoarthritis of left knee [M17.12])    Surgeons: Saul To MD Provider: Daron Izaguirre MD    Anesthesia Type: general ASA Status: 3            Anesthesia Type: general    Vitals  Vitals Value Taken Time   /54 12/23/24 1330   Temp     Pulse 55 12/23/24 1343   Resp     SpO2 97 % 12/23/24 1343   Vitals shown include unfiled device data.        Post Anesthesia Care and Evaluation    Pain management: adequate    Airway patency: patent  Anesthetic complications: No anesthetic complications    Cardiovascular status: acceptable  Respiratory status: acceptable  Hydration status: acceptable    Comments: /49 (BP Location: Right arm, Patient Position: Lying)   Pulse 70   Temp 36.7 °C (98.1 °F) (Oral)   Resp 16   Ht 147.3 cm (57.99\")   Wt 72.3 kg (159 lb 6.3 oz)   SpO2 92%   BMI 33.32 kg/m²       "

## 2024-12-23 NOTE — PLAN OF CARE
Pt. VS WNL. C/o incisional pain at times, relieved by PO pain meds. Pt. Lethargic postop, arouses to voice. Pt. S/p left total knee arthroplasty today, incision wrapped, with chanel dressing in place, green light blinking ok, dressing CDI. Pt. A&O x4. Pt. To get OOB and to chair after dinner. Pt. With SCDs for DVT proph. Pt. Has voided twice postop. Pt. Educated to turn Q2H and use IS. Pt. With icepack to left knee. Pt. Resting comfortably at present, will continue to monitor closely for the remainder of this RN's shift.      Problem: Adult Inpatient Plan of Care  Goal: Plan of Care Review  Outcome: Progressing  Flowsheets (Taken 12/23/2024 1721)  Progress: improving  Plan of Care Reviewed With: patient

## 2024-12-23 NOTE — ANESTHESIA PREPROCEDURE EVALUATION
Anesthesia Evaluation     history of anesthetic complications (Hard to Intubate--needs small tube due to scar tissue from previous surgery):   NPO Solid Status: > 8 hours  NPO Liquid Status: > 2 hours           Airway   Mallampati: II  Neck ROM: full  Possible difficult intubation  Dental - normal exam     Pulmonary - negative pulmonary ROS and normal exam   (-) COPD, asthma, sleep apnea, not a smoker    PE comment: nonlabored  Cardiovascular - normal exam  Exercise tolerance: good (4-7 METS)    Rhythm: regular  Rate: normal    (+) hypertension, valvular problems/murmurs murmur, hyperlipidemia  (-) past MI, CAD, dysrhythmias, angina    ROS comment: Stress Test 10/2023:  ·  Left ventricular ejection fraction is hyperdynamic (Calculated EF > 70%).  ·  Myocardial perfusion imaging indicates a normal myocardial perfusion study with no evidence of ischemia.  ·  Impressions are consistent with a low risk study.  ·  Breast attenuation artifact is present.  ·  Compared to the prior study from 9/14/2017 the current study reveals no changes.         Neuro/Psych  (+) psychiatric history Anxiety and Bipolar  (-) seizures, TIA, CVA  GI/Hepatic/Renal/Endo    (+) obesity, GERD, thyroid problem (h/o Grave's disease) hypothyroidism  (-) liver disease, no renal diseaseDiabetes: preDM.    Musculoskeletal     (+) arthralgias  Abdominal    Substance History Drug use: History of substance abuse.     OB/GYN          Other   arthritis,   history of cancer (colon cancer)    ROS/Med Hx Other: Unilateral partial paralysis of vocal cords or larynx        Phys Exam Other: Hoarse voice              Anesthesia Plan    ASA 3     general   total IV anesthesia  (H/o difficult intubation but Gr IIa view with MAC 3 in past and 6.0 ETT passed easily;    AC block for post-op pain PSR)  intravenous induction     Anesthetic plan, risks, benefits, and alternatives have been provided, discussed and informed consent has been obtained with:  patient.      CODE STATUS:

## 2024-12-23 NOTE — ANESTHESIA PROCEDURE NOTES
Airway  Urgency: elective    Date/Time: 12/23/2024 11:38 AM    General Information and Staff    Patient location during procedure: OR  CRNA/CAA: Marie Reyna CRNA    Indications and Patient Condition  Indications for airway management: airway protection    Preoxygenated: yes  Mask difficulty assessment: 1 - vent by mask    Final Airway Details  Final airway type: endotracheal airway      Successful airway: ETT  Cuffed: yes   Successful intubation technique: direct laryngoscopy  Facilitating devices/methods: intubating stylet  Endotracheal tube insertion site: oral  Blade: John  Blade size: 3  ETT size (mm): 7.0  Cormack-Lehane Classification: grade IIa - partial view of glottis  Placement verified by: chest auscultation and capnometry   Cuff volume (mL): 6  Measured from: lips  Number of attempts at approach: 1  Assessment: lips, teeth, and gum same as pre-op and atraumatic intubation

## 2024-12-24 ENCOUNTER — READMISSION MANAGEMENT (OUTPATIENT)
Dept: CALL CENTER | Facility: HOSPITAL | Age: 69
End: 2024-12-24
Payer: COMMERCIAL

## 2024-12-24 VITALS
BODY MASS INDEX: 33.46 KG/M2 | TEMPERATURE: 98.1 F | DIASTOLIC BLOOD PRESSURE: 68 MMHG | WEIGHT: 159.39 LBS | RESPIRATION RATE: 16 BRPM | OXYGEN SATURATION: 93 % | SYSTOLIC BLOOD PRESSURE: 127 MMHG | HEIGHT: 58 IN | HEART RATE: 70 BPM

## 2024-12-24 LAB
HCT VFR BLD AUTO: 33 % (ref 34–46.6)
HGB BLD-MCNC: 11 G/DL (ref 12–15.9)

## 2024-12-24 PROCEDURE — 97162 PT EVAL MOD COMPLEX 30 MIN: CPT

## 2024-12-24 PROCEDURE — G0378 HOSPITAL OBSERVATION PER HR: HCPCS

## 2024-12-24 PROCEDURE — 25010000002 CEFAZOLIN PER 500 MG: Performed by: NURSE PRACTITIONER

## 2024-12-24 PROCEDURE — 85018 HEMOGLOBIN: CPT | Performed by: NURSE PRACTITIONER

## 2024-12-24 PROCEDURE — 97110 THERAPEUTIC EXERCISES: CPT

## 2024-12-24 PROCEDURE — 85014 HEMATOCRIT: CPT | Performed by: NURSE PRACTITIONER

## 2024-12-24 PROCEDURE — 99024 POSTOP FOLLOW-UP VISIT: CPT | Performed by: ORTHOPAEDIC SURGERY

## 2024-12-24 RX ORDER — HYDROMORPHONE HYDROCHLORIDE 2 MG/1
2 TABLET ORAL EVERY 4 HOURS PRN
Qty: 40 TABLET | Refills: 0 | Status: SHIPPED | OUTPATIENT
Start: 2024-12-24

## 2024-12-24 RX ADMIN — AMLODIPINE BESYLATE 5 MG: 5 TABLET ORAL at 08:46

## 2024-12-24 RX ADMIN — OXYBUTYNIN CHLORIDE 5 MG: 5 TABLET, EXTENDED RELEASE ORAL at 08:46

## 2024-12-24 RX ADMIN — PANTOPRAZOLE SODIUM 40 MG: 40 TABLET, DELAYED RELEASE ORAL at 05:39

## 2024-12-24 RX ADMIN — LEVOTHYROXINE SODIUM 100 MCG: 0.1 TABLET ORAL at 08:46

## 2024-12-24 RX ADMIN — ASPIRIN 81 MG: 81 TABLET, COATED ORAL at 08:46

## 2024-12-24 RX ADMIN — HYDROCODONE BITARTRATE AND ACETAMINOPHEN 1 TABLET: 7.5; 325 TABLET ORAL at 02:17

## 2024-12-24 RX ADMIN — SODIUM CHLORIDE 2 G: 900 INJECTION INTRAVENOUS at 04:12

## 2024-12-24 RX ADMIN — HYDROCODONE BITARTRATE AND ACETAMINOPHEN 1 TABLET: 7.5; 325 TABLET ORAL at 06:23

## 2024-12-24 RX ADMIN — HYDROCODONE BITARTRATE AND ACETAMINOPHEN 1 TABLET: 7.5; 325 TABLET ORAL at 10:59

## 2024-12-24 NOTE — THERAPY TREATMENT NOTE
Patient Name: Marie Foy  : 1955    MRN: [id number]                              Today's Date: 2024       Admit Date: 2024    Visit Dx:     ICD-10-CM ICD-9-CM   1. S/P total knee replacement, left  Z96.652 V43.65   2. Primary osteoarthritis of left knee  M17.12 715.16     Patient Active Problem List   Diagnosis    Anxiety    Gastroesophageal reflux disease    Herpes simplex type 2 infection    Hypercholesterolemia    Hypothyroidism    Overactive bladder    Unilateral partial paralysis of vocal cords or larynx    Essential (primary) hypertension    Primary localized osteoarthrosis of left lower leg    Osteoporosis    Malignant neoplasm of descending colon    Prediabetes    OA (osteoarthritis) of knee     Past Medical History:   Diagnosis Date    Anxiety     Arthritis     Arthritis of back     Benign cardiac murmur     Colon cancer     Colon polyps     Constipation     Depression     LOSS OF SPOUSE 4 YRS AGO.    Fibrocystic breast     Fracture, foot  problem resolved    Genital herpes     GERD (gastroesophageal reflux disease) unk    controlled w/meds    Graves disease     Hard to intubate     R/T SMALLER THAN NORMAL ADULT AIRWAY RELATED TO PREVIOUS SURGERY AND SCAR TISSUE.    Hearing loss     RIGHT WORST THEN LEFT. AS RESULT CHRONIC OTITIS MEDIA. HOLE RIGHT EAR DRUM    Heart murmur     History of bipolar disorder     STATES NOT TREATED. NO SAVANNA PHASES    History of substance abuse     Hoarseness     DAMAGE TO VOCAL CORD AS CHILD.     Hyperlipidemia     Hypertension     Joint pain     swelling    Knee swelling Unk    Osteopenia unk    Prolia injections    Stress fracture  problem resolved    Thyroid disease      Past Surgical History:   Procedure Laterality Date    ANKLE OPEN REDUCTION INTERNAL FIXATION      ANKLE SURGERY Left     ORIF WITH HARDWARE    BREAST BIOPSY Right     BENIGN    CATARACT EXTRACTION W/ INTRAOCULAR LENS IMPLANT      LEFT AND RIGHT    COLON RESECTION N/A  03/02/2021    Procedure: LAPAROSCOPIC TRANSVERSE COLECTOMY WITH DAVINCI ROBOT;  Surgeon: Martin Calvo MD;  Location: [facility] MAIN OR;  Service: DaVinci;  Laterality: N/A;    COLONOSCOPY N/A 08/23/2016    Procedure: COLONOSCOPY TO CECUM & T.I.  WITH SALINE INJECTION, HOT SNARE POLYPECTOMY, COLD POLYPECTOMY AND CLIP PLACEMENT X 1;  Surgeon: aDrryl Garcia MD;  Location:  [facility];  Service:     COLONOSCOPY N/A 01/26/2021    Procedure: COLONOSCOPY to cecum with cold biopsyies and cold biopsy polypectomy and tattoo needle injection of colonic ulcer;  Surgeon: Darryl Garcia MD;  Location:  [facility];  Service: Gastroenterology;  Laterality: N/A;  pre: hx of olyps  post:  colonic ulcer, polyp    COLONOSCOPY N/A 05/12/2022    Procedure: COLONOSCOPY to anastamosis with hot snare polypectomy;  Surgeon: Martin Calvo MD;  [facility];  Service: General;  Laterality: N/A;  Pre: History of colon cancer  Post: polyp    COLONOSCOPY N/A 06/18/2024    Procedure: COLONOSCOPY to cecum;  Surgeon: Martin Calvo MD;  Location:  [facility];  Service: General;  Laterality: N/A;  pre personal hx colon ca post diverticulosis    COLPORRHAPHY  2008    EAR TUBES      MEENA. MULTI TIMES    EYE SURGERY  Unknown    cateracts    FOOT SURGERY Right     STRESS FRACTURE. SURGERY X2 WITH HARDWARE IN PLACE    HAND SURGERY Right     FOR FRACTURE WITH HARDWARE IN PLACE    HYSTERECTOMY  2005    WITH BLADDER SLING    JOINT REPLACEMENT  2011    OTHER SURGICAL HISTORY  2006    TVT    REPLACEMENT TOTAL KNEE Right     SACROCOLPOPEXY  2007    POSTERIOR COLPORRHAPHY, PERINEORRHAPHY      THROAT SURGERY      childhood-PUNCTURED ESOPHAGUS AT AGE OF 10.  MULTI SURGERY TO AREA    TOTAL KNEE ARTHROPLASTY Right 2011      General Information       Row Name 12/24/24 0062          Physical Therapy Time and Intention    Document Type evaluation  -PC     Mode of Treatment physical therapy  -PC       Row Name 12/24/24 9908           General Information    Patient Profile Reviewed yes  -PC     Existing Precautions/Restrictions fall  -PC     Barriers to Rehab none identified  -PC       Row Name 12/24/24 1057          Living Environment    People in Home alone  pt is going to stay with her sister in law  -PC       Row Name 12/24/24 1057          Home Main Entrance    Number of Stairs, Main Entrance none  just the threshold to enter  -PC       Row Name 12/24/24 1057          Stairs Within Home, Primary    Number of Stairs, Within Home, Primary none  -PC       Row Name 12/24/24 1057          Cognition    Orientation Status (Cognition) oriented x 4  -PC       Row Name 12/24/24 1057          Safety Issues/Impairments Affecting Functional Mobility    Impairments Affecting Function (Mobility) balance;endurance/activity tolerance;pain;range of motion (ROM);strength  -PC               User Key  (r) = Recorded By, (t) = Taken By, (c) = Cosigned By      Initials Name Provider Type    PC Kaylee Bartlett, PT Physical Therapist                   Mobility       Row Name 12/24/24 1058          Bed Mobility    Bed Mobility supine-sit  -PC     Supine-Sit [place] (Bed Mobility) standby assist  -PC       Row Name 12/24/24 1058          Sit-Stand Transfer    Sit-Stand [place] (Transfers) standby assist  -PC     Assistive Device (Sit-Stand Transfers) walker, front-wheeled  -PC       Row Name 12/24/24 1058          Gait/Stairs (Locomotion)    [place] Level (Gait) standby assist  -PC     Assistive Device (Gait) walker, front-wheeled  -PC     Distance in Feet (Gait) 15  -PC     Deviations/Abnormal Patterns (Gait) antalgic;gait speed decreased;stride length decreased  -PC     Bilateral Gait Deviations forward flexed posture;heel strike decreased  -PC     Comment, (Gait/Stairs) significant pain limiting gait distance, pt self limiting weight through LLE due to pain  -PC               User Key  (r) = Recorded By, (t) = Taken By, (c) = Cosigned By       Initials Name Provider Type    Kaylee Alberto PT Physical Therapist                   Obj/Interventions       Row Name 12/24/24 1100          Range of Motion Comprehensive    Comment, General Range of Motion WFL x L knee  -PC       Row Name 12/24/24 1100          Strength Comprehensive (MMT)    Comment, General Manual Muscle Testing (MMT) Assessment WFL x L LE  -PC       Row Name 12/24/24 1100          Motor Skills    Therapeutic Exercise --  5 reps TKR ex, poor toleration due to pain, needed some assist to initiate ex  -PC       Row Name 12/24/24 1100          Balance    Comment, Balance WFL  -PC       Row Name 12/24/24 1100          Sensory Assessment (Somatosensory)    Sensory Assessment (Somatosensory) sensation intact  -PC               User Key  (r) = Recorded By, (t) = Taken By, (c) = Cosigned By      Initials Name Provider Type    Kaylee Alberto, PT Physical Therapist                   Goals/Plan    No documentation.                  Clinical Impression       Row Name 12/24/24 1101          Pain    Pretreatment Pain Rating 5/10  -PC     Posttreatment Pain Rating 10/10  -PC     Pain Location knee  -PC     Pain Side/Orientation left  -PC     Pain Management Interventions cold applied;positioning techniques utilized  -PC     Pre/Posttreatment Pain Comment RN came in to give pain meds  -PC       Row Name 12/24/24 1101          Plan of Care Review    Plan of Care Reviewed With patient  -PC     Outcome Evaluation Pt is POD 1 L TKR, she is independent at baseline, lives alone, plans to go to her sister-in-law's home at discharge. Pt was in significant pain today and was limited due to this, but able to mobilize safely with good quad control and able to walk with SBA with a rolling walker. She was limited in gait distance and ability to tolerate weight through LLE and needed some assist with exercises. No stairs to maneuver at home .Pt is appropriate to go home with assist and cont PT with home health  -PC        Row Name 12/24/24 1101          Positioning and Restraints    Pre-Treatment Position in bed  -PC     Post Treatment Position chair  -PC     In Chair reclined;call light within reach;encouraged to call for assist;exit alarm on  -PC               User Key  (r) = Recorded By, (t) = Taken By, (c) = Cosigned By      Initials Name Provider Type    PC Kaylee Bartlett, PT Physical Therapist                   Outcome Measures       Row Name 12/24/24 1105          How much help from another person do you currently need...    Turning from your back to your side while in flat bed without using bedrails? 4  -PC     Moving from lying on back to sitting on the side of a flat bed without bedrails? 4  -PC     Moving to and from a bed to a chair (including a wheelchair)? 4  -PC     Standing up from a chair using your arms (e.g., wheelchair, bedside chair)? 4  -PC     Climbing 3-5 steps with a railing? 3  -PC     To walk in hospital room? 3  -PC     AM-PAC 6 Clicks Score (PT) 22  -PC               User Key  (r) = Recorded By, (t) = Taken By, (c) = Cosigned By      Initials Name Provider Type    PC Kaylee Bartlett, PT Physical Therapist                                 Physical Therapy Education       Title: PT OT SLP Therapies (Done)       Topic: Physical Therapy (Done)       Point: Mobility training (Done)       Learning Progress Summary            Patient Acceptance, E,D, DU by PC at 12/24/2024 1105                      Point: Home exercise program (Done)       Learning Progress Summary            Patient Acceptance, E,D, DU by PC at 12/24/2024 1105                      Point: Body mechanics (Done)       Learning Progress Summary            Patient Acceptance, E,D, DU by PC at 12/24/2024 1105                      Point: Precautions (Done)       Learning Progress Summary            Patient Acceptance, E,D, DU by PC at 12/24/2024 1105                                      User Key       Initials Effective Dates Name Provider Type  Discipline    PC 06/16/21 -  Kaylee Bartlett PT Physical Therapist PT                  PT Recommendation and Plan     Outcome Evaluation: Pt is POD 1 L TKR, she is independent at baseline, lives alone, plans to go to her sister-in-law's home at discharge. Pt was in significant pain today and was limited due to this, but able to mobilize safely with good quad control and able to walk with SBA with a rolling walker. She was limited in gait distance and ability to tolerate weight through LLE and needed some assist with exercises. No stairs to maneuver at home .Pt is appropriate to go home with assist and cont PT with home health     Time Calculation:         PT Charges       Row Name 12/24/24 1105             Time Calculation    Start Time 1034  -PC      Stop Time 1054  -PC      Time Calculation (min) 20 min  -PC      PT Received On 12/24/24  -PC                User Key  (r) = Recorded By, (t) = Taken By, (c) = Cosigned By      Initials Name Provider Type    PC Kaylee Bartlett PT Physical Therapist                  Therapy Charges for Today       Code Description Service Date Service Provider Modifiers Qty    21927574318 HC PT EVAL MOD COMPLEXITY 2 12/24/2024 Kaylee Bartlett, PT GP 1    94822775584 HC PT THER PROC EA 15 MIN 12/24/2024 Kaylee Bartlett, PT GP 1            PT G-Codes  AM-PAC 6 Clicks Score (PT): 22       Kaylee Bartlett PT  12/24/2024

## 2024-12-24 NOTE — DISCHARGE PLACEMENT REQUEST
"Marie Foy (69 y.o. Female)       Date of Birth   1955    Social Security Number       Address   [address] [address]    Home Phone   [phone]    MRN   [id number]       Worship   Qu[employer]    Marital Status                               Admission Date   12/23/24    Admission Type   Elective    Admitting Provider   Saul To MD    Attending Provider   Saul To MD    Department, Room/Bed   [address], P785/1       Discharge Date       Discharge Disposition   Home-Health Care [facility]    Discharge Destination                                 Attending Provider: Saul To MD    Allergies: Penicillins, Codeine, Morphine And Codeine    Isolation: None   Infection: None   Code Status: Prior    Ht: 147.3 cm (57.99\")   Wt: 72.3 kg (159 lb 6.3 oz)    Admission Cmt: None   Principal Problem: OA (osteoarthritis) of knee [M17.9]                   Active Insurance as of 12/23/2024       Primary Coverage       Payor Plan Insurance Group Employer/Plan Group    ANTH BLUE CROSS ANTH BLUE CROSS BLUE SHIELD PPO [id number]       Payor Plan Address Payor Plan Phone Number Payor Plan Fax Number Effective Dates    [address] [phone]  1/1/2024 - None Entered    [facility] [id number]         Subscriber Name Subscriber Birth Date Member ID       MARIE FOY 1955 [id number]               Secondary Coverage       Payor Plan Insurance Group Employer/Plan Group    [employer] [place] [employer] [place] G       Payor Plan Address Payor Plan Phone Number Payor Plan Fax Number Effective Dates    [address] [phone]  7/1/2020 - None Entered    [facility] [id number]         Subscriber Name Subscriber Birth Date Member ID       MARIE FOY 1955 [id number]               Tertiary Coverage       Payor Plan Insurance Group Employer/Plan Group    MEDICARE MEDICARE A & B        Payor Plan Address Payor Plan Phone Number Payor Plan Fax Number " Effective Dates    [address] [phone]  6/1/2018 - None Entered    [facility] [id number]         Subscriber Name Subscriber Birth Date Member ID       RENAN RATLIFF 1955 [id number]                     Emergency Contacts        (Rel.) Home Phone Work Phone Mobile Phone    DOUG LEAHY (Daughter) [phone] -- [phone]    TlPadmini (Daughter) [phone] -- [phone]

## 2024-12-24 NOTE — DISCHARGE SUMMARY
Patient Name: Marie Foy  Patient YOB: 1955    Date of Admission:  12/23/2024  Date of Discharge:  12/24/2024  Discharge Diagnosis: [place] ARTHRP KNE CONDYLE&PLATU MEDIAL&LAT COMPARTMENTS [01483] (TOTAL KNEE ARTHROPLASTY)  Presenting Problem/History of Present Illness: Primary osteoarthritis of left knee [M17.12]  OA (osteoarthritis) of knee [M17.9]  Admitting Physician: Dr Saul To  Consults:   Consults       No orders found for last 30 day(s).            DETAILS OF HOSPITAL STAY:  Patient is a 69 y.o. female was admitted to the floor following the above procedure and underwent an uncomplicated hospital stay.  Patient did well with physical therapy and was ambulating well without problems.  On the day of discharge the wound was clean, dry and intact and calf was soft and nontender and Homans sign was negative.  Patient was tolerating  without problems.  Patient will be discharged home.    Condition on Discharge:  Stable    Vital Signs  Temp:  [97.3 °F (36.3 °C)-98.1 °F (36.7 °C)] 98.1 °F (36.7 °C)  Heart Rate:  [56-70] 70  Resp:  [16-20] 16  BP: (104-140)/(50-69) 127/68    LABS:      Admission on 12/23/2024   Component Date Value Ref Range Status    Hemoglobin 12/24/2024 11.0 (L)  12.0 - 15.9 g/dL Final    Hematocrit 12/24/2024 33.0 (L)  34.0 - 46.6 % Final       No results found.    Discharge Medications     Discharge Medications        New Medications        Instructions Start Date   Aspirin Low Dose 81 MG EC tablet  Generic drug: aspirin   Take 1 tablet by mouth twice daily for 14 days; then take 1 tablet by mouth daily for 28 days      HYDROmorphone 2 MG tablet  Commonly known as: Dilaudid   2 mg, Oral, Every 4 Hours PRN      meloxicam 15 MG tablet  Commonly known as: MOBIC   15 mg, Oral, Daily      ondansetron 4 MG tablet  Commonly known as: Zofran   4 mg, Oral, Every 8 Hours PRN      polyethylene glycol 17 GM/SCOOP powder  Commonly known as: MIRALAX   17 g, Oral, 2 Times Daily              Continue These Medications        Instructions Start Date   ALPRAZolam 0.25 MG tablet  Commonly known as: XANAX   0.25 mg, Oral, 3 Times Daily PRN      amLODIPine 10 MG tablet  Commonly known as: NORVASC   10 mg, Oral, Daily      atorvastatin 80 MG tablet  Commonly known as: LIPITOR   80 mg, Oral, Daily      Ferrous Sulfate 28 MG tablet   Take  by mouth. 3 days a week      levothyroxine 100 MCG tablet  Commonly known as: SYNTHROID, LEVOTHROID   100 mcg, Oral, Daily      omeprazole 40 MG capsule  Commonly known as: priLOSEC   TAKE 1 CAPSULE BY MOUTH DAILY      solifenacin 5 MG tablet  Commonly known as: VESICARE   TAKE 1 TABLET BY MOUTH ONCE DAILY      valACYclovir 1000 MG tablet  Commonly known as: VALTREX   1,000 mg, Oral, Daily      vitamin B-12 1000 MCG tablet  Commonly known as: CYANOCOBALAMIN   1,000 mcg, Daily             Stop These Medications      calcium citrate-vitamin d 200-250 MG-UNIT tablet tablet  Commonly known as: CITRACAL     MULTIVITAMINS PLUS ZINC PO              Discharge Instructions: Patient is to continue with physical therapy exercises daily and continue working with the physical therapist as ordered. Patient may weight bear as tolerated. Apply ice regularly. Patient may ice for long periods of time as long as ice is not directly on the skin. Patient instructed on frequent calf pumping exercises.  Patient also instructed on incentive spirometer during hospitalization and encouraged to continue to use at home regularly.    Dressing: The dressing is designed to be left in place until you return to the office in 2 weeks.  The suction unit should stop functioning at 7 days and the green light will switch to yellow.  At that point the suction unit and tubing can be disconnected at the port closest to the dressing.  The suction unit and tubing may be discarded.  You may shower immediately upon return home, you will need to turn the pump off by depressing the orange button once and then you may  disconnect the pump and tubing at the connection port.  After showering, shake off the excess water and reattach the tubing.  Restart the pump by depressing the orange button one time and you will notice the green light flashing again.  If the dressing becomes dislodged or saturated it should be changed. Please refer to the MAUREEN information sheet if you have any questions about the dressing.  If you have a home health nurse or therapist they can be contacted to assist with dressing change or repair. You may also call the MAUREEN dressing hotline for questions related to the dressing ([phone]). If there are still other problems or questions related to the dressing despite these measures then you can contact Sonia at our office [phone].  If for some reason the MAUREEN dressing is removed, after 7 days the wound can be gently cleaned with antibacterial soap then allowed to dry and covered with a dry sterile dressing. The wound should be covered at all times except while showering.  Patient may change dressings daily and prn using sterile 4x4 and paper tape, and should call if any unusual drainage, redness or swelling.*  Follow up appointment in 2 weeks - patient to call the office at [phone] to schedule.  Patient will be discharged on aspirin 81mg BID x 2 weeks, then daily x 4 weeks    Discharge Diagnosis:    OA (osteoarthritis) of knee      Follow-up Appointments  Future Appointments   Date Time Provider Department Center   1/7/2025  2:20 PM Saul To MD [facility]   2/20/2025  2:20 PM Willie Quintanilla APRN [facility]   2/27/2025  3:30 PM REFERRED INJECTION CHAIR EP  [facility]   3/19/2025 11:30 AM [facility]   3/26/2025 11:00 AM Michelle Wyman MD [facility]   5/8/2025  3:40 PM LAB CHAIR 2 DENNIS GORDILLO  [facility]   5/8/2025  4:00 PM Saul Ziegler MD [facility]   10/13/2025 11:30 AM [facility]   10/20/2025 11:15  Michelle Adamson MD [facility], SUPRIYA  12/24/24  12:08 EST

## 2024-12-24 NOTE — PLAN OF CARE
Goal Outcome Evaluation:  Plan of Care Reviewed With: patient           Outcome Evaluation: Pt is POD 1 L TKR, she is independent at baseline, lives alone, plans to go to her sister-in-law's home at discharge. Pt was in significant pain today and was limited due to this, but able to mobilize safely with good quad control and able to walk with SBA with a rolling walker. She was limited in gait distance and ability to tolerate weight through LLE and needed some assist with exercises. No stairs to maneuver at home .Pt is appropriate to go home with assist and cont PT with home health

## 2024-12-24 NOTE — PROGRESS NOTES
Continued Stay Note  [facility]     Patient Name: Marie Foy  MRN: [id number]  Today's Date: 12/24/2024    Admit Date: 12/23/2024        Discharge Plan       Row Name 12/24/24 1226       Plan    Final Discharge Disposition Code 01 - home or self-care    Final Note D/c home with [facility]                   Discharge Codes    No documentation.                 Expected Discharge Date and Time       Expected Discharge Date Expected Discharge Time    Dec 24, 2024               Vangie Solis RN

## 2024-12-24 NOTE — OUTREACH NOTE
Prep Survey      Flowsheet Row Responses   [facility] patient discharged from? [place]   Is LACE score < 7 ? No   Eligibility [facility]   Date of Admission 12/23/24   Date of Discharge 12/24/24   [place] Dis[place] [facility]   Discharge diagnosis OA (osteoarthritis) of knee, TOTAL KNEE ARTHROPLASTY LEFT   Does the patient have one of the following disease processes/diagnoses(primary or secondary)? Total Joint Replacement   Does the patient have Home health ordered? Yes   What is the Home health agency?  [facility]    Is there a DME ordered? Yes   What DME was ordered? walker   Prep survey completed? Yes            Georgette SARMIENTO - Registered Nurse

## 2024-12-24 NOTE — PLAN OF CARE
Goal Outcome Evaluation:              Outcome Evaluation: Patient is POD1 L total knee. Alert and oriented. VSS. Ambulating with assist x1. Voiding per BSC. Dressing cdi, chanel flashing green. Pain managed with prn pain meds. IV abx given per order.

## 2024-12-26 ENCOUNTER — TRANSITIONAL CARE MANAGEMENT TELEPHONE ENCOUNTER (OUTPATIENT)
Dept: CALL CENTER | Facility: HOSPITAL | Age: 69
End: 2024-12-26
Payer: COMMERCIAL

## 2024-12-26 ENCOUNTER — TELEPHONE (OUTPATIENT)
Dept: ORTHOPEDIC SURGERY | Facility: CLINIC | Age: 69
End: 2024-12-26
Payer: COMMERCIAL

## 2024-12-26 NOTE — TELEPHONE ENCOUNTER
CALLED PATIENT ABOUT PHYSICAL THERAPY AND  SHE MENTIONED THE ROM PIECE OF EQUIPMENT THAT'S AN ORDER WAS PUT IN FOR - GOT A CALL FROM [employer] TODAY THAT THEY WILL NOT COVER THE ROM EQUIPMENT AND SHE DOESN'T WANT TO PAY $20 A DAY TO RENT IT.    PLEASE ADVISE NEXT STEPS OR ALTERNATIVE ON THIS

## 2024-12-26 NOTE — TELEPHONE ENCOUNTER
Spoke with patient and stated per Willie EPPS; that since the patient does not want to pay the 20 dollar co-pay then there are no other alternatives. Patient will need to do the home therapy exercises that physical therapy provides. Patient voiced understanding.

## 2024-12-26 NOTE — TELEPHONE ENCOUNTER
There are no other alternatives.  If she does not want to pay the $20 since her insurance will not cover it and then she would just need to do the home therapy exercises that physical therapy is going to provide.

## 2024-12-26 NOTE — OUTREACH NOTE
Call Center TCM Note      Flowsheet Row Responses   [facility] patient discharged from? [place]   Does the patient have one of the following disease processes/diagnoses(primary or secondary)? Total Joint Replacement   Joint surgery performed? Knee   TCM attempt successful? Yes   Call start time 1515   Call end time 1518   Has the patient been back in either the hospital or Emergency Department since discharge? No   Discharge diagnosis OA (osteoarthritis) of knee, TOTAL KNEE ARTHROPLASTY LEFT   Does the patient have all medications related to this admission filled (includes all antibiotics, pain medications, etc.) Yes   Is the patient taking all medications as directed (includes completed medication regime)? Yes   Is the patient able to teach back alternate methods of pain control? Ice   Comments Pt has ortho post op appt set. TCM call complete.   Does the patient have an appointment with their PCP within 7-14 days of discharge? Other   Nursing Interventions Routed TCM call to PCP office   What is the Home health agency?  [facility]   Has home health visited the patient within 72 hours of discharge? Yes   What DME was ordered? walker   Has all DME been delivered? Yes   Psychosocial issues? No   Has the patient began therapy sessions (either in the home or as an out patient)? Yes   Has the patient fallen since discharge? No   Did the patient receive a copy of their discharge instructions? Yes   Nursing interventions Reviewed instructions with patient   What is the patient's perception of their functional status since discharge? Improving   Is the patient able to teach back signs and symptoms of infection? Temp >100.4 for 24h or longer, Incisional drainage, Blisters around incision, Increased swelling or redness around incision (not associated with surgical edema), Shortness of breath or chest pain   Is the patient able to teach back how to prevent infection? Eat well-balanced diet, Shower only as directed by  surgeon, No tub baths, hot tub or swimming   Is the patient able to teach back signs and symptoms of DVT? Severe pain in calf, Area hot to touch, Swelling in calf, Redness in calf, Shortness of breath or chest pain   Is the patient/caregiver able to teach back the hierarchy of who to call/visit for symptoms/problems? PCP, Specialist, Home health nurse, Urgent Care, ED, 911 Yes   TCM call completed? Yes   Wrap up additional comments Pt reports she is doing well. No needs. Pt has Post op appt set.   Call end time [id number]            Delmi Burnett RN    12/26/2024, 15:19 EST

## 2025-01-07 ENCOUNTER — OFFICE VISIT (OUTPATIENT)
Dept: ORTHOPEDIC SURGERY | Facility: CLINIC | Age: 70
End: 2025-01-07
Payer: COMMERCIAL

## 2025-01-07 VITALS — HEIGHT: 58 IN | BODY MASS INDEX: 32.95 KG/M2 | TEMPERATURE: 98.2 F | WEIGHT: 157 LBS

## 2025-01-07 DIAGNOSIS — Z96.652 STATUS POST LEFT KNEE REPLACEMENT: Primary | ICD-10-CM

## 2025-01-07 PROCEDURE — 99024 POSTOP FOLLOW-UP VISIT: CPT | Performed by: ORTHOPAEDIC SURGERY

## 2025-01-07 RX ORDER — HYDROCODONE BITARTRATE AND ACETAMINOPHEN 7.5; 325 MG/1; MG/1
1 TABLET ORAL EVERY 6 HOURS PRN
COMMUNITY

## 2025-01-07 NOTE — PROGRESS NOTES
Marie Foy : 1955 MRN: [id number] DATE: 2025    DIAGNOSIS: 2 week follow up left total knee      SUBJECTIVE:Patient returns today for 2 week follow up of left total knee replacement. Patient reports doing well with no unusual complaints. Appears to be progressing appropriately.    OBJECTIVE:   Exam:. The incision is healing appropriately. No sign of infection. Range of motion is progressing as expected. The calf is soft and nontender with a negative Homans sign.    ASSESSMENT: 2 week status post left knee replacement.    PLAN: 1) Staples removed and steri strips applied   2) Order given for PT   3) Discontinue ANTIONE hose   4) Continue ice PRN   5) aspirin 81 mg orally every day for 1 month   6) Follow up in 6 weeks with repeat Xrays of left knee (3views)    Saul To MD  2025

## 2025-01-15 ENCOUNTER — READMISSION MANAGEMENT (OUTPATIENT)
Dept: CALL CENTER | Facility: HOSPITAL | Age: 70
End: 2025-01-15
Payer: COMMERCIAL

## 2025-01-15 NOTE — OUTREACH NOTE
Total Joint Month 1 Survey      Flowsheet Row Responses   [facility] patient discharged from? [place]   Does the patient have one of the following disease processes/diagnoses(primary or secondary)? Total Joint Replacement   Joint surgery performed? Knee   Month 1 attempt successful? No   Unsuccessful attempts Attempt 1            CEDRIC Aviles Registered Nurse

## 2025-01-21 ENCOUNTER — READMISSION MANAGEMENT (OUTPATIENT)
Dept: CALL CENTER | Facility: HOSPITAL | Age: 70
End: 2025-01-21
Payer: COMMERCIAL

## 2025-01-21 NOTE — OUTREACH NOTE
Total Joint Month 1 Survey      Flowsheet Row Responses   [facility] facility patient discharged from? [place]   Does the patient have one of the following disease processes/diagnoses(primary or secondary)? Total Joint Replacement   Joint surgery performed? Knee   Month 1 attempt successful? No   Unsuccessful attempts Attempt 2            Susanne SHARP - Licensed Nurse

## 2025-02-19 ENCOUNTER — APPOINTMENT (OUTPATIENT)
Dept: CT IMAGING | Facility: HOSPITAL | Age: 70
End: 2025-02-19
Payer: COMMERCIAL

## 2025-02-19 ENCOUNTER — HOSPITAL ENCOUNTER (OUTPATIENT)
Facility: HOSPITAL | Age: 70
Setting detail: OBSERVATION
Discharge: HOME | End: 2025-02-21
Attending: EMERGENCY MEDICINE | Admitting: SURGERY
Payer: COMMERCIAL

## 2025-02-19 DIAGNOSIS — R10.13 EPIGASTRIC PAIN: ICD-10-CM

## 2025-02-19 DIAGNOSIS — K80.20 SYMPTOMATIC CHOLELITHIASIS: ICD-10-CM

## 2025-02-19 DIAGNOSIS — K80.00 CALCULUS OF GALLBLADDER WITH ACUTE CHOLECYSTITIS WITHOUT OBSTRUCTION: Primary | ICD-10-CM

## 2025-02-19 PROBLEM — R10.9 ABDOMINAL PAIN: Status: ACTIVE | Noted: 2025-02-19

## 2025-02-19 PROBLEM — K80.10 CHOLECYSTITIS WITH CHOLELITHIASIS: Status: ACTIVE | Noted: 2025-02-19

## 2025-02-19 LAB
ALBUMIN SERPL-MCNC: 4.7 G/DL (ref 3.5–5.2)
ALBUMIN/GLOB SERPL: 1.6 G/DL
ALP SERPL-CCNC: 115 U/L (ref 39–117)
ALT SERPL W P-5'-P-CCNC: 43 U/L (ref 1–33)
ANION GAP SERPL CALCULATED.3IONS-SCNC: 12 MMOL/L (ref 5–15)
AST SERPL-CCNC: 45 U/L (ref 1–32)
BACTERIA UR QL AUTO: ABNORMAL /HPF
BASOPHILS # BLD AUTO: 0.02 10*3/MM3 (ref 0–0.2)
BASOPHILS NFR BLD AUTO: 0.3 % (ref 0–1.5)
BILIRUB SERPL-MCNC: 1.6 MG/DL (ref 0–1.2)
BILIRUB UR QL STRIP: NEGATIVE
BUN SERPL-MCNC: 19 MG/DL (ref 8–23)
BUN/CREAT SERPL: 21.3 (ref 7–25)
CALCIUM SPEC-SCNC: 9.8 MG/DL (ref 8.6–10.5)
CHLORIDE SERPL-SCNC: 104 MMOL/L (ref 98–107)
CLARITY UR: CLEAR
CO2 SERPL-SCNC: 22 MMOL/L (ref 22–29)
COD CRY URNS QL: PRESENT /HPF
COLOR UR: YELLOW
CREAT SERPL-MCNC: 0.89 MG/DL (ref 0.57–1)
D-LACTATE SERPL-SCNC: 1.1 MMOL/L (ref 0.5–2)
DEPRECATED RDW RBC AUTO: 43.2 FL (ref 37–54)
EGFRCR SERPLBLD CKD-EPI 2021: 69.4 ML/MIN/1.73
EOSINOPHIL # BLD AUTO: 0.11 10*3/MM3 (ref 0–0.4)
EOSINOPHIL NFR BLD AUTO: 1.6 % (ref 0.3–6.2)
ERYTHROCYTE [DISTWIDTH] IN BLOOD BY AUTOMATED COUNT: 13.4 % (ref 12.3–15.4)
GLOBULIN UR ELPH-MCNC: 2.9 GM/DL
GLUCOSE SERPL-MCNC: 126 MG/DL (ref 65–99)
GLUCOSE UR STRIP-MCNC: NEGATIVE MG/DL
HCT VFR BLD AUTO: 37.9 % (ref 34–46.6)
HGB BLD-MCNC: 12.5 G/DL (ref 12–15.9)
HGB UR QL STRIP.AUTO: NEGATIVE
HOLD SPECIMEN: NORMAL
HOLD SPECIMEN: NORMAL
HYALINE CASTS UR QL AUTO: ABNORMAL /LPF
IMM GRANULOCYTES # BLD AUTO: 0.02 10*3/MM3 (ref 0–0.05)
IMM GRANULOCYTES NFR BLD AUTO: 0.3 % (ref 0–0.5)
KETONES UR QL STRIP: NEGATIVE
LEUKOCYTE ESTERASE UR QL STRIP.AUTO: NEGATIVE
LIPASE SERPL-CCNC: 46 U/L (ref 13–60)
LYMPHOCYTES # BLD AUTO: 0.81 10*3/MM3 (ref 0.7–3.1)
LYMPHOCYTES NFR BLD AUTO: 11.6 % (ref 19.6–45.3)
MCH RBC QN AUTO: 29.3 PG (ref 26.6–33)
MCHC RBC AUTO-ENTMCNC: 33 G/DL (ref 31.5–35.7)
MCV RBC AUTO: 89 FL (ref 79–97)
MONOCYTES # BLD AUTO: 0.42 10*3/MM3 (ref 0.1–0.9)
MONOCYTES NFR BLD AUTO: 6 % (ref 5–12)
NEUTROPHILS NFR BLD AUTO: 5.58 10*3/MM3 (ref 1.7–7)
NEUTROPHILS NFR BLD AUTO: 80.2 % (ref 42.7–76)
NITRITE UR QL STRIP: POSITIVE
NRBC BLD AUTO-RTO: 0 /100 WBC (ref 0–0.2)
PH UR STRIP.AUTO: 6 [PH] (ref 5–8)
PLATELET # BLD AUTO: 241 10*3/MM3 (ref 140–450)
PMV BLD AUTO: 8.6 FL (ref 6–12)
POTASSIUM SERPL-SCNC: 3.3 MMOL/L (ref 3.5–5.2)
PROT SERPL-MCNC: 7.6 G/DL (ref 6–8.5)
PROT UR QL STRIP: NEGATIVE
RBC # BLD AUTO: 4.26 10*6/MM3 (ref 3.77–5.28)
RBC # UR STRIP: ABNORMAL /HPF
REF LAB TEST METHOD: ABNORMAL
SODIUM SERPL-SCNC: 138 MMOL/L (ref 136–145)
SP GR UR STRIP: 1.01 (ref 1–1.03)
SQUAMOUS #/AREA URNS HPF: ABNORMAL /HPF
TROPONIN T SERPL HS-MCNC: 9 NG/L
UROBILINOGEN UR QL STRIP: ABNORMAL
WBC # UR STRIP: ABNORMAL /HPF
WBC NRBC COR # BLD AUTO: 6.96 10*3/MM3 (ref 3.4–10.8)
WHOLE BLOOD HOLD COAG: NORMAL
WHOLE BLOOD HOLD SPECIMEN: NORMAL

## 2025-02-19 PROCEDURE — 25810000003 LACTATED RINGERS PER 1000 ML: Performed by: STUDENT IN AN ORGANIZED HEALTH CARE EDUCATION/TRAINING PROGRAM

## 2025-02-19 PROCEDURE — 96376 TX/PRO/DX INJ SAME DRUG ADON: CPT

## 2025-02-19 PROCEDURE — 25510000001 IOPAMIDOL 61 % SOLUTION: Performed by: EMERGENCY MEDICINE

## 2025-02-19 PROCEDURE — 25010000002 HYDROMORPHONE PER 4 MG: Performed by: EMERGENCY MEDICINE

## 2025-02-19 PROCEDURE — 81001 URINALYSIS AUTO W/SCOPE: CPT | Performed by: EMERGENCY MEDICINE

## 2025-02-19 PROCEDURE — 25010000002 ONDANSETRON PER 1 MG: Performed by: EMERGENCY MEDICINE

## 2025-02-19 PROCEDURE — 74177 CT ABD & PELVIS W/CONTRAST: CPT

## 2025-02-19 PROCEDURE — G0378 HOSPITAL OBSERVATION PER HR: HCPCS

## 2025-02-19 PROCEDURE — 85025 COMPLETE CBC W/AUTO DIFF WBC: CPT | Performed by: EMERGENCY MEDICINE

## 2025-02-19 PROCEDURE — 84484 ASSAY OF TROPONIN QUANT: CPT | Performed by: EMERGENCY MEDICINE

## 2025-02-19 PROCEDURE — 96374 THER/PROPH/DIAG INJ IV PUSH: CPT

## 2025-02-19 PROCEDURE — 25010000002 HYDROMORPHONE PER 4 MG: Performed by: PHYSICIAN ASSISTANT

## 2025-02-19 PROCEDURE — 25010000002 METRONIDAZOLE 500 MG/100ML SOLUTION: Performed by: PHYSICIAN ASSISTANT

## 2025-02-19 PROCEDURE — 96375 TX/PRO/DX INJ NEW DRUG ADDON: CPT

## 2025-02-19 PROCEDURE — 80053 COMPREHEN METABOLIC PANEL: CPT | Performed by: EMERGENCY MEDICINE

## 2025-02-19 PROCEDURE — 83605 ASSAY OF LACTIC ACID: CPT | Performed by: EMERGENCY MEDICINE

## 2025-02-19 PROCEDURE — 99223 1ST HOSP IP/OBS HIGH 75: CPT | Performed by: STUDENT IN AN ORGANIZED HEALTH CARE EDUCATION/TRAINING PROGRAM

## 2025-02-19 PROCEDURE — 25810000003 SODIUM CHLORIDE 0.9 % SOLUTION: Performed by: EMERGENCY MEDICINE

## 2025-02-19 PROCEDURE — 83690 ASSAY OF LIPASE: CPT | Performed by: EMERGENCY MEDICINE

## 2025-02-19 PROCEDURE — 99285 EMERGENCY DEPT VISIT HI MDM: CPT

## 2025-02-19 PROCEDURE — 25010000002 CEFTRIAXONE PER 250 MG: Performed by: PHYSICIAN ASSISTANT

## 2025-02-19 RX ORDER — AMLODIPINE BESYLATE 10 MG/1
10 TABLET ORAL DAILY
Status: DISCONTINUED | OUTPATIENT
Start: 2025-02-20 | End: 2025-02-21 | Stop reason: HOSPADM

## 2025-02-19 RX ORDER — SODIUM CHLORIDE, SODIUM LACTATE, POTASSIUM CHLORIDE, CALCIUM CHLORIDE 600; 310; 30; 20 MG/100ML; MG/100ML; MG/100ML; MG/100ML
75 INJECTION, SOLUTION INTRAVENOUS CONTINUOUS
Status: DISCONTINUED | OUTPATIENT
Start: 2025-02-19 | End: 2025-02-20

## 2025-02-19 RX ORDER — OXYBUTYNIN CHLORIDE 5 MG/1
5 TABLET, EXTENDED RELEASE ORAL DAILY
Status: DISCONTINUED | OUTPATIENT
Start: 2025-02-20 | End: 2025-02-21 | Stop reason: HOSPADM

## 2025-02-19 RX ORDER — HYDROCODONE BITARTRATE AND ACETAMINOPHEN 7.5; 325 MG/1; MG/1
1 TABLET ORAL EVERY 6 HOURS PRN
Status: DISCONTINUED | OUTPATIENT
Start: 2025-02-19 | End: 2025-02-20

## 2025-02-19 RX ORDER — SODIUM CHLORIDE 0.9 % (FLUSH) 0.9 %
10 SYRINGE (ML) INJECTION AS NEEDED
Status: DISCONTINUED | OUTPATIENT
Start: 2025-02-19 | End: 2025-02-21 | Stop reason: HOSPADM

## 2025-02-19 RX ORDER — MELOXICAM 7.5 MG/1
15 TABLET ORAL DAILY
Status: DISCONTINUED | OUTPATIENT
Start: 2025-02-20 | End: 2025-02-21 | Stop reason: HOSPADM

## 2025-02-19 RX ORDER — PANTOPRAZOLE SODIUM 40 MG/1
40 TABLET, DELAYED RELEASE ORAL
Status: DISCONTINUED | OUTPATIENT
Start: 2025-02-20 | End: 2025-02-21 | Stop reason: HOSPADM

## 2025-02-19 RX ORDER — SODIUM CHLORIDE 0.9 % (FLUSH) 0.9 %
10 SYRINGE (ML) INJECTION AS NEEDED
Status: DISCONTINUED | OUTPATIENT
Start: 2025-02-19 | End: 2025-02-20

## 2025-02-19 RX ORDER — IOPAMIDOL 612 MG/ML
85 INJECTION, SOLUTION INTRAVASCULAR
Status: COMPLETED | OUTPATIENT
Start: 2025-02-19 | End: 2025-02-19

## 2025-02-19 RX ORDER — METRONIDAZOLE 500 MG/100ML
500 INJECTION, SOLUTION INTRAVENOUS EVERY 8 HOURS
Status: DISCONTINUED | OUTPATIENT
Start: 2025-02-19 | End: 2025-02-20

## 2025-02-19 RX ORDER — ONDANSETRON 2 MG/ML
4 INJECTION INTRAMUSCULAR; INTRAVENOUS ONCE
Status: COMPLETED | OUTPATIENT
Start: 2025-02-19 | End: 2025-02-19

## 2025-02-19 RX ORDER — AMOXICILLIN 250 MG
2 CAPSULE ORAL 2 TIMES DAILY PRN
Status: DISCONTINUED | OUTPATIENT
Start: 2025-02-19 | End: 2025-02-21 | Stop reason: HOSPADM

## 2025-02-19 RX ORDER — LEVOTHYROXINE SODIUM 100 UG/1
100 TABLET ORAL DAILY
Status: DISCONTINUED | OUTPATIENT
Start: 2025-02-20 | End: 2025-02-21 | Stop reason: HOSPADM

## 2025-02-19 RX ORDER — POTASSIUM CHLORIDE 1500 MG/1
40 TABLET, EXTENDED RELEASE ORAL EVERY 4 HOURS
Status: COMPLETED | OUTPATIENT
Start: 2025-02-19 | End: 2025-02-20

## 2025-02-19 RX ORDER — ACETAMINOPHEN 325 MG/1
650 TABLET ORAL EVERY 4 HOURS PRN
Status: DISCONTINUED | OUTPATIENT
Start: 2025-02-19 | End: 2025-02-21 | Stop reason: HOSPADM

## 2025-02-19 RX ORDER — HYDROMORPHONE HYDROCHLORIDE 1 MG/ML
0.5 INJECTION, SOLUTION INTRAMUSCULAR; INTRAVENOUS; SUBCUTANEOUS ONCE
Status: COMPLETED | OUTPATIENT
Start: 2025-02-19 | End: 2025-02-19

## 2025-02-19 RX ORDER — SODIUM CHLORIDE 0.9 % (FLUSH) 0.9 %
10 SYRINGE (ML) INJECTION EVERY 12 HOURS SCHEDULED
Status: DISCONTINUED | OUTPATIENT
Start: 2025-02-19 | End: 2025-02-20

## 2025-02-19 RX ORDER — BISACODYL 10 MG
10 SUPPOSITORY, RECTAL RECTAL DAILY PRN
Status: DISCONTINUED | OUTPATIENT
Start: 2025-02-19 | End: 2025-02-21 | Stop reason: HOSPADM

## 2025-02-19 RX ORDER — HYDROMORPHONE HYDROCHLORIDE 1 MG/ML
0.5 INJECTION, SOLUTION INTRAMUSCULAR; INTRAVENOUS; SUBCUTANEOUS
Status: DISCONTINUED | OUTPATIENT
Start: 2025-02-19 | End: 2025-02-20

## 2025-02-19 RX ORDER — ERGOCALCIFEROL (VITAMIN D2) 10 MCG
400 TABLET ORAL DAILY
COMMUNITY

## 2025-02-19 RX ORDER — SODIUM CHLORIDE 9 MG/ML
40 INJECTION, SOLUTION INTRAVENOUS AS NEEDED
Status: DISCONTINUED | OUTPATIENT
Start: 2025-02-19 | End: 2025-02-21 | Stop reason: HOSPADM

## 2025-02-19 RX ORDER — ATORVASTATIN CALCIUM 20 MG/1
80 TABLET, FILM COATED ORAL DAILY
Status: DISCONTINUED | OUTPATIENT
Start: 2025-02-20 | End: 2025-02-20

## 2025-02-19 RX ORDER — POLYETHYLENE GLYCOL 3350 17 G/17G
17 POWDER, FOR SOLUTION ORAL DAILY PRN
Status: DISCONTINUED | OUTPATIENT
Start: 2025-02-19 | End: 2025-02-21 | Stop reason: HOSPADM

## 2025-02-19 RX ORDER — ONDANSETRON 2 MG/ML
4 INJECTION INTRAMUSCULAR; INTRAVENOUS EVERY 6 HOURS PRN
Status: DISCONTINUED | OUTPATIENT
Start: 2025-02-19 | End: 2025-02-21 | Stop reason: HOSPADM

## 2025-02-19 RX ORDER — BISACODYL 5 MG/1
5 TABLET, DELAYED RELEASE ORAL DAILY PRN
Status: DISCONTINUED | OUTPATIENT
Start: 2025-02-19 | End: 2025-02-21 | Stop reason: HOSPADM

## 2025-02-19 RX ORDER — ALPRAZOLAM 0.25 MG
0.25 TABLET ORAL 3 TIMES DAILY PRN
Status: DISCONTINUED | OUTPATIENT
Start: 2025-02-19 | End: 2025-02-21 | Stop reason: HOSPADM

## 2025-02-19 RX ORDER — ASPIRIN 81 MG/1
81 TABLET ORAL DAILY
Status: DISCONTINUED | OUTPATIENT
Start: 2025-02-20 | End: 2025-02-21 | Stop reason: HOSPADM

## 2025-02-19 RX ORDER — PANTOPRAZOLE SODIUM 40 MG/10ML
40 INJECTION, POWDER, LYOPHILIZED, FOR SOLUTION INTRAVENOUS
Status: DISCONTINUED | OUTPATIENT
Start: 2025-02-20 | End: 2025-02-19 | Stop reason: SDUPTHER

## 2025-02-19 RX ADMIN — IOPAMIDOL 85 ML: 612 INJECTION, SOLUTION INTRAVENOUS at 19:55

## 2025-02-19 RX ADMIN — CEFTRIAXONE 2000 MG: 2 INJECTION, POWDER, FOR SOLUTION INTRAMUSCULAR; INTRAVENOUS at 21:44

## 2025-02-19 RX ADMIN — ONDANSETRON 4 MG: 2 INJECTION, SOLUTION INTRAMUSCULAR; INTRAVENOUS at 17:36

## 2025-02-19 RX ADMIN — SODIUM CHLORIDE, POTASSIUM CHLORIDE, SODIUM LACTATE AND CALCIUM CHLORIDE 75 ML/HR: 600; 310; 30; 20 INJECTION, SOLUTION INTRAVENOUS at 23:04

## 2025-02-19 RX ADMIN — HYDROMORPHONE HYDROCHLORIDE 0.5 MG: 1 INJECTION, SOLUTION INTRAMUSCULAR; INTRAVENOUS; SUBCUTANEOUS at 22:30

## 2025-02-19 RX ADMIN — HYDROMORPHONE HYDROCHLORIDE 0.5 MG: 1 INJECTION, SOLUTION INTRAMUSCULAR; INTRAVENOUS; SUBCUTANEOUS at 18:07

## 2025-02-19 RX ADMIN — METRONIDAZOLE 500 MG: 500 INJECTION, SOLUTION INTRAVENOUS at 22:31

## 2025-02-19 RX ADMIN — Medication 10 ML: at 21:56

## 2025-02-19 RX ADMIN — SODIUM CHLORIDE 1000 ML: 9 INJECTION, SOLUTION INTRAVENOUS at 18:07

## 2025-02-19 RX ADMIN — POTASSIUM CHLORIDE 40 MEQ: 1500 TABLET, EXTENDED RELEASE ORAL at 21:52

## 2025-02-19 NOTE — ED NOTES
"N/V epigastric pain x 1300. Pt stated she feels like  a \"gallbladder attack\". PT stated she has had three of these \"attacks\" the past two weeks.     Pt did take Zofran at home with mild improvement.   "

## 2025-02-19 NOTE — ED PROVIDER NOTES
EMERGENCY DEPARTMENT ENCOUNTER    Room Number:  111/1  Date of encounter:  2/20/2025  PCP: Michelle Wyman MD  Historian: Patient and daughter  Relevant information and history provided by sources other than the patient will be included below and in the ED Course.  Review of pertinent past medical records may also be included in record below and ED Course.    HPI:  Chief Complaint: Abdominal pain and vomiting  A complete HPI/ROS/PMH/PSH/SH/FH are unobtainable due to: Not applicable  Context: Marie Foy is a 69 y.o. female who presents to the ED c/o this patient symptoms started about 3 weeks ago.  Pain is in the mid epigastric region it occurs after eating.  It will last several hours and then gradually ease up.  She has had a total of 3 or 4 episodes of the past 2 to 3 weeks.  Her last episode started about 1230 or 1:00 today after eating.  It is a dull crampy pain.  She again has vomiting associated with this pain.  No blood in the vomit.  She does report she has known problems with her gallbladder and sludge in her gallbladder.  Saw the surgeon Dr. Martin García in fall of last year.  She was doing better no surgery planned at that time.  She again reports no fevers or chills.  No chest pain or shortness of breath.  Only previous bowel surgery she has had his colon resection for stage II colon cancer.  She is currently cancer free.        Previous Episodes: Yes see above.  She believes this is her gallbladder again.  Current Symptoms: Abdominal pain with nausea and vomiting    MEDICAL HISTORY REVIEWED  Patient is status post total knee replacement on 12/23/2024.  She does have a history of GERD.  History of elevated cholesterol and hypertension..  I did review her medicine list.      PAST MEDICAL HISTORY  Active Ambulatory Problems     Diagnosis Date Noted    Anxiety 02/26/2016    Gastroesophageal reflux disease 02/26/2016    Herpes simplex type 2 infection 02/26/2016    Hypercholesterolemia 02/26/2016     Hypothyroidism 02/26/2016    Overactive bladder 02/26/2016    Unilateral partial paralysis of vocal cords or larynx 02/26/2016    Essential (primary) hypertension 05/11/2020    Primary localized osteoarthrosis of left lower leg 05/20/2020    Osteoporosis 06/30/2020    Malignant neoplasm of descending colon 02/04/2021    Prediabetes 09/25/2024    OA (osteoarthritis) of knee 09/26/2024     Resolved Ambulatory Problems     Diagnosis Date Noted    Hypertonicity of bladder 02/26/2016    Osteopenia 02/26/2016    Fear of flying 02/26/2016    Colon polyps     Fixation hardware in foot 04/21/2016    Closed nondisplaced fracture of fifth right metatarsal bone 04/21/2016    Incidental lung nodule 09/11/2017    Hormone replacement therapy (HRT) 06/21/2019    Personal history of colon cancer 04/20/2022    Personal history of colon cancer 05/20/2024    History of adenomatous polyp of colon 05/20/2024     Past Medical History:   Diagnosis Date    Arthritis     Arthritis of back     Benign cardiac murmur     Colon cancer     Constipation     Depression     Fibrocystic breast     Fracture, foot 2014 problem resolved    Genital herpes     GERD (gastroesophageal reflux disease) unk    Graves disease     Hard to intubate     Hearing loss     Heart murmur     History of bipolar disorder     History of substance abuse     Hoarseness     Hyperlipidemia     Hypertension     Joint pain     Knee swelling Unk    Stress fracture 2014 problem resolved    Thyroid disease          PAST SURGICAL HISTORY  Past Surgical History:   Procedure Laterality Date    ANKLE OPEN REDUCTION INTERNAL FIXATION  1998    ANKLE SURGERY Left     ORIF WITH HARDWARE    BREAST BIOPSY Right     BENIGN    CATARACT EXTRACTION W/ INTRAOCULAR LENS IMPLANT      LEFT AND RIGHT    COLON RESECTION N/A 03/02/2021    Procedure: LAPAROSCOPIC TRANSVERSE COLECTOMY WITH ThrasosINCI ROBOT;  Surgeon: Martin Calvo MD;  Location: [facility];  Service: [facility];  Laterality: N/A;     COLONOSCOPY N/A 08/23/2016    Procedure: COLONOSCOPY TO CECUM & T.I.  WITH SALINE INJECTION, HOT SNARE POLYPECTOMY, COLD POLYPECTOMY AND CLIP PLACEMENT X 1;  Surgeon: Darryl Garcia MD;  Location: [facility] ENDOSCOPY;  Service:     COLONOSCOPY N/A 01/26/2021    Procedure: COLONOSCOPY to cecum with cold biopsyies and cold biopsy polypectomy and tattoo needle injection of colonic ulcer;  Surgeon: Darryl Garcia MD;  Location: [facility] ENDOSCOPY;  Service: Gastroenterology;  Laterality: N/A;  pre: hx of olyps  post:  colonic ulcer, polyp    COLONOSCOPY N/A 05/12/2022    Procedure: COLONOSCOPY to anastamosis with hot snare polypectomy;  Surgeon: Martin Calvo MD;  Location: [facility] ENDOSCOPY;  Service: General;  Laterality: N/A;  Pre: History of colon cancer  Post: polyp    COLONOSCOPY N/A 06/18/2024    Procedure: COLONOSCOPY to cecum;  Surgeon: Martin Calvo MD;  Location: [facility] ENDOSCOPY;  Service: General;  Laterality: N/A;  pre personal hx colon ca post diverticulosis    COLPORRHAPHY  2008    EAR TUBES      MEENA. MULTI TIMES    EYE SURGERY  Unknown    cateracts    FOOT SURGERY Right     STRESS FRACTURE. SURGERY X2 WITH HARDWARE IN PLACE    HAND SURGERY Right     FOR FRACTURE WITH HARDWARE IN PLACE    HYSTERECTOMY  2005    WITH BLADDER SLING    JOINT REPLACEMENT  2011    OTHER SURGICAL HISTORY  2006    TVT    REPLACEMENT TOTAL KNEE Right     SACROCOLPOPEXY  2007    POSTERIOR COLPORRHAPHY, PERINEORRHAPHY      THROAT SURGERY      childhood-PUNCTURED ESOPHAGUS AT AGE OF 10.  MULTI SURGERY TO AREA    TOTAL KNEE ARTHROPLASTY Right 2011    TOTAL KNEE ARTHROPLASTY Left 12/23/2024    Procedure: TOTAL KNEE ARTHROPLASTY;  Surgeon: Saul To MD;  Location: [facility];  Service: Orthopedics;  Laterality: Left;         FAMILY HISTORY  Family History   Problem Relation Age of Onset    Hypertension Mother     Heart failure Mother     Diabetes Mother     Colon polyps Mother     Colon cancer Mother     Pancreatic  cancer Father     Anxiety disorder Daughter         bi-polar    Malig Hyperthermia Neg Hx          SOCIAL HISTORY  Social History     Socioeconomic History    Marital status:     Number of children: 5   Tobacco Use    Smoking status: Never     Passive exposure: Never    Smokeless tobacco: Never   Vaping Use    Vaping status: Never Used   Substance and Sexual Activity    Alcohol use: Not Currently     Comment: OCASSIONALLY    Drug use: Never    Sexual activity: Not Currently     Comment: Not sexually active         ALLERGIES  Penicillins, Codeine, and Morphine and codeine        REVIEW OF SYSTEMS  Review of Systems     All systems reviewed and negative except for those discussed in HPI.       PHYSICAL EXAM    I have reviewed the triage vital signs and nursing notes.    ED Triage Vitals [02/19/25 1714]   Temp Heart Rate Resp BP SpO2   98.9 °F (37.2 °C) 67 18 170/60 99 %      Temp src Heart Rate Source Patient Position BP Location FiO2 (%)   -- Monitor -- -- --       GENERAL: Elderly female.  Patient does not appear septic or toxic.  Vital signs on my initial evaluation have been reviewed  HENT: nares patent  Head/neck/ face are symmetric without gross deformity, signs of trauma, or swelling  EYES: no scleral icterus, no conjunctival pallor.  NECK: Supple, no meningismus  CV: regular rhythm, regular rate with intact distal pulses.  RESPIRATORY: normal effort and no respiratory distress.  Clear to auscultation bilaterally  ABDOMEN: Obese tenderness in the midepigastric region.  There is no guarding or rebound.  Positive bowel sounds.  MUSCULOSKELETAL: no deformity.  Intact distal pulses upper lower extremities that are equal strong and symmetric.  NEURO: alert and appropriate, moves all extremities, follows commands.  No focal motor or sensory changes  SKIN: warm, dry    Vital signs and nursing notes reviewed.        LAB RESULTS  Recent Results (from the past 24 hours)   Comprehensive Metabolic Panel     Collection Time: 02/19/25  5:32 PM    Specimen: Blood   Result Value Ref Range    Glucose 126 (H) 65 - 99 mg/dL    BUN 19 8 - 23 mg/dL    Creatinine 0.89 0.57 - 1.00 mg/dL    Sodium 138 136 - 145 mmol/L    Potassium 3.3 (L) 3.5 - 5.2 mmol/L    Chloride 104 98 - 107 mmol/L    CO2 22.0 22.0 - 29.0 mmol/L    Calcium 9.8 8.6 - 10.5 mg/dL    Total Protein 7.6 6.0 - 8.5 g/dL    Albumin 4.7 3.5 - 5.2 g/dL    ALT (SGPT) 43 (H) 1 - 33 U/L    AST (SGOT) 45 (H) 1 - 32 U/L    Alkaline Phosphatase 115 39 - 117 U/L    Total Bilirubin 1.6 (H) 0.0 - 1.2 mg/dL    Globulin 2.9 gm/dL    A/G Ratio 1.6 g/dL    BUN/Creatinine Ratio 21.3 7.0 - 25.0    Anion Gap 12.0 5.0 - 15.0 mmol/L    eGFR 69.4 >60.0 mL/min/1.73   Lipase    Collection Time: 02/19/25  5:32 PM    Specimen: Blood   Result Value Ref Range    Lipase 46 13 - 60 U/L   Lactic Acid, Plasma    Collection Time: 02/19/25  5:32 PM    Specimen: Blood   Result Value Ref Range    Lactate 1.1 0.5 - 2.0 mmol/L   Green Top (Gel)    Collection Time: 02/19/25  5:32 PM   Result Value Ref Range    Extra Tube Hold for add-ons.    Lavender Top    Collection Time: 02/19/25  5:32 PM   Result Value Ref Range    Extra Tube hold for add-on    Gold Top - SST    Collection Time: 02/19/25  5:32 PM   Result Value Ref Range    Extra Tube Hold for add-ons.    Light Blue Top    Collection Time: 02/19/25  5:32 PM   Result Value Ref Range    Extra Tube Hold for add-ons.    CBC Auto Differential    Collection Time: 02/19/25  5:32 PM    Specimen: Blood   Result Value Ref Range    WBC 6.96 3.40 - 10.80 10*3/mm3    RBC 4.26 3.77 - 5.28 10*6/mm3    Hemoglobin 12.5 12.0 - 15.9 g/dL    Hematocrit 37.9 34.0 - 46.6 %    MCV 89.0 79.0 - 97.0 fL    MCH 29.3 26.6 - 33.0 pg    MCHC 33.0 31.5 - 35.7 g/dL    RDW 13.4 12.3 - 15.4 %    RDW-SD 43.2 37.0 - 54.0 fl    MPV 8.6 6.0 - 12.0 fL    Platelets 241 140 - 450 10*3/mm3    Neutrophil % 80.2 (H) 42.7 - 76.0 %    Lymphocyte % 11.6 (L) 19.6 - 45.3 %    Monocyte % 6.0 5.0 -  12.0 %    Eosinophil % 1.6 0.3 - 6.2 %    Basophil % 0.3 0.0 - 1.5 %    Immature Grans % 0.3 0.0 - 0.5 %    Neutrophils, Absolute 5.58 1.70 - 7.00 10*3/mm3    Lymphocytes, Absolute 0.81 0.70 - 3.10 10*3/mm3    Monocytes, Absolute 0.42 0.10 - 0.90 10*3/mm3    Eosinophils, Absolute 0.11 0.00 - 0.40 10*3/mm3    Basophils, Absolute 0.02 0.00 - 0.20 10*3/mm3    Immature Grans, Absolute 0.02 0.00 - 0.05 10*3/mm3    nRBC 0.0 0.0 - 0.2 /100 WBC   High Sensitivity Troponin T    Collection Time: 02/19/25  5:32 PM    Specimen: Blood   Result Value Ref Range    HS Troponin T 9 <14 ng/L   Urinalysis With Microscopic If Indicated (No Culture) - Urine, Clean Catch    Collection Time: 02/19/25  7:34 PM    Specimen: Urine, Clean Catch   Result Value Ref Range    Color, UA Yellow Yellow, Straw    Appearance, UA Clear Clear    pH, UA 6.0 5.0 - 8.0    Specific Gravity, UA 1.011 1.005 - 1.030    Glucose, UA Negative Negative    Ketones, UA Negative Negative    Bilirubin, UA Negative Negative    Blood, UA Negative Negative    Protein, UA Negative Negative    Leuk Esterase, UA Negative Negative    Nitrite, UA Positive (A) Negative    Urobilinogen, UA 1.0 E.U./dL 0.2 - 1.0 E.U./dL   Urinalysis, Microscopic Only - Urine, Clean Catch    Collection Time: 02/19/25  7:34 PM    Specimen: Urine, Clean Catch   Result Value Ref Range    RBC, UA 0-2 None Seen, 0-2 /HPF    WBC, UA 0-2 None Seen, 0-2 /HPF    Bacteria, UA 4+ (A) None Seen /HPF    Squamous Epithelial Cells, UA 3-6 (A) None Seen, 0-2 /HPF    Hyaline Casts, UA None Seen None Seen /LPF    Calcium Oxalate Crystals, UA Present None Seen /HPF    Methodology Automated Microscopy    Urinalysis With Culture If Indicated - Urine, Clean Catch    Collection Time: 02/20/25 12:26 AM    Specimen: Urine, Clean Catch   Result Value Ref Range    Color, UA Yellow Yellow, Straw    Appearance, UA Clear Clear    pH, UA 6.0 5.0 - 8.0    Specific Gravity, UA >1.030 (H) 1.005 - 1.030    Glucose, UA Negative  Negative    Ketones, UA Negative Negative    Bilirubin, UA Negative Negative    Blood, UA Negative Negative    Protein, UA Negative Negative    Leuk Esterase, UA Negative Negative    Nitrite, UA Positive (A) Negative    Urobilinogen, UA 1.0 E.U./dL 0.2 - 1.0 E.U./dL   Urinalysis, Microscopic Only - Urine, Clean Catch    Collection Time: 02/20/25 12:26 AM    Specimen: Urine, Clean Catch   Result Value Ref Range    RBC, UA None Seen None Seen, 0-2 /HPF    WBC, UA 0-2 None Seen, 0-2 /HPF    Bacteria, UA None Seen None Seen /HPF    Squamous Epithelial Cells, UA 0-2 None Seen, 0-2 /HPF    Hyaline Casts, UA None Seen None Seen /LPF    Methodology Automated Microscopy    CBC (No Diff)    Collection Time: 02/20/25  3:54 AM    Specimen: Arm, Right; Blood   Result Value Ref Range    WBC 4.92 3.40 - 10.80 10*3/mm3    RBC 3.39 (L) 3.77 - 5.28 10*6/mm3    Hemoglobin 9.8 (L) 12.0 - 15.9 g/dL    Hematocrit 29.8 (L) 34.0 - 46.6 %    MCV 87.9 79.0 - 97.0 fL    MCH 28.9 26.6 - 33.0 pg    MCHC 32.9 31.5 - 35.7 g/dL    RDW 13.2 12.3 - 15.4 %    RDW-SD 41.8 37.0 - 54.0 fl    MPV 8.6 6.0 - 12.0 fL    Platelets 202 140 - 450 10*3/mm3   Comprehensive Metabolic Panel    Collection Time: 02/20/25  3:54 AM    Specimen: Arm, Right; Blood   Result Value Ref Range    Glucose 81 65 - 99 mg/dL    BUN 11 8 - 23 mg/dL    Creatinine 0.72 0.57 - 1.00 mg/dL    Sodium 139 136 - 145 mmol/L    Potassium 4.4 3.5 - 5.2 mmol/L    Chloride 108 (H) 98 - 107 mmol/L    CO2 20.5 (L) 22.0 - 29.0 mmol/L    Calcium 8.4 (L) 8.6 - 10.5 mg/dL    Total Protein 6.4 6.0 - 8.5 g/dL    Albumin 3.6 3.5 - 5.2 g/dL    ALT (SGPT) 62 (H) 1 - 33 U/L    AST (SGOT) 54 (H) 1 - 32 U/L    Alkaline Phosphatase 94 39 - 117 U/L    Total Bilirubin 1.0 0.0 - 1.2 mg/dL    Globulin 2.8 gm/dL    A/G Ratio 1.3 g/dL    BUN/Creatinine Ratio 15.3 7.0 - 25.0    Anion Gap 10.5 5.0 - 15.0 mmol/L    eGFR 89.5 >60.0 mL/min/1.73   Hemoglobin & Hematocrit, Blood    Collection Time: 02/20/25  8:42 AM     Specimen: Blood   Result Value Ref Range    Hemoglobin 11.2 (L) 12.0 - 15.9 g/dL    Hematocrit 34.1 34.0 - 46.6 %       Ordered the above labs and independently reviewed the results.        RADIOLOGY  CT Abdomen Pelvis With Contrast    Result Date: 2/19/2025  CT ABDOMEN PELVIS W CONTRAST-  INDICATIONS: Epigastric pain  TECHNIQUE: Radiation dose reduction techniques were utilized, including automated exposure control and exposure modulation based on body size. Enhanced ABDOMEN AND PELVIS CT  COMPARISON: 10/28/2024  FINDINGS:  Stable liver low density too small to characterize.  Mild biliary ductal prominence is a change in appearance from the prior exam, without a specific cause such as stone or lesion identified; if further imaging evaluation of the biliary tree is indicated, MRCP could be obtained.  Right renal cyst is present. Additionally, ill-defined areas of low density are seen at the inferior right kidney, coronal image 82, and superior left kidney, coronal image 98, nonspecific, but appear stable from prior exams, at least as far back as 10/30/2023.  Otherwise unremarkable appearance of the liver, gallbladder, spleen, adrenal glands, pancreas, kidneys, bladder.  No bowel obstruction or abnormal bowel thickening is identified.  No free intraperitoneal gas or free fluid.  Scattered small mesenteric and para-aortic lymph nodes are seen that are not significant by size criteria.  Abdominal aorta is not aneurysmal. Aortic and other arterial calcifications are present.  The lung bases show small atelectasis/scarring.  Degenerative changes are seen in the spine. Stable sclerotic change in the right femoral head-neck junction. No acute fracture is identified.         1. Mild biliary ductal dilatation, nonspecific, as described above, further evaluation can be obtained as indicated.  2. No acute inflammatory process of bowel is identified, follow-up as indications persist.  3. No obstructive uropathy.     This report  was finalized on 2/19/2025 8:37 PM by Dr. Adriel Fish M.D on Workstation: [id number]       I ordered the above noted radiological studies. Reviewed by me and discussed with radiologist.  See dictation for official radiology interpretation.      PROCEDURES    Procedures      MEDICATIONS GIVEN IN ER    Medications   sodium chloride 0.9 % flush 10 mL (has no administration in time range)   sodium chloride 0.9 % flush 10 mL (10 mL Intravenous Not Given 2/20/25 0900)   sodium chloride 0.9 % flush 10 mL (has no administration in time range)   sodium chloride 0.9 % infusion 40 mL (has no administration in time range)   acetaminophen (TYLENOL) tablet 650 mg (has no administration in time range)   sennosides-docusate (PERICOLACE) 8.6-50 MG per tablet 2 tablet (has no administration in time range)     And   polyethylene glycol (MIRALAX) packet 17 g (has no administration in time range)     And   bisacodyl (DULCOLAX) EC tablet 5 mg (has no administration in time range)     And   bisacodyl (DULCOLAX) suppository 10 mg (has no administration in time range)   Potassium Replacement - Follow Nurse / BPA Driven Protocol (has no administration in time range)   Magnesium Standard Dose Replacement - Follow Nurse / BPA Driven Protocol (has no administration in time range)   Phosphorus Replacement - Follow Nurse / BPA Driven Protocol (has no administration in time range)   Calcium Replacement - Follow Nurse / BPA Driven Protocol (has no administration in time range)   ondansetron (ZOFRAN) injection 4 mg (has no administration in time range)   melatonin tablet 5 mg (has no administration in time range)   HYDROmorphone (DILAUDID) injection 0.5 mg (0.5 mg Intravenous Given 2/19/25 2230)   lactated ringers infusion (75 mL/hr Intravenous New Bag 2/19/25 2304)   cefTRIAXone (ROCEPHIN) 2,000 mg in sodium chloride 0.9 % 100 mL MBP (2,000 mg Intravenous New Bag 2/19/25 2144)   metroNIDAZOLE (FLAGYL) IVPB 500 mg (500 mg Intravenous New Bag  2/20/25 0557)   ALPRAZolam (XANAX) tablet 0.25 mg (has no administration in time range)   amLODIPine (NORVASC) tablet 10 mg (10 mg Oral Not Given 2/20/25 0900)   aspirin EC tablet 81 mg (81 mg Oral Not Given 2/20/25 0900)   atorvastatin (LIPITOR) tablet 80 mg ( Oral Dose Auto Held 2/28/25 0900)   HYDROcodone-acetaminophen (NORCO) 7.5-325 MG per tablet 1 tablet (has no administration in time range)   levothyroxine (SYNTHROID, LEVOTHROID) tablet 100 mcg (100 mcg Oral Not Given 2/20/25 0900)   meloxicam (MOBIC) tablet 15 mg (15 mg Oral Not Given 2/20/25 1001)   pantoprazole (PROTONIX) EC tablet 40 mg (40 mg Oral Given 2/20/25 0556)   oxybutynin XL (DITROPAN-XL) 24 hr tablet 5 mg (5 mg Oral Not Given 2/20/25 1002)   ondansetron (ZOFRAN) injection 4 mg (4 mg Intravenous Given 2/19/25 1736)   sodium chloride 0.9 % bolus 1,000 mL (0 mL Intravenous Stopped 2/19/25 1843)   HYDROmorphone (DILAUDID) injection 0.5 mg (0.5 mg Intravenous Given 2/19/25 1807)   iopamidol (ISOVUE-300) 61 % injection 85 mL (85 mL Intravenous Given by Other 2/19/25 1955)   potassium chloride (KLOR-CON M20) CR tablet 40 mEq (40 mEq Oral Given 2/20/25 0151)         All labs have been independently reviewed by me.  All radiology studies have been reviewed by me and I discussed with radiologist dictating the report when indicated below.  All EKG's independently viewed and interpreted by me.  Discussion below represents my analysis of pertinent findings related to patient's condition, differential diagnosis, treatment plan and final disposition.        PROGRESS, DATA ANALYSIS, CONSULTS, AND MEDICAL DECISION MAKING    Differential diagnosis includes   - hepatobiliary pathology such as cholecystitis, cholangitis, and symptomatic cholelithiasis  -PUD  -Mesenteric ischemia  - Pancreatitis  - Dyspepsia  - Small bowel or large bowel obstruction  - Appendicitis  - Diverticulitis  - UTI including pyelonephritis  - Ureteral stone  - Zoster  - Colitis, including  infectious and ischemic  - Atypical ACS  She is requesting medicine for pain.  She just took some Zofran for nausea.  Informed her of my clinical assessment and concerns.  I think this is very possibly gallbladder in etiology.  This pain is triggered after eating and has been episodic and worse today than previously.  Informed her of the test that we will order.  All questions answered at this time.      ED Course as of 02/20/25 1050   Wed Feb 19, 2025 1830 ALT (SGPT)(!): 43 [MM]   1830 AST (SGOT)(!): 45 [MM]   1830 Total Bilirubin(!): 1.6 [MM]   1834 On my reevaluation of the patient she is doing much better.  Pain in the abdomen has resolved.  Still has a very mild nausea.  Informed about the results of the test.  I am going to put out a call to the general surgery.  All questions answered at this time. [MM]   1842 I did discuss the case with Dr. Cabrera who is on for general surgery.  Informed him of this patient's past history and her current history and her presentation here.  He agrees with getting a CT scan of the abdomen pelvis.  He thinks the best course of action is to put her in the observation unit.  She very likely will probably need her gallbladder out.  He will see in consult on the patient. [MM]   1932 I did discuss the case with midlevel provider Kemal Saleem who is on for the observation unit.  Informed him of the patient's presenting symptoms and my conversation with the general surgery.  He agrees to admit the patient.  All questions answered. [MM]      ED Course User Index  [MM] Marty Zacarias MD       AS OF 10:50 EST VITALS:    BP - 146/58  HR - 80  TEMP - 98.8 °F (37.1 °C) (Oral)  02 SATS - 96%    SOCIAL DETERMINANTS OF HEALTH THAT IMPACT OR LIMIT CARE (For example..Homelessness,safe discharge, inability to obtain care, follow up, or prescriptions):      DIAGNOSIS  Final diagnoses:   Symptomatic cholelithiasis   Epigastric pain         DISPOSITION  Patient mated to the observation  unit.          DICTATED UTILIZING DRAGON DICTATION    Note Disclaimer: At [facility], we believe that sharing information builds trust and better relationships. You are receiving this note because you recently visited [facility]. It is possible you will see health information before a provider has talked with you about it. This kind of information can be easy to misunderstand. To help you fully understand what it means for your health, we urge you to discuss this note with your provider.

## 2025-02-20 ENCOUNTER — TELEPHONE (OUTPATIENT)
Dept: ORTHOPEDIC SURGERY | Facility: CLINIC | Age: 70
End: 2025-02-20

## 2025-02-20 ENCOUNTER — APPOINTMENT (OUTPATIENT)
Dept: GENERAL RADIOLOGY | Facility: HOSPITAL | Age: 70
End: 2025-02-20
Payer: COMMERCIAL

## 2025-02-20 ENCOUNTER — ANESTHESIA (OUTPATIENT)
Dept: PERIOP | Facility: HOSPITAL | Age: 70
End: 2025-02-20
Payer: COMMERCIAL

## 2025-02-20 ENCOUNTER — ANESTHESIA EVENT (OUTPATIENT)
Dept: PERIOP | Facility: HOSPITAL | Age: 70
End: 2025-02-20
Payer: COMMERCIAL

## 2025-02-20 LAB
ALBUMIN SERPL-MCNC: 3.6 G/DL (ref 3.5–5.2)
ALBUMIN/GLOB SERPL: 1.3 G/DL
ALP SERPL-CCNC: 94 U/L (ref 39–117)
ALT SERPL W P-5'-P-CCNC: 62 U/L (ref 1–33)
ANION GAP SERPL CALCULATED.3IONS-SCNC: 10.5 MMOL/L (ref 5–15)
AST SERPL-CCNC: 54 U/L (ref 1–32)
BACTERIA UR QL AUTO: NORMAL /HPF
BILIRUB SERPL-MCNC: 1 MG/DL (ref 0–1.2)
BILIRUB UR QL STRIP: NEGATIVE
BUN SERPL-MCNC: 11 MG/DL (ref 8–23)
BUN/CREAT SERPL: 15.3 (ref 7–25)
CALCIUM SPEC-SCNC: 8.4 MG/DL (ref 8.6–10.5)
CHLORIDE SERPL-SCNC: 108 MMOL/L (ref 98–107)
CLARITY UR: CLEAR
CO2 SERPL-SCNC: 20.5 MMOL/L (ref 22–29)
COLOR UR: YELLOW
CREAT SERPL-MCNC: 0.72 MG/DL (ref 0.57–1)
DEPRECATED RDW RBC AUTO: 41.8 FL (ref 37–54)
EGFRCR SERPLBLD CKD-EPI 2021: 89.5 ML/MIN/1.73
ERYTHROCYTE [DISTWIDTH] IN BLOOD BY AUTOMATED COUNT: 13.2 % (ref 12.3–15.4)
GLOBULIN UR ELPH-MCNC: 2.8 GM/DL
GLUCOSE SERPL-MCNC: 81 MG/DL (ref 65–99)
GLUCOSE UR STRIP-MCNC: NEGATIVE MG/DL
HCT VFR BLD AUTO: 29.8 % (ref 34–46.6)
HCT VFR BLD AUTO: 34.1 % (ref 34–46.6)
HGB BLD-MCNC: 11.2 G/DL (ref 12–15.9)
HGB BLD-MCNC: 9.8 G/DL (ref 12–15.9)
HGB UR QL STRIP.AUTO: NEGATIVE
HYALINE CASTS UR QL AUTO: NORMAL /LPF
KETONES UR QL STRIP: NEGATIVE
LEUKOCYTE ESTERASE UR QL STRIP.AUTO: NEGATIVE
MCH RBC QN AUTO: 28.9 PG (ref 26.6–33)
MCHC RBC AUTO-ENTMCNC: 32.9 G/DL (ref 31.5–35.7)
MCV RBC AUTO: 87.9 FL (ref 79–97)
NITRITE UR QL STRIP: POSITIVE
PH UR STRIP.AUTO: 6 [PH] (ref 5–8)
PLATELET # BLD AUTO: 202 10*3/MM3 (ref 140–450)
PMV BLD AUTO: 8.6 FL (ref 6–12)
POTASSIUM SERPL-SCNC: 4.4 MMOL/L (ref 3.5–5.2)
PROT SERPL-MCNC: 6.4 G/DL (ref 6–8.5)
PROT UR QL STRIP: NEGATIVE
RBC # BLD AUTO: 3.39 10*6/MM3 (ref 3.77–5.28)
RBC # UR STRIP: NORMAL /HPF
REF LAB TEST METHOD: NORMAL
SODIUM SERPL-SCNC: 139 MMOL/L (ref 136–145)
SP GR UR STRIP: >1.03 (ref 1–1.03)
SQUAMOUS #/AREA URNS HPF: NORMAL /HPF
UROBILINOGEN UR QL STRIP: ABNORMAL
WBC # UR STRIP: NORMAL /HPF
WBC NRBC COR # BLD AUTO: 4.92 10*3/MM3 (ref 3.4–10.8)

## 2025-02-20 PROCEDURE — 99232 SBSQ HOSP IP/OBS MODERATE 35: CPT | Performed by: SURGERY

## 2025-02-20 PROCEDURE — 25010000002 SUGAMMADEX 200 MG/2ML SOLUTION: Performed by: NURSE ANESTHETIST, CERTIFIED REGISTERED

## 2025-02-20 PROCEDURE — 25010000002 CEFAZOLIN PER 500 MG: Performed by: SURGERY

## 2025-02-20 PROCEDURE — G0378 HOSPITAL OBSERVATION PER HR: HCPCS

## 2025-02-20 PROCEDURE — 47563 LAPARO CHOLECYSTECTOMY/GRAPH: CPT | Performed by: SURGERY

## 2025-02-20 PROCEDURE — 25010000002 MIDAZOLAM PER 1 MG: Performed by: ANESTHESIOLOGY

## 2025-02-20 PROCEDURE — 25010000002 METRONIDAZOLE 500 MG/100ML SOLUTION: Performed by: PHYSICIAN ASSISTANT

## 2025-02-20 PROCEDURE — 25810000003 LACTATED RINGERS PER 1000 ML: Performed by: ANESTHESIOLOGY

## 2025-02-20 PROCEDURE — 25010000002 FENTANYL CITRATE (PF) 50 MCG/ML SOLUTION

## 2025-02-20 PROCEDURE — 81001 URINALYSIS AUTO W/SCOPE: CPT | Performed by: PHYSICIAN ASSISTANT

## 2025-02-20 PROCEDURE — 85018 HEMOGLOBIN: CPT | Performed by: PHYSICIAN ASSISTANT

## 2025-02-20 PROCEDURE — 85027 COMPLETE CBC AUTOMATED: CPT | Performed by: PHYSICIAN ASSISTANT

## 2025-02-20 PROCEDURE — 85014 HEMATOCRIT: CPT | Performed by: PHYSICIAN ASSISTANT

## 2025-02-20 PROCEDURE — 74300 X-RAY BILE DUCTS/PANCREAS: CPT

## 2025-02-20 PROCEDURE — 25010000002 GLYCOPYRROLATE 0.2 MG/ML SOLUTION: Performed by: ANESTHESIOLOGY

## 2025-02-20 PROCEDURE — 47563 LAPARO CHOLECYSTECTOMY/GRAPH: CPT | Performed by: PHYSICIAN ASSISTANT

## 2025-02-20 PROCEDURE — 25010000002 PROPOFOL 10 MG/ML EMULSION

## 2025-02-20 PROCEDURE — 25010000002 LIDOCAINE 2% SOLUTION

## 2025-02-20 PROCEDURE — 25010000002 DEXAMETHASONE PER 1 MG

## 2025-02-20 PROCEDURE — 25010000002 INDOCYANINE GREEN 25 MG RECONSTITUTED SOLUTION: Performed by: SURGERY

## 2025-02-20 PROCEDURE — 25510000001 IOPAMIDOL 61 % SOLUTION: Performed by: SURGERY

## 2025-02-20 PROCEDURE — 25010000002 KETOROLAC TROMETHAMINE PER 15 MG: Performed by: NURSE ANESTHETIST, CERTIFIED REGISTERED

## 2025-02-20 PROCEDURE — 25010000002 ONDANSETRON PER 1 MG: Performed by: NURSE ANESTHETIST, CERTIFIED REGISTERED

## 2025-02-20 PROCEDURE — C1758 CATHETER, URETERAL: HCPCS | Performed by: SURGERY

## 2025-02-20 PROCEDURE — 88304 TISSUE EXAM BY PATHOLOGIST: CPT | Performed by: SURGERY

## 2025-02-20 PROCEDURE — 25810000003 SODIUM CHLORIDE PER 500 ML: Performed by: SURGERY

## 2025-02-20 PROCEDURE — 99204 OFFICE O/P NEW MOD 45 MIN: CPT | Performed by: INTERNAL MEDICINE

## 2025-02-20 PROCEDURE — 80053 COMPREHEN METABOLIC PANEL: CPT | Performed by: PHYSICIAN ASSISTANT

## 2025-02-20 PROCEDURE — 25010000002 MAGNESIUM SULFATE PER 500 MG OF MAGNESIUM

## 2025-02-20 DEVICE — CLIP LIG HEMOLOK PA 6CT MD/LG GRN: Type: IMPLANTABLE DEVICE | Site: ABDOMEN | Status: FUNCTIONAL

## 2025-02-20 RX ORDER — PROPOFOL 10 MG/ML
VIAL (ML) INTRAVENOUS AS NEEDED
Status: DISCONTINUED | OUTPATIENT
Start: 2025-02-20 | End: 2025-02-20 | Stop reason: SURG

## 2025-02-20 RX ORDER — FLUMAZENIL 0.1 MG/ML
0.2 INJECTION INTRAVENOUS AS NEEDED
Status: DISCONTINUED | OUTPATIENT
Start: 2025-02-20 | End: 2025-02-20 | Stop reason: HOSPADM

## 2025-02-20 RX ORDER — SODIUM CHLORIDE, SODIUM LACTATE, POTASSIUM CHLORIDE, CALCIUM CHLORIDE 600; 310; 30; 20 MG/100ML; MG/100ML; MG/100ML; MG/100ML
9 INJECTION, SOLUTION INTRAVENOUS CONTINUOUS
Status: DISCONTINUED | OUTPATIENT
Start: 2025-02-20 | End: 2025-02-20

## 2025-02-20 RX ORDER — PROMETHAZINE HYDROCHLORIDE 25 MG/1
25 SUPPOSITORY RECTAL ONCE AS NEEDED
Status: DISCONTINUED | OUTPATIENT
Start: 2025-02-20 | End: 2025-02-20 | Stop reason: HOSPADM

## 2025-02-20 RX ORDER — BUPIVACAINE HYDROCHLORIDE AND EPINEPHRINE 2.5; 5 MG/ML; UG/ML
INJECTION, SOLUTION EPIDURAL; INFILTRATION; INTRACAUDAL; PERINEURAL AS NEEDED
Status: DISCONTINUED | OUTPATIENT
Start: 2025-02-20 | End: 2025-02-20 | Stop reason: HOSPADM

## 2025-02-20 RX ORDER — HYDROCODONE BITARTRATE AND ACETAMINOPHEN 5; 325 MG/1; MG/1
1 TABLET ORAL EVERY 6 HOURS PRN
Status: DISCONTINUED | OUTPATIENT
Start: 2025-02-20 | End: 2025-02-20 | Stop reason: SDUPTHER

## 2025-02-20 RX ORDER — ONDANSETRON 2 MG/ML
INJECTION INTRAMUSCULAR; INTRAVENOUS AS NEEDED
Status: DISCONTINUED | OUTPATIENT
Start: 2025-02-20 | End: 2025-02-20 | Stop reason: SURG

## 2025-02-20 RX ORDER — SODIUM CHLORIDE 0.9 % (FLUSH) 0.9 %
3 SYRINGE (ML) INJECTION EVERY 12 HOURS SCHEDULED
Status: DISCONTINUED | OUTPATIENT
Start: 2025-02-20 | End: 2025-02-20 | Stop reason: HOSPADM

## 2025-02-20 RX ORDER — SODIUM CHLORIDE 9 MG/ML
INJECTION, SOLUTION INTRAVENOUS AS NEEDED
Status: DISCONTINUED | OUTPATIENT
Start: 2025-02-20 | End: 2025-02-20 | Stop reason: HOSPADM

## 2025-02-20 RX ORDER — LIDOCAINE HYDROCHLORIDE 20 MG/ML
INJECTION, SOLUTION INFILTRATION; PERINEURAL AS NEEDED
Status: DISCONTINUED | OUTPATIENT
Start: 2025-02-20 | End: 2025-02-20 | Stop reason: SURG

## 2025-02-20 RX ORDER — HYDRALAZINE HYDROCHLORIDE 20 MG/ML
5 INJECTION INTRAMUSCULAR; INTRAVENOUS
Status: DISCONTINUED | OUTPATIENT
Start: 2025-02-20 | End: 2025-02-20 | Stop reason: HOSPADM

## 2025-02-20 RX ORDER — MIDAZOLAM HYDROCHLORIDE 1 MG/ML
0.5 INJECTION, SOLUTION INTRAMUSCULAR; INTRAVENOUS
Status: DISCONTINUED | OUTPATIENT
Start: 2025-02-20 | End: 2025-02-20 | Stop reason: HOSPADM

## 2025-02-20 RX ORDER — FENTANYL CITRATE 50 UG/ML
50 INJECTION, SOLUTION INTRAMUSCULAR; INTRAVENOUS
Status: DISCONTINUED | OUTPATIENT
Start: 2025-02-20 | End: 2025-02-20 | Stop reason: HOSPADM

## 2025-02-20 RX ORDER — IOPAMIDOL 612 MG/ML
INJECTION, SOLUTION INTRAVASCULAR AS NEEDED
Status: DISCONTINUED | OUTPATIENT
Start: 2025-02-20 | End: 2025-02-20 | Stop reason: HOSPADM

## 2025-02-20 RX ORDER — LIDOCAINE HYDROCHLORIDE 10 MG/ML
0.5 INJECTION, SOLUTION INFILTRATION; PERINEURAL ONCE AS NEEDED
Status: DISCONTINUED | OUTPATIENT
Start: 2025-02-20 | End: 2025-02-20 | Stop reason: HOSPADM

## 2025-02-20 RX ORDER — ONDANSETRON 2 MG/ML
4 INJECTION INTRAMUSCULAR; INTRAVENOUS ONCE AS NEEDED
Status: DISCONTINUED | OUTPATIENT
Start: 2025-02-20 | End: 2025-02-20 | Stop reason: HOSPADM

## 2025-02-20 RX ORDER — FENTANYL CITRATE 50 UG/ML
50 INJECTION, SOLUTION INTRAMUSCULAR; INTRAVENOUS ONCE AS NEEDED
Status: DISCONTINUED | OUTPATIENT
Start: 2025-02-20 | End: 2025-02-20 | Stop reason: HOSPADM

## 2025-02-20 RX ORDER — FAMOTIDINE 10 MG/ML
20 INJECTION, SOLUTION INTRAVENOUS ONCE
Status: COMPLETED | OUTPATIENT
Start: 2025-02-20 | End: 2025-02-20

## 2025-02-20 RX ORDER — SODIUM CHLORIDE 0.9 % (FLUSH) 0.9 %
3-10 SYRINGE (ML) INJECTION AS NEEDED
Status: DISCONTINUED | OUTPATIENT
Start: 2025-02-20 | End: 2025-02-20 | Stop reason: HOSPADM

## 2025-02-20 RX ORDER — HYDROCODONE BITARTRATE AND ACETAMINOPHEN 5; 325 MG/1; MG/1
1 TABLET ORAL EVERY 6 HOURS PRN
Status: DISCONTINUED | OUTPATIENT
Start: 2025-02-20 | End: 2025-02-21 | Stop reason: HOSPADM

## 2025-02-20 RX ORDER — EPHEDRINE SULFATE 50 MG/ML
5 INJECTION, SOLUTION INTRAVENOUS ONCE AS NEEDED
Status: DISCONTINUED | OUTPATIENT
Start: 2025-02-20 | End: 2025-02-20 | Stop reason: HOSPADM

## 2025-02-20 RX ORDER — FENTANYL CITRATE 50 UG/ML
INJECTION, SOLUTION INTRAMUSCULAR; INTRAVENOUS AS NEEDED
Status: DISCONTINUED | OUTPATIENT
Start: 2025-02-20 | End: 2025-02-20 | Stop reason: SURG

## 2025-02-20 RX ORDER — HYDROCODONE BITARTRATE AND ACETAMINOPHEN 5; 325 MG/1; MG/1
1 TABLET ORAL ONCE AS NEEDED
Status: DISCONTINUED | OUTPATIENT
Start: 2025-02-20 | End: 2025-02-20 | Stop reason: HOSPADM

## 2025-02-20 RX ORDER — HYDROMORPHONE HYDROCHLORIDE 1 MG/ML
0.5 INJECTION, SOLUTION INTRAMUSCULAR; INTRAVENOUS; SUBCUTANEOUS
Status: DISCONTINUED | OUTPATIENT
Start: 2025-02-20 | End: 2025-02-20 | Stop reason: HOSPADM

## 2025-02-20 RX ORDER — KETOROLAC TROMETHAMINE 30 MG/ML
30 INJECTION, SOLUTION INTRAMUSCULAR; INTRAVENOUS EVERY 6 HOURS PRN
Status: DISCONTINUED | OUTPATIENT
Start: 2025-02-20 | End: 2025-02-21 | Stop reason: HOSPADM

## 2025-02-20 RX ORDER — IPRATROPIUM BROMIDE AND ALBUTEROL SULFATE 2.5; .5 MG/3ML; MG/3ML
3 SOLUTION RESPIRATORY (INHALATION) ONCE AS NEEDED
Status: DISCONTINUED | OUTPATIENT
Start: 2025-02-20 | End: 2025-02-20 | Stop reason: HOSPADM

## 2025-02-20 RX ORDER — MAGNESIUM SULFATE HEPTAHYDRATE 500 MG/ML
INJECTION, SOLUTION INTRAMUSCULAR; INTRAVENOUS AS NEEDED
Status: DISCONTINUED | OUTPATIENT
Start: 2025-02-20 | End: 2025-02-20 | Stop reason: SURG

## 2025-02-20 RX ORDER — DEXAMETHASONE SODIUM PHOSPHATE 4 MG/ML
INJECTION, SOLUTION INTRA-ARTICULAR; INTRALESIONAL; INTRAMUSCULAR; INTRAVENOUS; SOFT TISSUE AS NEEDED
Status: DISCONTINUED | OUTPATIENT
Start: 2025-02-20 | End: 2025-02-20 | Stop reason: SURG

## 2025-02-20 RX ORDER — PROMETHAZINE HYDROCHLORIDE 25 MG/1
25 TABLET ORAL ONCE AS NEEDED
Status: DISCONTINUED | OUTPATIENT
Start: 2025-02-20 | End: 2025-02-20 | Stop reason: HOSPADM

## 2025-02-20 RX ORDER — INDOCYANINE GREEN AND WATER 25 MG
5 KIT INJECTION ONCE
Status: COMPLETED | OUTPATIENT
Start: 2025-02-20 | End: 2025-02-20

## 2025-02-20 RX ORDER — HYDROCODONE BITARTRATE AND ACETAMINOPHEN 7.5; 325 MG/1; MG/1
2 TABLET ORAL EVERY 4 HOURS PRN
Status: DISCONTINUED | OUTPATIENT
Start: 2025-02-20 | End: 2025-02-20 | Stop reason: HOSPADM

## 2025-02-20 RX ORDER — NALOXONE HCL 0.4 MG/ML
0.2 VIAL (ML) INJECTION AS NEEDED
Status: DISCONTINUED | OUTPATIENT
Start: 2025-02-20 | End: 2025-02-20 | Stop reason: HOSPADM

## 2025-02-20 RX ORDER — LABETALOL HYDROCHLORIDE 5 MG/ML
5 INJECTION, SOLUTION INTRAVENOUS
Status: DISCONTINUED | OUTPATIENT
Start: 2025-02-20 | End: 2025-02-20 | Stop reason: HOSPADM

## 2025-02-20 RX ORDER — KETOROLAC TROMETHAMINE 30 MG/ML
INJECTION, SOLUTION INTRAMUSCULAR; INTRAVENOUS AS NEEDED
Status: DISCONTINUED | OUTPATIENT
Start: 2025-02-20 | End: 2025-02-20 | Stop reason: SURG

## 2025-02-20 RX ORDER — GLYCOPYRROLATE 0.2 MG/ML
0.2 INJECTION INTRAMUSCULAR; INTRAVENOUS
Status: COMPLETED | OUTPATIENT
Start: 2025-02-20 | End: 2025-02-20

## 2025-02-20 RX ORDER — ROCURONIUM BROMIDE 10 MG/ML
INJECTION, SOLUTION INTRAVENOUS AS NEEDED
Status: DISCONTINUED | OUTPATIENT
Start: 2025-02-20 | End: 2025-02-20 | Stop reason: SURG

## 2025-02-20 RX ORDER — DIPHENHYDRAMINE HYDROCHLORIDE 50 MG/ML
12.5 INJECTION INTRAMUSCULAR; INTRAVENOUS
Status: DISCONTINUED | OUTPATIENT
Start: 2025-02-20 | End: 2025-02-20 | Stop reason: HOSPADM

## 2025-02-20 RX ADMIN — ONDANSETRON 4 MG: 2 INJECTION INTRAMUSCULAR; INTRAVENOUS at 14:41

## 2025-02-20 RX ADMIN — METRONIDAZOLE 500 MG: 500 INJECTION, SOLUTION INTRAVENOUS at 05:57

## 2025-02-20 RX ADMIN — LIDOCAINE HYDROCHLORIDE 60 MG: 20 INJECTION, SOLUTION INFILTRATION; PERINEURAL at 13:43

## 2025-02-20 RX ADMIN — ROCURONIUM BROMIDE 35 MG: 10 INJECTION, SOLUTION INTRAVENOUS at 13:43

## 2025-02-20 RX ADMIN — MIDAZOLAM 0.5 MG: 1 INJECTION INTRAMUSCULAR; INTRAVENOUS at 12:05

## 2025-02-20 RX ADMIN — DEXAMETHASONE SODIUM PHOSPHATE 6 MG: 4 INJECTION, SOLUTION INTRA-ARTICULAR; INTRALESIONAL; INTRAMUSCULAR; INTRAVENOUS; SOFT TISSUE at 13:56

## 2025-02-20 RX ADMIN — CEFAZOLIN 2 G: 2 INJECTION, POWDER, FOR SOLUTION INTRAMUSCULAR; INTRAVENOUS at 13:30

## 2025-02-20 RX ADMIN — PROPOFOL 150 MG: 10 INJECTION, EMULSION INTRAVENOUS at 13:43

## 2025-02-20 RX ADMIN — POTASSIUM CHLORIDE 40 MEQ: 1500 TABLET, EXTENDED RELEASE ORAL at 01:51

## 2025-02-20 RX ADMIN — INDOCYANINE GREEN AND WATER 2 MG: KIT at 12:05

## 2025-02-20 RX ADMIN — FAMOTIDINE 20 MG: 10 INJECTION INTRAVENOUS at 12:04

## 2025-02-20 RX ADMIN — FENTANYL CITRATE 25 MCG: 50 INJECTION, SOLUTION INTRAMUSCULAR; INTRAVENOUS at 13:56

## 2025-02-20 RX ADMIN — FENTANYL CITRATE 25 MCG: 50 INJECTION, SOLUTION INTRAMUSCULAR; INTRAVENOUS at 14:14

## 2025-02-20 RX ADMIN — FENTANYL CITRATE 50 MCG: 50 INJECTION, SOLUTION INTRAMUSCULAR; INTRAVENOUS at 15:29

## 2025-02-20 RX ADMIN — SUGAMMADEX 200 MG: 100 INJECTION, SOLUTION INTRAVENOUS at 14:43

## 2025-02-20 RX ADMIN — SODIUM CHLORIDE, POTASSIUM CHLORIDE, SODIUM LACTATE AND CALCIUM CHLORIDE 9 ML/HR: 600; 310; 30; 20 INJECTION, SOLUTION INTRAVENOUS at 12:04

## 2025-02-20 RX ADMIN — METRONIDAZOLE 500 MG: 500 INJECTION, SOLUTION INTRAVENOUS at 14:30

## 2025-02-20 RX ADMIN — MAGNESIUM SULFATE HEPTAHYDRATE 1 G: 500 INJECTION, SOLUTION INTRAMUSCULAR; INTRAVENOUS at 14:04

## 2025-02-20 RX ADMIN — PANTOPRAZOLE SODIUM 40 MG: 40 TABLET, DELAYED RELEASE ORAL at 05:56

## 2025-02-20 RX ADMIN — ROCURONIUM BROMIDE 10 MG: 10 INJECTION, SOLUTION INTRAVENOUS at 14:05

## 2025-02-20 RX ADMIN — KETOROLAC TROMETHAMINE 15 MG: 30 INJECTION, SOLUTION INTRAMUSCULAR at 14:42

## 2025-02-20 RX ADMIN — PROPOFOL 50 MG: 10 INJECTION, EMULSION INTRAVENOUS at 14:14

## 2025-02-20 RX ADMIN — GLYCOPYRROLATE 0.2 MG: 0.2 INJECTION INTRAMUSCULAR; INTRAVENOUS at 12:06

## 2025-02-20 NOTE — ANESTHESIA PREPROCEDURE EVALUATION
Anesthesia Evaluation     history of anesthetic complications (Hard to Intubate--needs small tube due to scar tissue from previous surgery):   NPO Solid Status: > 8 hours  NPO Liquid Status: > 2 hours           Airway   Mallampati: II  Neck ROM: full  Possible difficult intubation  Dental - normal exam     Pulmonary - negative pulmonary ROS and normal exam   (-) COPD, asthma, sleep apnea, not a smoker    PE comment: nonlabored  Cardiovascular - normal exam  Exercise tolerance: good (4-7 METS)    Rhythm: regular  Rate: normal    (+) hypertension, valvular problems/murmurs murmur, hyperlipidemia  (-) past MI, CAD, dysrhythmias, angina    ROS comment: Stress Test 10/2023:  ·  Left ventricular ejection fraction is hyperdynamic (Calculated EF > 70%).  ·  Myocardial perfusion imaging indicates a normal myocardial perfusion study with no evidence of ischemia.  ·  Impressions are consistent with a low risk study.  ·  Breast attenuation artifact is present.  ·  Compared to the prior study from 9/14/2017 the current study reveals no changes.         Neuro/Psych  (+) psychiatric history Anxiety and Bipolar  (-) seizures, TIA, CVA  GI/Hepatic/Renal/Endo    (+) obesity, GERD, thyroid problem (h/o Grave's disease) hypothyroidism  (-) liver disease, no renal diseaseDiabetes: preDM.    Musculoskeletal     (+) arthralgias  Abdominal    Substance History Drug use: History of substance abuse.     OB/GYN          Other   arthritis,   history of cancer (colon cancer)    ROS/Med Hx Other: Unilateral partial paralysis of vocal cords or larynx - left        Phys Exam Other: Hoarse voice              Anesthesia Plan    ASA 3     general     (H/o difficult intubation but Gr IIa view with MAC 3 in past and 6.0 ETT passed easily;    )  intravenous induction     Anesthetic plan, risks, benefits, and alternatives have been provided, discussed and informed consent has been obtained with: patient.      CODE STATUS:    Code Status (Patient has no  pulse and is not breathing): CPR (Attempt to Resuscitate)  Medical Interventions (Patient has pulse or is breathing): Full Support

## 2025-02-20 NOTE — ED NOTES
".Nursing report ED to floor  Marie Foy  69 y.o.  female    HPI :  HPI  Stated Reason for Visit: nausea and vomiting  History Obtained From: patient, EMS    Chief Complaint  Chief Complaint   Patient presents with    Nausea    Vomiting       Admitting doctor:   Trent Bustos MD    Admitting diagnosis:   The primary encounter diagnosis was Symptomatic cholelithiasis. A diagnosis of Epigastric pain was also pertinent to this visit.    Code status:   Current Code Status       Date Active Code Status Order ID Comments User Context       Prior            Allergies:   Penicillins, Codeine, and Morphine and codeine    Isolation:   No active isolations    Intake and Output    Intake/Output Summary (Last 24 hours) at 2/19/2025 1940  Last data filed at 2/19/2025 1843  Gross per 24 hour   Intake 1000 ml   Output --   Net 1000 ml       Weight:       02/19/25  1729   Weight: 70.3 kg (155 lb)       Most recent vitals:   Vitals:    02/19/25 1714 02/19/25 1729 02/19/25 1842   BP: 170/60  151/63   Pulse: 67  70   Resp: 18  14   Temp: 98.9 °F (37.2 °C)     SpO2: 99%  95%   Weight:  70.3 kg (155 lb)    Height:  147.3 cm (58\")        Active LDAs/IV Access:   Lines, Drains & Airways       Active LDAs       Name Placement date Placement time Site Days    Peripheral IV 02/19/25 1731 Left Antecubital 02/19/25  1731  Antecubital  less than 1                    Labs (abnormal labs have a star):   Labs Reviewed   COMPREHENSIVE METABOLIC PANEL - Abnormal; Notable for the following components:       Result Value    Glucose 126 (*)     Potassium 3.3 (*)     ALT (SGPT) 43 (*)     AST (SGOT) 45 (*)     Total Bilirubin 1.6 (*)     All other components within normal limits    Narrative:     GFR Categories in Chronic Kidney Disease (CKD)      GFR Category          GFR (mL/min/1.73)    Interpretation  G1                     90 or greater         Normal or high (1)  G2                      60-89                Mild decrease (1)  G3a            "        45-59                Mild to moderate decrease  G3b                   30-44                Moderate to severe decrease  G4                    15-29                Severe decrease  G5                    14 or less           Kidney failure          (1)In the absence of evidence of kidney disease, neither GFR category G1 or G2 fulfill the criteria for CKD.    eGFR calculation 2021 CKD-EPI creatinine equation, which does not include race as a factor   CBC WITH AUTO DIFFERENTIAL - Abnormal; Notable for the following components:    Neutrophil % 80.2 (*)     Lymphocyte % 11.6 (*)     All other components within normal limits   LIPASE - Normal   LACTIC ACID, PLASMA - Normal   TROPONIN - Normal    Narrative:     High Sensitive Troponin T Reference Range:  <14.0 ng/L- Negative Female for AMI  <22.0 ng/L- Negative Male for AMI  >=14 - Abnormal Female indicating possible myocardial injury.  >=22 - Abnormal Male indicating possible myocardial injury.   Clinicians would have to utilize clinical acumen, EKG, Troponin, and serial changes to determine if it is an Acute Myocardial Infarction or myocardial injury due to an underlying chronic condition.        RAINBOW DRAW    Narrative:     The following orders were created for panel order [place] Draw.  Procedure                               Abnormality         Status                     ---------                               -----------         ------                     Green Top (Gel)[[id number]]                                  Final result               Lavender Top[[id number]]                                     Final result               Gold Top - SST[[id number]]                                   Final result               Light Blue Top[[id number]]                                   Final result                 Please view results for these tests on the individual orders.   URINALYSIS W/ MICROSCOPIC IF INDICATED (NO CULTURE)   CBC AND DIFFERENTIAL    Narrative:     The  following orders were created for panel order CBC & Differential.  Procedure                               Abnormality         Status                     ---------                               -----------         ------                     CBC Auto Differential[[id number]]        Abnormal            Final result                 Please view results for these tests on the individual orders.   GREEN TOP   LAVENDER TOP   GOLD TOP - SST   LIGHT BLUE TOP       EKG:   No orders to display       Meds given in ED:   Medications   sodium chloride 0.9 % flush 10 mL (has no administration in time range)   ondansetron (ZOFRAN) injection 4 mg (4 mg Intravenous Given 2/19/25 1736)   sodium chloride 0.9 % bolus 1,000 mL (0 mL Intravenous Stopped 2/19/25 1843)   HYDROmorphone (DILAUDID) injection 0.5 mg (0.5 mg Intravenous Given 2/19/25 1807)       Imaging results:  No radiology results for the last day    Ambulatory status:   - up ad valerie    Social issues:   Social History     Socioeconomic History    Marital status:     Number of children: 5   Tobacco Use    Smoking status: Never     Passive exposure: Never    Smokeless tobacco: Never   Vaping Use    Vaping status: Never Used   Substance and Sexual Activity    Alcohol use: Not Currently     Comment: OCASSIONALLY    Drug use: Never    Sexual activity: Not Currently     Comment: Not sexually active       Peripheral Neurovascular  Peripheral Neurovascular (Adult)  Peripheral Neurovascular WDL: WDL    Neuro Cognitive  Neuro Cognitive (Adult)  Cognitive/Neuro/Behavioral WDL: WDL    Learning  Learning Assessment  Learning Readiness and Ability: no barriers identified  Education Provided  Person Taught: patient  Teaching Method: verbal instruction    Respiratory  Respiratory  Airway WDL: WDL  Respiratory WDL  Respiratory WDL: WDL    Abdominal Pain       Pain Assessments  Pain (Adult)  (0-10) Pain Rating: Rest: 0  (0-10) Pain Rating: Activity: 10  Pain Location: abdomen  Pain  Side/Orientation: upper  Pain Management Interventions: pillow support provided  Response to Pain Interventions: interventions effective per patient, self-care function improved    NIH Stroke Scale       Pj Cohen RN  02/19/25 19:40 EST

## 2025-02-20 NOTE — CONSULTS
Gastroenterology   Initial Inpatient Consult Note    Referring Provider: Marty Zacarias    Reason for Consultation: abnromal LFT and abnormal CT, history of colon cancer, abdominal pain    Subjective     History of present illness:    69 y.o. female who we are asked to see for elevated LFTs and abnormal CT scan.  She has a history of colon cancer with a colectomy of the transverse colon in 2021.  She has had epigastric pain nausea and vomiting.  She describes 3 discrete episodes within the last couple of weeks but it has actually been going on for about 12 months.  In the past she has had imaging that showed biliary sludge.  Patient reports her episodes are getting more frequent.  She has had 3 in the last couple of weeks pain and nausea.    There is mild nausea.  Initial labs revealed a AST of 45 and ALT of 43 and a total bilirubin of 1.6  This morning AST is 54 ALT 62 but bilirubin is down to 1.0  White count 4.92 hemoglobin is 9.8  CT Abdomen Pelvis With Contrast (02/19/2025 19:56) there is mild biliary ductal dilatation which is nonspecific but is noted to be a change from previous evaluations.  No specified stone or lesion.    Surgery has been consulted and has seen the patient, and it appears that there are plans for cholecystectomy today    Colonoscopy (06/18/2024 13:06) diverticulosis and patent anastomosis  Past Medical History:  Past Medical History:   Diagnosis Date    Anxiety     Arthritis     Arthritis of back     Benign cardiac murmur     Colon cancer     Colon polyps     Constipation     Depression     LOSS OF SPOUSE 4 YRS AGO.    Fibrocystic breast     Fracture, foot 2014 problem resolved    Genital herpes     GERD (gastroesophageal reflux disease) unk    controlled w/meds    Graves disease     Hard to intubate     R/T SMALLER THAN NORMAL ADULT AIRWAY RELATED TO PREVIOUS SURGERY AND SCAR TISSUE.    Hearing loss     RIGHT WORST THEN LEFT. AS RESULT CHRONIC OTITIS MEDIA. HOLE RIGHT EAR DRUM    Heart  murmur     History of bipolar disorder     STATES NOT TREATED. NO SAVANNA PHASES    History of substance abuse     Hoarseness     DAMAGE TO VOCAL CORD AS CHILD.     Hyperlipidemia     Hypertension     Joint pain     swelling    Knee swelling Unk    Osteopenia unk    Prolia injections    Stress fracture 2014 problem resolved    Thyroid disease      Past Surgical History:  Past Surgical History:   Procedure Laterality Date    ANKLE OPEN REDUCTION INTERNAL FIXATION  1998    ANKLE SURGERY Left     ORIF WITH HARDWARE    BREAST BIOPSY Right     BENIGN    CATARACT EXTRACTION W/ INTRAOCULAR LENS IMPLANT      LEFT AND RIGHT    COLON RESECTION N/A 03/02/2021    Procedure: LAPAROSCOPIC TRANSVERSE COLECTOMY WITH DAVINCI ROBOT;  Surgeon: Mratin Calvo MD;  Location: [facility] MAIN OR;  Service: DaVinci;  Laterality: N/A;    COLONOSCOPY N/A 08/23/2016    Procedure: COLONOSCOPY TO CECUM & T.I.  WITH SALINE INJECTION, HOT SNARE POLYPECTOMY, COLD POLYPECTOMY AND CLIP PLACEMENT X 1;  Surgeon: Darryl Garcia MD;  Location: [facility] ENDOSCOPY;  Service:     COLONOSCOPY N/A 01/26/2021    Procedure: COLONOSCOPY to cecum with cold biopsyies and cold biopsy polypectomy and tattoo needle injection of colonic ulcer;  Surgeon: Darryl Garcia MD;  Location: [facility] ENDOSCOPY;  Service: Gastroenterology;  Laterality: N/A;  pre: hx of olyps  post:  colonic ulcer, polyp    COLONOSCOPY N/A 05/12/2022    Procedure: COLONOSCOPY to anastamosis with hot snare polypectomy;  Surgeon: Martin Calvo MD;  Location: [facility] ENDOSCOPY;  Service: General;  Laterality: N/A;  Pre: History of colon cancer  Post: polyp    COLONOSCOPY N/A 06/18/2024    Procedure: COLONOSCOPY to cecum;  Surgeon: Martin Calvo MD;  Location: [facility] ENDOSCOPY;  Service: General;  Laterality: N/A;  pre personal hx colon ca post diverticulosis    COLPORRHAPHY  2008    EAR TUBES      MEENA. MULTI TIMES    EYE SURGERY  Unknown    cateracts    FOOT SURGERY Right     STRESS  FRACTURE. SURGERY X2 WITH HARDWARE IN PLACE    HAND SURGERY Right     FOR FRACTURE WITH HARDWARE IN PLACE    HYSTERECTOMY  2005    WITH BLADDER SLING    JOINT REPLACEMENT  2011    OTHER SURGICAL HISTORY  2006    TVT    REPLACEMENT TOTAL KNEE Right     SACROCOLPOPEXY  2007    POSTERIOR COLPORRHAPHY, PERINEORRHAPHY      THROAT SURGERY      childhood-PUNCTURED ESOPHAGUS AT AGE OF 10.  MULTI SURGERY TO AREA    TOTAL KNEE ARTHROPLASTY Right 2011    TOTAL KNEE ARTHROPLASTY Left 12/23/2024    Procedure: TOTAL KNEE ARTHROPLASTY;  Surgeon: Saul To MD;  [place] [facility] [place] [facility] [place]  Service: Orthopedics;  Laterality: Left;      Social History:   Social History     Tobacco Use    Smoking status: Never     Passive exposure: Never    Smokeless tobacco: Never   Substance Use Topics    Alcohol use: Not Currently     Comment: OCASSIONALLY      Family History:  Family History   Problem Relation Age of Onset    Hypertension Mother     Heart failure Mother     Diabetes Mother     Colon polyps Mother     Colon cancer Mother     Pancreatic cancer Father     Anxiety disorder Daughter         bi-polar    Malig Hyperthermia Neg Hx        Home Meds:  Medications Prior to Admission   Medication Sig Dispense Refill Last Dose/Taking    ALPRAZolam (XANAX) 0.25 MG tablet Take 1 tablet by mouth 3 (Three) Times a Day As Needed for Anxiety. 20 tablet 0     amLODIPine (NORVASC) 10 MG tablet Take 1 tablet by mouth Daily. (Patient taking differently: Take 0.5 tablets by mouth 2 (Two) Times a Day.) 90 tablet 3     aspirin 81 MG EC tablet Take 1 tablet by mouth twice daily for 14 days; then take 1 tablet by mouth daily for 28 days 60 tablet 0     atorvastatin (LIPITOR) 80 MG tablet TAKE 1 TABLET BY MOUTH DAILY. 90 tablet 3     Ferrous Sulfate 28 MG tablet Take  by mouth. 3 days a week       HYDROcodone-acetaminophen (NORCO) 7.5-325 MG per tablet Take 1 tablet by mouth Every 6 (Six) Hours As Needed for Moderate Pain.       HYDROmorphone (Dilaudid) 2  MG tablet Take 1 tablet by mouth Every 4 (Four) Hours As Needed for Severe Pain for up to 40 doses. (Patient not taking: Reported on 1/7/2025) 40 tablet 0     levothyroxine (SYNTHROID, LEVOTHROID) 100 MCG tablet TAKE 1 TABLET BY MOUTH ONCE DAILY 90 tablet 1     meloxicam (MOBIC) 15 MG tablet Take 1 tablet by mouth Daily. 14 tablet 0     omeprazole (priLOSEC) 40 MG capsule TAKE 1 CAPSULE BY MOUTH DAILY 90 capsule 2     ondansetron (Zofran) 4 MG tablet Take 1 tablet by mouth Every 8 (Eight) Hours As Needed for Nausea or Vomiting for up to 10 doses. (Patient not taking: Reported on 1/7/2025) 10 tablet 0     solifenacin (VESICARE) 5 MG tablet TAKE 1 TABLET BY MOUTH ONCE DAILY 90 tablet 3     valACYclovir (VALTREX) 1000 MG tablet TAKE 1 TABLET BY MOUTH DAILY. (Patient taking differently: Take 1 tablet by mouth. 3 TIMES WEEKLY) 90 tablet 3     vitamin B-12 (CYANOCOBALAMIN) 1000 MCG tablet Take 1 tablet by mouth Daily.       Vitamin D, Cholecalciferol, (CHOLECALCIFEROL) 10 MCG (400 UNIT) tablet Take 1 tablet by mouth Daily.        Current Meds:   amLODIPine, 10 mg, Oral, Daily  aspirin, 81 mg, Oral, Daily  [Held by provider] atorvastatin, 80 mg, Oral, Daily  cefTRIAXone, 2,000 mg, Intravenous, Q24H  levothyroxine, 100 mcg, Oral, Daily  meloxicam, 15 mg, Oral, Daily  metroNIDAZOLE, 500 mg, Intravenous, Q8H  oxybutynin XL, 5 mg, Oral, Daily  pantoprazole, 40 mg, Oral, Q AM  sodium chloride, 10 mL, Intravenous, Q12H      Allergies:  Allergies   Allergen Reactions    Penicillins Rash     Beta lactam allergy details  Antibiotic reaction: rash, hives  Age at reaction: child  Dose to reaction time: (!) minutes  Reason for antibiotic: unknown  Epinephrine required for reaction?: no  Tolerated antibiotics: unknown       Codeine Headache     Headache    Morphine And Codeine Rash     Review of Systems  Pertinent items are noted in HPI, all other systems reviewed and negative    Objective     Vital Signs  Temp:  [97.5 °F (36.4  °C)-98.9 °F (37.2 °C)] 98.4 °F (36.9 °C)  Heart Rate:  [59-74] 66  Resp:  [14-18] 16  BP: (118-170)/(45-63) 118/45    Physical Exam:  CONSTITUTIONAL:  today's vital signs reviewed  EARS NOSE THROAT: trachea midline and no deformity of the nares  EYES: no scleral icterus  GASTROINTESTINAL: abdomen is soft, mild tender to palpation with nausea elicited, nondistended with normal active bowel sounds, no masses are appreciated  PSYCHIATRIC: appropriate mood and affect  RESPIRATORY: normal inspiratory effort with no increased work of breathing  NEUROLOGIC: patient is awake and alert  DERMATOLOGIC: skin is warm with no cyanosis  LYMPHATIC: no periumbilical lymphadenopathy     Results Review:              I reviewed the patient's new clinical results.    Results from last 7 days   Lab Units 02/20/25  0354 02/19/25  1732   WBC 10*3/mm3 4.92 6.96   HEMOGLOBIN g/dL 9.8* 12.5   HEMATOCRIT % 29.8* 37.9   PLATELETS 10*3/mm3 202 241     Results from last 7 days   Lab Units 02/20/25  0354 02/19/25  1732   SODIUM mmol/L 139 138   POTASSIUM mmol/L 4.4 3.3*   CHLORIDE mmol/L 108* 104   CO2 mmol/L 20.5* 22.0   BUN mg/dL 11 19   CREATININE mg/dL 0.72 0.89   CALCIUM mg/dL 8.4* 9.8   BILIRUBIN mg/dL 1.0 1.6*   ALK PHOS U/L 94 115   ALT (SGPT) U/L 62* 43*   AST (SGOT) U/L 54* 45*   GLUCOSE mg/dL 81 126*         Lab Results   Lab Value Date/Time    LIPASE 46 02/19/2025 1732       Radiology:  CT Abdomen Pelvis With Contrast   Final Result       1. Mild biliary ductal dilatation, nonspecific, as described above,   further evaluation can be obtained as indicated.       2. No acute inflammatory process of bowel is identified, follow-up as   indications persist.       3. No obstructive uropathy.                 This report was finalized on 2/19/2025 8:37 PM by Dr. Adriel Fish M.D on Workstation: [employer]              Assessment & Plan   Active Hospital Problems    Diagnosis     **Abdominal pain     Cholecystitis with cholelithiasis      Symptomatic cholelithiasis        Assessment:  Epigastric pain and nausea  Elevated LFTs, transaminases slightly increased bilirubin normalized  No evidence of cholangitis with no fever or jaundice  History of colon cancer status post resection  Anemia    Plan:  Supportive therapy for nausea  I do not think we need to consult our biliary service at this moment.  But based on either her clinical course or if patient undergoes cholecystectomy and there is an abnormal IOC certainly we can get them involved  We will await the consultation with Dr. Martin García this morning.  If more imaging is requested we could get an MRCP otherwise we will see his plans for cholecystectomy or observation      I discussed the patients findings and my recommendations with patient and nursing staff.           Saul Feliz M.D.  [facility] [place] [address]  Office: [phone]

## 2025-02-20 NOTE — ANESTHESIA POSTPROCEDURE EVALUATION
Patient: Marie Foy    Procedure Summary       Date: 02/20/25 Room / Location: [facility] OR  / [facility] MAIN OR    Anesthesia Start: 1335 Anesthesia Stop: 1503    Procedure: CHOLECYSTECTOMY LAPAROSCOPIC INTRAOPERATIVE CHOLANGIOGRAM WITH DAVINCI ROBOT (Abdomen) Diagnosis:       Symptomatic cholelithiasis      Calculus of gallbladder with acute cholecystitis without obstruction      (Symptomatic cholelithiasis [K80.20])      (Calculus of gallbladder with acute cholecystitis without obstruction [K80.00])    Surgeons: Martin Calvo MD Provider: Kenyon Millard MD    Anesthesia Type: general ASA Status: 3            Anesthesia Type: general    Vitals  Vitals Value Taken Time   /59 02/20/25 1730   Temp 36.6 °C (97.8 °F) 02/20/25 1500   Pulse 65 02/20/25 1741   Resp 16 02/20/25 1730   SpO2 96 % 02/20/25 1741   Vitals shown include unfiled device data.        Post Anesthesia Care and Evaluation    Patient location during evaluation: bedside  Patient participation: complete - patient participated  Level of consciousness: awake and alert  Pain management: adequate    Airway patency: patent  Anesthetic complications: No anesthetic complications  PONV Status: controlled  Cardiovascular status: acceptable  Respiratory status: acceptable  Hydration status: acceptable

## 2025-02-20 NOTE — ANESTHESIA PROCEDURE NOTES
Airway  Urgency: elective    Date/Time: 2/20/2025 1:46 PM  Airway not difficult    General Information and Staff    Patient location during procedure: OR  Anesthesiologist: Kenyon Millard MD  CRNA/CAA: Dodie Pardo CRNA    Indications and Patient Condition  Indications for airway management: airway protection    Preoxygenated: yes  MILS not maintained throughout  Mask difficulty assessment: 1 - vent by mask    Final Airway Details  Final airway type: endotracheal airway      Successful airway: ETT  Cuffed: yes   Successful intubation technique: direct laryngoscopy  Facilitating devices/methods: intubating stylet  Endotracheal tube insertion site: oral  Blade: John  Blade size: 3  ETT size (mm): 6.5  Cormack-Lehane Classification: grade IIa - partial view of glottis  Placement verified by: chest auscultation and capnometry   Cuff volume (mL): 8  Measured from: teeth  ETT/EBT  to teeth (cm): 19  Number of attempts at approach: 1  Assessment: lips, teeth, and gum same as pre-op and atraumatic intubation             Impression: Myopia, bilateral: H52.13. Plan: Discussed findings. New glasses and contact lens Rx finalized and given to patient.

## 2025-02-20 NOTE — H&P
[facility]   HISTORY AND PHYSICAL    Patient Name: Marie Foy  : 1955  MRN: [id number]  Primary Care Physician:  Michelle Wyman MD  Date of admission: 2025    Subjective   Subjective     Chief Complaint:   Chief Complaint   Patient presents with    Nausea    Vomiting         HPI:    Marie Foy is a 69 y.o. female comes in complaining of a intermittent abdominal pain for the past 2 weeks.  Patient states she has had 3 episodes after eating food in which she has severe abdominal pain in her epigastric and right upper quadrant area with associated nausea and sometimes vomiting.  Patient states she had worsening episode today as well.  Patient reports she has been told she has had sludge on her gallbladder on her last CT scan.  Patient denies any fever, chills or urinary symptoms.    In the ED, initial troponin 9, potassium 3.3, AST and ALT of 45 and 43 respectively, total bilirubin of 1.6, lactic normal 1.1, lipase normal, CBC largely unremarkable for acute findings.  UA shows 4+ bacteria nitrite positive however patient states that this was contaminated had a lot of toilet paper in her sample, will repeat UA.  CT abdomen pelvis with contrast shows mild biliary ductal dilatation, nonspecific, further evaluation with MRCP may be recommended.  Patient is afebrile, pulse in the 70s, on room air oxygen and 97% SpO2 and blood pressure 140s over 50s.  Patient was given Dilaudid and Zofran in the ED and 1 L normal saline bolus.     Review of Systems   All systems were reviewed and negative except for: as per HPI    Personal History     Past Medical History:   Diagnosis Date    Anxiety     Arthritis     Arthritis of back     Benign cardiac murmur     Colon cancer     Colon polyps     Constipation     Depression     LOSS OF SPOUSE 4 YRS AGO.    Fibrocystic breast     Fracture, foot 2014 problem resolved    Genital herpes     GERD (gastroesophageal reflux disease) unk    controlled w/meds    Graves  disease     Hard to intubate     R/T SMALLER THAN NORMAL ADULT AIRWAY RELATED TO PREVIOUS SURGERY AND SCAR TISSUE.    Hearing loss     RIGHT WORST THEN LEFT. AS RESULT CHRONIC OTITIS MEDIA. HOLE RIGHT EAR DRUM    Heart murmur     History of bipolar disorder     STATES NOT TREATED. NO SAVANNA PHASES    History of substance abuse     Hoarseness     DAMAGE TO VOCAL CORD AS CHILD.     Hyperlipidemia     Hypertension     Joint pain     swelling    Knee swelling Unk    Osteopenia unk    Prolia injections    Stress fracture 2014 problem resolved    Thyroid disease        Past Surgical History:   Procedure Laterality Date    ANKLE OPEN REDUCTION INTERNAL FIXATION  1998    ANKLE SURGERY Left     ORIF WITH HARDWARE    BREAST BIOPSY Right     BENIGN    CATARACT EXTRACTION W/ INTRAOCULAR LENS IMPLANT      LEFT AND RIGHT    COLON RESECTION N/A 03/02/2021    Procedure: LAPAROSCOPIC TRANSVERSE COLECTOMY WITH DAVINCI ROBOT;  Surgeon: Martin Calvo MD;  [facility];  [facility];  Laterality: N/A;    COLONOSCOPY N/A 08/23/2016    Procedure: COLONOSCOPY TO CECUM & T.I.  WITH SALINE INJECTION, HOT SNARE POLYPECTOMY, COLD POLYPECTOMY AND CLIP PLACEMENT X 1;  Surgeon: Darryl Garcia MD;  Location: [facility] ENDOSCOPY;  Service:     COLONOSCOPY N/A 01/26/2021    Procedure: COLONOSCOPY to cecum with cold biopsyies and cold biopsy polypectomy and tattoo needle injection of colonic ulcer;  Surgeon: Darryl Garcia MD;  Location: [facility] ENDOSCOPY;  Service: Gastroenterology;  Laterality: N/A;  pre: hx of olyps  post:  colonic ulcer, polyp    COLONOSCOPY N/A 05/12/2022    Procedure: COLONOSCOPY to anastamosis with hot snare polypectomy;  Surgeon: Martin Calvo MD;  Location: [facility] ENDOSCOPY;  Service: General;  Laterality: N/A;  Pre: History of colon cancer  Post: polyp    COLONOSCOPY N/A 06/18/2024    Procedure: COLONOSCOPY to cecum;  Surgeon: Martin Calvo MD;  Location: [facility] ENDOSCOPY;  Service: General;   Laterality: N/A;  pre personal hx colon ca post diverticulosis    COLPORRHAPHY  2008    EAR TUBES      MEENA. MULTI TIMES    EYE SURGERY  Unknown    cateracts    FOOT SURGERY Right     STRESS FRACTURE. SURGERY X2 WITH HARDWARE IN PLACE    HAND SURGERY Right     FOR FRACTURE WITH HARDWARE IN PLACE    HYSTERECTOMY  2005    WITH BLADDER SLING    JOINT REPLACEMENT  2011    OTHER SURGICAL HISTORY  2006    TVT    REPLACEMENT TOTAL KNEE Right     SACROCOLPOPEXY  2007    POSTERIOR COLPORRHAPHY, PERINEORRHAPHY      THROAT SURGERY      childhood-PUNCTURED ESOPHAGUS AT AGE OF 10.  MULTI SURGERY TO AREA    TOTAL KNEE ARTHROPLASTY Right 2011    TOTAL KNEE ARTHROPLASTY Left 12/23/2024    Procedure: TOTAL KNEE ARTHROPLASTY;  Surgeon: Saul To MD;  Location: [facility] OR [facility];  Service: Orthopedics;  Laterality: Left;       Family History: family history includes Anxiety disorder in her daughter; Colon cancer in her mother; Colon polyps in her mother; Diabetes in her mother; Heart failure in her mother; Hypertension in her mother; Pancreatic cancer in her father. Otherwise pertinent FHx was reviewed and not pertinent to current issue.    Social History:  reports that she has never smoked. She has never been exposed to tobacco smoke. She has never used smokeless tobacco. She reports that she does not currently use alcohol. She reports that she does not use drugs.    Home Medications:  ALPRAZolam, Ferrous Sulfate, HYDROcodone-acetaminophen, HYDROmorphone, Vitamin D (Cholecalciferol), amLODIPine, aspirin, atorvastatin, levothyroxine, meloxicam, omeprazole, ondansetron, solifenacin, valACYclovir, and vitamin B-12    Allergies:  Allergies   Allergen Reactions    Penicillins Rash     Beta lactam allergy details  Antibiotic reaction: rash, hives  Age at reaction: child  Dose to reaction time: (!) minutes  Reason for antibiotic: unknown  Epinephrine required for reaction?: no  Tolerated antibiotics: unknown       Codeine Headache      Headache    Morphine And Codeine Rash       Objective   Objective     Vitals:   Temp:  [98.8 °F (37.1 °C)-98.9 °F (37.2 °C)] 98.8 °F (37.1 °C)  Heart Rate:  [67-74] 74  Resp:  [14-18] 16  BP: (149-170)/(55-63) 149/55  Physical Exam    Constitutional: Awake, alert   Eyes: PERRLA, sclerae anicteric, no conjunctival injection   HENT: NCAT, mucous membranes moist   Neck: Supple, no thyromegaly, no lymphadenopathy, trachea midline   Respiratory: Clear to auscultation bilaterally, nonlabored respirations    Cardiovascular: RRR, no murmurs, rubs, or gallops, palpable pedal pulses bilaterally   Gastrointestinal: RUQ and epigastric tenderness otherwise positive bowel sounds, soft, nontender, nondistended   Musculoskeletal: No bilateral ankle edema, no clubbing or cyanosis to extremities   Psychiatric: Appropriate affect, cooperative   Neurologic: Oriented x 3, strength symmetric in all extremities, Cranial Nerves grossly intact to confrontation, speech clear   Skin: No rashes     Result Review    Result Review:  I have personally reviewed the results from the time of this admission to 2/19/2025 22:56 EST and agree with these findings:  [x]  Laboratory list / accordion  []  Microbiology  [x]  Radiology  []  EKG/Telemetry   []  Cardiology/Vascular   []  Pathology  []  Old records  []  Other:  Most notable findings include: see above      Assessment & Plan   Assessment / Plan     Brief Patient Summary:  Marie Foy is a 69 y.o. female who comes in complaining of abdominal pain    Active Hospital Problems:  Active Hospital Problems    Diagnosis     **Abdominal pain     Cholecystitis with cholelithiasis     Symptomatic cholelithiasis      Plan:     Abdominal pain  Biliary colic  Hyperbilirubinemia  Mild biliary ductal dilatation  - initial troponin 9,   -AST and ALT of 45 and 43 respectively, total bilirubin of 1.6,  - lactic normal 1.1, lipase normal, white blood cell count normal  -CT abdomen pelvis with contrast shows mild  biliary ductal dilatation, nonspecific, further evaluation with MRCP may be recommended.    -Patient is afebrile, pulse in the 70s, on room air oxygen and 97% SpO2 and blood pressure 140s over 50s.    -Patient was given Dilaudid and Zofran in the ED and 1 L normal saline bolus.   -General Surgery consult  -GI consult  -Antibiotics ordered per general surgery of Jenise, these were changed given patient's penicillin allergy to Rocephin and Flagyl for now.  -IV fluids  -Pain and nausea control  -Clear liquid diet to n.p.o. midnight    Hypokalemia   -potassium 3.3, replacement protocol     Abnormal UA  -UA shows 4+ bacteria nitrite positive however patient states that this was contaminated had a lot of toilet paper in her sample, will repeat UA.    -Repeat UA    Anxiety/depression   -continue home Xanax    Essential hypertension, chronic, poorly controlled  -Continue home Norvasc    Hyperlipidemia  -Hold home statin    Hypothyroidism  -Continue home Synthroid    GERD, continue home PPI    Obesity, BMI 32, encourage lifestyle modifications        VTE Prophylaxis: SCDs  Mechanical VTE prophylaxis orders are present.        CODE STATUS:    Code Status (Patient has no pulse and is not breathing): CPR (Attempt to Resuscitate)  Medical Interventions (Patient has pulse or is breathing): Full Support    Admission Status:  I believe this patient meets observation status.    79 minutes have been spent by [facility] providers in the care of this patient while under observation status.      Appropriate PPE worn during patient encounter.  Hand hygeine performed before and after seeing the patient.      Electronically signed by LESLEY Kc, 02/19/25, 9:38 PM EST.

## 2025-02-20 NOTE — PROGRESS NOTES
This is a patient that I saw in the emergency department yesterday that admitted to the observation unit after consultation with general surgery.  My concern is this patient has symptomatic cholelithiasis.  When I am seeing her here in the observation unit she is doing better.  She has been seen by the general surgeon Dr. Cabrera yesterday and then Dr. García today.  The plan is to take her for cholecystectomy later today.  LFTs are mildly elevated.  CT scan report from yesterday was reviewed.  She did have a drop in hemoglobin of 12.5-9.8.  I believe this likely is delusional.  She denies any blood loss.  Discussed this with the patient as well as Dr. García the general surgeon.  All questions answered.

## 2025-02-20 NOTE — TELEPHONE ENCOUNTER
Caller: Marie Foy    Relationship to patient: Self    Best call back number: [phone] (home)     Patient is needing: PATIENT HAD TO CANCEL HER 2ND POST OP THAT WAS SCHEDULED TODAY DUE TO BEING ADMITTED INTO THE HOSPITAL.   THE NEXT AVAILABLE APPT IS 3/18/25, PLEASE ADVISE PATIENT ON GETTING R/S - SHE BELIEVES THIS IS TOO FAR OUT   PATIENT BELIEVES SHE WILL BE RELEASED FROM THE HOSPITAL TOMORROW

## 2025-02-20 NOTE — PROGRESS NOTES
Follow-up abdominal pain    Subjective:  Overall her epigastric/right upper quadrant pain is improved this morning although not resolved.  Mild nausea.    Objective:  Afebrile, vitals are stable  General: Awake and alert, no distress  Eyes: No icterus  Abdomen: Soft, there is mild epigastric tenderness without guarding.  Previous laparoscopic scars are well-healed.    Labs reviewed.  Hemoglobin was 11.0 around a month ago.  12.5 last night at admission.  9.8 this morning after hydration.  White count normal at 4.9.  Chemistries reviewed.  Liver function studies slightly higher this morning.  Bilirubin 1.0 from 1.6 last night.    Assessment and plan:  -Epigastric and right upper quadrant pain and possible cholelithiasis, probable chronic cholecystitis with acute flare  -She has had the symptoms intermittently but this seems to be worse at this time.  We had worked her up previously for possible cholecystectomy and without definitive evidence elected to hold off.  At this time I think it is appropriate to proceed.  I have scheduled her for laparoscopic cholecystectomy and cholangiogram.  We detailed the risks including, but not limited to, bleeding, infection, conversion to open surgery, injury to surrounding structures including the small bowel, colon, duodenum, major vascular structures, liver and common bile duct.  She understands these and is willing to proceed.  She also understands that there is a possibility this will not resolve her symptoms.  -Anemia, etiology unclear.  No evidence of any significant bleeding at this time.  Will monitor.  -She is due for colonoscopy and might benefit from EGD at the same time.  I believe this likely can be accomplished as an outpatient.    Martin Calvo MD  General and Endoscopic Surgery  [facility]    [address], [address]  [place], [place], [address]  P: [phone]  F: [phone]

## 2025-02-20 NOTE — PROGRESS NOTES
ED OBSERVATION PROGRESS/DISCHARGE SUMMARY    Date of Admission: 2/19/2025   LOS: 0 days   PCP: Michelle Wyman MD    Working diagnosis: Symptomatic cholelithiasis      Subjective     Hospital Outcome: Admission to general surgery for laparoscopic cholecystectomy    Marie Foy is a 69-year-old female who was admitted to the ED observation unit for further evaluation of abdominal pain.   Pain has been intermittent for the past 2 weeks.  Symptoms exacerbated after eating food.  Pain primarily in the right upper quadrant and epigastric region of the abdomen.  There is associated nausea and vomiting.  At present symptoms are controlled.    CT scan in the ED revealed mild biliary ductal dilatation.  Cardiac workup was negative.  AST and ALT minimally elevated at 45 and 43.  T. bili was 1.6.  Lactate normal.  CBC unremarkable.  UA did show bacteria but it was contaminated.  Patient has no UTI symptoms.    General surgery following with plans for for scopic cholecystectomy.  GI has evaluated as well and are available for general surgery should further workup be needed.  Will transfer to general surgery service when the patient goes to the OR.    11:30 AM.  Patient being taken to the OR for laparoscopic cholecystectomy.  Dr. Calvo will be performing the procedure.  To transfer to his care at this time.      ROS:  General: no fevers, chills  Respiratory: no cough, dyspnea  Cardiovascular: no chest pain, palpitations  Abdomen: No abdominal pain, nausea, vomiting, or diarrhea  Neurologic: No focal weakness    Objective   Physical Exam:  I have reviewed the vital signs.  Temp:  [97.5 °F (36.4 °C)-99 °F (37.2 °C)] 99 °F (37.2 °C)  Heart Rate:  [59-80] 76  Resp:  [14-18] 16  BP: (118-170)/(45-67) 152/67  General Appearance:    Alert, cooperative, no distress  Head:    Normocephalic, atraumatic  Eyes:    Sclerae anicteric  Neck:   Supple, no mass  Lungs: Clear to auscultation bilaterally, respirations unlabored  Heart:  Regular rate and rhythm, S1 and S2 normal, no murmur, rub or gallop  Abdomen:  Soft, nontender, bowel sounds active, nondistended  Extremities: No clubbing, cyanosis, or edema to lower extremities  Pulses:  2+ and symmetric in distal lower extremities  Skin: No rashes   Neurologic: Oriented x3, Normal strength to extremities    Results Review:    I have reviewed the labs, radiology results and diagnostic studies.    Results from last 7 days   Lab Units 02/20/25  0842 02/20/25  0354   WBC 10*3/mm3  --  4.92   HEMOGLOBIN g/dL 11.2* 9.8*   HEMATOCRIT % 34.1 29.8*   PLATELETS 10*3/mm3  --  202     Results from last 7 days   Lab Units 02/20/25  0354 02/19/25  1732   SODIUM mmol/L 139 138   POTASSIUM mmol/L 4.4 3.3*   CHLORIDE mmol/L 108* 104   CO2 mmol/L 20.5* 22.0   BUN mg/dL 11 19   CREATININE mg/dL 0.72 0.89   CALCIUM mg/dL 8.4* 9.8   BILIRUBIN mg/dL 1.0 1.6*   ALK PHOS U/L 94 115   ALT (SGPT) U/L 62* 43*   AST (SGOT) U/L 54* 45*   GLUCOSE mg/dL 81 126*     Imaging Results (Last 24 Hours)       Procedure Component Value Units Date/Time    CT Abdomen Pelvis With Contrast [[id number]] Collected: 02/19/25 2028     Updated: 02/19/25 2040    Narrative:      CT ABDOMEN PELVIS W CONTRAST-     INDICATIONS: Epigastric pain     TECHNIQUE: Radiation dose reduction techniques were utilized, including  automated exposure control and exposure modulation based on body size.  Enhanced ABDOMEN AND PELVIS CT     COMPARISON: 10/28/2024     FINDINGS:     Stable liver low density too small to characterize.     Mild biliary ductal prominence is a change in appearance from the prior  exam, without a specific cause such as stone or lesion identified; if  further imaging evaluation of the biliary tree is indicated, MRCP could  be obtained.     Right renal cyst is present. Additionally, ill-defined areas of low  density are seen at the inferior right kidney, coronal image 82, and  superior left kidney, coronal image 98, nonspecific, but appear  stable  from prior exams, at least as far back as 10/30/2023.     Otherwise unremarkable appearance of the liver, gallbladder, spleen,  adrenal glands, pancreas, kidneys, bladder.     No bowel obstruction or abnormal bowel thickening is identified.     No free intraperitoneal gas or free fluid.     Scattered small mesenteric and para-aortic lymph nodes are seen that are  not significant by size criteria.     Abdominal aorta is not aneurysmal. Aortic and other arterial  calcifications are present.     The lung bases show small atelectasis/scarring.     Degenerative changes are seen in the spine. Stable sclerotic change in  the right femoral head-neck junction. No acute fracture is identified.             Impression:         1. Mild biliary ductal dilatation, nonspecific, as described above,  further evaluation can be obtained as indicated.     2. No acute inflammatory process of bowel is identified, follow-up as  indications persist.     3. No obstructive uropathy.             This report was finalized on 2/19/2025 8:37 PM by Dr. Adriel Fish M.D on Workstation: [employer]               I have reviewed the medications.     Discharge Medications        ASK your doctor about these medications        Instructions Start Date   ALPRAZolam 0.25 MG tablet  Commonly known as: XANAX   0.25 mg, Oral, 3 Times Daily PRN      amLODIPine 10 MG tablet  Commonly known as: NORVASC   10 mg, Oral, Daily      Aspirin Low Dose 81 MG EC tablet  Generic drug: aspirin   Take 1 tablet by mouth twice daily for 14 days; then take 1 tablet by mouth daily for 28 days      atorvastatin 80 MG tablet  Commonly known as: LIPITOR   80 mg, Oral, Daily      Ferrous Sulfate 28 MG tablet   Take  by mouth. 3 days a week      HYDROcodone-acetaminophen 7.5-325 MG per tablet  Commonly known as: NORCO   1 tablet, Every 6 Hours PRN      HYDROmorphone 2 MG tablet  Commonly known as: Dilaudid   2 mg, Oral, Every 4 Hours PRN      levothyroxine 100 MCG  tablet  Commonly known as: SYNTHROID, LEVOTHROID   100 mcg, Oral, Daily      meloxicam 15 MG tablet  Commonly known as: MOBIC   15 mg, Oral, Daily      omeprazole 40 MG capsule  Commonly known as: priLOSEC   TAKE 1 CAPSULE BY MOUTH DAILY      ondansetron 4 MG tablet  Commonly known as: Zofran   4 mg, Oral, Every 8 Hours PRN      solifenacin 5 MG tablet  Commonly known as: VESICARE   TAKE 1 TABLET BY MOUTH ONCE DAILY      valACYclovir 1000 MG tablet  Commonly known as: VALTREX   1,000 mg, Oral, Daily      vitamin B-12 1000 MCG tablet  Commonly known as: CYANOCOBALAMIN   1,000 mcg, Daily      Vitamin D (Cholecalciferol) 10 MCG (400 UNIT) tablet  Commonly known as: CHOLECALCIFEROL   400 Units, Oral, Daily              ---------------------------------------------------------------------------------------------  Assessment & Plan   Assessment/Problem List    Abdominal pain    Cholecystitis with cholelithiasis    Symptomatic cholelithiasis      Plan:    Abdominal pain  Biliary colic  Hyperbilirubinemia  Mild biliary ductal dilatation  -Cardiac workup negative  -AST and ALT of 45 and 43 respectively, total bilirubin of 1.6,  - lactic normal 1.1, lipase normal, white blood cell count normal  -CT abdomen pelvis with contrast shows mild biliary ductal dilatation, nonspecific, further evaluation with MRCP may be recommended.    -Clear liquid diet to n.p.o. midnight  -General Surgery and GI evaluated with the plan above.     Hypokalemia   -potassium 3.3, replacement protocol      Abnormal UA  -UA shows 4+ bacteria nitrite positive however patient states that this was contaminated had a lot of toilet paper in her sample, will repeat UA.    -Repeat UA     Anxiety/depression   -continue home Xanax     Essential hypertension, chronic, poorly controlled  -Continue home Norvasc     Hyperlipidemia  -Hold home statin     Hypothyroidism  -Continue home Synthroid     GERD  -Continue home PPI           VTE Prophylaxis: SCDs  Mechanical  VTE prophylaxis orders are present.       Disposition: Transfer to the OR    Follow-up after Discharge: Pending    This note will serve as a progress note    Brigido Mcmanus III, PA 02/20/25 11:39 EST    I have worn appropriate PPE during this patient encounter, sanitized my hands both with entering and exiting patient's room.      51 minutes has been spent by [facility] providers in the care of this patient while under observation status

## 2025-02-20 NOTE — PLAN OF CARE
Problem: Adult Inpatient Plan of Care  Goal: Plan of Care Review  Outcome: Progressing  Flowsheets (Taken 2/20/2025 0603)  Outcome Evaluation: Patient AOx4, up a valerie. NPO since midnight. LR @ 75mL/Hr. PRN pain meds given during shift. Plan for General surgery and GI to eval  Plan of Care Reviewed With: patient  Goal: Patient-Specific Goal (Individualized)  Outcome: Progressing  Goal: Absence of Hospital-Acquired Illness or Injury  Outcome: Progressing  Intervention: Identify and Manage Fall Risk  Recent Flowsheet Documentation  Taken 2/20/2025 0400 by Urvashi Sánchez RN  Safety Promotion/Fall Prevention: safety round/check completed  Taken 2/20/2025 0200 by Urvashi Sánchez RN  Safety Promotion/Fall Prevention: safety round/check completed  Taken 2/20/2025 0000 by Urvashi Sánchez RN  Safety Promotion/Fall Prevention: safety round/check completed  Taken 2/19/2025 2200 by Urvashi Sánchez RN  Safety Promotion/Fall Prevention: safety round/check completed  Taken 2/19/2025 2030 by Urvashi Sánchez RN  Safety Promotion/Fall Prevention: safety round/check completed  Goal: Optimal Comfort and Wellbeing  Outcome: Progressing  Goal: Readiness for Transition of Care  Outcome: Progressing     Problem: Nausea and Vomiting  Goal: Nausea and Vomiting Relief  Outcome: Progressing     Problem: Pain Acute  Goal: Optimal Pain Control and Function  Outcome: Progressing   Goal Outcome Evaluation:  Plan of Care Reviewed With: patient           Outcome Evaluation: Patient AOx4, up a valerie. NPO since midnight. LR @ 75mL/Hr. PRN pain meds given during shift. Plan for General surgery and GI to eval

## 2025-02-20 NOTE — OP NOTE
Operative Note :   Martin Calvo MD    Marie Foy  1955    Procedure Date: 02/20/25    Pre-op Diagnosis:  Symptomatic cholelithiasis [K80.20]  Calculus of gallbladder with acute cholecystitis without obstruction [K80.00]    Post-Op Diagnosis:  Same    Procedure:   Laparoscopic cholecystectomy with cholangiogram and da Farhat robot assistance    Surgeon: Martin Calvo MD    Assistant: Gwen Lee PA-C was responsible for performing the following activities: Irrigation, suction, retraction, manipulation of the camera, closure of skin and placement of dressings as well as exchange of instruments at bedside and their skilled assistance was necessary for the success of this case.    Anesthesia:  General    EBL:   minimal    Specimens:   Gallbladder and contents    Indications:  69-year-old female with history of prior transverse colectomy.  Presenting with episodes of epigastric abdominal pain, elevated liver function studies and overall findings suggestive of cholecystitis.    Findings:   Edema of the gallbladder and portal triad  Cholangiogram with some initial distal resistance to flow of contrast into the duodenum but smooth tapering and no filling defect appreciated.  Eventually did empty into duodenum    Recommendations:   Routine postcholecystectomy care    Description of procedure:    After obtaining informed consent, patient was brought to the operating room and placed under a general anesthetic.  The abdomen was sterilely prepped and draped.  Henderson's point was identified and anesthetized with a Marcaine solution.  8 mm skin incision made and then 0 degree scope and Optiview trocar was used with direct access technique, placed through the abdominal wall layers into the abdominal cavity.  The abdomen was then insufflated and inspection of the abdomen demonstrated no evidence of intra-abdominal injury.  Additional 8 mm robotic trocars were then introduced into the abdomen with an 8 mm right  lateral trocar, 8 mm right mid abdomen trocar and 8 mm left midabdomen trocar.  The patient was positioned with the head up and right side up.  The da Farhat Xi robot was then brought up and docked.  From right to left the fenestrated bipolar grasper, 30 degree scope, monopolar hook and ProGrasp forceps were introduced under direct visualization.  I then moved to the console.  There were minimal adhesions from her prior operation.  The fundus of the gallbladder was grasped and retracted cephalad.  There were adhesions between the infundibulum and the duodenum which were taken down with a combination of sharp and blunt dissection, exposing the infundibulum.  The infundibulum was then grasped and retracted laterally.  The peritoneum overlying the cystic triangle was incised.  Next using a combination of sharp and blunt dissection and using fluorescent cholangiography, the cystic duct was dissected and identified completely.  The cystic artery was identified and dissected completely.  The remainder of the cystic triangle was cleared out, creating the retrocystic window and then the lower one third of the gallbladder was mobilized off the bed of the liver, giving the critical view of safety.  Again the fluoroscopic cholangiography was used to confirm impression of the anatomy.  2 clips were placed proximally on the cystic artery.  A clip was placed on the gallbladder side of the gallbladder cystic duct junction.  Incision was made in the cystic duct.  Cholangiocatheter was introduced through the abdominal wall and clipped into position in the cystic duct.  We shot our cholangiogram and there was good filling of the bile duct.  The distal bile duct was smooth.  The biliary system was mildly dilated.  No filling defect was seen but there was initial resistance of flow of contrast into the duodenum.  Eventually contrast did pass.  Again no filling defects were seen.  The cholangiocatheter was then withdrawn.  The cystic  duct was then clipped and then divided between the clips.  The cystic artery was divided between clips.  The gallbladder was removed from the bed of the liver with electrocautery and then placed in an Endo Catch bag.  The bed of the liver was inspected and any small bleeders were cauterized.  The clips on the cystic duct and cystic artery were inspected and appeared to be in good position.  The abdomen was irrigated.  Trocars were withdrawn under direct visualization after removal of the gallbladder.  Spokes of the left mid abdominal wall port site.  The fascia was closed at this site with 0 Vicryl suture.  Skin incisions were closed with 4-0 Monocryl.  Patient tolerated this well.    Martin Calvo MD  General and Endoscopic Surgery  [facility]    [address], [address]  [place], [place], [address]  P: [phone]

## 2025-02-20 NOTE — H&P
General Surgery History and Physical      Summary:    Marie Foy is a 69 y.o. lady with history of T3 N0 colon cancer status post transverse colectomy in 2021 presenting with postprandial epigastric pain, nausea, and vomiting since early this afternoon.  She has had 3 episodes of these biliary-like symptoms in the last 2 weeks and intermittently over the last year.  Previous workup for similar symptoms in November was unrevealing aside from biliary sludge and focal wall thickening.  Her pain is subsiding in the ED, although she remains nauseated and mildly tender in the upper abdomen. Labs today significant for mildly elevated bilirubin and liver enzymes.  CT scan shows a distended gallbladder with mild pericholecystic fluid and an 8mm common bile duct without obvious choledocholithiasis. I suspect she has symptomatic cholelithiasis versus mild cholecystitis and possible choledocholithiasis. I recommend admission for observation and tentatively proceeding with cholecystectomy and intraoperative cholangiogram tomorrow. Dr. Calvo will see her in the morning.       History of Present Illness:    Marie Foy is a 69 y.o. lady with history of stage II colon cancer status post partial colectomy in 2021 presented to the ED with epigastric pain, nausea, and vomiting since 1 PM after eating lunch.  She has had similar symptoms intermittently over the last year, and 3 such episodes in the last 2 weeks.  She underwent workup including ultrasound and HIDA scan in November which were unrevealing aside from some biliary sludge and focal wall enhancement/thickening.  She denies fevers chills, jaundice, scleral icterus, as well as history of liver and pancreas problems in the past.  She is afebrile and hemodynamically normal.  Labs significant for mildly elevated liver enzymes and bilirubin of 1.6.  She has mild focal tenderness in the epigastrium and right upper quadrant.  CT scan shows biliary dilation with small amount  of pericholecystic fluid and 8 mm common bile duct without obvious choledocholithiasis.    Past Medical History:   Past Medical History:   Diagnosis Date    Anxiety     Arthritis     Arthritis of back     Benign cardiac murmur     Colon cancer     Colon polyps     Constipation     Depression     Fibrocystic breast     Fracture, foot 2014 problem resolved    Genital herpes     GERD (gastroesophageal reflux disease) unk    Graves disease     Hard to intubate     Hearing loss     Heart murmur     History of bipolar disorder     History of substance abuse     Hoarseness     Hyperlipidemia     Hypertension     Joint pain     Knee swelling Unk    Osteopenia unk    Stress fracture 2014 problem resolved    Thyroid disease           Past Surgical History:    Past Surgical History:   Procedure Laterality Date    ANKLE OPEN REDUCTION INTERNAL FIXATION  1998    ANKLE SURGERY Left     ORIF WITH HARDWARE    BREAST BIOPSY Right     BENIGN    CATARACT EXTRACTION W/ INTRAOCULAR LENS IMPLANT      LEFT AND RIGHT    COLON RESECTION N/A 03/02/2021    Procedure: LAPAROSCOPIC TRANSVERSE COLECTOMY WITH DAVINCI ROBOT;  Surgeon: Martin Calvo MD;  Location: [facility] OR;  Service: DaVinci;  Laterality: N/A;    COLONOSCOPY N/A 08/23/2016    Procedure: COLONOSCOPY TO CECUM & T.I.  WITH SALINE INJECTION, HOT SNARE POLYPECTOMY, COLD POLYPECTOMY AND CLIP PLACEMENT X 1;  Surgeon: Darryl Garcia MD;  Location: [facility] ENDOSCOPY;  Service:     COLONOSCOPY N/A 01/26/2021    Procedure: COLONOSCOPY to cecum with cold biopsyies and cold biopsy polypectomy and tattoo needle injection of colonic ulcer;  Surgeon: Darryl Garcia MD;  Location: [facility] ENDOSCOPY;  Service: Gastroenterology;  Laterality: N/A;  pre: hx of olyps  post:  colonic ulcer, polyp    COLONOSCOPY N/A 05/12/2022    Procedure: COLONOSCOPY to anastamosis with hot snare polypectomy;  Surgeon: Martin Calvo MD;  Location: [facility] ENDOSCOPY;  Service: General;  Laterality: N/A;   Pre: History of colon cancer  Post: polyp    COLONOSCOPY N/A 06/18/2024    Procedure: COLONOSCOPY to cecum;  Surgeon: Martin Calvo MD;  Location: [facility] ENDOSCOPY;  Service: General;  Laterality: N/A;  pre personal hx colon ca post diverticulosis    COLPORRHAPHY  2008    EAR TUBES      MEENA. MULTI TIMES    EYE SURGERY  Unknown    cateracts    FOOT SURGERY Right     STRESS FRACTURE. SURGERY X2 WITH HARDWARE IN PLACE    HAND SURGERY Right     FOR FRACTURE WITH HARDWARE IN PLACE    HYSTERECTOMY  2005    WITH BLADDER SLING    JOINT REPLACEMENT  2011    OTHER SURGICAL HISTORY  2006    TVT    REPLACEMENT TOTAL KNEE Right     SACROCOLPOPEXY  2007    POSTERIOR COLPORRHAPHY, PERINEORRHAPHY      THROAT SURGERY      childhood-PUNCTURED ESOPHAGUS AT AGE OF 10.  MULTI SURGERY TO AREA    TOTAL KNEE ARTHROPLASTY Right 2011    TOTAL KNEE ARTHROPLASTY Left 12/23/2024    Procedure: TOTAL KNEE ARTHROPLASTY;  Surgeon: Saul To MD;  Location: [facility] OR [facility];  Service: Orthopedics;  Laterality: Left;        Family History:    Family History   Problem Relation Age of Onset    Hypertension Mother     Heart failure Mother     Diabetes Mother     Colon polyps Mother     Colon cancer Mother     Pancreatic cancer Father     Anxiety disorder Daughter         bi-polar    Malig Hyperthermia Neg Hx         Social History:    Tobacco: Non-smoker      Medications:   Reviewed in the chart      Laboratory Results  WBC 6.96, hemoglobin 12.5, platelets 241  Lipase 46  Total bilirubin 1.6, ALT 43, AST 45, alkaline phosphatase 115, creatinine 0.89, potassium 3.3, sodium 138, albumin 4.7    Radiology  CT scan of the abdomen pelvis from today demonstrating a distended gallbladder with some mild pericholecystic fluid and 8 mm common bile duct without obvious choledocholithiasis.  No masses or worrisome lymphadenopathy worrisome for colon cancer recurrence.    RUQ ultrasound November 19th demonstrating gallstones versus sludge and no stigmata of  cholecystitis or biliary dilatation    HIDA scan with CCK December 3 with gallbladder filling and normal ejection fraction    CT scan December 28 demonstrating gallbladder sludge and focal wall enhancement/thickening anteriorly      Endoscopy  Colonoscopy in June 2024 with subcentimeter tubulovillous adenoma of the sigmoid colon      Physical Exam:   Vitals:    02/19/25 1842   BP: 151/63   Pulse: 70   Resp: 14   Temp:    SpO2: 95%      General: Nauseated, mild scleral icterus, no jaundice  Cardiac: normal rate, no JVD, no peripheral edema  Respiratory: nonlabored breathing on room air  Abdomen: soft, mildly tender to palpation in epigastrium and right upper quadrant, nondistended, no guarding      Stanley Arnett M.D.  General Surgery  [facility]    [address], [address]  [place], [place], [address]  P: [phone]  F: [phone]

## 2025-02-21 ENCOUNTER — READMISSION MANAGEMENT (OUTPATIENT)
Dept: CALL CENTER | Facility: HOSPITAL | Age: 70
End: 2025-02-21
Payer: COMMERCIAL

## 2025-02-21 VITALS
DIASTOLIC BLOOD PRESSURE: 60 MMHG | RESPIRATION RATE: 16 BRPM | BODY MASS INDEX: 32.54 KG/M2 | TEMPERATURE: 99 F | WEIGHT: 155 LBS | HEART RATE: 70 BPM | SYSTOLIC BLOOD PRESSURE: 122 MMHG | OXYGEN SATURATION: 94 % | HEIGHT: 58 IN

## 2025-02-21 LAB
ALBUMIN SERPL-MCNC: 3.9 G/DL (ref 3.5–5.2)
ALBUMIN/GLOB SERPL: 1.4 G/DL
ALP SERPL-CCNC: 119 U/L (ref 39–117)
ALT SERPL W P-5'-P-CCNC: 96 U/L (ref 1–33)
ANION GAP SERPL CALCULATED.3IONS-SCNC: 11.8 MMOL/L (ref 5–15)
AST SERPL-CCNC: 86 U/L (ref 1–32)
BILIRUB SERPL-MCNC: 0.6 MG/DL (ref 0–1.2)
BUN SERPL-MCNC: 12 MG/DL (ref 8–23)
BUN/CREAT SERPL: 16.9 (ref 7–25)
CALCIUM SPEC-SCNC: 8.9 MG/DL (ref 8.6–10.5)
CHLORIDE SERPL-SCNC: 106 MMOL/L (ref 98–107)
CO2 SERPL-SCNC: 22.2 MMOL/L (ref 22–29)
CREAT SERPL-MCNC: 0.71 MG/DL (ref 0.57–1)
CYTO UR: NORMAL
DEPRECATED RDW RBC AUTO: 42.9 FL (ref 37–54)
EGFRCR SERPLBLD CKD-EPI 2021: 91 ML/MIN/1.73
ERYTHROCYTE [DISTWIDTH] IN BLOOD BY AUTOMATED COUNT: 13.1 % (ref 12.3–15.4)
GLOBULIN UR ELPH-MCNC: 2.7 GM/DL
GLUCOSE SERPL-MCNC: 126 MG/DL (ref 65–99)
HCT VFR BLD AUTO: 33.3 % (ref 34–46.6)
HGB BLD-MCNC: 10.5 G/DL (ref 12–15.9)
LAB AP CASE REPORT: NORMAL
MCH RBC QN AUTO: 28.4 PG (ref 26.6–33)
MCHC RBC AUTO-ENTMCNC: 31.5 G/DL (ref 31.5–35.7)
MCV RBC AUTO: 90 FL (ref 79–97)
PATH REPORT.FINAL DX SPEC: NORMAL
PATH REPORT.GROSS SPEC: NORMAL
PLATELET # BLD AUTO: 215 10*3/MM3 (ref 140–450)
PMV BLD AUTO: 9.2 FL (ref 6–12)
POTASSIUM SERPL-SCNC: 3.9 MMOL/L (ref 3.5–5.2)
PROT SERPL-MCNC: 6.6 G/DL (ref 6–8.5)
RBC # BLD AUTO: 3.7 10*6/MM3 (ref 3.77–5.28)
SODIUM SERPL-SCNC: 140 MMOL/L (ref 136–145)
WBC NRBC COR # BLD AUTO: 7.88 10*3/MM3 (ref 3.4–10.8)

## 2025-02-21 PROCEDURE — 99024 POSTOP FOLLOW-UP VISIT: CPT | Performed by: SURGERY

## 2025-02-21 PROCEDURE — 80053 COMPREHEN METABOLIC PANEL: CPT | Performed by: SURGERY

## 2025-02-21 PROCEDURE — 85027 COMPLETE CBC AUTOMATED: CPT | Performed by: SURGERY

## 2025-02-21 PROCEDURE — 25010000002 ONDANSETRON PER 1 MG: Performed by: SURGERY

## 2025-02-21 PROCEDURE — G0378 HOSPITAL OBSERVATION PER HR: HCPCS

## 2025-02-21 RX ADMIN — OXYBUTYNIN CHLORIDE 5 MG: 5 TABLET, EXTENDED RELEASE ORAL at 08:09

## 2025-02-21 RX ADMIN — LEVOTHYROXINE SODIUM 100 MCG: 100 TABLET ORAL at 08:09

## 2025-02-21 RX ADMIN — HYDROCODONE BITARTRATE AND ACETAMINOPHEN 1 TABLET: 5; 325 TABLET ORAL at 18:00

## 2025-02-21 RX ADMIN — HYDROCODONE BITARTRATE AND ACETAMINOPHEN 1 TABLET: 5; 325 TABLET ORAL at 12:13

## 2025-02-21 RX ADMIN — PANTOPRAZOLE SODIUM 40 MG: 40 TABLET, DELAYED RELEASE ORAL at 06:10

## 2025-02-21 RX ADMIN — ASPIRIN 81 MG: 81 TABLET, COATED ORAL at 08:08

## 2025-02-21 RX ADMIN — AMLODIPINE BESYLATE 10 MG: 10 TABLET ORAL at 08:09

## 2025-02-21 RX ADMIN — ONDANSETRON 4 MG: 2 INJECTION, SOLUTION INTRAMUSCULAR; INTRAVENOUS at 14:34

## 2025-02-21 RX ADMIN — HYDROCODONE BITARTRATE AND ACETAMINOPHEN 1 TABLET: 5; 325 TABLET ORAL at 06:10

## 2025-02-21 NOTE — PLAN OF CARE
Goal Outcome Evaluation:  Plan of Care Reviewed With: patient        Progress: improving  Outcome Evaluation: vss, no c/o pain, encourage to increase fluid intake and to use incentive spirometer, up with assist, bed alarm for safety, 4 lap site with island dressing, conitnue to monitor the pt.

## 2025-02-21 NOTE — NURSING NOTE
VSS. Norco given for pain. Ambulated in hallway. Lap sites with border dressings. Zofran given for nausea. Discharge home today.

## 2025-02-21 NOTE — DISCHARGE SUMMARY
Discharge Summary    Patient name: Marie Foy    Medical record number: [id number]    Admission date: 2/19/2025  Discharge date: 2/21/2025    Attending physician: Martin Calvo MD    Primary care physician: Michelle Wyman MD    Consulting physician(s): None    Condition on discharge: improving    Primary Diagnoses: Cholecystitis    Secondary Diagnoses: Anemia    Operative Procedure: Robotic cholecystectomy with cholangiogram    Hospital Course: The patient is a  69 y.o. female that was admitted to the hospital quadrant pain, prominence of the common bile duct and elevated liver function studies.  She under went robotic cholecystectomy and recovered well.  She has some chronic pain issues but was discharged home the next day doing reasonably well.  Tolerating a diet.  She had a mild anemia at admission.  She has had a colonoscopy done last year but may benefit from upper endoscopy which we can do as an outpatient.    Discharge medications:      Discharge Medications        Changes to Medications        Instructions Start Date   amLODIPine 10 MG tablet  Commonly known as: NORVASC  What changed:   how much to take  when to take this   10 mg, Oral, Daily             Continue These Medications        Instructions Start Date   ALPRAZolam 0.25 MG tablet  Commonly known as: XANAX   0.25 mg, Oral, 3 Times Daily PRN      Aspirin Low Dose 81 MG EC tablet  Generic drug: aspirin   Take 1 tablet by mouth twice daily for 14 days; then take 1 tablet by mouth daily for 28 days      atorvastatin 80 MG tablet  Commonly known as: LIPITOR   80 mg, Oral, Daily      Ferrous Sulfate 28 MG tablet   Take  by mouth. 3 days a week      HYDROcodone-acetaminophen 7.5-325 MG per tablet  Commonly known as: NORCO   1 tablet, Every 6 Hours PRN      levothyroxine 100 MCG tablet  Commonly known as: SYNTHROID, LEVOTHROID   100 mcg, Oral, Daily      meloxicam 15 MG tablet  Commonly known as: MOBIC   15 mg, Oral, Daily      omeprazole 40 MG  capsule  Commonly known as: priLOSEC   TAKE 1 CAPSULE BY MOUTH DAILY      solifenacin 5 MG tablet  Commonly known as: VESICARE   TAKE 1 TABLET BY MOUTH ONCE DAILY      valACYclovir 1000 MG tablet  Commonly known as: VALTREX   1,000 mg, Oral, Daily      vitamin B-12 1000 MCG tablet  Commonly known as: CYANOCOBALAMIN   1,000 mcg, Daily      Vitamin D (Cholecalciferol) 10 MCG (400 UNIT) tablet  Commonly known as: CHOLECALCIFEROL   400 Units, Oral, Daily             ASK your doctor about these medications        Instructions Start Date   HYDROmorphone 2 MG tablet  Commonly known as: Dilaudid   2 mg, Oral, Every 4 Hours PRN      ondansetron 4 MG tablet  Commonly known as: Zofran   4 mg, Oral, Every 8 Hours PRN               Discharge instructions: No lifting over 10 pounds for 2 weeks.  Follow-up with me in the office 2 weeks.      Follow-up appointment: Follow up with Martin Calvo MD in the office in 2 weeks. Call for appointment at [phone].

## 2025-02-22 NOTE — OUTREACH NOTE
Prep Survey      Flowsheet Row Responses   [facility] patient discharged from? [place]   Is LACE score < 7 ? Yes   Eligibility [facility]   Date of Admission 02/19/25   Date of Discharge 02/21/25   Discharge Disposition Home or Self Care   Discharge diagnosis Abdominal pain, Lap joan   Does the patient have one of the following disease processes/diagnoses(primary or secondary)? General Surgery   Does the patient have Home health ordered? No   Is there a DME ordered? No   Prep survey completed? Yes            Georgette SARMIENTO - Registered Nurse

## 2025-02-24 ENCOUNTER — TRANSITIONAL CARE MANAGEMENT TELEPHONE ENCOUNTER (OUTPATIENT)
Dept: CALL CENTER | Facility: HOSPITAL | Age: 70
End: 2025-02-24
Payer: COMMERCIAL

## 2025-02-24 NOTE — PROGRESS NOTES
Case Management Discharge Note      Final Note: Discharged home. Radha Avina RN         Selected Continued Care - Discharged on 2/21/2025 Admission date: 2/19/2025 - Discharge disposition: Home or Self Care           Transportation Services  Private: Car    Final Discharge Disposition Code: 01 - home or self-care

## 2025-02-24 NOTE — OUTREACH NOTE
Call Center TCM Note      Flowsheet Row Responses   [facility] patient discharged from? [place]   Does the patient have one of the following disease processes/diagnoses(primary or secondary)? General Surgery   TCM attempt successful? Yes   Call start time 1015   Call end time 1016   Discharge diagnosis Abdominal pain, Lap joan   Person spoke with today (if not patient) and relationship patient   Meds reviewed with patient/caregiver? Yes   Comments Patient declined to schedule fu appt at this time.   Does the patient have an appointment with their PCP within 7-14 days of discharge? Other   Nursing Interventions Routed TCM call to PCP office, Patient declined scheduling/rescheduling appointment at this time   Has home health visited the patient within 72 hours of discharge? N/A   Psychosocial issues? No   Did the patient receive a copy of their discharge instructions? Yes   Nursing interventions Reviewed instructions with patient   What is the patient's perception of their health status since discharge? Improving   Nursing interventions Nurse provided patient education   Is the patient /caregiver able to teach back basic post-op care? Keep incision areas clean,dry and protected   Is the patient/caregiver able to teach back signs and symptoms of incisional infection? Increased redness, swelling or pain at the incisonal site, Incisional warmth, Increased drainage or bleeding, Pus or odor from incision, Fever   Is the patient/caregiver able to teach back steps to recovery at home? Set small, achievable goals for return to baseline health, Rest and rebuild strength, gradually increase activity   Is the patient/caregiver able to teach back the hierarchy of who to call/visit for symptoms/problems? PCP, Specialist, Home health nurse, Urgent Care, ED, 911 Yes   TCM call completed? Yes   Wrap up additional comments Patient reports doing well. No s/s of infection noted. No concerns or questions noted.   Call end time  1016   Would this patient benefit from a Referral to [facility] Social Work? No   Is the patient interested in additional calls from an ambulatory ? No            Mel Parnell RN    2/24/2025, 10:16 EST

## 2025-02-25 ENCOUNTER — OFFICE VISIT (OUTPATIENT)
Dept: ORTHOPEDIC SURGERY | Facility: CLINIC | Age: 70
End: 2025-02-25
Payer: COMMERCIAL

## 2025-02-25 VITALS — WEIGHT: 153.4 LBS | TEMPERATURE: 98.6 F | BODY MASS INDEX: 32.2 KG/M2 | HEIGHT: 58 IN

## 2025-02-25 DIAGNOSIS — Z96.652 STATUS POST LEFT KNEE REPLACEMENT: ICD-10-CM

## 2025-02-25 DIAGNOSIS — R52 PAIN: Primary | ICD-10-CM

## 2025-02-25 PROCEDURE — 99024 POSTOP FOLLOW-UP VISIT: CPT | Performed by: NURSE PRACTITIONER

## 2025-02-25 RX ORDER — HYDROCODONE BITARTRATE AND ACETAMINOPHEN 7.5; 325 MG/1; MG/1
1 TABLET ORAL EVERY 6 HOURS PRN
Qty: 25 TABLET | Refills: 0 | Status: SHIPPED | OUTPATIENT
Start: 2025-02-25

## 2025-02-25 RX ORDER — CEPHALEXIN 500 MG/1
CAPSULE ORAL
Qty: 4 CAPSULE | Refills: 5 | Status: SHIPPED | OUTPATIENT
Start: 2025-02-25

## 2025-02-25 NOTE — PROGRESS NOTES
Marie Foy : 1955 MRN: [id number] DATE: 2025    DIAGNOSIS: 8 week follow up left total knee      SUBJECTIVE:Patient returns today for 8 week follow up of left total knee replacement. Patient reports doing well with no unusual complaints.  She reports that she still has a moderate amount of pain globally around the anterior portion of the knee.  Also has an increased pain level due to a recent cholecystectomy that was done at [facility].  She is still going to outpatient physical therapy at [facility] in [place] but has had to back off due to the recent gallbladder surgery.  Appears to be progressing appropriately she is ambulating full weightbearing and walks unassisted in clinic today.  She denies any instability or buckling issues.  Denies any sign or symptoms of infection, and is without any other significant complaints today.    OBJECTIVE:   Exam:. The incision is well healed. No sign of infection. Range of motion is measured at 0 to 115. The calf is soft and nontender with a negative Homans sign. Strength is progressing and the patient is ambulating appropriately.    DIAGNOSTIC STUDIES  Xrays: 3 views of the left knee (AP, lateral, and sunrise) were ordered and reviewed for evaluation of recent knee replacement. They demonstrate a well positioned, well aligned knee replacement without complicating factors noted. In comparison with previous films there has been no change.    ASSESSMENT: 8 week status post left knee replacement.    PLAN:     Treatment option as well as imaging results were discussed with the patient.  Patient did inquire about a refill of pain medicine.  I did instruct her that by now we usually wean them off but considering they did not give her pain medicine after her gallbladder surgery as we were already given her prescription pain medicine for a knee replacement, I will give her 1 more refill of Norco 7.5 today.  Patient will continue activities and physical therapy as  tolerated.  As far as return to work status she needs to be cleared from physical therapy before she can return from our standpoint as well as be cleared by her gallbladder surgeon.  Patient likely tentatively need an additional 4 weeks off.    Willie Quintanilla, APRN  2/25/2025

## 2025-02-26 ENCOUNTER — TELEPHONE (OUTPATIENT)
Dept: INTERNAL MEDICINE | Facility: CLINIC | Age: 70
End: 2025-02-26
Payer: COMMERCIAL

## 2025-02-26 ENCOUNTER — DOCUMENTATION (OUTPATIENT)
Dept: ONCOLOGY | Facility: HOSPITAL | Age: 70
End: 2025-02-26
Payer: COMMERCIAL

## 2025-02-26 DIAGNOSIS — M81.0 OSTEOPOROSIS, UNSPECIFIED OSTEOPOROSIS TYPE, UNSPECIFIED PATHOLOGICAL FRACTURE PRESENCE: Primary | ICD-10-CM

## 2025-02-26 NOTE — NURSING NOTE
Pt with CMP on 02/21/2025 with calcium of 8.9. Per Dr. Garo del rio to proceed with Prolia with CMP and no additional labs needed, canceled in plan.

## 2025-02-26 NOTE — TELEPHONE ENCOUNTER
Caller: Marie Foy    Relationship: Self    Best call back number: [phone]     What orders are you requesting (i.e. lab or imaging): DEXA SCAN     In what timeframe would the patient need to come in: ASAP    Where will you receive your lab/imaging services: WHERE EVER PATIENT GOT IT DONE LAST TIME    Additional notes: PATIENTS INSURANCE WILL NOT COVER PROLLIA SHOT WITH OUT AN UP TO DATE DEXA SCAN.     PLEASE CALL PATIENT ONCE THIS HAS BEEN PLACED.

## 2025-02-28 ENCOUNTER — PATIENT OUTREACH (OUTPATIENT)
Dept: CASE MANAGEMENT | Facility: OTHER | Age: 70
End: 2025-02-28
Payer: COMMERCIAL

## 2025-02-28 NOTE — OUTREACH NOTE
AMBULATORY CASE MANAGEMENT NOTE    Names and Relationships of Patient/Support Persons: Contact: Marie Foy; Relationship: Self -     Patient Outreach    Brief call with patient to follow up on recent hospitalization. Introduced self and role of ACM. She states she is fine. Denies questions or concerns.         Vivian CARABALLO  Ambulatory Case Management    2/28/2025, 12:45 EST

## 2025-03-05 ENCOUNTER — OFFICE VISIT (OUTPATIENT)
Dept: INTERNAL MEDICINE | Facility: CLINIC | Age: 70
End: 2025-03-05
Payer: COMMERCIAL

## 2025-03-05 VITALS
DIASTOLIC BLOOD PRESSURE: 83 MMHG | OXYGEN SATURATION: 98 % | HEART RATE: 72 BPM | SYSTOLIC BLOOD PRESSURE: 142 MMHG | WEIGHT: 153 LBS | HEIGHT: 58 IN | BODY MASS INDEX: 32.12 KG/M2

## 2025-03-05 DIAGNOSIS — D64.9 ANEMIA, UNSPECIFIED TYPE: Primary | ICD-10-CM

## 2025-03-05 DIAGNOSIS — M17.12 PRIMARY LOCALIZED OSTEOARTHROSIS OF LEFT LOWER LEG: ICD-10-CM

## 2025-03-05 DIAGNOSIS — R94.5 ABNORMAL RESULTS OF LIVER FUNCTION STUDIES: ICD-10-CM

## 2025-03-05 RX ORDER — MELOXICAM 15 MG/1
15 TABLET ORAL DAILY
Qty: 30 TABLET | Refills: 0 | Status: SHIPPED | OUTPATIENT
Start: 2025-03-05

## 2025-03-05 NOTE — PROGRESS NOTES
Transitional Care Follow Up Visit  Subjective     Marie Foy is a 69 y.o. female who presents for a transitional care management visit.    Within 48 business hours after discharge our office contacted her via telephone to coordinate her care and needs.      I reviewed and discussed the details of that call along with the discharge summary, hospital problems, inpatient lab results, inpatient diagnostic studies, and consultation reports with Marie.     Current outpatient and discharge medications have been reconciled for the patient.  Reviewed by: Michelle Wyman MD          2/21/2025     7:33 PM   Date of TCM Phone Call   [facility]   Date of Admission 2/19/2025   Date of Discharge 2/21/2025   Discharge Disposition Home or Self Care     Risk for Readmission (LACE) Score: 5 (2/21/2025  6:00 AM)      History of Present Illness   Course During Hospital Stay:  Patient presents in hospital f/u.  She was admitted for cholecystectomy.  I have reviewed discharge summary, labs, scans, cardiovascular studies and medication changes.      The following data was reviewed by: Michelle Wyman MD on 03/05/2025:  Common labs          2/19/2025    17:32 2/20/2025    03:54 2/20/2025    08:42 2/21/2025    06:32   Common Labs   Glucose 126  81   126    BUN 19  11   12    Creatinine 0.89  0.72   0.71    Sodium 138  139   140    Potassium 3.3  4.4   3.9    Chloride 104  108   106    Calcium 9.8  8.4   8.9    Albumin 4.7  3.6   3.9    Total Bilirubin 1.6  1.0   0.6    Alkaline Phosphatase 115  94   119    AST (SGOT) 45  54   86    ALT (SGPT) 43  62   96    WBC 6.96  4.92   7.88    Hemoglobin 12.5  9.8  11.2  10.5    Hematocrit 37.9  29.8  34.1  33.3    Platelets 241  202   215      Data reviewed : Radiologic studies CT abd/pelvis         The following portions of the patient's history were reviewed and updated as appropriate: allergies, current medications, past family history, past medical history, past  "social history, past surgical history, and problem list.    Review of Systems   Constitutional:  Negative for chills, diaphoresis, fatigue and fever.   HENT:  Negative for congestion and sore throat.    Respiratory:  Negative for cough.    Cardiovascular:  Negative for chest pain.   Gastrointestinal:  Positive for abdominal pain. Negative for nausea and vomiting.   Genitourinary:  Negative for dysuria.   Musculoskeletal:  Positive for arthralgias and myalgias. Negative for neck pain.   Skin:  Negative for rash.   Neurological:  Negative for weakness, numbness and headaches.       Objective   /83   Pulse 72   Ht 147.3 cm (57.99\")   Wt 69.4 kg (153 lb)   SpO2 98%   BMI 31.99 kg/m²   Physical Exam  Constitutional:       Appearance: She is well-developed.   HENT:      Head: Normocephalic and atraumatic.   Pulmonary:      Effort: Pulmonary effort is normal.   Neurological:      Mental Status: She is alert and oriented to person, place, and time.   Psychiatric:         Behavior: Behavior normal.         Assessment & Plan   Diagnoses and all orders for this visit:    1. Anemia, unspecified type (Primary)  -     Cancel: CBC & Differential  -     Cancel: Comprehensive Metabolic Panel  -     Cancel: Lipid Panel  -     Cancel: TSH Rfx On Abnormal To Free T4  -     Cancel: Hemoglobin A1c  -     CBC & Differential  -     Comprehensive Metabolic Panel  -     Lipid Panel  -     TSH Rfx On Abnormal To Free T4  -     Hemoglobin A1c    2. Abnormal results of liver function studies  -     Cancel: CBC & Differential  -     Cancel: Comprehensive Metabolic Panel  -     Cancel: Lipid Panel  -     Cancel: TSH Rfx On Abnormal To Free T4  -     Cancel: Hemoglobin A1c  -     CBC & Differential  -     Comprehensive Metabolic Panel  -     Lipid Panel  -     TSH Rfx On Abnormal To Free T4  -     Hemoglobin A1c    3. Primary localized osteoarthrosis of left lower leg    Other orders  -     meloxicam (MOBIC) 15 MG tablet; Take 1 tablet " by mouth Daily.  Dispense: 30 tablet; Refill: 0      Patient presents with f/u of cholecystectomy.  Overall doing well.  Anemia and increased LFTs during hospital stay.  F/u labs are ordered.    Left knee OA s/p left knee replacement.  She has had continued pain with physical therapy.  She has tolerated meloxicam well in the past.  Refill is provided.

## 2025-03-06 ENCOUNTER — OFFICE VISIT (OUTPATIENT)
Dept: SURGERY | Facility: CLINIC | Age: 70
End: 2025-03-06
Payer: COMMERCIAL

## 2025-03-06 VITALS
SYSTOLIC BLOOD PRESSURE: 132 MMHG | HEIGHT: 58 IN | DIASTOLIC BLOOD PRESSURE: 56 MMHG | WEIGHT: 154 LBS | BODY MASS INDEX: 32.32 KG/M2 | HEART RATE: 85 BPM | OXYGEN SATURATION: 97 %

## 2025-03-06 DIAGNOSIS — R93.2 ABNORMAL GALLBLADDER ULTRASOUND: Primary | ICD-10-CM

## 2025-03-06 LAB
ALBUMIN SERPL-MCNC: 4.9 G/DL (ref 3.8–4.8)
ALP SERPL-CCNC: 131 IU/L (ref 44–121)
ALT SERPL-CCNC: 21 IU/L (ref 0–32)
AST SERPL-CCNC: 21 IU/L (ref 0–40)
BASOPHILS # BLD AUTO: 0 X10E3/UL (ref 0–0.2)
BASOPHILS NFR BLD AUTO: 1 %
BILIRUB SERPL-MCNC: 0.7 MG/DL (ref 0–1.2)
BUN SERPL-MCNC: 20 MG/DL (ref 8–27)
BUN/CREAT SERPL: 21 (ref 12–28)
CALCIUM SERPL-MCNC: 10.4 MG/DL (ref 8.7–10.3)
CHLORIDE SERPL-SCNC: 101 MMOL/L (ref 96–106)
CHOLEST SERPL-MCNC: 194 MG/DL (ref 100–199)
CO2 SERPL-SCNC: 19 MMOL/L (ref 20–29)
CREAT SERPL-MCNC: 0.95 MG/DL (ref 0.57–1)
EGFRCR SERPLBLD CKD-EPI 2021: 64 ML/MIN/1.73
EOSINOPHIL # BLD AUTO: 0.3 X10E3/UL (ref 0–0.4)
EOSINOPHIL NFR BLD AUTO: 5 %
ERYTHROCYTE [DISTWIDTH] IN BLOOD BY AUTOMATED COUNT: 13 % (ref 11.7–15.4)
GLOBULIN SER CALC-MCNC: 2.7 G/DL (ref 1.5–4.5)
GLUCOSE SERPL-MCNC: 125 MG/DL (ref 70–99)
HBA1C MFR BLD: 4.9 % (ref 4.8–5.6)
HCT VFR BLD AUTO: 39 % (ref 34–46.6)
HDLC SERPL-MCNC: 64 MG/DL
HGB BLD-MCNC: 12.7 G/DL (ref 11.1–15.9)
IMM GRANULOCYTES # BLD AUTO: 0 X10E3/UL (ref 0–0.1)
IMM GRANULOCYTES NFR BLD AUTO: 0 %
LDLC SERPL CALC-MCNC: 110 MG/DL (ref 0–99)
LYMPHOCYTES # BLD AUTO: 1 X10E3/UL (ref 0.7–3.1)
LYMPHOCYTES NFR BLD AUTO: 19 %
MCH RBC QN AUTO: 28.7 PG (ref 26.6–33)
MCHC RBC AUTO-ENTMCNC: 32.6 G/DL (ref 31.5–35.7)
MCV RBC AUTO: 88 FL (ref 79–97)
MONOCYTES # BLD AUTO: 0.4 X10E3/UL (ref 0.1–0.9)
MONOCYTES NFR BLD AUTO: 7 %
NEUTROPHILS # BLD AUTO: 3.6 X10E3/UL (ref 1.4–7)
NEUTROPHILS NFR BLD AUTO: 68 %
PLATELET # BLD AUTO: 303 X10E3/UL (ref 150–450)
POTASSIUM SERPL-SCNC: 3.9 MMOL/L (ref 3.5–5.2)
PROT SERPL-MCNC: 7.6 G/DL (ref 6–8.5)
RBC # BLD AUTO: 4.42 X10E6/UL (ref 3.77–5.28)
SODIUM SERPL-SCNC: 140 MMOL/L (ref 134–144)
TRIGL SERPL-MCNC: 115 MG/DL (ref 0–149)
TSH SERPL DL<=0.005 MIU/L-ACNC: 0.45 UIU/ML (ref 0.45–4.5)
VLDLC SERPL CALC-MCNC: 20 MG/DL (ref 5–40)
WBC # BLD AUTO: 5.4 X10E3/UL (ref 3.4–10.8)

## 2025-03-06 PROCEDURE — 99024 POSTOP FOLLOW-UP VISIT: CPT | Performed by: SURGERY

## 2025-03-06 NOTE — PROGRESS NOTES
Postop robotic cholecystectomy    Subjective:  Feels well overall.  Tolerating a diet well.  Preop symptoms have improved.  Bowel function is essentially back at her baseline.    Objective:  BMI 32.2  General: Awake and alert without distress  Eyes: No icterus  Abdomen: Soft and benign, incisions healing well, no sign of hernia or infection    Patient had some postop labs done by her primary care provider.  AST and ALT have normalized, total bilirubin normal.  Alkaline phosphatase mildly elevated.  Hemoglobin has normalized.    Assessment and plan:  -Postop robotic cholecystectomy for chronic cholecystitis and cholelithiasis, recovering well  -Okay to return to normal activity at this time  -History of colon cancer, recent anemia.  Anemia has resolved.  Last colonoscopy done less than a year ago, probably not much value in repeating at this time unless there is evidence for further anemia  -Next colonoscopy due June 2027 on a surveillance basis    Martin Calvo MD  General and Endoscopic Surgery  [facility]    [address], [address]  [place], [place], [address]  P: [phone]  F: [phone]     admission

## 2025-03-13 ENCOUNTER — HOSPITAL ENCOUNTER (OUTPATIENT)
Dept: BONE DENSITY | Facility: HOSPITAL | Age: 70
Discharge: HOME | End: 2025-03-13
Admitting: INTERNAL MEDICINE
Payer: COMMERCIAL

## 2025-03-13 DIAGNOSIS — M81.0 OSTEOPOROSIS, UNSPECIFIED OSTEOPOROSIS TYPE, UNSPECIFIED PATHOLOGICAL FRACTURE PRESENCE: ICD-10-CM

## 2025-03-13 PROCEDURE — 77080 DXA BONE DENSITY AXIAL: CPT

## 2025-03-21 RX ORDER — OMEPRAZOLE 40 MG/1
40 CAPSULE, DELAYED RELEASE ORAL DAILY
Qty: 90 CAPSULE | Refills: 2 | Status: SHIPPED | OUTPATIENT
Start: 2025-03-21

## 2025-03-26 ENCOUNTER — DOCUMENTATION (OUTPATIENT)
Dept: ONCOLOGY | Facility: HOSPITAL | Age: 70
End: 2025-03-26
Payer: COMMERCIAL

## 2025-03-26 ENCOUNTER — OFFICE VISIT (OUTPATIENT)
Dept: INTERNAL MEDICINE | Facility: CLINIC | Age: 70
End: 2025-03-26
Payer: MEDICARE

## 2025-03-26 VITALS
BODY MASS INDEX: 32.12 KG/M2 | OXYGEN SATURATION: 96 % | HEIGHT: 58 IN | WEIGHT: 153 LBS | HEART RATE: 72 BPM | DIASTOLIC BLOOD PRESSURE: 64 MMHG | SYSTOLIC BLOOD PRESSURE: 144 MMHG

## 2025-03-26 DIAGNOSIS — E78.00 HYPERCHOLESTEROLEMIA: ICD-10-CM

## 2025-03-26 DIAGNOSIS — I48.91 NEW ONSET ATRIAL FIBRILLATION: Primary | ICD-10-CM

## 2025-03-26 DIAGNOSIS — E03.9 HYPOTHYROIDISM, UNSPECIFIED TYPE: ICD-10-CM

## 2025-03-26 NOTE — PROGRESS NOTES
Subjective     Marie Foy is a 69 y.o. female who presents with   Chief Complaint   Patient presents with    Hyperlipidemia    Hypothyroidism       Hyperlipidemia  Exacerbating diseases include hypothyroidism.   Hypothyroidism  Her past medical history is significant for hyperlipidemia.        The following data was reviewed by: Michelle Wyman MD on 03/26/2025:  Common labs          2/20/2025    03:54 2/20/2025    08:42 2/21/2025    06:32 3/5/2025    13:36   Common Labs   Glucose 81   126  125    BUN 11   12  20    Creatinine 0.72   0.71  0.95    Sodium 139   140  140    Potassium 4.4   3.9  3.9    Chloride 108   106  101    Calcium 8.4   8.9  10.4    Albumin 3.6   3.9  4.9    Total Bilirubin 1.0   0.6  0.7    Alkaline Phosphatase 94   119  131    AST (SGOT) 54   86  21    ALT (SGPT) 62   96  21    WBC 4.92   7.88  5.4    Hemoglobin 9.8  11.2  10.5  12.7    Hematocrit 29.8  34.1  33.3  39.0    Platelets 202   215  303    Total Cholesterol    194    Triglycerides    115    HDL Cholesterol    64    LDL Cholesterol     110    Hemoglobin A1C    4.9      HLD.  Good control.    Hypothyroidism.  Good control.      Review of Systems   Respiratory: Negative.     Cardiovascular: Negative.        The following portions of the patient's history were reviewed and updated as appropriate: allergies, current medications and problem list.    Patient Active Problem List    Diagnosis Date Noted    Abdominal pain 02/19/2025    Cholecystitis with cholelithiasis 02/19/2025    Symptomatic cholelithiasis 02/19/2025    OA (osteoarthritis) of knee 09/26/2024    Prediabetes 09/25/2024    Malignant neoplasm of descending colon 02/04/2021    Osteoporosis 06/30/2020     Note Last Updated: 6/30/2021     Prolia every six months.       Primary localized osteoarthrosis of left lower leg 05/20/2020    Essential (primary) hypertension 05/11/2020    Anxiety 02/26/2016    Gastroesophageal reflux disease 02/26/2016    Herpes simplex type 2  "infection 02/26/2016    Hypercholesterolemia 02/26/2016    Hypothyroidism 02/26/2016    Overactive bladder 02/26/2016    Unilateral partial paralysis of vocal cords or larynx 02/26/2016     Note Last Updated: 2/26/2016     Description: chronic         Current Outpatient Medications on File Prior to Visit   Medication Sig Dispense Refill    ALPRAZolam (XANAX) 0.25 MG tablet Take 1 tablet by mouth 3 (Three) Times a Day As Needed for Anxiety. 20 tablet 0    amLODIPine (NORVASC) 10 MG tablet Take 1 tablet by mouth Daily. 90 tablet 3    atorvastatin (LIPITOR) 80 MG tablet TAKE 1 TABLET BY MOUTH DAILY. 90 tablet 3    cephalexin (KEFLEX) 500 MG capsule Take all 4 capsules one hour prior to procedure 4 capsule 5    Ferrous Sulfate 28 MG tablet Take  by mouth. 3 days a week      levothyroxine (SYNTHROID, LEVOTHROID) 100 MCG tablet TAKE 1 TABLET BY MOUTH ONCE DAILY 90 tablet 1    omeprazole (priLOSEC) 40 MG capsule TAKE 1 CAPSULE BY MOUTH DAILY 90 capsule 2    ondansetron (Zofran) 4 MG tablet Take 1 tablet by mouth Every 8 (Eight) Hours As Needed for Nausea or Vomiting for up to 10 doses. 10 tablet 0    solifenacin (VESICARE) 5 MG tablet TAKE 1 TABLET BY MOUTH ONCE DAILY 90 tablet 3    valACYclovir (VALTREX) 1000 MG tablet TAKE 1 TABLET BY MOUTH DAILY. (Patient taking differently: Take 1 tablet by mouth. 3 TIMES WEEKLY) 90 tablet 3    vitamin B-12 (CYANOCOBALAMIN) 1000 MCG tablet Take 1 tablet by mouth Daily.      Vitamin D, Cholecalciferol, (CHOLECALCIFEROL) 10 MCG (400 UNIT) tablet Take 1 tablet by mouth Daily.      [DISCONTINUED] meloxicam (MOBIC) 15 MG tablet Take 1 tablet by mouth Daily. 30 tablet 0     No current facility-administered medications on file prior to visit.       Objective     /64   Pulse 72   Ht 147.3 cm (57.99\")   Wt 69.4 kg (153 lb)   SpO2 96%   BMI 31.99 kg/m²     Physical Exam  Constitutional:       Appearance: She is well-developed.   HENT:      Head: Normocephalic and atraumatic. "   Cardiovascular:      Rate and Rhythm: Normal rate. Rhythm irregularly irregular.      Heart sounds: Normal heart sounds.   Pulmonary:      Effort: Pulmonary effort is normal.      Breath sounds: Normal breath sounds.   Skin:     General: Skin is warm and dry.   Neurological:      Mental Status: She is alert and oriented to person, place, and time.   Psychiatric:         Behavior: Behavior normal.       ECG 12 Lead    Date/Time: 3/26/2025 12:22 PM  Performed by: Michelle Wyman MD    Authorized by: Michelle Wyman MD  Comparison: compared with previous ECG from 12/10/2024  Rhythm: atrial fibrillation  Rate: normal  Conduction: conduction normal  ST Segments: ST segments normal  T Waves: T waves normal  QRS axis: normal    Clinical impression: abnormal EKG            Assessment & Plan   Diagnoses and all orders for this visit:    1. New onset atrial fibrillation (Primary)  -     rivaroxaban (XARELTO) 20 MG tablet; Take 1 tablet by mouth Daily.  Dispense: 30 tablet; Refill: 11  -     Ambulatory Referral to Cardiology    2. Hypercholesterolemia    3. Hypothyroidism, unspecified type    Other orders  -     ECG 12 Lead        Discussion    New onset atrial fibrillation.  Rate controlled.  Not symptomatic.  Start Xarelto.  Discussed with the patient onset on action and the potential side effects including bleeding.  The patient will let me know of any side effects from the medication.    Refer for cardiac evaluation.    HLD.  Control is good.  The patient is advised to continue current dosage of pravastatin.    Hypothyroidism. Control is good.  The patient is advised to continue current dosage of levothyroxine.             Future Appointments   Date Time Provider Department Center   3/27/2025 12:00 PM REFERRED INJECTION CHAIR [facility]   5/8/2025  3:40 PM LAB CHAIR 2 DENNIS GORDILLO  [facility]   5/8/2025  4:00 PM Saul Ziegler MD [facility]   10/13/2025 11:30 AM [facility]   10/20/2025 11:15 AM Michelle Wyman MD [facility]   12/19/2025 11:00 AM Jaclyn Huber APRN [facility]

## 2025-03-27 ENCOUNTER — INFUSION (OUTPATIENT)
Dept: ONCOLOGY | Facility: HOSPITAL | Age: 70
End: 2025-03-27
Payer: COMMERCIAL

## 2025-03-27 DIAGNOSIS — M81.0 OSTEOPOROSIS, UNSPECIFIED OSTEOPOROSIS TYPE, UNSPECIFIED PATHOLOGICAL FRACTURE PRESENCE: Primary | ICD-10-CM

## 2025-03-27 PROCEDURE — 25010000002 DENOSUMAB 60 MG/ML SOLUTION PREFILLED SYRINGE: Performed by: INTERNAL MEDICINE

## 2025-03-27 PROCEDURE — 96372 THER/PROPH/DIAG INJ SC/IM: CPT

## 2025-03-27 RX ORDER — PHENOL 1.4 %
600 AEROSOL, SPRAY (ML) MUCOUS MEMBRANE 2 TIMES DAILY WITH MEALS
COMMUNITY

## 2025-03-27 RX ADMIN — DENOSUMAB 60 MG: 60 INJECTION SUBCUTANEOUS at 12:17

## 2025-04-14 ENCOUNTER — OFFICE VISIT (OUTPATIENT)
Dept: CARDIOLOGY | Age: 70
End: 2025-04-14
Payer: COMMERCIAL

## 2025-04-14 VITALS
DIASTOLIC BLOOD PRESSURE: 76 MMHG | HEIGHT: 58 IN | WEIGHT: 153 LBS | BODY MASS INDEX: 32.12 KG/M2 | HEART RATE: 79 BPM | SYSTOLIC BLOOD PRESSURE: 148 MMHG

## 2025-04-14 DIAGNOSIS — I48.0 PAF (PAROXYSMAL ATRIAL FIBRILLATION): ICD-10-CM

## 2025-04-14 DIAGNOSIS — R06.09 DOE (DYSPNEA ON EXERTION): Primary | ICD-10-CM

## 2025-04-14 PROCEDURE — 99204 OFFICE O/P NEW MOD 45 MIN: CPT | Performed by: INTERNAL MEDICINE

## 2025-04-14 PROCEDURE — 93000 ELECTROCARDIOGRAM COMPLETE: CPT | Performed by: INTERNAL MEDICINE

## 2025-04-15 RX ORDER — AMLODIPINE BESYLATE 10 MG/1
10 TABLET ORAL DAILY
Qty: 90 TABLET | Refills: 3 | Status: SHIPPED | OUTPATIENT
Start: 2025-04-15

## 2025-04-15 NOTE — PROGRESS NOTES
"      CARDIOLOGY    Buster Shankar MD    ENCOUNTER DATE:  04/14/2025    Marie Foy / 69 y.o. / female        CHIEF COMPLAINT / REASON FOR OFFICE VISIT     New Afib      HISTORY OF PRESENT ILLNESS       HPI  Marie Foy is a 69 y.o. female who presents today for consultation.  Patient presented to her primary care physician's office for routine evaluation.  Patient was complaining some shortness of breath.  She said she occasionally gets palpitations and she also occasionally gets chest discomforts.  The episodes been going on for about a week or so.  She subsequently was found to be in atrial fibrillation.  She was anticoagulated and was referred for further evaluation.  Today her biggest complaint is shortness of breath with exertion as well as increasing fatigue.      The following portions of the patient's history were reviewed and updated as appropriate: allergies, current medications, past family history, past medical history, past social history, past surgical history and problem list.      VITAL SIGNS     Visit Vitals  /76 (BP Location: Left arm)   Pulse 79   Ht 147.3 cm (58\")   Wt 69.4 kg (153 lb)   BMI 31.98 kg/m²         Wt Readings from Last 3 Encounters:   04/14/25 69.4 kg (153 lb)   03/26/25 69.4 kg (153 lb)   03/06/25 69.9 kg (154 lb)     Body mass index is 31.98 kg/m².      REVIEW OF SYSTEMS   ROS        PHYSICAL EXAMINATION     Vitals reviewed.   Cardiovascular:      Normal rate. Regular rhythm. Normal S1. Normal S2.       Murmurs: There is a grade 1/6 harsh midsystolic murmur at the URSB.      No gallop.  No click. No rub.   Pulses:     Intact distal pulses.   Edema:     Peripheral edema absent.           REVIEWED DATA       ECG 12 Lead    Date/Time: 4/14/2025 10:56 AM  Performed by: Buster Shankar MD    Authorized by: Buster Shankar MD  Comparison: compared with previous ECG from 3/26/2025  Comparison to previous ECG: Previous ECG showed atrial fibrillation  Rhythm: " sinus rhythm    Clinical impression: normal ECG          Cardiac Procedures:      Lipid Panel          9/18/2024    00:00 3/5/2025    13:36   Lipid Panel   Total Cholesterol 216  194    Triglycerides 149  115    HDL Cholesterol 67  64    VLDL Cholesterol 26  20    LDL Cholesterol  123  110          ASSESSMENT & PLAN      Diagnosis Plan   1. BARAJAS (dyspnea on exertion)  Stress Test With Myocardial Perfusion One Day    Adult Transthoracic Echo Complete W/ Cont if Necessary Per Protocol    Holter Monitor - 48 Hour      2. PAF (paroxysmal atrial fibrillation)  Stress Test With Myocardial Perfusion One Day    Adult Transthoracic Echo Complete W/ Cont if Necessary Per Protocol    Holter Monitor - 48 Hour            SUMMARY/DISCUSSION  Dyspnea on exertion.  Will set patient up for an echocardiogram as well as a nuclear perfusion study.  Paroxysmal atrial fibrillation.  Will see what her studies show and, go from there.  Murmur or [place] echocardiograms been ordered wait for that results.  Will follow-up depending on test results.        MEDICATIONS         Discharge Medications            Accurate as of April 14, 2025 11:59 PM. If you have any questions, ask your nurse or doctor.                Changes to Medications        Instructions Start Date   valACYclovir 1000 MG tablet  Commonly known as: VALTREX  What changed:   when to take this  additional instructions   1,000 mg, Oral, Daily             Continue These Medications        Instructions Start Date   ALPRAZolam 0.25 MG tablet  Commonly known as: XANAX   0.25 mg, Oral, 3 Times Daily PRN      amLODIPine 10 MG tablet  Commonly known as: NORVASC   10 mg, Oral, Daily      atorvastatin 80 MG tablet  Commonly known as: LIPITOR   80 mg, Oral, Daily      calcium carbonate 600 MG tablet  Commonly known as: OS-HUMBERTO   600 mg, 2 Times Daily With Meals      cephalexin 500 MG capsule  Commonly known as: KEFLEX   Take all 4 capsules one hour prior to procedure      Ferrous Sulfate 28  MG tablet   Take  by mouth. 3 days a week      levothyroxine 100 MCG tablet  Commonly known as: SYNTHROID, LEVOTHROID   100 mcg, Oral, Daily      omeprazole 40 MG capsule  Commonly known as: priLOSEC   40 mg, Oral, Daily      ondansetron 4 MG tablet  Commonly known as: Zofran   4 mg, Oral, Every 8 Hours PRN      rivaroxaban 20 MG tablet  Commonly known as: XARELTO   20 mg, Oral, Daily      solifenacin 5 MG tablet  Commonly known as: VESICARE   TAKE 1 TABLET BY MOUTH ONCE DAILY      vitamin B-12 1000 MCG tablet  Commonly known as: CYANOCOBALAMIN   1,000 mcg, Daily      Vitamin D (Cholecalciferol) 10 MCG (400 UNIT) tablet  Commonly known as: CHOLECALCIFEROL   400 Units, Daily                   **Dragon Disclaimer:   Much of this encounter note is an electronic transcription/translation of spoken language to printed text. The electronic translation of spoken language may permit erroneous, or at times, nonsensical words or phrases to be inadvertently transcribed. Although I have reviewed the note for such errors, some may still exist.

## 2025-04-23 ENCOUNTER — TELEPHONE (OUTPATIENT)
Dept: CARDIOLOGY | Age: 70
End: 2025-04-23
Payer: COMMERCIAL

## 2025-04-24 ENCOUNTER — HOSPITAL ENCOUNTER (OUTPATIENT)
Dept: CARDIOLOGY | Facility: HOSPITAL | Age: 70
Discharge: HOME | End: 2025-04-24
Payer: MEDICARE

## 2025-04-24 VITALS
DIASTOLIC BLOOD PRESSURE: 76 MMHG | HEIGHT: 58 IN | WEIGHT: 153 LBS | SYSTOLIC BLOOD PRESSURE: 153 MMHG | HEART RATE: 86 BPM | BODY MASS INDEX: 32.12 KG/M2

## 2025-04-24 DIAGNOSIS — R06.09 DOE (DYSPNEA ON EXERTION): ICD-10-CM

## 2025-04-24 DIAGNOSIS — I48.0 PAF (PAROXYSMAL ATRIAL FIBRILLATION): ICD-10-CM

## 2025-04-24 LAB
AORTIC ARCH: 2.8 CM
AORTIC DIMENSIONLESS INDEX: 0.88 (DI)
ASCENDING AORTA: 3.1 CM
AV MEAN PRESS GRAD SYS DOP V1V2: 5 MMHG
AV VMAX SYS DOP: 145 CM/SEC
BH CV ECHO MEAS - ACS: 1.83 CM
BH CV ECHO MEAS - AO MAX PG: 8.4 MMHG
BH CV ECHO MEAS - AO ROOT AREA (BSA CORRECTED): 1.5 CM2
BH CV ECHO MEAS - AO ROOT DIAM: 2.48 CM
BH CV ECHO MEAS - AO V2 VTI: 30.6 CM
BH CV ECHO MEAS - AVA(I,D): 2.6 CM2
BH CV ECHO MEAS - EDV(CUBED): 85.2 ML
BH CV ECHO MEAS - EDV(MOD-SP2): 53 ML
BH CV ECHO MEAS - EDV(MOD-SP4): 56 ML
BH CV ECHO MEAS - EF(MOD-SP2): 62.3 %
BH CV ECHO MEAS - EF(MOD-SP4): 60.7 %
BH CV ECHO MEAS - ESV(CUBED): 22.7 ML
BH CV ECHO MEAS - ESV(MOD-SP2): 20 ML
BH CV ECHO MEAS - ESV(MOD-SP4): 22 ML
BH CV ECHO MEAS - FS: 35.7 %
BH CV ECHO MEAS - IVS/LVPW: 0.75 CM
BH CV ECHO MEAS - IVSD: 0.9 CM
BH CV ECHO MEAS - LAT PEAK E' VEL: 6.4 CM/SEC
BH CV ECHO MEAS - LV DIASTOLIC VOL/BSA (35-75): 34.3 CM2
BH CV ECHO MEAS - LV MASS(C)D: 158.2 GRAMS
BH CV ECHO MEAS - LV MAX PG: 6.4 MMHG
BH CV ECHO MEAS - LV MEAN PG: 3 MMHG
BH CV ECHO MEAS - LV SYSTOLIC VOL/BSA (12-30): 13.5 CM2
BH CV ECHO MEAS - LV V1 MAX: 126 CM/SEC
BH CV ECHO MEAS - LV V1 VTI: 26.8 CM
BH CV ECHO MEAS - LVIDD: 4.4 CM
BH CV ECHO MEAS - LVIDS: 2.8 CM
BH CV ECHO MEAS - LVOT AREA: 3 CM2
BH CV ECHO MEAS - LVOT DIAM: 1.95 CM
BH CV ECHO MEAS - LVPWD: 1.2 CM
BH CV ECHO MEAS - MED PEAK E' VEL: 8.6 CM/SEC
BH CV ECHO MEAS - MV A DUR: 0.14 SEC
BH CV ECHO MEAS - MV A MAX VEL: 123 CM/SEC
BH CV ECHO MEAS - MV DEC SLOPE: 364 CM/SEC2
BH CV ECHO MEAS - MV DEC TIME: 0.19 SEC
BH CV ECHO MEAS - MV E MAX VEL: 93.3 CM/SEC
BH CV ECHO MEAS - MV E/A: 0.76
BH CV ECHO MEAS - MV MAX PG: 4.6 MMHG
BH CV ECHO MEAS - MV MEAN PG: 2.07 MMHG
BH CV ECHO MEAS - MV P1/2T: 84.9 MSEC
BH CV ECHO MEAS - MV V2 VTI: 28.9 CM
BH CV ECHO MEAS - MVA(P1/2T): 2.6 CM2
BH CV ECHO MEAS - MVA(VTI): 2.8 CM2
BH CV ECHO MEAS - PA ACC TIME: 0.08 SEC
BH CV ECHO MEAS - PA V2 MAX: 156.9 CM/SEC
BH CV ECHO MEAS - PULM A REVS DUR: 0.14 SEC
BH CV ECHO MEAS - PULM A REVS VEL: 36.9 CM/SEC
BH CV ECHO MEAS - PULM DIAS VEL: 33.2 CM/SEC
BH CV ECHO MEAS - PULM S/D: 1.3
BH CV ECHO MEAS - PULM SYS VEL: 43.1 CM/SEC
BH CV ECHO MEAS - QP/QS: 0.76
BH CV ECHO MEAS - RAP SYSTOLE: 3 MMHG
BH CV ECHO MEAS - RV MAX PG: 5 MMHG
BH CV ECHO MEAS - RV V1 MAX: 111.6 CM/SEC
BH CV ECHO MEAS - RV V1 VTI: 20.4 CM
BH CV ECHO MEAS - RVOT DIAM: 1.95 CM
BH CV ECHO MEAS - RVSP: 22.3 MMHG
BH CV ECHO MEAS - SV(LVOT): 79.8 ML
BH CV ECHO MEAS - SV(MOD-SP2): 33 ML
BH CV ECHO MEAS - SV(MOD-SP4): 34 ML
BH CV ECHO MEAS - SV(RVOT): 61 ML
BH CV ECHO MEAS - SVI(LVOT): 48.8 ML/M2
BH CV ECHO MEAS - SVI(MOD-SP2): 20.2 ML/M2
BH CV ECHO MEAS - SVI(MOD-SP4): 20.8 ML/M2
BH CV ECHO MEAS - TAPSE (>1.6): 1.84 CM
BH CV ECHO MEAS - TR MAX PG: 19.3 MMHG
BH CV ECHO MEAS - TR MAX VEL: 219.6 CM/SEC
BH CV ECHO MEASUREMENTS AVERAGE E/E' RATIO: 12.44
BH CV NUCLEAR PRIOR STUDY: 1
BH CV REST NUCLEAR ISOTOPE DOSE: 10.6 MCI
BH CV STRESS BP STAGE 1: NORMAL
BH CV STRESS COMMENTS STAGE 1: NORMAL
BH CV STRESS DOSE REGADENOSON STAGE 1: 0.4
BH CV STRESS DURATION MIN STAGE 1: 0
BH CV STRESS DURATION SEC STAGE 1: 10
BH CV STRESS HR STAGE 1: 113
BH CV STRESS NUCLEAR ISOTOPE DOSE: 33.3 MCI
BH CV STRESS PROTOCOL 1: NORMAL
BH CV STRESS RECOVERY BP: NORMAL MMHG
BH CV STRESS RECOVERY HR: 87 BPM
BH CV STRESS STAGE 1: 1
BH CV XLRA - RV BASE: 3.4 CM
BH CV XLRA - RV LENGTH: 6.7 CM
BH CV XLRA - RV MID: 3.1 CM
BH CV XLRA - TDI S': 20.5 CM/SEC
LEFT ATRIUM VOLUME INDEX: 24.1 ML/M2
LV EF BIPLANE MOD: 62.4 %
MAXIMAL PREDICTED HEART RATE: 149 BPM
PERCENT MAX PREDICTED HR: 75.84 %
SINUS: 3 CM
SPECT HRT GATED+EF W RNC IV: 70 %
STJ: 2.35 CM
STRESS BASELINE BP: NORMAL MMHG
STRESS BASELINE HR: 79 BPM
STRESS PERCENT HR: 89 %
STRESS POST EXERCISE DUR MIN: 0 MIN
STRESS POST EXERCISE DUR SEC: 10 SEC
STRESS POST PEAK BP: NORMAL MMHG
STRESS POST PEAK HR: 113 BPM
STRESS TARGET HR: 127 BPM

## 2025-04-24 PROCEDURE — 93017 CV STRESS TEST TRACING ONLY: CPT

## 2025-04-24 PROCEDURE — 93306 TTE W/DOPPLER COMPLETE: CPT

## 2025-04-24 PROCEDURE — 25010000002 REGADENOSON 0.4 MG/5ML SOLUTION: Performed by: INTERNAL MEDICINE

## 2025-04-24 PROCEDURE — A9502 TC99M TETROFOSMIN: HCPCS | Performed by: INTERNAL MEDICINE

## 2025-04-24 PROCEDURE — 34310000005 TECHNETIUM TETROFOSMIN KIT: Performed by: INTERNAL MEDICINE

## 2025-04-24 PROCEDURE — 78452 HT MUSCLE IMAGE SPECT MULT: CPT

## 2025-04-24 RX ORDER — REGADENOSON 0.08 MG/ML
0.4 INJECTION, SOLUTION INTRAVENOUS
Status: COMPLETED | OUTPATIENT
Start: 2025-04-24 | End: 2025-04-24

## 2025-04-24 RX ADMIN — TETROFOSMIN 1 DOSE: 1.38 INJECTION, POWDER, LYOPHILIZED, FOR SOLUTION INTRAVENOUS at 13:10

## 2025-04-24 RX ADMIN — TETROFOSMIN 1 DOSE: 1.38 INJECTION, POWDER, LYOPHILIZED, FOR SOLUTION INTRAVENOUS at 12:10

## 2025-04-24 RX ADMIN — REGADENOSON 0.4 MG: 0.08 INJECTION, SOLUTION INTRAVENOUS at 13:09

## 2025-05-14 ENCOUNTER — LAB (OUTPATIENT)
Dept: LAB | Facility: HOSPITAL | Age: 70
End: 2025-05-14
Payer: MEDICARE

## 2025-05-14 ENCOUNTER — OFFICE VISIT (OUTPATIENT)
Dept: ONCOLOGY | Facility: CLINIC | Age: 70
End: 2025-05-14
Payer: COMMERCIAL

## 2025-05-14 VITALS
BODY MASS INDEX: 32.64 KG/M2 | SYSTOLIC BLOOD PRESSURE: 142 MMHG | DIASTOLIC BLOOD PRESSURE: 78 MMHG | HEART RATE: 78 BPM | OXYGEN SATURATION: 97 % | WEIGHT: 155.5 LBS | RESPIRATION RATE: 17 BRPM | TEMPERATURE: 98.7 F | HEIGHT: 58 IN

## 2025-05-14 DIAGNOSIS — D50.0 IRON DEFICIENCY ANEMIA DUE TO CHRONIC BLOOD LOSS: ICD-10-CM

## 2025-05-14 DIAGNOSIS — Z85.038 HISTORY OF COLON CANCER: ICD-10-CM

## 2025-05-14 DIAGNOSIS — Z85.038 HISTORY OF COLON CANCER: Primary | ICD-10-CM

## 2025-05-14 LAB
ALBUMIN SERPL-MCNC: 4.8 G/DL (ref 3.5–5.2)
ALBUMIN/GLOB SERPL: 2 G/DL
ALP SERPL-CCNC: 107 U/L (ref 39–117)
ALT SERPL W P-5'-P-CCNC: 22 U/L (ref 1–33)
ANION GAP SERPL CALCULATED.3IONS-SCNC: 11.2 MMOL/L (ref 5–15)
AST SERPL-CCNC: 21 U/L (ref 1–32)
BASOPHILS # BLD AUTO: 0.02 10*3/MM3 (ref 0–0.2)
BASOPHILS NFR BLD AUTO: 0.5 % (ref 0–1.5)
BILIRUB SERPL-MCNC: 0.8 MG/DL (ref 0–1.2)
BUN SERPL-MCNC: 17 MG/DL (ref 8–23)
BUN/CREAT SERPL: 15 (ref 7–25)
CALCIUM SPEC-SCNC: 9.7 MG/DL (ref 8.6–10.5)
CEA SERPL-MCNC: 2.35 NG/ML
CHLORIDE SERPL-SCNC: 108 MMOL/L (ref 98–107)
CO2 SERPL-SCNC: 22.8 MMOL/L (ref 22–29)
CREAT SERPL-MCNC: 1.13 MG/DL (ref 0.57–1)
DEPRECATED RDW RBC AUTO: 46.7 FL (ref 37–54)
EGFRCR SERPLBLD CKD-EPI 2021: 52.1 ML/MIN/1.73
EOSINOPHIL # BLD AUTO: 0.26 10*3/MM3 (ref 0–0.4)
EOSINOPHIL NFR BLD AUTO: 6.4 % (ref 0.3–6.2)
ERYTHROCYTE [DISTWIDTH] IN BLOOD BY AUTOMATED COUNT: 14 % (ref 12.3–15.4)
FERRITIN SERPL-MCNC: 58.4 NG/ML (ref 13–150)
GLOBULIN UR ELPH-MCNC: 2.4 GM/DL
GLUCOSE SERPL-MCNC: 101 MG/DL (ref 65–99)
HCT VFR BLD AUTO: 35 % (ref 34–46.6)
HGB BLD-MCNC: 10.9 G/DL (ref 12–15.9)
IMM GRANULOCYTES # BLD AUTO: 0.01 10*3/MM3 (ref 0–0.05)
IMM GRANULOCYTES NFR BLD AUTO: 0.2 % (ref 0–0.5)
IRON 24H UR-MRATE: 49 MCG/DL (ref 37–145)
IRON SATN MFR SERPL: 13 % (ref 20–50)
LYMPHOCYTES # BLD AUTO: 0.67 10*3/MM3 (ref 0.7–3.1)
LYMPHOCYTES NFR BLD AUTO: 16.5 % (ref 19.6–45.3)
MCH RBC QN AUTO: 28.5 PG (ref 26.6–33)
MCHC RBC AUTO-ENTMCNC: 31.1 G/DL (ref 31.5–35.7)
MCV RBC AUTO: 91.4 FL (ref 79–97)
MONOCYTES # BLD AUTO: 0.46 10*3/MM3 (ref 0.1–0.9)
MONOCYTES NFR BLD AUTO: 11.3 % (ref 5–12)
NEUTROPHILS NFR BLD AUTO: 2.64 10*3/MM3 (ref 1.7–7)
NEUTROPHILS NFR BLD AUTO: 65.1 % (ref 42.7–76)
NRBC BLD AUTO-RTO: 0 /100 WBC (ref 0–0.2)
PLATELET # BLD AUTO: 256 10*3/MM3 (ref 140–450)
PMV BLD AUTO: 8.7 FL (ref 6–12)
POTASSIUM SERPL-SCNC: 4.3 MMOL/L (ref 3.5–5.2)
PROT SERPL-MCNC: 7.2 G/DL (ref 6–8.5)
RBC # BLD AUTO: 3.83 10*6/MM3 (ref 3.77–5.28)
SODIUM SERPL-SCNC: 142 MMOL/L (ref 136–145)
TIBC SERPL-MCNC: 368 MCG/DL (ref 298–536)
TRANSFERRIN SERPL-MCNC: 247 MG/DL (ref 200–360)
WBC NRBC COR # BLD AUTO: 4.06 10*3/MM3 (ref 3.4–10.8)

## 2025-05-14 PROCEDURE — 85025 COMPLETE CBC W/AUTO DIFF WBC: CPT

## 2025-05-14 PROCEDURE — 84466 ASSAY OF TRANSFERRIN: CPT

## 2025-05-14 PROCEDURE — 80053 COMPREHEN METABOLIC PANEL: CPT

## 2025-05-14 PROCEDURE — 82728 ASSAY OF FERRITIN: CPT

## 2025-05-14 PROCEDURE — 36415 COLL VENOUS BLD VENIPUNCTURE: CPT

## 2025-05-14 PROCEDURE — 83540 ASSAY OF IRON: CPT

## 2025-05-14 PROCEDURE — 82378 CARCINOEMBRYONIC ANTIGEN: CPT | Performed by: INTERNAL MEDICINE

## 2025-05-14 NOTE — PROGRESS NOTES
Subjective     CHIEF COMPLAINT:      Chief Complaint   Patient presents with    Follow-up     HISTORY OF PRESENT ILLNESS:     Marie Foy is a 69 y.o. female patient who returns today for follow up on her colon cancer.  She returns to the office today for 6-month follow-up and review.  Since she was last seen, she did undergo cholecystectomy.  Unfortunately, she has continued to have irregular bowels since this.  She admits when she eats a better well-balanced diet her bowels are better controlled.  However, with heavy meals she does have rather urgent immediate diarrhea.  She has no blood in her stool.  She notes overall improvement in her epigastric abdominal pain since cholecystectomy.      ROS:  Pertinent ROS is in the HPI.     Past medical, surgical, social and family history were reviewed.     MEDICATIONS:    Current Outpatient Medications:     ALPRAZolam (XANAX) 0.25 MG tablet, Take 1 tablet by mouth 3 (Three) Times a Day As Needed for Anxiety., Disp: 20 tablet, Rfl: 0    amLODIPine (NORVASC) 10 MG tablet, TAKE 1 TABLET BY MOUTH DAILY., Disp: 90 tablet, Rfl: 3    atorvastatin (LIPITOR) 80 MG tablet, TAKE 1 TABLET BY MOUTH DAILY., Disp: 90 tablet, Rfl: 3    calcium carbonate (OS-HUMBERTO) 600 MG tablet, Take 1 tablet by mouth 2 (Two) Times a Day With Meals., Disp: , Rfl:     cephalexin (KEFLEX) 500 MG capsule, Take all 4 capsules one hour prior to procedure, Disp: 4 capsule, Rfl: 5    levothyroxine (SYNTHROID, LEVOTHROID) 100 MCG tablet, TAKE 1 TABLET BY MOUTH ONCE DAILY, Disp: 90 tablet, Rfl: 1    omeprazole (priLOSEC) 40 MG capsule, TAKE 1 CAPSULE BY MOUTH DAILY, Disp: 90 capsule, Rfl: 2    ondansetron (Zofran) 4 MG tablet, Take 1 tablet by mouth Every 8 (Eight) Hours As Needed for Nausea or Vomiting for up to 10 doses., Disp: 10 tablet, Rfl: 0    rivaroxaban (XARELTO) 20 MG tablet, Take 1 tablet by mouth Daily., Disp: 30 tablet, Rfl: 11    solifenacin (VESICARE) 5 MG tablet, TAKE 1 TABLET BY MOUTH ONCE DAILY,  "Disp: 90 tablet, Rfl: 3    valACYclovir (VALTREX) 1000 MG tablet, TAKE 1 TABLET BY MOUTH DAILY. (Patient taking differently: Take 1 tablet by mouth. 3 TIMES WEEKLY), Disp: 90 tablet, Rfl: 3    vitamin B-12 (CYANOCOBALAMIN) 1000 MCG tablet, Take 1 tablet by mouth Daily., Disp: , Rfl:     Vitamin D, Cholecalciferol, (CHOLECALCIFEROL) 10 MCG (400 UNIT) tablet, Take 1 tablet by mouth Daily., Disp: , Rfl:     Ferrous Sulfate 28 MG tablet, Take  by mouth. 3 days a week, Disp: , Rfl:     Objective     VITAL SIGNS:     Vitals:    05/14/25 1507   BP: 142/78   Pulse: 78   Resp: 17   Temp: 98.7 °F (37.1 °C)   TempSrc: Oral   SpO2: 97%   Weight: 70.5 kg (155 lb 8 oz)   Height: 147 cm (57.87\")   PainSc: 0-No pain     Body mass index is 32.64 kg/m².     Wt Readings from Last 5 Encounters:   05/14/25 70.5 kg (155 lb 8 oz)   04/24/25 69.4 kg (153 lb)   04/14/25 69.4 kg (153 lb)   03/26/25 69.4 kg (153 lb)   03/06/25 69.9 kg (154 lb)     PHYSICAL EXAMINATION:   GENERAL: The patient appears in good general condition, not in acute distress.   SKIN: No Ecchymosis.  EYES: No jaundice. No Pallor.  CHEST: Normal respiratory effort.   ABDOMEN: Soft. No tenderness. No Hepatomegaly. No Splenomegaly. No masses.  Petty sign negative.  EXTREMITIES: No joint deformity.     DIAGNOSTIC DATA:     Results from last 7 days   Lab Units 05/14/25  1457   WBC 10*3/mm3 4.06   NEUTROS ABS 10*3/mm3 2.64   HEMOGLOBIN g/dL 10.9*   HEMATOCRIT % 35.0   PLATELETS 10*3/mm3 256     Results from last 7 days   Lab Units 05/14/25  1457   SODIUM mmol/L 142   POTASSIUM mmol/L 4.3   CHLORIDE mmol/L 108*   CO2 mmol/L 22.8   BUN mg/dL 17   CREATININE mg/dL 1.13*   CALCIUM mg/dL 9.7   ALBUMIN g/dL 4.8   BILIRUBIN mg/dL 0.8   ALK PHOS U/L 107   ALT (SGPT) U/L 22   AST (SGOT) U/L 21   GLUCOSE mg/dL 101*           Lab 05/14/25  1457   IRON 49   IRON SATURATION (TSAT) 13*   TIBC 368   TRANSFERRIN 247   FERRITIN 58.40        Lab Results   Component Value Date    CEA 2.35 " 05/14/2025    CEA 3.03 11/07/2024    CEA 2.30 05/09/2024    CEA 2.80 11/09/2023    CEA 2.33 05/16/2023      CT chest abdomen pelvis on 10/28/2024:  FINDINGS CHEST:       No pathologically enlarged thoracic lymph nodes are identified.  The heart is mildly enlarged. There is trace pericardial fluid. The  ascending aorta is mildly ectatic to 3.8 cm. There is mild calcific  aortic atherosclerosis.The pleural spaces are clear.  There is degenerative disc disease.  The trachea is largely clear. There is scattered mucous plugging in  distal airways. There is mild bilateral curvilinear atelectasis and or  scarring. A tiny nodule in the posterolateral left lower lobe is similar  dating back to at least 2021 and likely benign.     FINDINGS ABDOMEN/PELVIS:     The liver is normal in size. A few hypodense foci in the liver are too  small to accurately characterize but similar to prior. There is  suspected cholelithiasis. There is anterior gallbladder wall thickening  and enhancement. The spleen is normal in size. The pancreas is within  normal limits. The adrenal glands are within normal limits. A probable  inferior right renal cyst is similar to prior. There is a 0.4 cm stone  at the right ureterovesical junction, which is new from prior. There is  only minimal asymmetric prominence of the right renal collecting system,  although this is similar to prior.  No pathologically enlarged abdominal or pelvic lymph nodes are  identified. There is moderate calcific atherosclerosis.  There is a tiny hiatal hernia. There are postsurgical changes from  partial colonic resection with a transverse colonic anastomosis. There  is no bowel obstruction. There are scattered colonic diverticula. The  appendix is visualized. The bladder is poorly distended and not well  assessed. The uterus is not seen. There is no adnexal mass.  There is degenerative disc disease. There is a chronic sclerotic  proximal right femoral lesion.     IMPRESSION:      1.  New 0.4 cm stone at the right UVJ with only minimal asymmetric  prominence of the right renal collecting system, which is similar to  prior, suggesting no corresponding obstruction.  2.  Suspected cholelithiasis with anterior gallbladder wall thickening  and enhancement, incompletely assessed. Correlate with biliary enzymes  and gallbladder ultrasound and/or contrast-enhanced MRCP.  3.  Hypodense foci in the liver are too small to accurately characterize  but similar to prior.  4.  Status post partial colonic resection. No obstruction.    Assessment & Plan    *Colon cancer arising from the distal transverse colon.  Descending colon mass was identified on screening colonoscopy on 1/26/2021.  S/P laparoscopic transverse partial colectomy on 3/2/2021.  Pathology exam revealed a  T3 N0 Grade 2 tumor with clear surgical margin. 14 lymph nodes were negative. There was no evidence of lymphovascular or perineural invasion.   Tumor was MSI stable. No KRAS NRAS or BRAF mutations.  Baseline CEA was elevated at 5.86.  Since the tumor was stage IIA with adequate number of lymph nodes examined and with no high risk features, adjuvant chemotherapy was not recommended.  CT scan on 11/1/2021 revealed no evidence of recurrence or metastasis.  Colonoscopy on 5/12/2022 revealed a colonic polyp.  Pathology exam revealed tubulovillous adenoma.  CT scan on 11/2/2022 revealed no evidence of recurrence.  3 tiny hepatic lesions were seen that were considered to be most likely benign.  Continued follow-up was recommended.  CEA was 2.38 on 11/8/2022.  CT chest abdomen pelvis on 10/30/2023 showed no evidence of recurrence or metastasis.  There was a stable tiny hepatic segment 2 lesion likely representing a cyst.  11/9/2023: CEA 2.80.  5/9/2024: CEA improved to 2.30.  Colonoscopy on 6/18/2024 revealed no lesions or new polyps.  Follow-up colonoscopy after 3 years was recommended.  CT scan on 10/28/2024 revealed no evidence of recurrence  or metastasis.    5/14/2025 CEA is normal at 2.53 with no clinical evidence of recurrent disease, due for colonoscopy next year in 2027    *Iron deficiency anemia.   It is attributed to blood losses from the colon cancer and from surgical blood losses.    Hemoglobin was 11.1 on 3/3/2021.  Patient's diet was low in iron rich food.    We recommended increasing her intake of iron rich food.  Patient started on 4/5/2021 ferrous sulfate 28 mg elemental iron 4 days a week.  Hemoglobin improved to 12.1 on 8/19/2021.  Hemoglobin was 12.5 on 11/11/2021.  Hemoglobin was 12.2 on 11/8/2022.  Ferritin was 80 and transferrin saturation was 20%.  Ferrous sulfate was increased to 5 days a week.  5/16/2023: Hemoglobin was 12.4.   Ferritin was 74.  Transferrin saturation was 16%.   She was continued on ferrous sulfate 5 days a week.  11/9/2023: Hemoglobin improved 12.8.  Ferritin improved to 117.  Transferrin saturation improved to 18%.  Patient reports that she stopped taking the oral iron a month ago (problem with the stool consistency).  She was restarted on ferrous sulfate 325 mg once weekly.  5/9/2024: Hemoglobin 12.7.  Ferritin 107.  Transferrin saturation decreased to 15%.  Ferrous sulfate was increased to 3 days a week.  11/7/2024: Hemoglobin 13.7. Ferritin 104.  Transferrin saturation 15%.  5/14/2025 currently with worsening iron deficiency with hemoglobin 10.9, ferritin 58.3, iron saturation 13% patient has been taking over-the-counter iron supplementation 3 times weekly, we discussed increasing this to daily    *Vitamin B12 deficiency.   Vitamin B12 was 305 on 11/11/2022.  She is taking vitamin B12 1000 mcg daily.   Vitamin B12 improved to 854 on 11/8/2023.  5/16/2023: Vitamin B12 was 800.  11/9/2023: Vitamin B12 adequate at 610.  She was continued on vitamin B12 daily.  5/9/2024: B12 521.  She was continued on vitamin B12 daily.  11/7/2024: Vitamin B12 improved to 723.    *Suspected cholecystitis.  CT on 10/28/2024  revealed gallstones.    There was thickening of the gallbladder wall.  Patient reports feeling full soon after she eats.    No pain in the right upper quadrant and no tenderness on exam.  Status post cholecystectomy with Dr. Calvo 2/19/2025    *Stone at the right UVJ.  This was identified on CT on 10/20/2024.  She is not having pain in the area.    No changes of hydronephrosis.    PLAN:  The patient will remain in observation regarding her previous malignancy with normal CEA  Worsening iron deficiency, increase oral iron supplementation to daily  The patient was provided Enterade to try for her loose bowels though we did review dietary choices would provide benefit to her bowels postcholecystectomy  6-month follow-up with Dr. Ziegler with CBC, CEA, CMP, ferritin, iron profile    Yaz Hook, SUPRIYA  05/14/2025          Yaz Hook, SUPRIYA  05/15/25

## 2025-06-18 ENCOUNTER — TELEPHONE (OUTPATIENT)
Dept: SURGERY | Facility: CLINIC | Age: 70
End: 2025-06-18
Payer: MEDICARE

## 2025-06-18 RX ORDER — LEVOTHYROXINE SODIUM 100 UG/1
100 TABLET ORAL DAILY
Qty: 90 TABLET | Refills: 1 | Status: SHIPPED | OUTPATIENT
Start: 2025-06-18

## 2025-06-18 NOTE — TELEPHONE ENCOUNTER
Provider: JUAN PABLO    Caller: Marie Foy    Relationship to Patient: Self    Phone Number: [phone]    Reason for Call: PATIENT NEEDS NOTE FOR WORK SHOWING RELEASED TO WORK ON 03/10 WITH NO RESTRICTIONS FAXED TO [facility]  [phone] [address]   [place] [address]  WITH A COPY SENT TO [facility]    HAD CHOLECYSTECTOMY LAPAROSCOPIC INTRAOPERATIVE CHOLANGIOGRAM WITH DAVINCI ROBOT ON 02/20     When was the patient last seen: 03/06

## 2025-06-30 ENCOUNTER — OFFICE VISIT (OUTPATIENT)
Dept: INTERNAL MEDICINE | Facility: CLINIC | Age: 70
End: 2025-06-30
Payer: MEDICARE

## 2025-06-30 VITALS
DIASTOLIC BLOOD PRESSURE: 70 MMHG | BODY MASS INDEX: 30.86 KG/M2 | HEART RATE: 71 BPM | SYSTOLIC BLOOD PRESSURE: 140 MMHG | OXYGEN SATURATION: 92 % | HEIGHT: 58 IN | WEIGHT: 147 LBS

## 2025-06-30 DIAGNOSIS — K62.5 RECTAL BLEEDING: Primary | ICD-10-CM

## 2025-06-30 DIAGNOSIS — I48.0 PAROXYSMAL ATRIAL FIBRILLATION: ICD-10-CM

## 2025-06-30 DIAGNOSIS — Z85.038 HISTORY OF COLON CANCER: ICD-10-CM

## 2025-06-30 DIAGNOSIS — R19.5 HEME POSITIVE STOOL: ICD-10-CM

## 2025-06-30 DIAGNOSIS — Z79.01 ENCOUNTER FOR CURRENT LONG-TERM USE OF ANTICOAGULANTS: ICD-10-CM

## 2025-06-30 PROBLEM — K80.10 CHOLECYSTITIS WITH CHOLELITHIASIS: Status: RESOLVED | Noted: 2025-02-19 | Resolved: 2025-06-30

## 2025-06-30 PROBLEM — R10.9 ABDOMINAL PAIN: Status: RESOLVED | Noted: 2025-02-19 | Resolved: 2025-06-30

## 2025-06-30 PROBLEM — K80.20 SYMPTOMATIC CHOLELITHIASIS: Status: RESOLVED | Noted: 2025-02-19 | Resolved: 2025-06-30

## 2025-06-30 LAB
ALBUMIN SERPL-MCNC: 4.6 G/DL (ref 3.5–5.2)
ALBUMIN/GLOB SERPL: 2.1 G/DL
ALP SERPL-CCNC: 116 U/L (ref 39–117)
ALT SERPL-CCNC: 13 U/L (ref 1–33)
AST SERPL-CCNC: 16 U/L (ref 1–32)
BASOPHILS # BLD AUTO: 0.03 10*3/MM3 (ref 0–0.2)
BASOPHILS NFR BLD AUTO: 0.7 % (ref 0–1.5)
BILIRUB SERPL-MCNC: 0.9 MG/DL (ref 0–1.2)
BUN SERPL-MCNC: 14 MG/DL (ref 8–23)
BUN/CREAT SERPL: 16.7 (ref 7–25)
CALCIUM SERPL-MCNC: 9.2 MG/DL (ref 8.6–10.5)
CHLORIDE SERPL-SCNC: 107 MMOL/L (ref 98–107)
CO2 SERPL-SCNC: 23.2 MMOL/L (ref 22–29)
CREAT SERPL-MCNC: 0.84 MG/DL (ref 0.57–1)
DEVELOPER EXPIRATION DATE: 0
DEVELOPER LOT NUMBER: 0
EGFRCR SERPLBLD CKD-EPI 2021: 73.9 ML/MIN/1.73
EOSINOPHIL # BLD AUTO: 0.19 10*3/MM3 (ref 0–0.4)
EOSINOPHIL NFR BLD AUTO: 4.2 % (ref 0.3–6.2)
ERYTHROCYTE [DISTWIDTH] IN BLOOD BY AUTOMATED COUNT: 13.9 % (ref 12.3–15.4)
EXPIRATION DATE: ABNORMAL
FECAL OCCULT BLOOD SCREEN, POC: POSITIVE
GLOBULIN SER CALC-MCNC: 2.2 GM/DL
GLUCOSE SERPL-MCNC: 100 MG/DL (ref 65–99)
HCT VFR BLD AUTO: 34.5 % (ref 34–46.6)
HGB BLD-MCNC: 11.1 G/DL (ref 12–15.9)
IMM GRANULOCYTES # BLD AUTO: 0.02 10*3/MM3 (ref 0–0.05)
IMM GRANULOCYTES NFR BLD AUTO: 0.4 % (ref 0–0.5)
LYMPHOCYTES # BLD AUTO: 0.67 10*3/MM3 (ref 0.7–3.1)
LYMPHOCYTES NFR BLD AUTO: 14.9 % (ref 19.6–45.3)
Lab: ABNORMAL
MCH RBC QN AUTO: 28.9 PG (ref 26.6–33)
MCHC RBC AUTO-ENTMCNC: 32.2 G/DL (ref 31.5–35.7)
MCV RBC AUTO: 89.8 FL (ref 79–97)
MONOCYTES # BLD AUTO: 0.36 10*3/MM3 (ref 0.1–0.9)
MONOCYTES NFR BLD AUTO: 8 % (ref 5–12)
NEGATIVE CONTROL: NEGATIVE
NEUTROPHILS # BLD AUTO: 3.24 10*3/MM3 (ref 1.7–7)
NEUTROPHILS NFR BLD AUTO: 71.8 % (ref 42.7–76)
NRBC BLD AUTO-RTO: 0 /100 WBC (ref 0–0.2)
PLATELET # BLD AUTO: 314 10*3/MM3 (ref 140–450)
POSITIVE CONTROL: POSITIVE
POTASSIUM SERPL-SCNC: 4.3 MMOL/L (ref 3.5–5.2)
PROT SERPL-MCNC: 6.8 G/DL (ref 6–8.5)
RBC # BLD AUTO: 3.84 10*6/MM3 (ref 3.77–5.28)
SODIUM SERPL-SCNC: 141 MMOL/L (ref 136–145)
WBC # BLD AUTO: 4.51 10*3/MM3 (ref 3.4–10.8)

## 2025-06-30 PROCEDURE — 82270 OCCULT BLOOD FECES: CPT | Performed by: INTERNAL MEDICINE

## 2025-06-30 PROCEDURE — G2211 COMPLEX E/M VISIT ADD ON: HCPCS | Performed by: INTERNAL MEDICINE

## 2025-06-30 PROCEDURE — 1160F RVW MEDS BY RX/DR IN RCRD: CPT | Performed by: INTERNAL MEDICINE

## 2025-06-30 PROCEDURE — 1159F MED LIST DOCD IN RCRD: CPT | Performed by: INTERNAL MEDICINE

## 2025-06-30 PROCEDURE — 1126F AMNT PAIN NOTED NONE PRSNT: CPT | Performed by: INTERNAL MEDICINE

## 2025-06-30 PROCEDURE — 3078F DIAST BP <80 MM HG: CPT | Performed by: INTERNAL MEDICINE

## 2025-06-30 PROCEDURE — 99214 OFFICE O/P EST MOD 30 MIN: CPT | Performed by: INTERNAL MEDICINE

## 2025-06-30 PROCEDURE — 3077F SYST BP >= 140 MM HG: CPT | Performed by: INTERNAL MEDICINE

## 2025-06-30 NOTE — PROGRESS NOTES
Subjective     Marie Foy is a 70 y.o. female who presents with   Chief Complaint   Patient presents with    Black or Bloody Stool       History of Present Illness     C/o blood in stool.  She noticed blood clots in stool four days ago.  No black or tarry looking.  No abdominal pain.  No N/V.  No fever.  Noticed after bowel movement.  No constipation.  Notably on Xarelto for afib.      Review of Systems   Respiratory: Negative.     Cardiovascular: Negative.    Gastrointestinal:  Positive for anal bleeding and blood in stool. Negative for abdominal distention, abdominal pain, constipation, diarrhea, nausea, rectal pain and vomiting.       The following portions of the patient's history were reviewed and updated as appropriate: allergies, current medications and problem list.    Patient Active Problem List    Diagnosis Date Noted    Paroxysmal atrial fibrillation 06/30/2025    OA (osteoarthritis) of knee 09/26/2024    Prediabetes 09/25/2024    Malignant neoplasm of descending colon 02/04/2021    Osteoporosis 06/30/2020     Note Last Updated: 6/30/2021     Prolia every six months.       Primary localized osteoarthrosis of left lower leg 05/20/2020    Essential (primary) hypertension 05/11/2020    Anxiety 02/26/2016    Gastroesophageal reflux disease 02/26/2016    Herpes simplex type 2 infection 02/26/2016    Hypercholesterolemia 02/26/2016    Hypothyroidism 02/26/2016    Overactive bladder 02/26/2016    Unilateral partial paralysis of vocal cords or larynx 02/26/2016     Note Last Updated: 2/26/2016     Description: chronic         Current Outpatient Medications on File Prior to Visit   Medication Sig Dispense Refill    ALPRAZolam (XANAX) 0.25 MG tablet Take 1 tablet by mouth 3 (Three) Times a Day As Needed for Anxiety. 20 tablet 0    amLODIPine (NORVASC) 10 MG tablet TAKE 1 TABLET BY MOUTH DAILY. 90 tablet 3    atorvastatin (LIPITOR) 80 MG tablet TAKE 1 TABLET BY MOUTH DAILY. 90 tablet 3    calcium carbonate  "(OS-HUMBERTO) 600 MG tablet Take 1 tablet by mouth 2 (Two) Times a Day With Meals.      cephalexin (KEFLEX) 500 MG capsule Take all 4 capsules one hour prior to procedure 4 capsule 5    levothyroxine (SYNTHROID, LEVOTHROID) 100 MCG tablet TAKE 1 TABLET BY MOUTH ONCE DAILY 90 tablet 1    omeprazole (priLOSEC) 40 MG capsule TAKE 1 CAPSULE BY MOUTH DAILY 90 capsule 2    ondansetron (Zofran) 4 MG tablet Take 1 tablet by mouth Every 8 (Eight) Hours As Needed for Nausea or Vomiting for up to 10 doses. 10 tablet 0    rivaroxaban (XARELTO) 20 MG tablet Take 1 tablet by mouth Daily. 30 tablet 11    solifenacin (VESICARE) 5 MG tablet TAKE 1 TABLET BY MOUTH ONCE DAILY 90 tablet 3    valACYclovir (VALTREX) 1000 MG tablet TAKE 1 TABLET BY MOUTH DAILY. (Patient taking differently: Take 1 tablet by mouth. 3 TIMES WEEKLY) 90 tablet 3    vitamin B-12 (CYANOCOBALAMIN) 1000 MCG tablet Take 1 tablet by mouth Daily.      Vitamin D, Cholecalciferol, (CHOLECALCIFEROL) 10 MCG (400 UNIT) tablet Take 1 tablet by mouth Daily.      Ferrous Sulfate 28 MG tablet Take  by mouth. 3 days a week       No current facility-administered medications on file prior to visit.       Objective     /70   Pulse 71   Ht 147 cm (57.87\")   Wt 66.7 kg (147 lb)   SpO2 92%   BMI 30.86 kg/m²     Physical Exam  Constitutional:       Appearance: She is well-developed.   HENT:      Head: Normocephalic and atraumatic.   Pulmonary:      Effort: Pulmonary effort is normal.   Abdominal:      General: There is no distension.      Palpations: Abdomen is soft. There is no mass.      Tenderness: There is no abdominal tenderness. There is no guarding or rebound.   Genitourinary:     Rectum: Normal. Guaiac result positive. No mass, tenderness, anal fissure, external hemorrhoid or internal hemorrhoid. Normal anal tone.   Neurological:      Mental Status: She is alert.         Assessment & Plan   Diagnoses and all orders for this visit:    1. Rectal bleeding (Primary)  -     " CBC & Differential  -     Comprehensive Metabolic Panel  -     Ambulatory Referral to General Surgery    2. History of colon cancer  -     Ambulatory Referral to General Surgery    3. Heme positive stool  -     Ambulatory Referral to General Surgery    4. Paroxysmal atrial fibrillation    5. Encounter for current long-term use of anticoagulants        Discussion    Patient with history of colon cancer presents with one episode of rectal bleeding with clots.  No further but she is heme positive stool.  Check labs today.  Refer to her surgeon Dr. Calvo for consideration of repeat colonoscopy.    Afib.  She is advised to continue Xarelto at this time.         Future Appointments   Date Time Provider Department Center   10/13/2025 11:30 AM [facility] [place] [facility] [place]   10/17/2025  2:00 PM Buster Shankar MD [id number] [place]   10/20/2025 11:15 AM Michelle Wyman MD [facility] [place]   11/17/2025 12:50 PM LAB CHAIR 5 [facility] [place]   11/17/2025  1:20 PM Saul Ziegler MD [facility] [place]   12/19/2025 11:00 AM Jaclyn Huber APRN [facility] [place]

## 2025-07-03 ENCOUNTER — OFFICE VISIT (OUTPATIENT)
Dept: SURGERY | Facility: CLINIC | Age: 70
End: 2025-07-03
Payer: MEDICARE

## 2025-07-03 VITALS
OXYGEN SATURATION: 98 % | BODY MASS INDEX: 31.7 KG/M2 | DIASTOLIC BLOOD PRESSURE: 70 MMHG | HEART RATE: 72 BPM | SYSTOLIC BLOOD PRESSURE: 150 MMHG | HEIGHT: 58 IN | WEIGHT: 151 LBS

## 2025-07-03 DIAGNOSIS — R19.5 HEME POSITIVE STOOL: Primary | ICD-10-CM

## 2025-07-03 PROCEDURE — 3078F DIAST BP <80 MM HG: CPT | Performed by: SURGERY

## 2025-07-03 PROCEDURE — 1159F MED LIST DOCD IN RCRD: CPT | Performed by: SURGERY

## 2025-07-03 PROCEDURE — 99214 OFFICE O/P EST MOD 30 MIN: CPT | Performed by: SURGERY

## 2025-07-03 PROCEDURE — 3077F SYST BP >= 140 MM HG: CPT | Performed by: SURGERY

## 2025-07-03 PROCEDURE — 1160F RVW MEDS BY RX/DR IN RCRD: CPT | Performed by: SURGERY

## 2025-07-03 NOTE — H&P (VIEW-ONLY)
Cc: Anemia, rectal bleed     History of presenting illness:   This is a nice 70-year-old female known to me from having undergone a transverse colectomy for a T3 N0 colon cancer in March 2021.  She has recovered well from this and has had no evidence of problems subsequently.  She subsequently underwent robotic cholecystectomy done by me in February 2025.  She recovered well from that.  She has had a persistent anemia for some time.  She recently had some heme positive stools and has had some blood per rectum.  She has some occasional epigastric discomfort and dyspepsia.  Last colonoscopy done June 2024, largely unremarkable.  She has recently been started on Xarelto for paroxysmal atrial fibrillation.     Past Medical History: Colon cancer, hypertension, hypercholesterolemia, depression, hypothyroidism, recently diagnosed with atrial fibrillation, although appears to be out of A-fib currently.     Past Surgical History: Airway surgery as a child, hysterectomy and bladder suspension, right knee surgery, breast biopsy, robotic transverse colectomy done March 2021.  Colonoscopy most recently June 2024.  Robotic cholecystectomy done by me February 2025.     Medications: Xarelto, Xanax, Norvasc, Lipitor, Colace, Synthroid, iron, Prilosec, Vesicare, vitamin B12     Allergies: Codeine, penicillin, morphine     Social History: She is a non-smoker     Family History: Positive for colon cancer in her mother     Review of Systems:  Constitutional: Denies fever, chills, change in weight  Neck: no swollen glands or dysphagia or odynophagia  Respiratory: negative for SOB, cough, hemoptysis or wheezing  Cardiovascular: negative for chest pain, palpitations or peripheral edema  Gastrointestinal: Positive for occasional episodes of nausea and some vomiting, no real abdominal pain        Physical Exam:  BMI: 31.7, previously 33.0  General: alert and oriented, appropriate, no acute distress  Eyes: No scleral icterus, extraocular  movements are intact  Neck: Supple without lymphadenopathy or thyromegaly, trachea is in the midline  Respiratory: There is good bilateral chest expansion, no use of accessory muscles is noted  Cardiovascular: No jugular venous distention or peripheral edema is seen  Gastrointestinal: Soft and benign, no mass or hernia, surgical scars well-healed     Laboratory data: Recent Hemoccult was positive for blood.  She has been anemic over the course of this year.  1 normal hemoglobin done in March but was down to 10.9 in May, slightly improved to 11.1 in June.     Imaging data: No recent relevant data        Assessment and plan:   - Anemia and Hemoccult positive stools  - Epigastric discomfort  - Personal history of colon cancer status postresection without evidence of recurrence to this point  - History of paroxysmal atrial fibrillation and having recently started Xarelto  - Options discussed with patient.  I have recommended proceeding with EGD and colonoscopy.  Risks discussed including bleeding and perforation.  Hold Xarelto perioperatively.        Martin Calvo MD  General, Minimally Invasive and Endoscopic Surgery  [facility]

## 2025-07-03 NOTE — PROGRESS NOTES
Cc: Anemia, rectal bleed     History of presenting illness:   This is a nice 70-year-old female known to me from having undergone a transverse colectomy for a T3 N0 colon cancer in March 2021.  She has recovered well from this and has had no evidence of problems subsequently.  She subsequently underwent robotic cholecystectomy done by me in February 2025.  She recovered well from that.  She has had a persistent anemia for some time.  She recently had some heme positive stools and has had some blood per rectum.  She has some occasional epigastric discomfort and dyspepsia.  Last colonoscopy done June 2024, largely unremarkable.  She has recently been started on Xarelto for paroxysmal atrial fibrillation.     Past Medical History: Colon cancer, hypertension, hypercholesterolemia, depression, hypothyroidism, recently diagnosed with atrial fibrillation, although appears to be out of A-fib currently.     Past Surgical History: Airway surgery as a child, hysterectomy and bladder suspension, right knee surgery, breast biopsy, robotic transverse colectomy done March 2021.  Colonoscopy most recently June 2024.  Robotic cholecystectomy done by me February 2025.     Medications: Xarelto, Xanax, Norvasc, Lipitor, Colace, Synthroid, iron, Prilosec, Vesicare, vitamin B12     Allergies: Codeine, penicillin, morphine     Social History: She is a non-smoker     Family History: Positive for colon cancer in her mother     Review of Systems:  Constitutional: Denies fever, chills, change in weight  Neck: no swollen glands or dysphagia or odynophagia  Respiratory: negative for SOB, cough, hemoptysis or wheezing  Cardiovascular: negative for chest pain, palpitations or peripheral edema  Gastrointestinal: Positive for occasional episodes of nausea and some vomiting, no real abdominal pain        Physical Exam:  BMI: 31.7, previously 33.0  General: alert and oriented, appropriate, no acute distress  Eyes: No scleral icterus, extraocular  movements are intact  Neck: Supple without lymphadenopathy or thyromegaly, trachea is in the midline  Respiratory: There is good bilateral chest expansion, no use of accessory muscles is noted  Cardiovascular: No jugular venous distention or peripheral edema is seen  Gastrointestinal: Soft and benign, no mass or hernia, surgical scars well-healed     Laboratory data: Recent Hemoccult was positive for blood.  She has been anemic over the course of this year.  1 normal hemoglobin done in March but was down to 10.9 in May, slightly improved to 11.1 in June.     Imaging data: No recent relevant data        Assessment and plan:   - Anemia and Hemoccult positive stools  - Epigastric discomfort  - Personal history of colon cancer status postresection without evidence of recurrence to this point  - History of paroxysmal atrial fibrillation and having recently started Xarelto  - Options discussed with patient.  I have recommended proceeding with EGD and colonoscopy.  Risks discussed including bleeding and perforation.  Hold Xarelto perioperatively.        Martin Calvo MD  General, Minimally Invasive and Endoscopic Surgery  [facility]

## 2025-07-22 ENCOUNTER — HOSPITAL ENCOUNTER (OUTPATIENT)
Facility: HOSPITAL | Age: 70
Setting detail: HOSPITAL OUTPATIENT SURGERY
Discharge: HOME | End: 2025-07-22
Attending: SURGERY | Admitting: SURGERY
Payer: MEDICARE

## 2025-07-22 ENCOUNTER — ANESTHESIA EVENT (OUTPATIENT)
Dept: GASTROENTEROLOGY | Facility: HOSPITAL | Age: 70
End: 2025-07-22
Payer: MEDICARE

## 2025-07-22 ENCOUNTER — ANESTHESIA (OUTPATIENT)
Dept: GASTROENTEROLOGY | Facility: HOSPITAL | Age: 70
End: 2025-07-22
Payer: MEDICARE

## 2025-07-22 VITALS
HEIGHT: 58 IN | OXYGEN SATURATION: 98 % | BODY MASS INDEX: 31.91 KG/M2 | HEART RATE: 83 BPM | DIASTOLIC BLOOD PRESSURE: 50 MMHG | RESPIRATION RATE: 16 BRPM | SYSTOLIC BLOOD PRESSURE: 119 MMHG | WEIGHT: 152 LBS

## 2025-07-22 PROCEDURE — 25810000003 LACTATED RINGERS PER 1000 ML: Performed by: SURGERY

## 2025-07-22 PROCEDURE — 25010000002 PROPOFOL 10 MG/ML EMULSION: Performed by: NURSE ANESTHETIST, CERTIFIED REGISTERED

## 2025-07-22 PROCEDURE — G0105 COLORECTAL SCRN; HI RISK IND: HCPCS | Performed by: SURGERY

## 2025-07-22 PROCEDURE — 25010000002 LIDOCAINE 2% SOLUTION: Performed by: NURSE ANESTHETIST, CERTIFIED REGISTERED

## 2025-07-22 PROCEDURE — 25010000002 GLYCOPYRROLATE 0.2 MG/ML SOLUTION: Performed by: NURSE ANESTHETIST, CERTIFIED REGISTERED

## 2025-07-22 PROCEDURE — 43235 EGD DIAGNOSTIC BRUSH WASH: CPT | Performed by: SURGERY

## 2025-07-22 RX ORDER — EPHEDRINE SULFATE 50 MG/ML
INJECTION, SOLUTION INTRAVENOUS AS NEEDED
Status: DISCONTINUED | OUTPATIENT
Start: 2025-07-22 | End: 2025-07-22 | Stop reason: SURG

## 2025-07-22 RX ORDER — FLUMAZENIL 0.1 MG/ML
0.2 INJECTION INTRAVENOUS AS NEEDED
Status: DISCONTINUED | OUTPATIENT
Start: 2025-07-22 | End: 2025-07-22 | Stop reason: HOSPADM

## 2025-07-22 RX ORDER — PROPOFOL 10 MG/ML
VIAL (ML) INTRAVENOUS AS NEEDED
Status: DISCONTINUED | OUTPATIENT
Start: 2025-07-22 | End: 2025-07-22 | Stop reason: SURG

## 2025-07-22 RX ORDER — PROMETHAZINE HYDROCHLORIDE 25 MG/1
25 SUPPOSITORY RECTAL ONCE AS NEEDED
Status: DISCONTINUED | OUTPATIENT
Start: 2025-07-22 | End: 2025-07-22 | Stop reason: HOSPADM

## 2025-07-22 RX ORDER — DROPERIDOL 2.5 MG/ML
0.62 INJECTION, SOLUTION INTRAMUSCULAR; INTRAVENOUS
Status: DISCONTINUED | OUTPATIENT
Start: 2025-07-22 | End: 2025-07-22 | Stop reason: HOSPADM

## 2025-07-22 RX ORDER — LIDOCAINE HYDROCHLORIDE 20 MG/ML
INJECTION, SOLUTION INFILTRATION; PERINEURAL AS NEEDED
Status: DISCONTINUED | OUTPATIENT
Start: 2025-07-22 | End: 2025-07-22 | Stop reason: SURG

## 2025-07-22 RX ORDER — DIPHENHYDRAMINE HYDROCHLORIDE 50 MG/ML
12.5 INJECTION, SOLUTION INTRAMUSCULAR; INTRAVENOUS
Status: DISCONTINUED | OUTPATIENT
Start: 2025-07-22 | End: 2025-07-22 | Stop reason: HOSPADM

## 2025-07-22 RX ORDER — GLYCOPYRROLATE 0.2 MG/ML
INJECTION INTRAMUSCULAR; INTRAVENOUS AS NEEDED
Status: DISCONTINUED | OUTPATIENT
Start: 2025-07-22 | End: 2025-07-22 | Stop reason: SURG

## 2025-07-22 RX ORDER — ONDANSETRON 2 MG/ML
4 INJECTION INTRAMUSCULAR; INTRAVENOUS ONCE AS NEEDED
Status: DISCONTINUED | OUTPATIENT
Start: 2025-07-22 | End: 2025-07-22 | Stop reason: HOSPADM

## 2025-07-22 RX ORDER — NALOXONE HCL 0.4 MG/ML
0.2 VIAL (ML) INJECTION AS NEEDED
Status: DISCONTINUED | OUTPATIENT
Start: 2025-07-22 | End: 2025-07-22 | Stop reason: HOSPADM

## 2025-07-22 RX ORDER — PROMETHAZINE HYDROCHLORIDE 25 MG/1
25 TABLET ORAL ONCE AS NEEDED
Status: DISCONTINUED | OUTPATIENT
Start: 2025-07-22 | End: 2025-07-22 | Stop reason: HOSPADM

## 2025-07-22 RX ORDER — SODIUM CHLORIDE, SODIUM LACTATE, POTASSIUM CHLORIDE, CALCIUM CHLORIDE 600; 310; 30; 20 MG/100ML; MG/100ML; MG/100ML; MG/100ML
1000 INJECTION, SOLUTION INTRAVENOUS CONTINUOUS
Status: DISCONTINUED | OUTPATIENT
Start: 2025-07-22 | End: 2025-07-22 | Stop reason: HOSPADM

## 2025-07-22 RX ADMIN — PROPOFOL 100 MG: 10 INJECTION, EMULSION INTRAVENOUS at 13:24

## 2025-07-22 RX ADMIN — LIDOCAINE HYDROCHLORIDE 80 MG: 20 INJECTION, SOLUTION INFILTRATION; PERINEURAL at 13:24

## 2025-07-22 RX ADMIN — EPHEDRINE SULFATE 5 MG: 50 INJECTION INTRAMUSCULAR; INTRAVENOUS; SUBCUTANEOUS at 13:37

## 2025-07-22 RX ADMIN — PROPOFOL 50 MG: 10 INJECTION, EMULSION INTRAVENOUS at 13:33

## 2025-07-22 RX ADMIN — PROPOFOL 50 MG: 10 INJECTION, EMULSION INTRAVENOUS at 13:28

## 2025-07-22 RX ADMIN — GLYCOPYRROLATE 0.2 MG: 0.2 INJECTION INTRAMUSCULAR; INTRAVENOUS at 13:37

## 2025-07-22 RX ADMIN — SODIUM CHLORIDE, POTASSIUM CHLORIDE, SODIUM LACTATE AND CALCIUM CHLORIDE 1000 ML: 600; 310; 30; 20 INJECTION, SOLUTION INTRAVENOUS at 12:44

## 2025-07-22 NOTE — DISCHARGE INSTRUCTIONS
For the rest of the day patient needs to be with a responsible adult.    For the rest of the day DO NOT work, drive, operate heavy machinery, drink alcohol, make important decisions or sign legal documents.    Advance to regular diet as tolerated, if your stomach is upset start with a light/bland diet.    Follow recommendations on procedure report if provided by your doctor.    Call Dr Calvo for problems [phone]    If these problems occur come to ER: large amounts of bleeding, trouble breathing, repeated vomiting, severe unrelieved pain, fever or chills.

## 2025-07-22 NOTE — ANESTHESIA PREPROCEDURE EVALUATION
Anesthesia Evaluation     Patient summary reviewed and Nursing notes reviewed   history of anesthetic complications:  Difficult airway  NPO Solid Status: > 8 hours  NPO Liquid Status: > 8 hours           Airway   Mallampati: II  Neck ROM: full  No difficulty expected  Dental - normal exam     Pulmonary     breath sounds clear to auscultation    ROS comment: Paralyzed vocal cord  Cardiovascular     Rhythm: regular    (+) hypertension, valvular problems/murmurs murmur, dysrhythmias Atrial Fib, hyperlipidemia      Neuro/Psych  (+) psychiatric history Anxiety and Bipolar  GI/Hepatic/Renal/Endo    (+) obesity, GERD, thyroid problem     Musculoskeletal     Abdominal    Substance History      OB/GYN          Other   arthritis,   history of cancer                  Anesthesia Plan    ASA 3     MAC     (Very hoarse voice)  intravenous induction     Anesthetic plan, risks, benefits, and alternatives have been provided, discussed and informed consent has been obtained with: patient.    CODE STATUS:

## 2025-07-22 NOTE — OP NOTE
Operative Note :   Martin Calvo MD    Marie Foy  1955    Procedure Date: 07/22/25    Pre-op Diagnosis:  Anemia  Hemoccult positive stools  Personal history of colon cancer    Post-op Diagnosis:  Small hiatal hernia  Diverticulosis    Procedure:   Esophagogastroduodenoscopy  Colonoscopy to terminal ileum    Surgeon: Martin Calvo MD      Anesthesia: MAC    Indications:  70-year-old female presenting with heme positive stools and mild anemia.  History of T3N0 colon cancer resected in 2021.  She has recently started Xarelto.    Findings:   Small hiatal hernia  Patent anastomosis  Minimal diverticular disease  No clear cause for anemia identified    Recommendations:   Monitor hemoglobin, if anemia is persistent, may consider referral to GI for small bowel capsule endoscopy and evaluation by hematology    Description of procedure:    After obtaining informed consent, patient was brought to the endoscopy suite and was sedated.  The flexible gastroscope was introduced through the patient's mouth and passed the cricopharyngeus into the esophagus, beyond the GE junction into the stomach and then passed the pylorus into the duodenum.  The junction of the 2nd and 3rd portion of the duodenum was reached.  The duodenal mucosa was normal.  There was no ulceration.  The major papilla was normal.  Duodenal bulb and pyloric channel were unremarkable.  Scope was pulled back into the stomach.  Gastric mucosa was normal with no gastric ulceration noted.  Retroflexion of the scope within the stomach demonstrated a small hiatus hernia.  Scope was pulled back to the GE junction which was normal as was the remainder of the esophagus on withdrawal of the scope.    The patient was turned around. Digital rectal exam was undertaken and was unremarkable.  The flexible colonoscope was then introduced through the rectum and passed around to the level of the cecum.  The anastomosis was transversed.  The ileocecal valve and  appendiceal orifice appeared unremarkable.  The terminal ileum was intubated and the visualized ileal mucosa was normal.  Scope was pulled pulled back into the cecum which was normal.  The ascending colon was normal.  The patient's anastomosis was widely patent.  A single silk suture was able to be identified.  The remainder of the splenic or descending colon were normal.  The sigmoid colon had a few diverticular changes.  The rectum was unremarkable.  The scope was withdrawn completely.  The patient tolerated this well.    Martin Calvo MD  General and Endoscopic Surgery  [facility]    [address] [place] [address], [address]  [place], [place], [address]  P: [phone]  F: [phone]

## 2025-07-22 NOTE — ANESTHESIA POSTPROCEDURE EVALUATION
"Patient: Marie Foy    Procedure Summary       Date: 07/22/25 Room / Location:  [facility]    Anesthesia Start: 1319 Anesthesia Stop: 1353    Procedures:       ESOPHAGOGASTRODUODENOSCOPY (Esophagus)      COLONOSCOPY into cecum and terminal ileum Diagnosis:       Heme positive stool      (Heme positive stool [R19.5])    Surgeons: Martin Calvo MD Provider: Johan Garcia MD    Anesthesia Type: MAC ASA Status: 3            Anesthesia Type: MAC    Vitals  Vitals Value Taken Time   BP 97/51 07/22/25 13:56   Temp     Pulse 90 07/22/25 14:00   Resp 16 07/22/25 13:56   SpO2 95 % 07/22/25 14:00   Vitals shown include unfiled device data.        Post Anesthesia Care and Evaluation    Patient location during evaluation: bedside  Patient participation: complete - patient participated  Level of consciousness: sleepy but conscious  Pain score: 0  Pain management: adequate    Airway patency: patent  Anesthetic complications: No anesthetic complications    Cardiovascular status: acceptable  Respiratory status: acceptable  Hydration status: acceptable    Comments: BP 97/51 (BP Location: Left arm, Patient Position: Lying)   Pulse 92   Resp 16   Ht 147.3 cm (58\")   Wt 68.9 kg (152 lb)   SpO2 95%   BMI 31.77 kg/m²       "

## (undated) DEVICE — SINGLE-USE BIOPSY FORCEPS: Brand: RADIAL JAW 4

## (undated) DEVICE — INTENDED TO SUPPORT AND MAINTAIN THE POSITION OF AN ANESTHETIZED PATIENT DURING SURGERY: Brand: HERMANTOR XL PINK KNEE POSITIONING PAD

## (undated) DEVICE — DRSNG SURESITE WNDW 2.38X2.75

## (undated) DEVICE — LOU LAP CHOLE: Brand: MEDLINE INDUSTRIES, INC.

## (undated) DEVICE — GLV SURG BIOGEL LTX PF 8 1/2

## (undated) DEVICE — 3M™ IOBAN™ 2 ANTIMICROBIAL INCISE DRAPE 6640EZ: Brand: IOBAN™ 2

## (undated) DEVICE — MSK PROC CURAPLEX O2 2/ADAPT 7FT

## (undated) DEVICE — BNDG,ELSTC,MATRIX,STRL,6"X5YD,LF,HOOK&LP: Brand: MEDLINE

## (undated) DEVICE — SENSR O2 OXIMAX FNGR A/ 18IN NONSTR

## (undated) DEVICE — KT ORCA ORCAPOD DISP STRL

## (undated) DEVICE — SEAL

## (undated) DEVICE — KT DRN EVAC WND PVC PCH WTROC RND 10F400

## (undated) DEVICE — THE SINGLE USE ETRAP – POLYP TRAP IS USED FOR SUCTION RETRIEVAL OF ENDOSCOPICALLY REMOVED POLYPS.: Brand: ETRAP

## (undated) DEVICE — CANNULA SEAL

## (undated) DEVICE — THE TORRENT IRRIGATION SCOPE CONNECTOR IS USED WITH THE TORRENT IRRIGATION TUBING TO PROVIDE IRRIGATION FLUIDS SUCH AS STERILE WATER DURING GASTROINTESTINAL ENDOSCOPIC PROCEDURES WHEN USED IN CONJUNCTION WITH AN IRRIGATION PUMP (OR ELECTROSURGICAL UNIT).: Brand: TORRENT

## (undated) DEVICE — SYR LUERLOK 30CC

## (undated) DEVICE — DEV COND GAS LAP INSUFLOW W/LUER CONN

## (undated) DEVICE — ANTIBACTERIAL UNDYED BRAIDED (POLYGLACTIN 910), SYNTHETIC ABSORBABLE SUTURE: Brand: COATED VICRYL

## (undated) DEVICE — SUT MNCRYL PLS ANTIB UD 4/0 PS2 18IN

## (undated) DEVICE — BLCK/BITE BLOX W/DENTL/RIM W/STRAP 54F

## (undated) DEVICE — STPCK 3WY D201 DISCOFIX

## (undated) DEVICE — DRSNG WND BORDR/ADHS NONADHR/GZ LF 2X2IN STRL

## (undated) DEVICE — DRAPE,REIN 53X77,STERILE: Brand: MEDLINE

## (undated) DEVICE — TIP COVER ACCESSORY

## (undated) DEVICE — COLUMN DRAPE

## (undated) DEVICE — Device: Brand: SPOT EX ENDOSCOPIC TATTOO

## (undated) DEVICE — LOU LAP SIGMOID COLON: Brand: MEDLINE INDUSTRIES, INC.

## (undated) DEVICE — REDUCER: Brand: ENDOWRIST

## (undated) DEVICE — EXTENSION SET, MALE LUER LOCK ADAPTER WITH RETRACTABLE COLLAR

## (undated) DEVICE — DUAL CUT SAGITTAL BLADE

## (undated) DEVICE — APPL DURAPREP IODOPHOR APL 26ML

## (undated) DEVICE — 30977 SEE SHARP - ENHANCED INTRAOPERATIVE LAPAROSCOPE CLEANING & DEFOGGING: Brand: 30977 SEE SHARP - ENHANCED INTRAOPERATIVE LAPAROSCOPE CLEANING & DEFOGGING

## (undated) DEVICE — COVER,C-ARM,41X74: Brand: MEDLINE

## (undated) DEVICE — TUBING, SUCTION, 1/4" X 10', STRAIGHT: Brand: MEDLINE

## (undated) DEVICE — TRAP FLD MINIVAC MEGADYNE 100ML

## (undated) DEVICE — 3M™ STERI-STRIP™ COMPOUND BENZOIN TINCTURE 40 BAGS/CARTON 4 CARTONS/CASE C1544: Brand: 3M™ STERI-STRIP™

## (undated) DEVICE — ERBE NESSY®PLATE 170 SPLIT; 168CM²; CABLE 3M: Brand: ERBE

## (undated) DEVICE — VIOLET BRAIDED (POLYGLACTIN 910), SYNTHETIC ABSORBABLE SUTURE: Brand: COATED VICRYL

## (undated) DEVICE — ANTIBACTERIAL UNDYED BRAIDED (POLYGLACTIN 910), SYNTHETIC ABSORBABLE SURGICAL SUTURE: Brand: COATED VICRYL

## (undated) DEVICE — PATIENT RETURN ELECTRODE, SINGLE-USE, CONTACT QUALITY MONITORING, ADULT, WITH 9FT CORD, FOR PATIENTS WEIGING OVER 33LBS. (15KG): Brand: MEGADYNE

## (undated) DEVICE — GOWN,SIRUS,NONRNF,SETINSLV,XL,20/CS: Brand: MEDLINE

## (undated) DEVICE — SOL IRR NACL 0.9PCT 3000ML

## (undated) DEVICE — ADAPT CLN BIOGUARD AIR/H2O DISP

## (undated) DEVICE — WOUND RETRACTOR AND PROTECTOR: Brand: ALEXIS WOUND PROTECTOR-RETRACTOR

## (undated) DEVICE — SOL NACL 0.9PCT 1000ML

## (undated) DEVICE — STAPLER 60: Brand: SUREFORM

## (undated) DEVICE — BLADELESS OBTURATOR: Brand: WECK VISTA

## (undated) DEVICE — GLV SURG SENSICARE W/ALOE PF LF 8 STRL

## (undated) DEVICE — LN SMPL CO2 SHTRM SD STREAM W/M LUER

## (undated) DEVICE — APPL CHLORAPREP HI/LITE 26ML ORNG

## (undated) DEVICE — UNDERGLV SURG BIOGEL INDICATOR LF PF 7.5

## (undated) DEVICE — ST TBG AIRSEAL BIF FLTR W/ACT/CHARCOAL/FLTR

## (undated) DEVICE — SKIN PREP TRAY 4 COMPARTM TRAY: Brand: MEDLINE INDUSTRIES, INC.

## (undated) DEVICE — CANN O2 ETCO2 FITS ALL CONN CO2 SMPL A/ 7IN DISP LF

## (undated) DEVICE — 450 ML BOTTLE OF 0.05% CHLORHEXIDINE GLUCONATE IN 99.95% STERILE WATER FOR IRRIGATION, USP AND APPLICATOR.: Brand: IRRISEPT ANTIMICROBIAL WOUND LAVAGE

## (undated) DEVICE — ARM DRAPE

## (undated) DEVICE — SYR LUERLOK 20CC BX/50

## (undated) DEVICE — THE STERILE LIGHT HANDLE COVER IS USED WITH STERIS SURGICAL LIGHTING AND VISUALIZATION SYSTEMS.

## (undated) DEVICE — CATH IV INSYTE AUTOGARD 14G 1 1/2IN ORNG

## (undated) DEVICE — PK KN TOTL 40

## (undated) DEVICE — LAPAROVUE VISIBILITY SYSTEM LAPAROSCOPIC SOLUTIONS: Brand: LAPAROVUE

## (undated) DEVICE — SUT VIC 1 CT1 36IN J947H

## (undated) DEVICE — GLV SURG SENSICARE PI MIC PF SZ7 LF STRL

## (undated) DEVICE — YANKAUER,BULB TIP,W/O VENT,RIGID,STERILE: Brand: MEDLINE

## (undated) DEVICE — SYS CLS SKIN PREMIERPRO EXOFINFUSION 22CM

## (undated) DEVICE — PREP SOL POVIDONE/IODINE BT 4OZ

## (undated) DEVICE — SUT PROLN 2/0 CT2 30IN 8411H

## (undated) DEVICE — UNDERCAST PADDING: Brand: DEROYAL

## (undated) DEVICE — GLV SURG SENSICARE PI PF LF 7 GRN STRL

## (undated) DEVICE — Device: Brand: XPERIENCE

## (undated) DEVICE — CATH URETRL SOF/FLEX OPN/END 4F 70CM

## (undated) DEVICE — VESSEL SEALER EXTEND: Brand: ENDOWRIST

## (undated) DEVICE — NEEDLE, QUINCKE 22GX3.5": Brand: MEDLINE INDUSTRIES, INC.

## (undated) DEVICE — ENDOPATH XCEL BLADELESS TROCARS WITH STABILITY SLEEVES: Brand: ENDOPATH XCEL

## (undated) DEVICE — GLV SURG BIOGEL M LTX PF 7 1/2

## (undated) DEVICE — PENCL ES MEGADINE EZ/CLEAN BUTN W/HOLSTR 10FT

## (undated) DEVICE — THE CARR-LOCKE INJECTION NEEDLE IS A SINGLE USE, DISPOSABLE, FLEXIBLE SHEATH INJECTION NEEDLE USED FOR THE INJECTION OF VARIOUS TYPES OF MEDIA THROUGH FLEXIBLE ENDOSCOPES.

## (undated) DEVICE — TROC BLADLES AIRSEAL/OPTI THRD 8X120MM 1P/U

## (undated) DEVICE — SOL ANTISTICK CAUTRY ELECTROLUBE LF

## (undated) DEVICE — SYR LL TP 10ML STRL

## (undated) DEVICE — SNAR POLYP SENSATION STDOVL 27 240 BX40

## (undated) DEVICE — GLV SURG SENSICARE W/ALOE PF LF 7.5 STRL

## (undated) DEVICE — TISSUE RETRIEVAL SYSTEM: Brand: INZII RETRIEVAL SYSTEM

## (undated) DEVICE — ADHS LIQ MASTISOL 2/3ML